# Patient Record
Sex: MALE | Race: WHITE
[De-identification: names, ages, dates, MRNs, and addresses within clinical notes are randomized per-mention and may not be internally consistent; named-entity substitution may affect disease eponyms.]

---

## 2017-02-01 RX ORDER — SPIRONOLACTONE 25 MG/1
TABLET ORAL
Qty: 90 TABLET | Refills: 0 | Status: SHIPPED | OUTPATIENT
Start: 2017-02-01 | End: 2017-04-28

## 2017-02-01 RX ORDER — ATORVASTATIN CALCIUM 80 MG/1
TABLET, FILM COATED ORAL
Qty: 90 TABLET | Refills: 0 | Status: SHIPPED | OUTPATIENT
Start: 2017-02-01 | End: 2017-10-01

## 2017-02-28 ENCOUNTER — PRIOR ORIGINAL RECORDS (OUTPATIENT)
Dept: OTHER | Age: 57
End: 2017-02-28

## 2017-03-07 ENCOUNTER — OFFICE VISIT (OUTPATIENT)
Dept: FAMILY MEDICINE CLINIC | Facility: CLINIC | Age: 57
End: 2017-03-07

## 2017-03-07 ENCOUNTER — APPOINTMENT (OUTPATIENT)
Dept: LAB | Age: 57
End: 2017-03-07
Attending: FAMILY MEDICINE

## 2017-03-07 ENCOUNTER — PRIOR ORIGINAL RECORDS (OUTPATIENT)
Dept: OTHER | Age: 57
End: 2017-03-07

## 2017-03-07 VITALS
SYSTOLIC BLOOD PRESSURE: 126 MMHG | TEMPERATURE: 99 F | RESPIRATION RATE: 20 BRPM | HEART RATE: 80 BPM | WEIGHT: 267 LBS | HEIGHT: 75 IN | DIASTOLIC BLOOD PRESSURE: 80 MMHG | BODY MASS INDEX: 33.2 KG/M2

## 2017-03-07 DIAGNOSIS — E56.9 VITAMIN DEFICIENCY: ICD-10-CM

## 2017-03-07 DIAGNOSIS — I77.810 ASCENDING AORTA DILATATION (HCC): ICD-10-CM

## 2017-03-07 DIAGNOSIS — I35.1 AORTIC VALVE INSUFFICIENCY, UNSPECIFIED ETIOLOGY: ICD-10-CM

## 2017-03-07 DIAGNOSIS — Z86.73 HISTORY OF CVA (CEREBROVASCULAR ACCIDENT): Primary | ICD-10-CM

## 2017-03-07 DIAGNOSIS — I10 ESSENTIAL HYPERTENSION: ICD-10-CM

## 2017-03-07 DIAGNOSIS — I42.0 DILATED CARDIOMYOPATHY (HCC): ICD-10-CM

## 2017-03-07 DIAGNOSIS — Z86.73 HISTORY OF CVA (CEREBROVASCULAR ACCIDENT): ICD-10-CM

## 2017-03-07 LAB
25-HYDROXYVITAMIN D (TOTAL): 41.5 NG/ML (ref 30–100)
ALBUMIN SERPL-MCNC: 3.6 G/DL (ref 3.5–4.8)
ALP LIVER SERPL-CCNC: 56 U/L (ref 45–117)
ALT SERPL-CCNC: 40 U/L (ref 17–63)
AST SERPL-CCNC: 22 U/L (ref 15–41)
BILIRUB SERPL-MCNC: 0.9 MG/DL (ref 0.1–2)
BUN BLD-MCNC: 24 MG/DL (ref 8–20)
CALCIUM BLD-MCNC: 9.5 MG/DL (ref 8.3–10.3)
CHLORIDE: 104 MMOL/L (ref 101–111)
CHOLEST SMN-MCNC: 100 MG/DL (ref ?–200)
CO2: 29 MMOL/L (ref 22–32)
CREAT BLD-MCNC: 1.22 MG/DL (ref 0.7–1.3)
EST. AVERAGE GLUCOSE BLD GHB EST-MCNC: 108 MG/DL (ref 68–126)
FREE T4: 0.8 NG/DL (ref 0.9–1.8)
GLUCOSE BLD-MCNC: 104 MG/DL (ref 70–99)
HAV AB SERPL IA-ACNC: 451 PG/ML (ref 193–986)
HAV IGM SER QL: 2.2 MG/DL (ref 1.7–3)
HBA1C MFR BLD HPLC: 5.4 % (ref ?–5.7)
HDLC SERPL-MCNC: 59 MG/DL (ref 45–?)
HDLC SERPL: 1.69 {RATIO} (ref ?–4.97)
LDLC SERPL CALC-MCNC: 27 MG/DL (ref ?–130)
M PROTEIN MFR SERPL ELPH: 7.2 G/DL (ref 6.1–8.3)
NONHDLC SERPL-MCNC: 41 MG/DL (ref ?–130)
POTASSIUM SERPL-SCNC: 4.6 MMOL/L (ref 3.6–5.1)
SODIUM SERPL-SCNC: 140 MMOL/L (ref 136–144)
TRIGLYCERIDES: 69 MG/DL (ref ?–150)
TSI SER-ACNC: 1.28 MIU/ML (ref 0.35–5.5)
VLDL: 14 MG/DL (ref 5–40)

## 2017-03-07 PROCEDURE — 84439 ASSAY OF FREE THYROXINE: CPT

## 2017-03-07 PROCEDURE — 36415 COLL VENOUS BLD VENIPUNCTURE: CPT

## 2017-03-07 PROCEDURE — 83735 ASSAY OF MAGNESIUM: CPT

## 2017-03-07 PROCEDURE — 84443 ASSAY THYROID STIM HORMONE: CPT

## 2017-03-07 PROCEDURE — 99214 OFFICE O/P EST MOD 30 MIN: CPT | Performed by: FAMILY MEDICINE

## 2017-03-07 PROCEDURE — 80061 LIPID PANEL: CPT

## 2017-03-07 PROCEDURE — 82306 VITAMIN D 25 HYDROXY: CPT

## 2017-03-07 PROCEDURE — 80053 COMPREHEN METABOLIC PANEL: CPT

## 2017-03-07 PROCEDURE — 82607 VITAMIN B-12: CPT

## 2017-03-07 PROCEDURE — 83036 HEMOGLOBIN GLYCOSYLATED A1C: CPT

## 2017-04-07 ENCOUNTER — OFFICE VISIT (OUTPATIENT)
Dept: FAMILY MEDICINE CLINIC | Facility: CLINIC | Age: 57
End: 2017-04-07

## 2017-04-07 VITALS
WEIGHT: 268 LBS | HEIGHT: 75 IN | DIASTOLIC BLOOD PRESSURE: 80 MMHG | TEMPERATURE: 99 F | RESPIRATION RATE: 18 BRPM | HEART RATE: 82 BPM | BODY MASS INDEX: 33.32 KG/M2 | SYSTOLIC BLOOD PRESSURE: 120 MMHG

## 2017-04-07 DIAGNOSIS — I10 ESSENTIAL HYPERTENSION: Primary | ICD-10-CM

## 2017-04-07 PROCEDURE — 99213 OFFICE O/P EST LOW 20 MIN: CPT | Performed by: FAMILY MEDICINE

## 2017-04-07 NOTE — PROGRESS NOTES
Scooter Davis is a 65year old male here for CVA / HTN follow up   HPI:     Pt had a CVA 3/3/15. Pt had a cerebellar CVA     Pt is working at home and working out regularly and eating healthy.     Pt has bicupid aortic valve, cardiomyopathy and dilated ao GENERAL: feels well otherwise  SKIN: denies any unusual skin lesions  EYES:denies blurred vision or double vision  HEENT: denies nasal congestion, sinus pain or ST  LUNGS: denies shortness of breath with exertion  CARDIOVASCULAR: denies chest pain on exe

## 2017-04-28 RX ORDER — SPIRONOLACTONE 25 MG/1
TABLET ORAL
Qty: 90 TABLET | Refills: 0 | Status: SHIPPED | OUTPATIENT
Start: 2017-04-28 | End: 2017-09-24

## 2017-08-14 ENCOUNTER — OFFICE VISIT (OUTPATIENT)
Dept: FAMILY MEDICINE CLINIC | Facility: CLINIC | Age: 57
End: 2017-08-14

## 2017-08-14 VITALS
HEIGHT: 75 IN | BODY MASS INDEX: 32.33 KG/M2 | OXYGEN SATURATION: 99 % | RESPIRATION RATE: 20 BRPM | WEIGHT: 260 LBS | DIASTOLIC BLOOD PRESSURE: 80 MMHG | TEMPERATURE: 98 F | HEART RATE: 60 BPM | SYSTOLIC BLOOD PRESSURE: 130 MMHG

## 2017-08-14 DIAGNOSIS — I10 ESSENTIAL HYPERTENSION: ICD-10-CM

## 2017-08-14 DIAGNOSIS — Z86.73 HISTORY OF CVA (CEREBROVASCULAR ACCIDENT): Primary | ICD-10-CM

## 2017-08-14 DIAGNOSIS — I42.0 DILATED CARDIOMYOPATHY (HCC): ICD-10-CM

## 2017-08-14 PROCEDURE — 99213 OFFICE O/P EST LOW 20 MIN: CPT | Performed by: FAMILY MEDICINE

## 2017-08-14 RX ORDER — HYDROCHLOROTHIAZIDE 12.5 MG/1
12.5 TABLET ORAL DAILY
COMMUNITY
End: 2018-02-08

## 2017-08-14 NOTE — PROGRESS NOTES
Molly De La Cruz is a 65year old male here for CVA / HTN follow up   HPI:     Pt had a CVA 3/3/15. Pt had a cerebellar CVA     Pt is working at home and working out regularly and eating healthy. Pt is happy to have lost weight.      Pt has bicupid aortic 1.0 oz/week     Cans of beer: 2 per week     Occ: . : . Children: .    Exercise: minimal.  Diet: watches fats closely     REVIEW OF SYSTEMS:   GENERAL: feels well otherwise  SKIN: denies any unusual skin lesions  EYES:denies blurred vision or double

## 2017-08-17 ENCOUNTER — PRIOR ORIGINAL RECORDS (OUTPATIENT)
Dept: OTHER | Age: 57
End: 2017-08-17

## 2017-08-17 ENCOUNTER — APPOINTMENT (OUTPATIENT)
Dept: LAB | Age: 57
End: 2017-08-17
Attending: FAMILY MEDICINE
Payer: COMMERCIAL

## 2017-08-17 DIAGNOSIS — Z86.73 HISTORY OF CVA (CEREBROVASCULAR ACCIDENT): ICD-10-CM

## 2017-08-17 DIAGNOSIS — I10 ESSENTIAL HYPERTENSION: ICD-10-CM

## 2017-08-17 DIAGNOSIS — I42.0 DILATED CARDIOMYOPATHY (HCC): ICD-10-CM

## 2017-08-17 LAB
ALBUMIN SERPL-MCNC: 3.7 G/DL (ref 3.5–4.8)
ALP LIVER SERPL-CCNC: 51 U/L (ref 45–117)
ALT SERPL-CCNC: 27 U/L (ref 17–63)
AST SERPL-CCNC: 22 U/L (ref 15–41)
BILIRUB SERPL-MCNC: 0.8 MG/DL (ref 0.1–2)
BUN BLD-MCNC: 24 MG/DL (ref 8–20)
CALCIUM BLD-MCNC: 9.1 MG/DL (ref 8.3–10.3)
CHLORIDE: 106 MMOL/L (ref 101–111)
CHOLEST SMN-MCNC: 97 MG/DL (ref ?–200)
CO2: 29 MMOL/L (ref 22–32)
CREAT BLD-MCNC: 1.14 MG/DL (ref 0.7–1.3)
GLUCOSE BLD-MCNC: 93 MG/DL (ref 70–99)
HDLC SERPL-MCNC: 45 MG/DL (ref 45–?)
HDLC SERPL: 2.16 {RATIO} (ref ?–4.97)
LDLC SERPL CALC-MCNC: 38 MG/DL (ref ?–130)
LDLC SERPL-MCNC: 14 MG/DL (ref 5–40)
M PROTEIN MFR SERPL ELPH: 6.7 G/DL (ref 6.1–8.3)
NONHDLC SERPL-MCNC: 52 MG/DL (ref ?–130)
POTASSIUM SERPL-SCNC: 4.3 MMOL/L (ref 3.6–5.1)
SODIUM SERPL-SCNC: 140 MMOL/L (ref 136–144)
TRIGLYCERIDES: 72 MG/DL (ref ?–150)

## 2017-08-17 PROCEDURE — 36415 COLL VENOUS BLD VENIPUNCTURE: CPT

## 2017-08-17 PROCEDURE — 80061 LIPID PANEL: CPT

## 2017-08-17 PROCEDURE — 80053 COMPREHEN METABOLIC PANEL: CPT

## 2017-08-31 ENCOUNTER — HOSPITAL ENCOUNTER (OUTPATIENT)
Dept: CT IMAGING | Facility: HOSPITAL | Age: 57
Discharge: HOME | End: 2017-08-31
Attending: INTERNAL MEDICINE
Payer: COMMERCIAL

## 2017-08-31 DIAGNOSIS — I71.2: ICD-10-CM

## 2017-08-31 DIAGNOSIS — Q23.1 CONGENITAL AORTIC INSUFFICIENCY: ICD-10-CM

## 2017-08-31 PROCEDURE — 71275 CT ANGIOGRAPHY CHEST: CPT | Performed by: INTERNAL MEDICINE

## 2017-09-07 ENCOUNTER — PRIOR ORIGINAL RECORDS (OUTPATIENT)
Dept: OTHER | Age: 57
End: 2017-09-07

## 2017-09-07 RX ORDER — HYDROCHLOROTHIAZIDE 12.5 MG/1
CAPSULE, GELATIN COATED ORAL
Qty: 180 CAPSULE | Refills: 0 | Status: SHIPPED | OUTPATIENT
Start: 2017-09-07 | End: 2018-02-08

## 2017-09-25 RX ORDER — SPIRONOLACTONE 25 MG/1
TABLET ORAL
Qty: 90 TABLET | Refills: 1 | Status: SHIPPED | OUTPATIENT
Start: 2017-09-25 | End: 2017-10-20

## 2017-09-26 ENCOUNTER — MYAURORA ACCOUNT LINK (OUTPATIENT)
Dept: OTHER | Age: 57
End: 2017-09-26

## 2017-09-26 ENCOUNTER — HOSPITAL ENCOUNTER (OUTPATIENT)
Dept: CV DIAGNOSTICS | Facility: HOSPITAL | Age: 57
Discharge: HOME | End: 2017-09-26
Attending: INTERNAL MEDICINE

## 2017-09-26 DIAGNOSIS — I71.2 THORACIC ASCENDING AORTIC ANEURYSM (HCC): ICD-10-CM

## 2017-09-26 DIAGNOSIS — Q23.1 BICUSPID AORTIC VALVE: ICD-10-CM

## 2017-09-28 ENCOUNTER — APPOINTMENT (OUTPATIENT)
Dept: OTHER | Facility: HOSPITAL | Age: 57
End: 2017-09-28
Attending: PREVENTIVE MEDICINE

## 2017-09-29 LAB
ALBUMIN: 3.7 G/DL
ALKALINE PHOSPHATATE(ALK PHOS): 51 IU/L
BILIRUBIN TOTAL: 0.8 MG/DL
BUN: 24 MG/DL
CALCIUM: 9.1 MG/DL
CHLORIDE: 106 MEQ/L
CHOLESTEROL, TOTAL: 97 MG/DL
CREATININE, SERUM: 1.14 MG/DL
GLUCOSE: 93 MG/DL
HDL CHOLESTEROL: 45 MG/DL
LDL CHOLESTEROL: 38 MG/DL
MAGNESIUM: 2.2 MG/DL
POTASSIUM, SERUM: 4.3 MEQ/L
PROTEIN, TOTAL: 6.7 G/DL
SGOT (AST): 22 IU/L
SGPT (ALT): 27 IU/L
SODIUM: 140 MEQ/L
TRIGLYCERIDES: 72 MG/DL

## 2017-10-03 ENCOUNTER — PRIOR ORIGINAL RECORDS (OUTPATIENT)
Dept: OTHER | Age: 57
End: 2017-10-03

## 2017-10-03 RX ORDER — ATORVASTATIN CALCIUM 80 MG/1
TABLET, FILM COATED ORAL
Qty: 90 TABLET | Refills: 0 | OUTPATIENT
Start: 2017-10-03

## 2017-10-03 RX ORDER — ATORVASTATIN CALCIUM 80 MG/1
TABLET, FILM COATED ORAL
Qty: 30 TABLET | Refills: 0 | Status: SHIPPED | OUTPATIENT
Start: 2017-10-03 | End: 2017-10-20

## 2017-10-20 RX ORDER — ATORVASTATIN CALCIUM 80 MG/1
TABLET, FILM COATED ORAL
Qty: 90 TABLET | Refills: 0 | Status: SHIPPED | OUTPATIENT
Start: 2017-10-20 | End: 2017-12-13

## 2017-10-20 RX ORDER — CARVEDILOL 25 MG/1
TABLET ORAL
Qty: 135 TABLET | Refills: 0 | Status: SHIPPED | OUTPATIENT
Start: 2017-10-20 | End: 2017-12-13

## 2017-10-20 RX ORDER — LISINOPRIL 40 MG/1
TABLET ORAL
Qty: 90 TABLET | Refills: 0 | Status: SHIPPED | OUTPATIENT
Start: 2017-10-20 | End: 2017-12-13

## 2017-10-20 RX ORDER — SPIRONOLACTONE 25 MG/1
25 TABLET ORAL
Qty: 90 TABLET | Refills: 0 | Status: SHIPPED | OUTPATIENT
Start: 2017-10-20 | End: 2017-12-13

## 2017-12-14 RX ORDER — LISINOPRIL 40 MG/1
TABLET ORAL
Qty: 90 TABLET | Refills: 0 | Status: SHIPPED | OUTPATIENT
Start: 2017-12-14 | End: 2018-02-15

## 2017-12-14 RX ORDER — ATORVASTATIN CALCIUM 80 MG/1
TABLET, FILM COATED ORAL
Qty: 90 TABLET | Refills: 0 | Status: SHIPPED | OUTPATIENT
Start: 2017-12-14 | End: 2018-02-15

## 2017-12-14 RX ORDER — SPIRONOLACTONE 25 MG/1
TABLET ORAL
Qty: 90 TABLET | Refills: 0 | Status: SHIPPED | OUTPATIENT
Start: 2017-12-14 | End: 2018-02-15

## 2017-12-14 RX ORDER — CARVEDILOL 25 MG/1
TABLET ORAL
Qty: 135 TABLET | Refills: 0 | Status: SHIPPED | OUTPATIENT
Start: 2017-12-14 | End: 2018-02-15

## 2018-02-08 ENCOUNTER — TELEPHONE (OUTPATIENT)
Dept: FAMILY MEDICINE CLINIC | Facility: CLINIC | Age: 58
End: 2018-02-08

## 2018-02-08 RX ORDER — HYDROCHLOROTHIAZIDE 12.5 MG/1
12.5 TABLET ORAL DAILY
Qty: 90 TABLET | Refills: 0 | Status: SHIPPED | OUTPATIENT
Start: 2018-02-08 | End: 2018-04-03

## 2018-02-08 RX ORDER — HYDROCHLOROTHIAZIDE 12.5 MG/1
CAPSULE, GELATIN COATED ORAL
Qty: 30 CAPSULE | Refills: 0 | Status: SHIPPED | OUTPATIENT
Start: 2018-02-08 | End: 2018-02-15

## 2018-02-08 NOTE — TELEPHONE ENCOUNTER
Patient stopped at , needs refill on HCTZ 12.5mgs  The directions on label of his med bottle states 2 pills daily, he states he only takes 1 pill daily.        He is out of medication would like 30 days at [place] and then he would like us to se

## 2018-02-08 NOTE — TELEPHONE ENCOUNTER
Patient stopped at , needs refill on HCTZ 12.5mgs  The directions on label of his med bottle states 2 pills daily, he states he only takes 1 pill daily.        He is out of medication would like 30 days at [place] and then he would like us to s

## 2018-02-15 ENCOUNTER — APPOINTMENT (OUTPATIENT)
Dept: LAB | Age: 58
End: 2018-02-15
Attending: FAMILY MEDICINE
Payer: COMMERCIAL

## 2018-02-15 ENCOUNTER — PRIOR ORIGINAL RECORDS (OUTPATIENT)
Dept: OTHER | Age: 58
End: 2018-02-15

## 2018-02-15 ENCOUNTER — OFFICE VISIT (OUTPATIENT)
Dept: FAMILY MEDICINE CLINIC | Facility: CLINIC | Age: 58
End: 2018-02-15

## 2018-02-15 VITALS
HEART RATE: 72 BPM | WEIGHT: 249 LBS | DIASTOLIC BLOOD PRESSURE: 70 MMHG | SYSTOLIC BLOOD PRESSURE: 120 MMHG | TEMPERATURE: 99 F | BODY MASS INDEX: 30.96 KG/M2 | RESPIRATION RATE: 20 BRPM | HEIGHT: 75 IN | OXYGEN SATURATION: 98 %

## 2018-02-15 DIAGNOSIS — E55.9 VITAMIN D DEFICIENCY: ICD-10-CM

## 2018-02-15 DIAGNOSIS — I10 ESSENTIAL HYPERTENSION: ICD-10-CM

## 2018-02-15 DIAGNOSIS — Z86.73 HISTORY OF CVA (CEREBROVASCULAR ACCIDENT): ICD-10-CM

## 2018-02-15 DIAGNOSIS — E56.9 VITAMIN DEFICIENCY: ICD-10-CM

## 2018-02-15 DIAGNOSIS — Z12.5 SCREENING PSA (PROSTATE SPECIFIC ANTIGEN): ICD-10-CM

## 2018-02-15 DIAGNOSIS — I35.1 AORTIC VALVE INSUFFICIENCY, ETIOLOGY OF CARDIAC VALVE DISEASE UNSPECIFIED: ICD-10-CM

## 2018-02-15 DIAGNOSIS — E53.8 VITAMIN B 12 DEFICIENCY: ICD-10-CM

## 2018-02-15 DIAGNOSIS — Z12.11 COLON CANCER SCREENING: ICD-10-CM

## 2018-02-15 DIAGNOSIS — I77.810 ASCENDING AORTA DILATATION (HCC): Primary | ICD-10-CM

## 2018-02-15 DIAGNOSIS — I42.0 DILATED CARDIOMYOPATHY (HCC): ICD-10-CM

## 2018-02-15 LAB
25-HYDROXYVITAMIN D (TOTAL): 36.8 NG/ML (ref 30–100)
ALBUMIN SERPL-MCNC: 4 G/DL (ref 3.5–4.8)
ALP LIVER SERPL-CCNC: 49 U/L (ref 45–117)
ALT SERPL-CCNC: 29 U/L (ref 17–63)
AST SERPL-CCNC: 22 U/L (ref 15–41)
BILIRUB SERPL-MCNC: 0.9 MG/DL (ref 0.1–2)
BUN BLD-MCNC: 23 MG/DL (ref 8–20)
CALCIUM BLD-MCNC: 9.4 MG/DL (ref 8.3–10.3)
CHLORIDE: 100 MMOL/L (ref 101–111)
CHOLEST SMN-MCNC: 139 MG/DL (ref ?–200)
CO2: 30 MMOL/L (ref 22–32)
CREAT BLD-MCNC: 1.32 MG/DL (ref 0.7–1.3)
EST. AVERAGE GLUCOSE BLD GHB EST-MCNC: 120 MG/DL (ref 68–126)
FREE T4: 0.7 NG/DL (ref 0.9–1.8)
GLUCOSE BLD-MCNC: 102 MG/DL (ref 70–99)
HAV AB SERPL IA-ACNC: 518 PG/ML (ref 193–986)
HBA1C MFR BLD HPLC: 5.8 % (ref ?–5.7)
HDLC SERPL-MCNC: 52 MG/DL (ref 45–?)
HDLC SERPL: 2.67 {RATIO} (ref ?–4.97)
LDLC SERPL CALC-MCNC: 69 MG/DL (ref ?–130)
M PROTEIN MFR SERPL ELPH: 7.7 G/DL (ref 6.1–8.3)
NONHDLC SERPL-MCNC: 87 MG/DL (ref ?–130)
POTASSIUM SERPL-SCNC: 4.7 MMOL/L (ref 3.6–5.1)
PSA SERPL-MCNC: 0.77 NG/ML (ref 0.01–4)
SODIUM SERPL-SCNC: 136 MMOL/L (ref 136–144)
TRIGL SERPL-MCNC: 89 MG/DL (ref ?–150)
TSI SER-ACNC: 1.4 MIU/ML (ref 0.35–5.5)
VLDLC SERPL CALC-MCNC: 18 MG/DL (ref 5–40)

## 2018-02-15 PROCEDURE — 84443 ASSAY THYROID STIM HORMONE: CPT

## 2018-02-15 PROCEDURE — 36415 COLL VENOUS BLD VENIPUNCTURE: CPT

## 2018-02-15 PROCEDURE — 83036 HEMOGLOBIN GLYCOSYLATED A1C: CPT

## 2018-02-15 PROCEDURE — 80053 COMPREHEN METABOLIC PANEL: CPT

## 2018-02-15 PROCEDURE — 84439 ASSAY OF FREE THYROXINE: CPT

## 2018-02-15 PROCEDURE — 99214 OFFICE O/P EST MOD 30 MIN: CPT | Performed by: FAMILY MEDICINE

## 2018-02-15 PROCEDURE — 82306 VITAMIN D 25 HYDROXY: CPT

## 2018-02-15 PROCEDURE — 82607 VITAMIN B-12: CPT

## 2018-02-15 PROCEDURE — 80061 LIPID PANEL: CPT

## 2018-02-15 PROCEDURE — 84153 ASSAY OF PSA TOTAL: CPT

## 2018-02-15 RX ORDER — ATORVASTATIN CALCIUM 80 MG/1
TABLET, FILM COATED ORAL
Qty: 90 TABLET | Refills: 1 | Status: SHIPPED | OUTPATIENT
Start: 2018-02-15 | End: 2019-05-01

## 2018-02-15 RX ORDER — CARVEDILOL 25 MG/1
TABLET ORAL
Qty: 135 TABLET | Refills: 1 | Status: SHIPPED | OUTPATIENT
Start: 2018-02-15 | End: 2018-11-02

## 2018-02-15 RX ORDER — SPIRONOLACTONE 25 MG/1
25 TABLET ORAL
Qty: 90 TABLET | Refills: 1 | Status: SHIPPED | OUTPATIENT
Start: 2018-02-15 | End: 2018-10-05

## 2018-02-15 RX ORDER — LISINOPRIL 40 MG/1
40 TABLET ORAL
Qty: 90 TABLET | Refills: 1 | Status: SHIPPED | OUTPATIENT
Start: 2018-02-15 | End: 2018-08-26

## 2018-02-15 NOTE — PROGRESS NOTES
Rohan Carl is a 65year old male here for CVA / HTN follow up   HPI:     Pt had a CVA 3/3/15. Pt had a cerebellar CVA     Pt is working at home and working out regularly and eating healthy. Pt is happy to have lost more weight.      Pt has bicupid aor 2 per week     Occ: . : . Children: .    Exercise: minimal.  Diet: watches fats closely     REVIEW OF SYSTEMS:   GENERAL: feels well otherwise  SKIN: denies any unusual skin lesions  EYES:denies blurred vision or double vision  HEENT: denies nasal co unspecified    - spironolactone 25 MG Oral Tab; Take 1 tablet (25 mg total) by mouth once daily. Dispense: 90 tablet;  Refill: 1  - carvedilol 25 MG Oral Tab; TAKE ONE-HALF TABLET BY  MOUTH IN THE MORNING AND 1  TABLET IN THE EVENING  Dispense: 135 tablet;

## 2018-02-19 DIAGNOSIS — R73.09 ELEVATED GLUCOSE: Primary | ICD-10-CM

## 2018-03-26 ENCOUNTER — TELEPHONE (OUTPATIENT)
Dept: FAMILY MEDICINE CLINIC | Facility: CLINIC | Age: 58
End: 2018-03-26

## 2018-03-26 DIAGNOSIS — Z86.73 HX OF COMPLETED STROKE: Primary | ICD-10-CM

## 2018-03-26 NOTE — TELEPHONE ENCOUNTER
PLs enter a f/u referral for Dr Lauren Wei  (Cardiology)      He sees him 2X a year for f/u on his stroke.

## 2018-04-02 ENCOUNTER — TELEPHONE (OUTPATIENT)
Dept: FAMILY MEDICINE CLINIC | Facility: CLINIC | Age: 58
End: 2018-04-02

## 2018-04-02 DIAGNOSIS — H25.019 CORTICAL AGE-RELATED CATARACT, UNSPECIFIED LATERALITY: Primary | ICD-10-CM

## 2018-04-02 NOTE — TELEPHONE ENCOUNTER
PT HAS CATARACT IN R EYE AND  NEEDS REFERRAL TO DR Kanwal Gutierrez. HAS [facility] AND WILL NEED A REFERRAL. PLS ENTER A REFERRAL IN SYSTEM.

## 2018-04-03 ENCOUNTER — OFFICE VISIT (OUTPATIENT)
Dept: SURGERY | Facility: CLINIC | Age: 58
End: 2018-04-03

## 2018-04-03 VITALS
HEART RATE: 61 BPM | TEMPERATURE: 98 F | SYSTOLIC BLOOD PRESSURE: 153 MMHG | WEIGHT: 247 LBS | BODY MASS INDEX: 30.71 KG/M2 | DIASTOLIC BLOOD PRESSURE: 97 MMHG | HEIGHT: 75 IN

## 2018-04-03 DIAGNOSIS — Z86.73 HISTORY OF CVA (CEREBROVASCULAR ACCIDENT): ICD-10-CM

## 2018-04-03 DIAGNOSIS — Z12.11 ENCOUNTER FOR SCREENING COLONOSCOPY: Primary | ICD-10-CM

## 2018-04-03 DIAGNOSIS — I63.49 CEREBRAL INFARCTION DUE TO EMBOLISM OF OTHER CEREBRAL ARTERY (HCC): ICD-10-CM

## 2018-04-03 DIAGNOSIS — I10 ESSENTIAL HYPERTENSION: ICD-10-CM

## 2018-04-03 PROCEDURE — 99243 OFF/OP CNSLTJ NEW/EST LOW 30: CPT | Performed by: COLON & RECTAL SURGERY

## 2018-04-03 RX ORDER — HYDROCHLOROTHIAZIDE 12.5 MG/1
TABLET ORAL
Qty: 90 TABLET | Refills: 0 | Status: SHIPPED | OUTPATIENT
Start: 2018-04-03 | End: 2018-06-10

## 2018-04-03 NOTE — PATIENT INSTRUCTIONS
This patient presents at the referral of his primary care physician, Dr. Bg Candelaria. The patient has never had a colonoscopy. He denies any symptoms such as black or tarry stools, bright red blood per rectum, or blood with wiping.   He has no issues with kirk

## 2018-04-03 NOTE — H&P
New Patient Visit Note       Active Problems      1. Encounter for screening colonoscopy    2. History of CVA (cerebrovascular accident)    3. Essential hypertension    4.  Cerebral infarction due to embolism of other cerebral artery [facility])        Chief Comp The patient now follows with a cardiologist and is diligent with his medications. He takes antihypertensives and medications for his cholesterol. He also takes a daily baby aspirin. His cardiologist is Dr. Romel Reddy.   The patient has never had any surgery 1.0 oz/week       Cans of beer: 2 per week    Drug use: No            Other Topics            Concern  Caffeine Concern        No    Comment:occasionally  Exercise                No         Current Outpatient Prescriptions:  HYDROCHLOROTHIAZIDE 12.5 MG and bowel sounds are normal. He exhibits no distension, no fluid wave, no ascites, no pulsatile midline mass and no mass. There is no splenomegaly or hepatomegaly. There is no tenderness.  There is no rigidity, no rebound, no guarding, no CVA tenderness, no consists of a CVA which occurred in March 2015. The patient states that this was secondary to undertreated hypertension. The patient now follows with a cardiologist and is diligent with his medications.   He takes antihypertensives and medications for his

## 2018-04-04 RX ORDER — POLYETHYLENE GLYCOL 3350, SODIUM CHLORIDE, SODIUM BICARBONATE, POTASSIUM CHLORIDE 420; 11.2; 5.72; 1.48 G/4L; G/4L; G/4L; G/4L
POWDER, FOR SOLUTION ORAL
Qty: 1 BOTTLE | Refills: 0 | Status: SHIPPED | OUTPATIENT
Start: 2018-04-04 | End: 2018-04-06

## 2018-04-06 ENCOUNTER — OFFICE VISIT (OUTPATIENT)
Dept: FAMILY MEDICINE CLINIC | Facility: CLINIC | Age: 58
End: 2018-04-06

## 2018-04-06 VITALS
SYSTOLIC BLOOD PRESSURE: 126 MMHG | RESPIRATION RATE: 16 BRPM | WEIGHT: 247 LBS | DIASTOLIC BLOOD PRESSURE: 88 MMHG | BODY MASS INDEX: 30.71 KG/M2 | TEMPERATURE: 99 F | HEIGHT: 75 IN | HEART RATE: 66 BPM

## 2018-04-06 DIAGNOSIS — H26.9 CATARACT OF RIGHT EYE, UNSPECIFIED CATARACT TYPE: ICD-10-CM

## 2018-04-06 DIAGNOSIS — Q23.1 BICUSPID AORTIC VALVE: ICD-10-CM

## 2018-04-06 DIAGNOSIS — I10 ESSENTIAL HYPERTENSION: ICD-10-CM

## 2018-04-06 DIAGNOSIS — Z01.818 PREOP EXAMINATION: Primary | ICD-10-CM

## 2018-04-06 DIAGNOSIS — Z86.73 HISTORY OF CARDIOEMBOLIC CEREBROVASCULAR ACCIDENT (CVA): ICD-10-CM

## 2018-04-06 DIAGNOSIS — R93.1 DECREASED CARDIAC EJECTION FRACTION: ICD-10-CM

## 2018-04-06 PROCEDURE — 99244 OFF/OP CNSLTJ NEW/EST MOD 40: CPT | Performed by: FAMILY MEDICINE

## 2018-04-06 NOTE — PROGRESS NOTES
HPI:    Patient ID: Nigel Klinefelter is a 63year old male. Pt came in for a Pre-Op exam for cataract eye surgery of the right eye. Pre-Op was requested by Dr. Skylar Golden (who will also be performing the procedure). This procedure will take place on 4/17/18.     H reactive to light. Neck: Normal range of motion. Neck supple. No thyromegaly present. Cardiovascular: Normal rate, regular rhythm and intact distal pulses. Murmur heard.   Systolic heart murmur +2   Pulmonary/Chest: Effort normal and breath sounds no

## 2018-04-16 ENCOUNTER — APPOINTMENT (OUTPATIENT)
Dept: OTHER | Facility: HOSPITAL | Age: 58
End: 2018-04-16
Attending: PREVENTIVE MEDICINE

## 2018-05-24 ENCOUNTER — PATIENT MESSAGE (OUTPATIENT)
Dept: FAMILY MEDICINE CLINIC | Facility: CLINIC | Age: 58
End: 2018-05-24

## 2018-05-24 NOTE — TELEPHONE ENCOUNTER
From: Mariel Paul  To: Aaron Salcido DO  Sent: 5/24/2018 4:15 AM CDT  Subject: Non-Urgent Medical Question    Hi Dr. Josette Zuniga here . .. when I last visited on Feb. 15th you wanted me to do a follow up blood work in 90 days.  I'd like to take care

## 2018-05-25 ENCOUNTER — APPOINTMENT (OUTPATIENT)
Dept: LAB | Age: 58
End: 2018-05-25
Attending: FAMILY MEDICINE
Payer: COMMERCIAL

## 2018-05-25 ENCOUNTER — PRIOR ORIGINAL RECORDS (OUTPATIENT)
Dept: OTHER | Age: 58
End: 2018-05-25

## 2018-05-25 DIAGNOSIS — R73.09 ELEVATED GLUCOSE: ICD-10-CM

## 2018-05-25 PROCEDURE — 36415 COLL VENOUS BLD VENIPUNCTURE: CPT

## 2018-05-25 PROCEDURE — 80053 COMPREHEN METABOLIC PANEL: CPT

## 2018-05-25 PROCEDURE — 83036 HEMOGLOBIN GLYCOSYLATED A1C: CPT

## 2018-06-11 RX ORDER — HYDROCHLOROTHIAZIDE 12.5 MG/1
TABLET ORAL
Qty: 90 TABLET | Refills: 0 | Status: SHIPPED | OUTPATIENT
Start: 2018-06-11 | End: 2018-10-05

## 2018-06-19 ENCOUNTER — PRIOR ORIGINAL RECORDS (OUTPATIENT)
Dept: OTHER | Age: 58
End: 2018-06-19

## 2018-07-05 LAB
ALBUMIN: 3.9 G/DL
ALKALINE PHOSPHATATE(ALK PHOS): 42 IU/L
BILIRUBIN TOTAL: 0.9 MG/DL
BUN: 22 MG/DL
CALCIUM: 8.9 MG/DL
CHLORIDE: 106 MEQ/L
CHOLESTEROL, TOTAL: 139 MG/DL
CREATININE, SERUM: 1.18 MG/DL
GLUCOSE: 78 MG/DL
HDL CHOLESTEROL: 52 MG/DL
HEMOGLOBIN A1C: 5.4 %
LDL CHOLESTEROL: 69 MG/DL
POTASSIUM, SERUM: 4 MEQ/L
PROTEIN, TOTAL: 7.1 G/DL
SGOT (AST): 34 IU/L
SGPT (ALT): 36 IU/L
SODIUM: 140 MEQ/L
TRIGLYCERIDES: 89 MG/DL
VITAMIN D 25-OH: 36.8 NG/ML

## 2018-08-26 DIAGNOSIS — I77.810 ASCENDING AORTA DILATATION (HCC): ICD-10-CM

## 2018-08-26 DIAGNOSIS — I35.1 AORTIC VALVE INSUFFICIENCY, ETIOLOGY OF CARDIAC VALVE DISEASE UNSPECIFIED: ICD-10-CM

## 2018-08-26 DIAGNOSIS — I10 ESSENTIAL HYPERTENSION: ICD-10-CM

## 2018-08-26 DIAGNOSIS — I42.0 DILATED CARDIOMYOPATHY (HCC): ICD-10-CM

## 2018-08-27 RX ORDER — LISINOPRIL 40 MG/1
TABLET ORAL
Qty: 90 TABLET | Refills: 0 | Status: SHIPPED | OUTPATIENT
Start: 2018-08-27 | End: 2018-11-15

## 2018-09-06 ENCOUNTER — OFFICE VISIT (OUTPATIENT)
Dept: FAMILY MEDICINE CLINIC | Facility: CLINIC | Age: 58
End: 2018-09-06
Payer: COMMERCIAL

## 2018-09-06 ENCOUNTER — LAB ENCOUNTER (OUTPATIENT)
Dept: LAB | Age: 58
End: 2018-09-06
Attending: FAMILY MEDICINE
Payer: COMMERCIAL

## 2018-09-06 VITALS
DIASTOLIC BLOOD PRESSURE: 82 MMHG | SYSTOLIC BLOOD PRESSURE: 134 MMHG | WEIGHT: 251 LBS | HEART RATE: 84 BPM | HEIGHT: 75 IN | TEMPERATURE: 99 F | RESPIRATION RATE: 16 BRPM | BODY MASS INDEX: 31.21 KG/M2

## 2018-09-06 DIAGNOSIS — L82.1 SEBORRHEIC KERATOSES: ICD-10-CM

## 2018-09-06 DIAGNOSIS — I42.0 DILATED CARDIOMYOPATHY (HCC): ICD-10-CM

## 2018-09-06 DIAGNOSIS — I35.1 AORTIC VALVE INSUFFICIENCY, ETIOLOGY OF CARDIAC VALVE DISEASE UNSPECIFIED: ICD-10-CM

## 2018-09-06 DIAGNOSIS — Z86.73 HISTORY OF CVA (CEREBROVASCULAR ACCIDENT): ICD-10-CM

## 2018-09-06 DIAGNOSIS — Z00.00 ANNUAL PHYSICAL EXAM: Primary | ICD-10-CM

## 2018-09-06 DIAGNOSIS — Z00.00 ANNUAL PHYSICAL EXAM: ICD-10-CM

## 2018-09-06 DIAGNOSIS — I77.810 ASCENDING AORTA DILATATION (HCC): ICD-10-CM

## 2018-09-06 LAB
ALBUMIN SERPL-MCNC: 3.9 G/DL (ref 3.5–4.8)
ALBUMIN/GLOB SERPL: 1.1 {RATIO} (ref 1–2)
ALP LIVER SERPL-CCNC: 52 U/L (ref 45–117)
ALT SERPL-CCNC: 49 U/L (ref 17–63)
ANION GAP SERPL CALC-SCNC: 6 MMOL/L (ref 0–18)
AST SERPL-CCNC: 33 U/L (ref 15–41)
BASOPHILS # BLD AUTO: 0.07 X10(3) UL (ref 0–0.1)
BASOPHILS NFR BLD AUTO: 0.8 %
BILIRUB SERPL-MCNC: 1.4 MG/DL (ref 0.1–2)
BUN BLD-MCNC: 26 MG/DL (ref 8–20)
BUN/CREAT SERPL: 19.5 (ref 10–20)
CALCIUM BLD-MCNC: 9.4 MG/DL (ref 8.3–10.3)
CHLORIDE SERPL-SCNC: 106 MMOL/L (ref 101–111)
CHOLEST SMN-MCNC: 110 MG/DL (ref ?–200)
CO2 SERPL-SCNC: 29 MMOL/L (ref 22–32)
COMPLEXED PSA SERPL-MCNC: 0.72 NG/ML (ref 0.01–4)
CREAT BLD-MCNC: 1.33 MG/DL (ref 0.7–1.3)
EOSINOPHIL # BLD AUTO: 0.48 X10(3) UL (ref 0–0.3)
EOSINOPHIL NFR BLD AUTO: 5.6 %
ERYTHROCYTE [DISTWIDTH] IN BLOOD BY AUTOMATED COUNT: 12.8 % (ref 11.5–16)
EST. AVERAGE GLUCOSE BLD GHB EST-MCNC: 114 MG/DL (ref 68–126)
GLOBULIN PLAS-MCNC: 3.4 G/DL (ref 2.5–4)
GLUCOSE BLD-MCNC: 102 MG/DL (ref 70–99)
HAV AB SERPL IA-ACNC: 487 PG/ML (ref 193–986)
HBA1C MFR BLD HPLC: 5.6 % (ref ?–5.7)
HCT VFR BLD AUTO: 47 % (ref 37–53)
HDLC SERPL-MCNC: 48 MG/DL (ref 40–59)
HGB BLD-MCNC: 15.5 G/DL (ref 13–17)
IMMATURE GRANULOCYTE COUNT: 0.03 X10(3) UL (ref 0–1)
IMMATURE GRANULOCYTE RATIO %: 0.4 %
LDLC SERPL CALC-MCNC: 48 MG/DL (ref ?–100)
LYMPHOCYTES # BLD AUTO: 3.32 X10(3) UL (ref 0.9–4)
LYMPHOCYTES NFR BLD AUTO: 39 %
M PROTEIN MFR SERPL ELPH: 7.3 G/DL (ref 6.1–8.3)
MCH RBC QN AUTO: 32.2 PG (ref 27–33.2)
MCHC RBC AUTO-ENTMCNC: 33 G/DL (ref 31–37)
MCV RBC AUTO: 97.7 FL (ref 80–99)
MONOCYTES # BLD AUTO: 0.76 X10(3) UL (ref 0.1–1)
MONOCYTES NFR BLD AUTO: 8.9 %
NEUTROPHIL ABS PRELIM: 3.85 X10 (3) UL (ref 1.3–6.7)
NEUTROPHILS # BLD AUTO: 3.85 X10(3) UL (ref 1.3–6.7)
NEUTROPHILS NFR BLD AUTO: 45.3 %
NONHDLC SERPL-MCNC: 62 MG/DL (ref ?–130)
OSMOLALITY SERPL CALC.SUM OF ELEC: 297 MOSM/KG (ref 275–295)
PLATELET # BLD AUTO: 203 10(3)UL (ref 150–450)
POTASSIUM SERPL-SCNC: 5 MMOL/L (ref 3.6–5.1)
RBC # BLD AUTO: 4.81 X10(6)UL (ref 4.3–5.7)
RED CELL DISTRIBUTION WIDTH-SD: 45.9 FL (ref 35.1–46.3)
SODIUM SERPL-SCNC: 141 MMOL/L (ref 136–144)
T4 FREE SERPL-MCNC: 0.7 NG/DL (ref 0.9–1.8)
TRIGL SERPL-MCNC: 70 MG/DL (ref 30–149)
TSI SER-ACNC: 1.4 MIU/ML (ref 0.35–5.5)
VIT D+METAB SERPL-MCNC: 31.5 NG/ML (ref 30–100)
VLDLC SERPL CALC-MCNC: 14 MG/DL (ref 0–30)
WBC # BLD AUTO: 8.5 X10(3) UL (ref 4–13)

## 2018-09-06 PROCEDURE — 80061 LIPID PANEL: CPT

## 2018-09-06 PROCEDURE — 90471 IMMUNIZATION ADMIN: CPT | Performed by: FAMILY MEDICINE

## 2018-09-06 PROCEDURE — 36415 COLL VENOUS BLD VENIPUNCTURE: CPT

## 2018-09-06 PROCEDURE — 17110 DESTRUCTION B9 LES UP TO 14: CPT | Performed by: FAMILY MEDICINE

## 2018-09-06 PROCEDURE — 84443 ASSAY THYROID STIM HORMONE: CPT

## 2018-09-06 PROCEDURE — 82607 VITAMIN B-12: CPT

## 2018-09-06 PROCEDURE — 82306 VITAMIN D 25 HYDROXY: CPT

## 2018-09-06 PROCEDURE — 85025 COMPLETE CBC W/AUTO DIFF WBC: CPT

## 2018-09-06 PROCEDURE — 84439 ASSAY OF FREE THYROXINE: CPT

## 2018-09-06 PROCEDURE — 80053 COMPREHEN METABOLIC PANEL: CPT

## 2018-09-06 PROCEDURE — 83036 HEMOGLOBIN GLYCOSYLATED A1C: CPT

## 2018-09-06 PROCEDURE — 99396 PREV VISIT EST AGE 40-64: CPT | Performed by: FAMILY MEDICINE

## 2018-09-06 PROCEDURE — 90715 TDAP VACCINE 7 YRS/> IM: CPT | Performed by: FAMILY MEDICINE

## 2018-09-06 NOTE — PROGRESS NOTES
Nasim Gomez is a 63year old male who presents for a complete physical exam.   HPI:     Pt complains of nothing.  + calcium and vit D   No urinary symptoms  No erectile dysfunction  No penile discharge  c-scope scheduled for November   Some snoring - pt h 81 MG Oral Tab Take 81 mg by mouth daily. Disp:  Rfl:    Multiple Vitamins-Minerals (MULTI FOR HER 50+) Oral Tab Take  by mouth.  Disp:  Rfl:       Past Medical History:   Diagnosis Date   • Obstructive sleep apnea (adult) (pediatric) Denaa Clonts 6-20-16    a no chest tenderness  BREAST: no dominant or suspicious mass  LUNGS: clear to auscultation  CARDIO: RRR without murmur  GI: good BS's,no masses, HSM or tenderness  : two descended testes,no masses,no hernia,no penile lesions  RECTAL: good rectal tone, pro

## 2018-10-05 DIAGNOSIS — I10 ESSENTIAL HYPERTENSION: ICD-10-CM

## 2018-10-05 DIAGNOSIS — I77.810 ASCENDING AORTA DILATATION (HCC): ICD-10-CM

## 2018-10-05 DIAGNOSIS — I35.1 AORTIC VALVE INSUFFICIENCY, ETIOLOGY OF CARDIAC VALVE DISEASE UNSPECIFIED: ICD-10-CM

## 2018-10-05 DIAGNOSIS — I42.0 DILATED CARDIOMYOPATHY (HCC): ICD-10-CM

## 2018-10-05 RX ORDER — SPIRONOLACTONE 25 MG/1
TABLET ORAL
Qty: 90 TABLET | Refills: 1 | Status: SHIPPED | OUTPATIENT
Start: 2018-10-05 | End: 2018-11-16

## 2018-10-05 RX ORDER — HYDROCHLOROTHIAZIDE 12.5 MG/1
TABLET ORAL
Qty: 90 TABLET | Refills: 0 | Status: SHIPPED | OUTPATIENT
Start: 2018-10-05 | End: 2019-02-23

## 2018-11-02 DIAGNOSIS — I35.1 AORTIC VALVE INSUFFICIENCY, ETIOLOGY OF CARDIAC VALVE DISEASE UNSPECIFIED: ICD-10-CM

## 2018-11-02 DIAGNOSIS — I10 ESSENTIAL HYPERTENSION: ICD-10-CM

## 2018-11-02 DIAGNOSIS — I77.810 ASCENDING AORTA DILATATION (HCC): ICD-10-CM

## 2018-11-02 DIAGNOSIS — I42.0 DILATED CARDIOMYOPATHY (HCC): ICD-10-CM

## 2018-11-05 RX ORDER — CARVEDILOL 25 MG/1
TABLET ORAL
Qty: 135 TABLET | Refills: 1 | Status: SHIPPED | OUTPATIENT
Start: 2018-11-05 | End: 2019-04-12

## 2018-11-15 DIAGNOSIS — I42.0 DILATED CARDIOMYOPATHY (HCC): ICD-10-CM

## 2018-11-15 DIAGNOSIS — I35.1 AORTIC VALVE INSUFFICIENCY, ETIOLOGY OF CARDIAC VALVE DISEASE UNSPECIFIED: ICD-10-CM

## 2018-11-15 DIAGNOSIS — I77.810 ASCENDING AORTA DILATATION (HCC): ICD-10-CM

## 2018-11-15 DIAGNOSIS — I10 ESSENTIAL HYPERTENSION: ICD-10-CM

## 2018-11-16 RX ORDER — LISINOPRIL 40 MG/1
TABLET ORAL
Qty: 90 TABLET | Refills: 0 | Status: SHIPPED | OUTPATIENT
Start: 2018-11-16 | End: 2019-01-31

## 2018-11-28 ENCOUNTER — TELEPHONE (OUTPATIENT)
Dept: FAMILY MEDICINE CLINIC | Facility: CLINIC | Age: 58
End: 2018-11-28

## 2018-11-28 ENCOUNTER — TELEPHONE (OUTPATIENT)
Dept: ADMINISTRATIVE | Age: 58
End: 2018-11-28

## 2018-11-28 NOTE — TELEPHONE ENCOUNTER
Caller requesting to create a referral/order for a colonoscopy for the patient. Accdg to her she has faxed over the CN to Dr. Yaniv Whitley office last Monday.   Explained to caller that a colonoscopy is not something we handle at the [facility] Referral Dept but off

## 2018-11-28 NOTE — TELEPHONE ENCOUNTER
Per Harry Castanon - pt has [employer] and only the PCP can place orders. Pt is having colonoscopy tomorrow, needs order entered. Per [facility] - we are completely unable to order a CPT code for a procedure for a patient.  She believes it was because pt's ref

## 2018-11-29 ENCOUNTER — PRIOR ORIGINAL RECORDS (OUTPATIENT)
Dept: OTHER | Age: 58
End: 2018-11-29

## 2018-11-30 ENCOUNTER — MYAURORA ACCOUNT LINK (OUTPATIENT)
Dept: OTHER | Age: 58
End: 2018-11-30

## 2018-11-30 ENCOUNTER — PRIOR ORIGINAL RECORDS (OUTPATIENT)
Dept: OTHER | Age: 58
End: 2018-11-30

## 2018-12-02 ENCOUNTER — TELEPHONE (OUTPATIENT)
Dept: FAMILY MEDICINE CLINIC | Facility: CLINIC | Age: 58
End: 2018-12-02

## 2018-12-04 DIAGNOSIS — Z12.11 SPECIAL SCREENING FOR MALIGNANT NEOPLASMS, COLON: Primary | ICD-10-CM

## 2019-01-01 ENCOUNTER — EXTERNAL RECORD (OUTPATIENT)
Dept: HEALTH INFORMATION MANAGEMENT | Facility: OTHER | Age: 59
End: 2019-01-01

## 2019-01-02 ENCOUNTER — PATIENT OUTREACH (OUTPATIENT)
Dept: SURGERY | Facility: CLINIC | Age: 59
End: 2019-01-02

## 2019-01-07 ENCOUNTER — TELEPHONE (OUTPATIENT)
Dept: FAMILY MEDICINE CLINIC | Facility: CLINIC | Age: 59
End: 2019-01-07

## 2019-01-07 DIAGNOSIS — Q23.0 AORTIC STENOSIS DUE TO BICUSPID AORTIC VALVE: Primary | ICD-10-CM

## 2019-01-07 DIAGNOSIS — Q23.1 AORTIC STENOSIS DUE TO BICUSPID AORTIC VALVE: Primary | ICD-10-CM

## 2019-01-07 NOTE — TELEPHONE ENCOUNTER
Pt is requesting a referral to go see the specialist Dr. Isidoro Pike, pt has an appt on March 5th and last time he saw him was back in [facility], pt stated his ins requires a referral. Please call and advise.

## 2019-01-31 DIAGNOSIS — I77.810 ASCENDING AORTA DILATATION (HCC): ICD-10-CM

## 2019-01-31 DIAGNOSIS — I42.0 DILATED CARDIOMYOPATHY (HCC): ICD-10-CM

## 2019-01-31 DIAGNOSIS — I10 ESSENTIAL HYPERTENSION: ICD-10-CM

## 2019-01-31 DIAGNOSIS — I35.1 AORTIC VALVE INSUFFICIENCY, ETIOLOGY OF CARDIAC VALVE DISEASE UNSPECIFIED: ICD-10-CM

## 2019-02-01 RX ORDER — LISINOPRIL 40 MG/1
TABLET ORAL
Qty: 90 TABLET | Refills: 0 | Status: SHIPPED | OUTPATIENT
Start: 2019-02-01 | End: 2019-04-15

## 2019-02-21 ENCOUNTER — HOSPITAL ENCOUNTER (OUTPATIENT)
Dept: CT IMAGING | Facility: HOSPITAL | Age: 59
Discharge: HOME | End: 2019-02-21
Attending: INTERNAL MEDICINE
Payer: COMMERCIAL

## 2019-02-21 ENCOUNTER — PRIOR ORIGINAL RECORDS (OUTPATIENT)
Dept: OTHER | Age: 59
End: 2019-02-21

## 2019-02-21 ENCOUNTER — HOSPITAL ENCOUNTER (OUTPATIENT)
Dept: CV DIAGNOSTICS | Facility: HOSPITAL | Age: 59
Discharge: HOME | End: 2019-02-21
Attending: INTERNAL MEDICINE
Payer: COMMERCIAL

## 2019-02-21 DIAGNOSIS — Q23.1 AORTIC STENOSIS DUE TO BICUSPID AORTIC VALVE: ICD-10-CM

## 2019-02-21 DIAGNOSIS — Q23.0 AORTIC STENOSIS DUE TO BICUSPID AORTIC VALVE: ICD-10-CM

## 2019-02-21 DIAGNOSIS — I71.2 ANEURYSM OF AORTIC ARCH (HCC): ICD-10-CM

## 2019-02-21 LAB — CREAT SERPL-MCNC: 1.2 MG/DL (ref 0.7–1.3)

## 2019-02-21 PROCEDURE — 93306 TTE W/DOPPLER COMPLETE: CPT | Performed by: INTERNAL MEDICINE

## 2019-02-21 PROCEDURE — 71275 CT ANGIOGRAPHY CHEST: CPT | Performed by: INTERNAL MEDICINE

## 2019-02-21 PROCEDURE — 82565 ASSAY OF CREATININE: CPT

## 2019-02-23 RX ORDER — HYDROCHLOROTHIAZIDE 12.5 MG/1
TABLET ORAL
Qty: 90 TABLET | Refills: 0 | Status: SHIPPED | OUTPATIENT
Start: 2019-02-23 | End: 2019-05-01

## 2019-02-27 LAB — CREATININE, SERUM: 1.2 MG/DL

## 2019-02-27 RX ORDER — HYDROCHLOROTHIAZIDE 12.5 MG/1
CAPSULE, GELATIN COATED ORAL
COMMUNITY
Start: 2016-02-23 | End: 2019-07-25 | Stop reason: ALTCHOICE

## 2019-02-27 RX ORDER — ATORVASTATIN CALCIUM 40 MG/1
TABLET, FILM COATED ORAL
COMMUNITY
Start: 2017-10-03 | End: 2020-06-08 | Stop reason: DRUGHIGH

## 2019-02-27 RX ORDER — LISINOPRIL 40 MG/1
TABLET ORAL
COMMUNITY
Start: 2017-04-06 | End: 2020-03-16 | Stop reason: SDUPTHER

## 2019-02-27 RX ORDER — CARVEDILOL 25 MG/1
TABLET ORAL
COMMUNITY
Start: 2017-09-07 | End: 2020-06-08 | Stop reason: DRUGHIGH

## 2019-02-27 RX ORDER — SPIRONOLACTONE 25 MG/1
TABLET ORAL
COMMUNITY
Start: 2015-03-25 | End: 2020-10-28

## 2019-02-27 RX ORDER — MULTIVITAMIN
CAPSULE ORAL
COMMUNITY
Start: 2015-03-25

## 2019-02-27 RX ORDER — B-COMPLEX WITH VITAMIN C
TABLET ORAL
COMMUNITY
Start: 2016-02-23 | End: 2020-07-09

## 2019-02-28 VITALS
DIASTOLIC BLOOD PRESSURE: 82 MMHG | HEIGHT: 75 IN | BODY MASS INDEX: 31.58 KG/M2 | HEART RATE: 56 BPM | WEIGHT: 254 LBS | SYSTOLIC BLOOD PRESSURE: 130 MMHG

## 2019-02-28 VITALS
HEIGHT: 75 IN | HEART RATE: 64 BPM | DIASTOLIC BLOOD PRESSURE: 76 MMHG | WEIGHT: 255 LBS | SYSTOLIC BLOOD PRESSURE: 122 MMHG | BODY MASS INDEX: 31.71 KG/M2

## 2019-03-01 VITALS — WEIGHT: 270 LBS | DIASTOLIC BLOOD PRESSURE: 76 MMHG | HEART RATE: 53 BPM | SYSTOLIC BLOOD PRESSURE: 112 MMHG

## 2019-03-05 ENCOUNTER — OFFICE VISIT (OUTPATIENT)
Dept: CARDIOLOGY | Age: 59
End: 2019-03-05

## 2019-03-05 VITALS
HEART RATE: 78 BPM | BODY MASS INDEX: 33.62 KG/M2 | WEIGHT: 262 LBS | SYSTOLIC BLOOD PRESSURE: 104 MMHG | HEIGHT: 74 IN | DIASTOLIC BLOOD PRESSURE: 76 MMHG

## 2019-03-05 DIAGNOSIS — I25.10 CAD IN NATIVE ARTERY: Primary | ICD-10-CM

## 2019-03-05 DIAGNOSIS — I10 ESSENTIAL HYPERTENSION: ICD-10-CM

## 2019-03-05 DIAGNOSIS — I50.22 CHRONIC SYSTOLIC CHF (CONGESTIVE HEART FAILURE) (CMD): ICD-10-CM

## 2019-03-05 DIAGNOSIS — I71.21 THORACIC ASCENDING AORTIC ANEURYSM (CMD): ICD-10-CM

## 2019-03-05 PROCEDURE — 3074F SYST BP LT 130 MM HG: CPT | Performed by: INTERNAL MEDICINE

## 2019-03-05 PROCEDURE — 3078F DIAST BP <80 MM HG: CPT | Performed by: INTERNAL MEDICINE

## 2019-03-05 PROCEDURE — 99215 OFFICE O/P EST HI 40 MIN: CPT | Performed by: INTERNAL MEDICINE

## 2019-03-28 ENCOUNTER — LABORATORY ENCOUNTER (OUTPATIENT)
Dept: LAB | Age: 59
End: 2019-03-28
Attending: INTERNAL MEDICINE
Payer: COMMERCIAL

## 2019-03-28 DIAGNOSIS — I25.10 CORONARY ATHEROSCLEROSIS OF NATIVE CORONARY ARTERY: Primary | ICD-10-CM

## 2019-03-28 DIAGNOSIS — I50.22 CHRONIC SYSTOLIC HEART FAILURE (HCC): ICD-10-CM

## 2019-03-28 DIAGNOSIS — I10 ESSENTIAL HYPERTENSION, MALIGNANT: ICD-10-CM

## 2019-03-28 LAB
ALBUMIN SERPL-MCNC: 3.9 G/DL (ref 3.4–5)
ALBUMIN/GLOB SERPL: 1.1 {RATIO} (ref 1–2)
ALP LIVER SERPL-CCNC: 55 U/L (ref 45–117)
ALT SERPL-CCNC: 39 U/L (ref 16–61)
ANION GAP SERPL CALC-SCNC: 4 MMOL/L (ref 0–18)
AST SERPL-CCNC: 26 U/L (ref 15–37)
BILIRUB SERPL-MCNC: 1 MG/DL (ref 0.1–2)
BUN BLD-MCNC: 19 MG/DL (ref 7–18)
BUN/CREAT SERPL: 14.8 (ref 10–20)
CALCIUM BLD-MCNC: 9.5 MG/DL (ref 8.5–10.1)
CHLORIDE SERPL-SCNC: 105 MMOL/L (ref 98–107)
CHOLEST SMN-MCNC: 113 MG/DL (ref ?–200)
CO2 SERPL-SCNC: 30 MMOL/L (ref 21–32)
CREAT BLD-MCNC: 1.28 MG/DL (ref 0.7–1.3)
GLOBULIN PLAS-MCNC: 3.4 G/DL (ref 2.8–4.4)
GLUCOSE BLD-MCNC: 98 MG/DL (ref 70–99)
HDLC SERPL-MCNC: 43 MG/DL (ref 40–59)
LDLC SERPL CALC-MCNC: 39 MG/DL (ref ?–100)
M PROTEIN MFR SERPL ELPH: 7.3 G/DL (ref 6.4–8.2)
NONHDLC SERPL-MCNC: 70 MG/DL (ref ?–130)
OSMOLALITY SERPL CALC.SUM OF ELEC: 290 MOSM/KG (ref 275–295)
POTASSIUM SERPL-SCNC: 4.4 MMOL/L (ref 3.5–5.1)
SODIUM SERPL-SCNC: 139 MMOL/L (ref 136–145)
TRIGL SERPL-MCNC: 154 MG/DL (ref 30–149)
TSI SER-ACNC: 1.67 MIU/ML (ref 0.36–3.74)
VLDLC SERPL CALC-MCNC: 31 MG/DL (ref 0–30)

## 2019-03-28 PROCEDURE — 84443 ASSAY THYROID STIM HORMONE: CPT

## 2019-03-28 PROCEDURE — 80053 COMPREHEN METABOLIC PANEL: CPT

## 2019-03-28 PROCEDURE — 80061 LIPID PANEL: CPT

## 2019-03-28 PROCEDURE — 36415 COLL VENOUS BLD VENIPUNCTURE: CPT

## 2019-04-01 ENCOUNTER — TELEPHONE (OUTPATIENT)
Dept: CARDIOLOGY | Age: 59
End: 2019-04-01

## 2019-04-12 DIAGNOSIS — I42.0 DILATED CARDIOMYOPATHY (HCC): ICD-10-CM

## 2019-04-12 DIAGNOSIS — I10 ESSENTIAL HYPERTENSION: ICD-10-CM

## 2019-04-12 DIAGNOSIS — I35.1 AORTIC VALVE INSUFFICIENCY, ETIOLOGY OF CARDIAC VALVE DISEASE UNSPECIFIED: ICD-10-CM

## 2019-04-12 DIAGNOSIS — I77.810 ASCENDING AORTA DILATATION (HCC): ICD-10-CM

## 2019-04-13 RX ORDER — CARVEDILOL 25 MG/1
TABLET ORAL
Qty: 135 TABLET | Refills: 0 | Status: SHIPPED | OUTPATIENT
Start: 2019-04-13 | End: 2019-05-01

## 2019-04-13 NOTE — TELEPHONE ENCOUNTER
Last ov 9/6/18  Last refill carvedilol 11/5/19        . Pt is due for a f/u on blood pressure medication.  Please call to schedule one

## 2019-04-15 DIAGNOSIS — I10 ESSENTIAL HYPERTENSION: ICD-10-CM

## 2019-04-15 DIAGNOSIS — I35.1 AORTIC VALVE INSUFFICIENCY, ETIOLOGY OF CARDIAC VALVE DISEASE UNSPECIFIED: ICD-10-CM

## 2019-04-15 DIAGNOSIS — I42.0 DILATED CARDIOMYOPATHY (HCC): ICD-10-CM

## 2019-04-15 DIAGNOSIS — I77.810 ASCENDING AORTA DILATATION (HCC): ICD-10-CM

## 2019-04-16 RX ORDER — LISINOPRIL 40 MG/1
TABLET ORAL
Qty: 30 TABLET | Refills: 0 | Status: SHIPPED | OUTPATIENT
Start: 2019-04-16 | End: 2019-05-01

## 2019-04-16 NOTE — TELEPHONE ENCOUNTER
Rx Request  LISINOPRIL 40 MG Oral Tab    Disp:      90              R: 0    Associated Dx:  Aortic valve insufficiency, etiology of cardiac valve disease unspecified, Ascending aorta dilatation (HCC), Dilated cardiomyopathy ([address] Essential hypertension

## 2019-04-16 NOTE — TELEPHONE ENCOUNTER
Called pt. Notified that he is due for a follow up. Pt states he will call back to schedule follow up on Friday when he checks his schedule.

## 2019-05-01 ENCOUNTER — OFFICE VISIT (OUTPATIENT)
Dept: FAMILY MEDICINE CLINIC | Facility: CLINIC | Age: 59
End: 2019-05-01
Payer: COMMERCIAL

## 2019-05-01 ENCOUNTER — ANCILLARY PROCEDURE (OUTPATIENT)
Dept: CARDIOLOGY | Age: 59
End: 2019-05-01
Attending: INTERNAL MEDICINE

## 2019-05-01 ENCOUNTER — APPOINTMENT (OUTPATIENT)
Dept: CARDIOLOGY | Age: 59
End: 2019-05-01

## 2019-05-01 ENCOUNTER — OFFICE VISIT (OUTPATIENT)
Dept: CARDIOLOGY | Age: 59
End: 2019-05-01

## 2019-05-01 VITALS
RESPIRATION RATE: 18 BRPM | HEIGHT: 75 IN | OXYGEN SATURATION: 98 % | DIASTOLIC BLOOD PRESSURE: 88 MMHG | BODY MASS INDEX: 32.33 KG/M2 | TEMPERATURE: 98 F | SYSTOLIC BLOOD PRESSURE: 122 MMHG | HEART RATE: 69 BPM | WEIGHT: 260 LBS

## 2019-05-01 VITALS
HEIGHT: 75 IN | HEART RATE: 94 BPM | DIASTOLIC BLOOD PRESSURE: 62 MMHG | SYSTOLIC BLOOD PRESSURE: 110 MMHG | BODY MASS INDEX: 32.33 KG/M2 | WEIGHT: 260 LBS

## 2019-05-01 DIAGNOSIS — I50.22 CHRONIC SYSTOLIC CHF (CONGESTIVE HEART FAILURE) (CMD): ICD-10-CM

## 2019-05-01 DIAGNOSIS — I42.0 DILATED CARDIOMYOPATHY (HCC): ICD-10-CM

## 2019-05-01 DIAGNOSIS — I48.20 CHRONIC ATRIAL FIBRILLATION (HCC): Primary | ICD-10-CM

## 2019-05-01 DIAGNOSIS — Z00.00 GENERAL MEDICAL EXAM: ICD-10-CM

## 2019-05-01 DIAGNOSIS — I35.1 AORTIC VALVE INSUFFICIENCY, ETIOLOGY OF CARDIAC VALVE DISEASE UNSPECIFIED: ICD-10-CM

## 2019-05-01 DIAGNOSIS — I10 ESSENTIAL HYPERTENSION: ICD-10-CM

## 2019-05-01 DIAGNOSIS — Q23.1 BICUSPID AORTIC VALVE: ICD-10-CM

## 2019-05-01 DIAGNOSIS — I42.0 DILATED CARDIOMYOPATHY (CMD): ICD-10-CM

## 2019-05-01 DIAGNOSIS — I48.91 ATRIAL FIBRILLATION, UNSPECIFIED TYPE (CMD): Primary | ICD-10-CM

## 2019-05-01 DIAGNOSIS — I77.810 ASCENDING AORTA DILATATION (HCC): ICD-10-CM

## 2019-05-01 DIAGNOSIS — Z86.73 HISTORY OF CVA (CEREBROVASCULAR ACCIDENT): ICD-10-CM

## 2019-05-01 DIAGNOSIS — I48.91 ATRIAL FIBRILLATION, UNSPECIFIED TYPE (CMD): ICD-10-CM

## 2019-05-01 PROCEDURE — 93000 ELECTROCARDIOGRAM COMPLETE: CPT | Performed by: INTERNAL MEDICINE

## 2019-05-01 PROCEDURE — 99215 OFFICE O/P EST HI 40 MIN: CPT | Performed by: INTERNAL MEDICINE

## 2019-05-01 PROCEDURE — 3074F SYST BP LT 130 MM HG: CPT | Performed by: INTERNAL MEDICINE

## 2019-05-01 PROCEDURE — 3078F DIAST BP <80 MM HG: CPT | Performed by: INTERNAL MEDICINE

## 2019-05-01 PROCEDURE — 93224 XTRNL ECG REC UP TO 48 HRS: CPT | Performed by: INTERNAL MEDICINE

## 2019-05-01 PROCEDURE — 99214 OFFICE O/P EST MOD 30 MIN: CPT | Performed by: FAMILY MEDICINE

## 2019-05-01 RX ORDER — ATORVASTATIN CALCIUM 80 MG/1
TABLET, FILM COATED ORAL
Qty: 90 TABLET | Refills: 1 | Status: SHIPPED | OUTPATIENT
Start: 2019-05-01 | End: 2020-07-10

## 2019-05-01 RX ORDER — HYDROCHLOROTHIAZIDE 12.5 MG/1
12.5 TABLET ORAL
Qty: 90 TABLET | Refills: 1 | Status: SHIPPED | OUTPATIENT
Start: 2019-05-01 | End: 2019-11-12 | Stop reason: ALTCHOICE

## 2019-05-01 RX ORDER — SPIRONOLACTONE 25 MG/1
TABLET ORAL
Qty: 90 TABLET | Refills: 1 | Status: SHIPPED | OUTPATIENT
Start: 2019-05-01 | End: 2020-01-17

## 2019-05-01 RX ORDER — SPIRONOLACTONE 25 MG/1
TABLET ORAL
COMMUNITY
Start: 2015-03-25 | End: 2019-05-01

## 2019-05-01 RX ORDER — CARVEDILOL 25 MG/1
37.5 TABLET ORAL DAILY
Qty: 135 TABLET | Refills: 1 | Status: SHIPPED | OUTPATIENT
Start: 2019-05-01 | End: 2019-07-11

## 2019-05-01 RX ORDER — LISINOPRIL 40 MG/1
40 TABLET ORAL
Qty: 90 TABLET | Refills: 1 | Status: SHIPPED | OUTPATIENT
Start: 2019-05-01 | End: 2020-01-17

## 2019-05-01 RX ORDER — UBIDECARENONE 30 MG
CAPSULE ORAL
COMMUNITY
End: 2019-05-01 | Stop reason: CLARIF

## 2019-05-01 NOTE — PROGRESS NOTES
Lisa Esquivel is a 63year old male here for CVA / HTN follow up   HPI:     Pt had a CVA 3/3/15. Pt had a cerebellar CVA   Pt s/p recent c-scope during which he was found to have atrial fibrillation.    Pt will see Dr. Mikel Ha     Pt is working for [employer] status: Never Smoker      Smokeless tobacco: Never Used    Alcohol use: Yes      Alcohol/week: 1.0 oz      Types: 2 Cans of beer per week    Drug use: No     Occ: . : . Children: .    Exercise: minimal.  Diet: watches fats closely     REVIEW OF SYSTE Refill: 1  - carvedilol 25 MG Oral Tab; Take 1.5 tablets (37.5 mg total) by mouth daily. Dispense: 135 tablet; Refill: 1    3. Aortic valve insufficiency, etiology of cardiac valve disease unspecified    - lisinopril 40 MG Oral Tab;  Take 1 tablet (40 mg t

## 2019-05-04 ENCOUNTER — TELEPHONE (OUTPATIENT)
Dept: FAMILY MEDICINE CLINIC | Facility: CLINIC | Age: 59
End: 2019-05-04

## 2019-05-04 NOTE — TELEPHONE ENCOUNTER
Please advise - should pt be getting higher quantity? apixaban (ELIQUIS) 5 MG Oral Tab 2 tablet 0 5/1/2019    Sig:   Take 1 tablet (5 mg total) by mouth 2 (two) times daily.

## 2019-05-22 ENCOUNTER — TELEPHONE (OUTPATIENT)
Dept: CARDIOLOGY | Age: 59
End: 2019-05-22

## 2019-05-29 ENCOUNTER — OFFICE VISIT (OUTPATIENT)
Dept: CARDIOLOGY | Age: 59
End: 2019-05-29

## 2019-05-29 DIAGNOSIS — I44.7 LBBB (LEFT BUNDLE BRANCH BLOCK): ICD-10-CM

## 2019-05-29 DIAGNOSIS — Z86.79 HISTORY OF ATRIAL FIBRILLATION: ICD-10-CM

## 2019-05-29 DIAGNOSIS — I42.0 DILATED CARDIOMYOPATHY (CMD): ICD-10-CM

## 2019-05-29 DIAGNOSIS — I71.21 THORACIC ASCENDING AORTIC ANEURYSM (CMD): Primary | ICD-10-CM

## 2019-05-29 PROCEDURE — 3074F SYST BP LT 130 MM HG: CPT | Performed by: INTERNAL MEDICINE

## 2019-05-29 PROCEDURE — 99215 OFFICE O/P EST HI 40 MIN: CPT | Performed by: INTERNAL MEDICINE

## 2019-05-29 PROCEDURE — 3078F DIAST BP <80 MM HG: CPT | Performed by: INTERNAL MEDICINE

## 2019-05-29 ASSESSMENT — ENCOUNTER SYMPTOMS
BRUISES/BLEEDS EASILY: 0
HEMOPTYSIS: 0
ALLERGIC/IMMUNOLOGIC COMMENTS: NO NEW FOOD ALLERGIES
CHILLS: 0
WEIGHT LOSS: 0
COUGH: 0
SUSPICIOUS LESIONS: 0
HEMATOCHEZIA: 0
WEIGHT GAIN: 0
FEVER: 0

## 2019-06-05 ENCOUNTER — TELEPHONE (OUTPATIENT)
Dept: CARDIOLOGY | Age: 59
End: 2019-06-05

## 2019-07-05 ENCOUNTER — TELEPHONE (OUTPATIENT)
Dept: CARDIOLOGY | Age: 59
End: 2019-07-05

## 2019-07-10 ENCOUNTER — TELEPHONE (OUTPATIENT)
Dept: CARDIOLOGY | Age: 59
End: 2019-07-10

## 2019-07-11 ENCOUNTER — TELEPHONE (OUTPATIENT)
Dept: FAMILY MEDICINE CLINIC | Facility: CLINIC | Age: 59
End: 2019-07-11

## 2019-07-11 DIAGNOSIS — I42.0 DILATED CARDIOMYOPATHY (HCC): ICD-10-CM

## 2019-07-11 DIAGNOSIS — I10 ESSENTIAL HYPERTENSION: ICD-10-CM

## 2019-07-11 DIAGNOSIS — I77.810 ASCENDING AORTA DILATATION (HCC): ICD-10-CM

## 2019-07-11 DIAGNOSIS — I35.1 AORTIC VALVE INSUFFICIENCY, ETIOLOGY OF CARDIAC VALVE DISEASE UNSPECIFIED: ICD-10-CM

## 2019-07-11 RX ORDER — CARVEDILOL 25 MG/1
37.5 TABLET ORAL DAILY
Qty: 135 TABLET | Refills: 1 | Status: ON HOLD | OUTPATIENT
Start: 2019-07-11 | End: 2020-06-01

## 2019-07-16 NOTE — HISTORICAL OFFICE NOTE
Progress Notes  - documented in this encounter    Avtar Correia MD - 2019 11:30 AM CST  Formatting of this note might be different from the original.  [facility]  Clinic Note    Doni Puckett  : 3/6/1960  PCP: Lata Montano In February 2019 CT a of thoracic aorta was stable with ascending thoracic aorta aneurysm of 5.0 x 4.7 cm prior dimensions were 4.9 x 4.8 cm. The average diameter was 4.9 cm at the level of right pulmonary artery.  Aortic arch and descending aorta had jimbo Right and left heart catheterization was performed in April 2015. It showed severe reduction in LV systolic function with LVEF of 20% with significant hypokinetic segments in the LAD territory with no intracardiac thrombus. The left ventricle was dilated. he reports that has never smoked. he has never used smokeless tobacco.    Allergies:  ALLERGIES:  No Known Allergies    Medications:  Current Outpatient Medications   Medication Sig Dispense Refill   • aspirin 81 MG tablet TAKE 1 TABLET DAILY.    • atorvast • Creatinine, Serum 05/25/2018 1.18 mg/dL Final   • Glucose 05/25/2018 78 mg/dL Final   • Potassium, Serum 05/25/2018 4.0 mEq/L Final   • Sodium 05/25/2018 140 mEq/L Final   • Chloride 05/25/2018 106 mEq/L Final   • Calcium 05/25/2018 8.9 mg/dL Final   • A

## 2019-07-18 ENCOUNTER — HOSPITAL ENCOUNTER (OUTPATIENT)
Dept: INTERVENTIONAL RADIOLOGY/VASCULAR | Facility: HOSPITAL | Age: 59
Discharge: HOME | End: 2019-07-18
Attending: INTERNAL MEDICINE | Admitting: INTERNAL MEDICINE
Payer: COMMERCIAL

## 2019-07-18 VITALS
HEART RATE: 52 BPM | TEMPERATURE: 97 F | SYSTOLIC BLOOD PRESSURE: 92 MMHG | OXYGEN SATURATION: 97 % | HEIGHT: 75 IN | DIASTOLIC BLOOD PRESSURE: 61 MMHG | BODY MASS INDEX: 31.08 KG/M2 | WEIGHT: 250 LBS | RESPIRATION RATE: 16 BRPM

## 2019-07-18 DIAGNOSIS — I44.7 LEFT BUNDLE BRANCH BLOCK (LBBB): ICD-10-CM

## 2019-07-18 DIAGNOSIS — I48.91 ATRIAL FIBRILLATION (HCC): ICD-10-CM

## 2019-07-18 LAB
ATRIAL RATE: 55 BPM
P AXIS: 16 DEGREES
P-R INTERVAL: 222 MS
Q-T INTERVAL: 458 MS
QRS DURATION: 102 MS
QTC CALCULATION (BEZET): 438 MS
R AXIS: -42 DEGREES
T AXIS: 81 DEGREES
VENTRICULAR RATE: 55 BPM

## 2019-07-18 PROCEDURE — 92960 CARDIOVERSION ELECTRIC EXT: CPT

## 2019-07-18 PROCEDURE — 5A2204Z RESTORATION OF CARDIAC RHYTHM, SINGLE: ICD-10-PCS | Performed by: INTERNAL MEDICINE

## 2019-07-18 PROCEDURE — 93010 ELECTROCARDIOGRAM REPORT: CPT | Performed by: INTERNAL MEDICINE

## 2019-07-18 PROCEDURE — 93005 ELECTROCARDIOGRAM TRACING: CPT

## 2019-07-18 PROCEDURE — 92960 CARDIOVERSION ELECTRIC EXT: CPT | Performed by: INTERNAL MEDICINE

## 2019-07-18 RX ORDER — SODIUM CHLORIDE 9 MG/ML
INJECTION, SOLUTION INTRAVENOUS CONTINUOUS
Status: DISCONTINUED | OUTPATIENT
Start: 2019-07-18 | End: 2019-07-18

## 2019-07-18 RX ADMIN — SODIUM CHLORIDE: 9 INJECTION, SOLUTION INTRAVENOUS at 07:15:00

## 2019-07-18 NOTE — PROGRESS NOTES
S/p successful cardioversion with Dr. Arnaldo Avendano, pt converted back to sinus rhythm, EKG done to confirm. Pt in stable condition, A&Ox4--neurologically intact, VS WNL, denies any pain/discomfort, tolerated PO well.  Discharged instructions given and reviewed, p

## 2019-07-18 NOTE — H&P
[facility]  H&P    Veronica Mckenzie Patient Status:  Outpatient in a Bed    3/6/1960 MRN [id number]   [facility] Marianna Dey [facility]   Hosp Day # 0 PCP DO Consuelo Novak infusion, , Intravenous, Continuous    Review of Systems:  All systems were reviewed and are negative except as described above in HPI. Physical Exam:  Blood pressure 96/75, pulse 57, temperature 96.9 °F (36.1 °C), temperature source Temporal, resp.  rat

## 2019-07-25 ENCOUNTER — OFFICE VISIT (OUTPATIENT)
Dept: CARDIOLOGY | Age: 59
End: 2019-07-25

## 2019-07-25 VITALS
WEIGHT: 252 LBS | DIASTOLIC BLOOD PRESSURE: 60 MMHG | SYSTOLIC BLOOD PRESSURE: 100 MMHG | BODY MASS INDEX: 31.33 KG/M2 | HEART RATE: 59 BPM | HEIGHT: 75 IN

## 2019-07-25 DIAGNOSIS — I25.10 CAD IN NATIVE ARTERY: ICD-10-CM

## 2019-07-25 DIAGNOSIS — I42.0 DILATED CARDIOMYOPATHY (CMD): ICD-10-CM

## 2019-07-25 DIAGNOSIS — Q23.1 BICUSPID AORTIC VALVE: ICD-10-CM

## 2019-07-25 DIAGNOSIS — I48.91 ATRIAL FIBRILLATION STATUS POST CARDIOVERSION (CMD): ICD-10-CM

## 2019-07-25 DIAGNOSIS — Z86.79 HISTORY OF ATRIAL FIBRILLATION: Primary | ICD-10-CM

## 2019-07-25 DIAGNOSIS — I50.22 CHRONIC SYSTOLIC CHF (CONGESTIVE HEART FAILURE) (CMD): ICD-10-CM

## 2019-07-25 DIAGNOSIS — I71.21 THORACIC ASCENDING AORTIC ANEURYSM (CMD): ICD-10-CM

## 2019-07-25 DIAGNOSIS — I65.29 STENOSIS OF CAROTID ARTERY, UNSPECIFIED LATERALITY: ICD-10-CM

## 2019-07-25 DIAGNOSIS — I10 ESSENTIAL HYPERTENSION: ICD-10-CM

## 2019-07-25 PROCEDURE — 3074F SYST BP LT 130 MM HG: CPT | Performed by: NURSE PRACTITIONER

## 2019-07-25 PROCEDURE — 99213 OFFICE O/P EST LOW 20 MIN: CPT | Performed by: NURSE PRACTITIONER

## 2019-07-25 PROCEDURE — 3078F DIAST BP <80 MM HG: CPT | Performed by: NURSE PRACTITIONER

## 2019-07-25 PROCEDURE — 93000 ELECTROCARDIOGRAM COMPLETE: CPT | Performed by: NURSE PRACTITIONER

## 2019-07-25 ASSESSMENT — ENCOUNTER SYMPTOMS
WEIGHT GAIN: 0
ALLERGIC/IMMUNOLOGIC COMMENTS: NO NEW FOOD ALLERGIES
BRUISES/BLEEDS EASILY: 0
HEMOPTYSIS: 0
COUGH: 0
FEVER: 0
WEIGHT LOSS: 0
HEMATOCHEZIA: 0
CHILLS: 0
SUSPICIOUS LESIONS: 0

## 2019-09-03 NOTE — PROCEDURES
PROCEDURE(S) PERFORMED:    1. Cardioversion. 2.     Sedation     :  Estrella Chavis MD     ANESTHESIA:  IV sedation. INDICATION:  Persistent atrial fibrillation. COMPLICATIONS:  None.      METHODS:  The patient was brought to the outpatien

## 2019-09-17 ENCOUNTER — OFFICE VISIT (OUTPATIENT)
Dept: CARDIOLOGY | Age: 59
End: 2019-09-17

## 2019-09-17 VITALS
DIASTOLIC BLOOD PRESSURE: 64 MMHG | BODY MASS INDEX: 32.08 KG/M2 | HEIGHT: 75 IN | SYSTOLIC BLOOD PRESSURE: 102 MMHG | WEIGHT: 258 LBS | HEART RATE: 56 BPM

## 2019-09-17 DIAGNOSIS — I42.0 DILATED CARDIOMYOPATHY (CMD): ICD-10-CM

## 2019-09-17 DIAGNOSIS — Q23.1 BICUSPID AORTIC VALVE: ICD-10-CM

## 2019-09-17 DIAGNOSIS — I50.22 CHRONIC SYSTOLIC CHF (CONGESTIVE HEART FAILURE) (CMD): ICD-10-CM

## 2019-09-17 DIAGNOSIS — I25.10 CAD IN NATIVE ARTERY: ICD-10-CM

## 2019-09-17 DIAGNOSIS — I44.7 LBBB (LEFT BUNDLE BRANCH BLOCK): ICD-10-CM

## 2019-09-17 DIAGNOSIS — I71.21 THORACIC ASCENDING AORTIC ANEURYSM (CMD): Primary | ICD-10-CM

## 2019-09-17 DIAGNOSIS — Z86.79 HISTORY OF ATRIAL FIBRILLATION: ICD-10-CM

## 2019-09-17 DIAGNOSIS — I10 ESSENTIAL HYPERTENSION: ICD-10-CM

## 2019-09-17 PROCEDURE — 99215 OFFICE O/P EST HI 40 MIN: CPT | Performed by: INTERNAL MEDICINE

## 2019-09-17 PROCEDURE — 3078F DIAST BP <80 MM HG: CPT | Performed by: INTERNAL MEDICINE

## 2019-09-17 PROCEDURE — 3074F SYST BP LT 130 MM HG: CPT | Performed by: INTERNAL MEDICINE

## 2019-09-17 PROCEDURE — 93000 ELECTROCARDIOGRAM COMPLETE: CPT | Performed by: INTERNAL MEDICINE

## 2019-10-14 RX ORDER — APIXABAN 5 MG/1
TABLET, FILM COATED ORAL
Qty: 60 TABLET | Refills: 11 | Status: SHIPPED | OUTPATIENT
Start: 2019-10-14 | End: 2020-06-08

## 2019-10-23 ENCOUNTER — OFFICE VISIT (OUTPATIENT)
Dept: CARDIOLOGY | Age: 59
End: 2019-10-23

## 2019-10-23 VITALS
DIASTOLIC BLOOD PRESSURE: 70 MMHG | HEIGHT: 75 IN | SYSTOLIC BLOOD PRESSURE: 128 MMHG | HEART RATE: 60 BPM | BODY MASS INDEX: 31.33 KG/M2 | WEIGHT: 252 LBS

## 2019-10-23 DIAGNOSIS — I42.0 DILATED CARDIOMYOPATHY (CMD): ICD-10-CM

## 2019-10-23 DIAGNOSIS — Z86.79 HISTORY OF ATRIAL FIBRILLATION: Primary | ICD-10-CM

## 2019-10-23 PROCEDURE — 3078F DIAST BP <80 MM HG: CPT | Performed by: INTERNAL MEDICINE

## 2019-10-23 PROCEDURE — 3074F SYST BP LT 130 MM HG: CPT | Performed by: INTERNAL MEDICINE

## 2019-10-23 PROCEDURE — 99214 OFFICE O/P EST MOD 30 MIN: CPT | Performed by: INTERNAL MEDICINE

## 2019-10-23 ASSESSMENT — PATIENT HEALTH QUESTIONNAIRE - PHQ9
SUM OF ALL RESPONSES TO PHQ9 QUESTIONS 1 AND 2: 0
2. FEELING DOWN, DEPRESSED OR HOPELESS: NOT AT ALL
SUM OF ALL RESPONSES TO PHQ9 QUESTIONS 1 AND 2: 0
1. LITTLE INTEREST OR PLEASURE IN DOING THINGS: NOT AT ALL

## 2019-11-11 RX ORDER — DRONEDARONE 400 MG/1
TABLET, FILM COATED ORAL
Qty: 60 TABLET | Refills: 4 | Status: SHIPPED | OUTPATIENT
Start: 2019-11-11 | End: 2020-04-16

## 2019-11-11 NOTE — PROGRESS NOTES
Rufus Da Silva is a 63year old male who presents for a complete physical exam.   HPI:     Pt complains of nothing.  + calcium and vit D   No urinary symptoms  No erectile dysfunction  No penile discharge  c-scope 2018    No snoring   No family h/o colon or Take 81 mg by mouth daily. • Multiple Vitamins-Minerals (MULTI FOR HER 50+) Oral Tab Take  by mouth.         Past Medical History:   Diagnosis Date   • Arrhythmia    • Obstructive sleep apnea (adult) (pediatric) Doug Oneal 6-20-16    autoPAP 5-12    • St clear  NECK: supple,no adenopathy,no bruits, no JVD, + thyromegaly  CHEST: no chest tenderness  BREAST: no dominant or suspicious mass  LUNGS: clear to auscultation  CARDIO: RRR without murmur  GI: good BS's,no masses, HSM or tenderness  : two descended

## 2019-11-12 ENCOUNTER — OFFICE VISIT (OUTPATIENT)
Dept: FAMILY MEDICINE CLINIC | Facility: CLINIC | Age: 59
End: 2019-11-12
Payer: COMMERCIAL

## 2019-11-12 VITALS
WEIGHT: 260.5 LBS | OXYGEN SATURATION: 98 % | HEART RATE: 61 BPM | DIASTOLIC BLOOD PRESSURE: 80 MMHG | SYSTOLIC BLOOD PRESSURE: 112 MMHG | BODY MASS INDEX: 32.39 KG/M2 | RESPIRATION RATE: 16 BRPM | HEIGHT: 75 IN | TEMPERATURE: 98 F

## 2019-11-12 DIAGNOSIS — Z00.00 ANNUAL PHYSICAL EXAM: Primary | ICD-10-CM

## 2019-11-12 DIAGNOSIS — E04.9 GOITER: ICD-10-CM

## 2019-11-12 DIAGNOSIS — Z23 NEED FOR VACCINATION: ICD-10-CM

## 2019-11-12 PROCEDURE — 90686 IIV4 VACC NO PRSV 0.5 ML IM: CPT | Performed by: FAMILY MEDICINE

## 2019-11-12 PROCEDURE — 99396 PREV VISIT EST AGE 40-64: CPT | Performed by: FAMILY MEDICINE

## 2019-11-12 PROCEDURE — 90471 IMMUNIZATION ADMIN: CPT | Performed by: FAMILY MEDICINE

## 2019-12-04 ENCOUNTER — ORDER TRANSCRIPTION (OUTPATIENT)
Dept: ADMINISTRATIVE | Facility: HOSPITAL | Age: 59
End: 2019-12-04

## 2019-12-04 DIAGNOSIS — Q23.1 BICUSPID AORTIC VALVE: ICD-10-CM

## 2019-12-04 DIAGNOSIS — I71.2 THORACIC ASCENDING AORTIC ANEURYSM (HCC): ICD-10-CM

## 2019-12-04 DIAGNOSIS — I10 HYPERTENSION: Primary | ICD-10-CM

## 2020-01-01 ENCOUNTER — EXTERNAL RECORD (OUTPATIENT)
Dept: OTHER | Age: 60
End: 2020-01-01

## 2020-01-17 DIAGNOSIS — I10 ESSENTIAL HYPERTENSION: ICD-10-CM

## 2020-01-17 DIAGNOSIS — I77.810 ASCENDING AORTA DILATATION (HCC): ICD-10-CM

## 2020-01-17 DIAGNOSIS — I35.1 AORTIC VALVE INSUFFICIENCY, ETIOLOGY OF CARDIAC VALVE DISEASE UNSPECIFIED: ICD-10-CM

## 2020-01-17 DIAGNOSIS — I42.0 DILATED CARDIOMYOPATHY (HCC): ICD-10-CM

## 2020-01-17 RX ORDER — LISINOPRIL 40 MG/1
40 TABLET ORAL
Qty: 90 TABLET | Refills: 1 | Status: ON HOLD | OUTPATIENT
Start: 2020-01-17 | End: 2020-06-01

## 2020-01-17 RX ORDER — SPIRONOLACTONE 25 MG/1
TABLET ORAL
Qty: 90 TABLET | Refills: 1 | Status: SHIPPED | OUTPATIENT
Start: 2020-01-17 | End: 2021-06-01 | Stop reason: ALTCHOICE

## 2020-01-17 NOTE — TELEPHONE ENCOUNTER
Rx Request  lisinopril 40 MG Oral Tab    spironolactone 25 MG Oral Tab    Disp:  90                  R: 1      Associated Dx: Dilated cardiomyopathy ([address] Aortic valve insufficiency, etiology of cardiac valve disease unspecified, Ascending aorta dilatatio

## 2020-02-04 NOTE — LETTER
2/4/2020         RE: Yadira Hoang  [address] [facility] [address]        Dear Colleague,    Thank you for referring your patient, Yadira Hoang, to the [facility]. Please see a copy of my visit note below.    CONSULT NOTE      REFERRING PHYSICIAN:  Gabriela Dorsey MD      PRESENTING COMPLAINT:  Consultation for a right heel malignant melanoma.      HISTORY OF PRESENTING COMPLAINT:  Ms. Hoang is 76 years old.  She has been diagnosed with a malignant melanoma of the right heel 3.4 mm in Breslow depth.  Planning a 2 cm margin excision.  Reconstruction of the heel will be required and she is here to discuss that with me.      PAST MEDICAL HISTORY:  Nil.      PAST SURGICAL HISTORY:  Nil.      MEDICATIONS:  Nil.      ALLERGIES:  Statin drugs.      SOCIAL HISTORY:  Does not smoke, does not drink.  She is retired, lives in [place].      REVIEW OF SYSTEMS:  Denies chest pain, shortness of breath, MI, CVA, DVT and PE.      PHYSICAL EXAMINATION:  Vital signs are stable.  She is afebrile, in no obvious distress.  She is 5 feet 7 inches, 144 pounds, BMI 22 kg/m2.  On examination of her right heel, she has about a 3 x 3 cm verrucous lesion on the right heel just on the instep on the medial aspect.  Pulses are palpable.  Sensation is intact.      ASSESSMENT AND PLAN:  Based on above findings, a diagnosis of a right heel malignant melanoma was made.  Reconstruction of the defect after excision will be required.  I think the best course of action would be to either use an instep flap if the blood flow is intact with a skin graft from the donor site or a free groin flap.  This was discussed with the patient and her  in detail.  I will discuss with Dr. Dorsey whether we do this simultaneously or in a staged fashion, depending on how confident she is about the margins given the fact that the lesion is quite atypical looking.  All risks, benefits and alternatives of the procedure including  [address] [facility] FOR BLOOD OR BLOOD COMPONENTS      In the course of your treatment, it may become necessary to administer a transfusion of blood or blood components.  This form provides basic information concerning this proc explain the alternatives to you if it has not already been done. I,Kris Colvin, have read/had read to me the above. I understand the matters bearing on the decision whether or not to authorize a transfusion of blood or blood components.  I have no questio pain, infection, bleeding, scarring, asymmetry, seromas, hematomas, wound breakdown, wound dehiscence, loss of the flaps, requirement of further surgeries, long convalescence sensation loss, requirement of staged revisional surgeries, DVT, PE, MI, CVA, pneumonia, renal failure and death were all explained.  She understood everything and wants to proceed.  I look forward to helping her out in the near future.      Total time spent with patient 30 minutes, more than half was counseling.      cc:   Gabriela Dorsey MD   [address]  [address]         Again, thank you for allowing me to participate in the care of your patient.        Sincerely,        NIKA Crabtree MD

## 2020-02-24 ENCOUNTER — HOSPITAL ENCOUNTER (OUTPATIENT)
Dept: CT IMAGING | Facility: HOSPITAL | Age: 60
Discharge: HOME | End: 2020-02-24
Attending: INTERNAL MEDICINE
Payer: COMMERCIAL

## 2020-02-24 VITALS — SYSTOLIC BLOOD PRESSURE: 111 MMHG | HEART RATE: 60 BPM | DIASTOLIC BLOOD PRESSURE: 68 MMHG

## 2020-02-24 DIAGNOSIS — I71.2 THORACIC ASCENDING AORTIC ANEURYSM (HCC): ICD-10-CM

## 2020-02-24 DIAGNOSIS — I10 HYPERTENSION: ICD-10-CM

## 2020-02-24 DIAGNOSIS — Q23.1 BICUSPID AORTIC VALVE: ICD-10-CM

## 2020-02-24 LAB — CREAT BLD-MCNC: 1.3 MG/DL (ref 0.7–1.3)

## 2020-02-24 PROCEDURE — 82565 ASSAY OF CREATININE: CPT

## 2020-02-24 PROCEDURE — 71275 CT ANGIOGRAPHY CHEST: CPT | Performed by: INTERNAL MEDICINE

## 2020-02-24 NOTE — IMAGING NOTE
Received pt to CT 4, GE. Pt verified name, , and allergies. Contrast= 92 ml  Saline= 158 ml  Average HR= 62  Pt tolerated exam well.

## 2020-03-02 ENCOUNTER — HOSPITAL ENCOUNTER (OUTPATIENT)
Dept: CV DIAGNOSTICS | Facility: HOSPITAL | Age: 60
Discharge: HOME | End: 2020-03-02
Attending: INTERNAL MEDICINE
Payer: COMMERCIAL

## 2020-03-02 DIAGNOSIS — I25.10 CAD IN NATIVE ARTERY: ICD-10-CM

## 2020-03-02 DIAGNOSIS — I44.7 LBBB (LEFT BUNDLE BRANCH BLOCK): ICD-10-CM

## 2020-03-02 DIAGNOSIS — Q23.1 BICUSPID AORTIC VALVE: ICD-10-CM

## 2020-03-02 DIAGNOSIS — I71.2 THORACIC ASCENDING AORTIC ANEURYSM (HCC): ICD-10-CM

## 2020-03-02 PROCEDURE — 93306 TTE W/DOPPLER COMPLETE: CPT | Performed by: INTERNAL MEDICINE

## 2020-03-04 ENCOUNTER — TELEPHONE (OUTPATIENT)
Dept: CARDIOLOGY | Age: 60
End: 2020-03-04

## 2020-03-12 ENCOUNTER — TELEPHONE (OUTPATIENT)
Dept: CARDIOLOGY | Age: 60
End: 2020-03-12

## 2020-03-16 ENCOUNTER — TELEPHONE (OUTPATIENT)
Dept: CARDIOLOGY | Age: 60
End: 2020-03-16

## 2020-03-17 ENCOUNTER — OFFICE VISIT (OUTPATIENT)
Dept: CARDIOLOGY | Age: 60
End: 2020-03-17

## 2020-03-17 VITALS
BODY MASS INDEX: 31.83 KG/M2 | WEIGHT: 256 LBS | HEIGHT: 75 IN | SYSTOLIC BLOOD PRESSURE: 118 MMHG | DIASTOLIC BLOOD PRESSURE: 76 MMHG

## 2020-03-17 DIAGNOSIS — I10 ESSENTIAL HYPERTENSION: ICD-10-CM

## 2020-03-17 DIAGNOSIS — I71.21 THORACIC ASCENDING AORTIC ANEURYSM (CMD): Primary | ICD-10-CM

## 2020-03-17 DIAGNOSIS — I25.10 CAD IN NATIVE ARTERY: ICD-10-CM

## 2020-03-17 DIAGNOSIS — I50.22 CHRONIC SYSTOLIC CHF (CONGESTIVE HEART FAILURE) (CMD): ICD-10-CM

## 2020-03-17 DIAGNOSIS — Z86.79 HISTORY OF ATRIAL FIBRILLATION: ICD-10-CM

## 2020-03-17 DIAGNOSIS — Q23.1 BICUSPID AORTIC VALVE: ICD-10-CM

## 2020-03-17 DIAGNOSIS — I42.0 DILATED CARDIOMYOPATHY (CMD): ICD-10-CM

## 2020-03-17 DIAGNOSIS — I65.23 BILATERAL CAROTID ARTERY STENOSIS: ICD-10-CM

## 2020-03-17 DIAGNOSIS — I44.7 LBBB (LEFT BUNDLE BRANCH BLOCK): ICD-10-CM

## 2020-03-17 PROCEDURE — 99214 OFFICE O/P EST MOD 30 MIN: CPT | Performed by: INTERNAL MEDICINE

## 2020-03-17 RX ORDER — LISINOPRIL 40 MG/1
10 TABLET ORAL 2 TIMES DAILY
COMMUNITY
Start: 2020-01-17 | End: 2020-07-09 | Stop reason: DRUGHIGH

## 2020-03-17 SDOH — HEALTH STABILITY: PHYSICAL HEALTH: ON AVERAGE, HOW MANY MINUTES DO YOU ENGAGE IN EXERCISE AT THIS LEVEL?: 60 MIN

## 2020-03-17 SDOH — HEALTH STABILITY: PHYSICAL HEALTH: ON AVERAGE, HOW MANY DAYS PER WEEK DO YOU ENGAGE IN MODERATE TO STRENUOUS EXERCISE (LIKE A BRISK WALK)?: 6 DAYS

## 2020-03-17 ASSESSMENT — PATIENT HEALTH QUESTIONNAIRE - PHQ9
SUM OF ALL RESPONSES TO PHQ9 QUESTIONS 1 AND 2: 0
2. FEELING DOWN, DEPRESSED OR HOPELESS: NOT AT ALL
1. LITTLE INTEREST OR PLEASURE IN DOING THINGS: NOT AT ALL
SUM OF ALL RESPONSES TO PHQ9 QUESTIONS 1 AND 2: 0

## 2020-04-16 RX ORDER — DRONEDARONE 400 MG/1
TABLET, FILM COATED ORAL
Qty: 60 TABLET | Refills: 11 | Status: SHIPPED | OUTPATIENT
Start: 2020-04-16 | End: 2021-06-16

## 2020-04-27 ENCOUNTER — OFFICE VISIT (OUTPATIENT)
Dept: CARDIOLOGY | Age: 60
End: 2020-04-27

## 2020-04-27 ENCOUNTER — TELEPHONE (OUTPATIENT)
Dept: CARDIOLOGY | Age: 60
End: 2020-04-27

## 2020-04-27 VITALS
BODY MASS INDEX: 32.08 KG/M2 | DIASTOLIC BLOOD PRESSURE: 77 MMHG | HEIGHT: 75 IN | SYSTOLIC BLOOD PRESSURE: 119 MMHG | WEIGHT: 258 LBS

## 2020-04-27 DIAGNOSIS — I71.21 THORACIC ASCENDING AORTIC ANEURYSM (CMD): Primary | ICD-10-CM

## 2020-04-27 DIAGNOSIS — Z86.79 HISTORY OF ATRIAL FIBRILLATION: ICD-10-CM

## 2020-04-27 DIAGNOSIS — Q23.1 BICUSPID AORTIC VALVE: ICD-10-CM

## 2020-04-27 PROCEDURE — 99214 OFFICE O/P EST MOD 30 MIN: CPT | Performed by: INTERNAL MEDICINE

## 2020-05-07 ENCOUNTER — TELEPHONE (OUTPATIENT)
Dept: CARDIOLOGY | Age: 60
End: 2020-05-07

## 2020-05-07 ENCOUNTER — TELEPHONE (OUTPATIENT)
Dept: CARDIOLOGY UNIT | Facility: HOSPITAL | Age: 60
End: 2020-05-07

## 2020-05-07 DIAGNOSIS — I71.21 THORACIC ASCENDING AORTIC ANEURYSM (CMD): Primary | ICD-10-CM

## 2020-05-07 DIAGNOSIS — I25.10 CAD IN NATIVE ARTERY: ICD-10-CM

## 2020-05-07 DIAGNOSIS — Q23.1 BICUSPID AORTIC VALVE: ICD-10-CM

## 2020-05-07 NOTE — PROGRESS NOTES
Called patient to discuss pre op teaching, post op expectations, discharge planning. Discussed roles of CM/SW, PT/OT, Cardiac rehab in education and recovery. All questions answered, binder given.   PAT scheduled, he is awaiting call back from Dr. Chas Posada

## 2020-05-18 ENCOUNTER — LAB ENCOUNTER (OUTPATIENT)
Dept: LAB | Facility: HOSPITAL | Age: 60
End: 2020-05-18
Attending: THORACIC SURGERY (CARDIOTHORACIC VASCULAR SURGERY)
Payer: COMMERCIAL

## 2020-05-18 DIAGNOSIS — I25.10 CAD (CORONARY ARTERY DISEASE): ICD-10-CM

## 2020-05-18 DIAGNOSIS — I35.0 AORTIC STENOSIS: ICD-10-CM

## 2020-05-19 ENCOUNTER — HOSPITAL ENCOUNTER (OUTPATIENT)
Dept: GENERAL RADIOLOGY | Facility: HOSPITAL | Age: 60
Discharge: HOME | End: 2020-05-19
Attending: THORACIC SURGERY (CARDIOTHORACIC VASCULAR SURGERY)
Payer: COMMERCIAL

## 2020-05-19 ENCOUNTER — LAB ENCOUNTER (OUTPATIENT)
Dept: LAB | Facility: HOSPITAL | Age: 60
End: 2020-05-19
Attending: THORACIC SURGERY (CARDIOTHORACIC VASCULAR SURGERY)
Payer: COMMERCIAL

## 2020-05-19 ENCOUNTER — HOSPITAL ENCOUNTER (OUTPATIENT)
Dept: ULTRASOUND IMAGING | Facility: HOSPITAL | Age: 60
Discharge: HOME | End: 2020-05-19
Attending: THORACIC SURGERY (CARDIOTHORACIC VASCULAR SURGERY)
Payer: COMMERCIAL

## 2020-05-19 DIAGNOSIS — Z91.89 AT RISK FOR BLEEDING: ICD-10-CM

## 2020-05-19 DIAGNOSIS — Z01.818 PREOP TESTING: ICD-10-CM

## 2020-05-19 DIAGNOSIS — I77.810 ASCENDING AORTA DILATATION (HCC): ICD-10-CM

## 2020-05-19 PROCEDURE — 86900 BLOOD TYPING SEROLOGIC ABO: CPT

## 2020-05-19 PROCEDURE — 93010 ELECTROCARDIOGRAM REPORT: CPT | Performed by: INTERNAL MEDICINE

## 2020-05-19 PROCEDURE — 71045 X-RAY EXAM CHEST 1 VIEW: CPT | Performed by: THORACIC SURGERY (CARDIOTHORACIC VASCULAR SURGERY)

## 2020-05-19 PROCEDURE — 36415 COLL VENOUS BLD VENIPUNCTURE: CPT

## 2020-05-19 PROCEDURE — 80053 COMPREHEN METABOLIC PANEL: CPT

## 2020-05-19 PROCEDURE — 93005 ELECTROCARDIOGRAM TRACING: CPT

## 2020-05-19 PROCEDURE — 85730 THROMBOPLASTIN TIME PARTIAL: CPT

## 2020-05-19 PROCEDURE — 85025 COMPLETE CBC W/AUTO DIFF WBC: CPT

## 2020-05-19 PROCEDURE — 83036 HEMOGLOBIN GLYCOSYLATED A1C: CPT

## 2020-05-19 PROCEDURE — 86901 BLOOD TYPING SEROLOGIC RH(D): CPT

## 2020-05-19 PROCEDURE — 81003 URINALYSIS AUTO W/O SCOPE: CPT

## 2020-05-19 PROCEDURE — 93880 EXTRACRANIAL BILAT STUDY: CPT | Performed by: THORACIC SURGERY (CARDIOTHORACIC VASCULAR SURGERY)

## 2020-05-19 PROCEDURE — 86850 RBC ANTIBODY SCREEN: CPT

## 2020-05-19 PROCEDURE — 85610 PROTHROMBIN TIME: CPT

## 2020-05-19 NOTE — HISTORICAL OFFICE NOTE
Progress Notes  - documented in this encounter  Vickie Lee MD - 03/17/2020 12:30 PM CDT  Formatting of this note might be different from the original.  [address] Note    Telemedicine Visit  Phone Call Clinic Visit  Du Prior mean gradient was 29 mmHg at that time. Aortic valve area was 1.4 cm² in February 2018. Patient has follow-up gated CT of thoracic aorta in March 2020. There is chronic total occlusion in mid LAD segment.  No other significant coronary artery disea Allergies    Medications:  Current Outpatient Medications   Medication Sig Dispense Refill   • lisinopril (ZESTRIL) 40 MG tablet Take 40 mg by mouth. • MULTAQ 400 MG Tab TAKE ONE TABLET BY MOUTH TWICE DAILY.  60 tablet 4   • ELIQUIS 5 MG Tab TAKE 1 TABLET 02/21/2019   Component Date Value Ref Range Status   • Creatinine, Serum 02/21/2019 1.20 mg/dL Final   ]    Diagnosis / Assessment:  Patient Active Problem List   Diagnosis   • CAD in native artery   • Thoracic ascending aortic aneurysm (CMS/HCC)   • Bicus

## 2020-05-21 ENCOUNTER — HOSPITAL ENCOUNTER (OUTPATIENT)
Dept: INTERVENTIONAL RADIOLOGY/VASCULAR | Facility: HOSPITAL | Age: 60
Discharge: HOME | End: 2020-05-21
Attending: THORACIC SURGERY (CARDIOTHORACIC VASCULAR SURGERY) | Admitting: THORACIC SURGERY (CARDIOTHORACIC VASCULAR SURGERY)
Payer: COMMERCIAL

## 2020-05-21 VITALS
TEMPERATURE: 99 F | HEIGHT: 75 IN | HEART RATE: 59 BPM | DIASTOLIC BLOOD PRESSURE: 72 MMHG | BODY MASS INDEX: 32.08 KG/M2 | OXYGEN SATURATION: 99 % | RESPIRATION RATE: 15 BRPM | SYSTOLIC BLOOD PRESSURE: 117 MMHG | WEIGHT: 258 LBS

## 2020-05-21 DIAGNOSIS — I71.2 ASCENDING AORTIC ANEURYSM (HCC): ICD-10-CM

## 2020-05-21 DIAGNOSIS — I35.0 AORTIC VALVE STENOSIS, ETIOLOGY OF CARDIAC VALVE DISEASE UNSPECIFIED: ICD-10-CM

## 2020-05-21 DIAGNOSIS — I25.10 CAD (CORONARY ARTERY DISEASE): Primary | ICD-10-CM

## 2020-05-21 DIAGNOSIS — I35.0 AORTIC STENOSIS: ICD-10-CM

## 2020-05-21 PROCEDURE — 99152 MOD SED SAME PHYS/QHP 5/>YRS: CPT | Performed by: INTERNAL MEDICINE

## 2020-05-21 PROCEDURE — 93456 R HRT CORONARY ARTERY ANGIO: CPT

## 2020-05-21 PROCEDURE — B3101ZZ FLUOROSCOPY OF THORACIC AORTA USING LOW OSMOLAR CONTRAST: ICD-10-PCS | Performed by: INTERNAL MEDICINE

## 2020-05-21 PROCEDURE — 99152 MOD SED SAME PHYS/QHP 5/>YRS: CPT

## 2020-05-21 PROCEDURE — 93456 R HRT CORONARY ARTERY ANGIO: CPT | Performed by: INTERNAL MEDICINE

## 2020-05-21 PROCEDURE — 4A023N6 MEASUREMENT OF CARDIAC SAMPLING AND PRESSURE, RIGHT HEART, PERCUTANEOUS APPROACH: ICD-10-PCS | Performed by: INTERNAL MEDICINE

## 2020-05-21 PROCEDURE — 99153 MOD SED SAME PHYS/QHP EA: CPT

## 2020-05-21 PROCEDURE — 93567 NJX CAR CTH SPRVLV AORTGRPHY: CPT | Performed by: INTERNAL MEDICINE

## 2020-05-21 PROCEDURE — 93567 NJX CAR CTH SPRVLV AORTGRPHY: CPT

## 2020-05-21 PROCEDURE — B2111ZZ FLUOROSCOPY OF MULTIPLE CORONARY ARTERIES USING LOW OSMOLAR CONTRAST: ICD-10-PCS | Performed by: INTERNAL MEDICINE

## 2020-05-21 RX ORDER — SODIUM CHLORIDE 9 MG/ML
INJECTION, SOLUTION INTRAVENOUS
Status: DISCONTINUED | OUTPATIENT
Start: 2020-05-22 | End: 2020-05-21 | Stop reason: HOSPADM

## 2020-05-21 RX ORDER — LIDOCAINE HYDROCHLORIDE 10 MG/ML
INJECTION, SOLUTION EPIDURAL; INFILTRATION; INTRACAUDAL; PERINEURAL
Status: COMPLETED
Start: 2020-05-21 | End: 2020-05-21

## 2020-05-21 RX ORDER — HEPARIN SODIUM 5000 [USP'U]/ML
INJECTION, SOLUTION INTRAVENOUS; SUBCUTANEOUS
Status: COMPLETED
Start: 2020-05-21 | End: 2020-05-21

## 2020-05-21 RX ORDER — MIDAZOLAM HYDROCHLORIDE 1 MG/ML
INJECTION INTRAMUSCULAR; INTRAVENOUS
Status: COMPLETED
Start: 2020-05-21 | End: 2020-05-21

## 2020-05-21 NOTE — H&P
Veronica Pastrana  : 3/6/1960  PCP: Brayan Jean, DO    History reviewed and up to date. No changes to H&P. The patient is scheduled for right and left heart catheterization as a preop evaluation prior to open heart surgery.   Patient has chronic total occlus Hemoglobin A1c was 5.2%. Creatinine was normal at 1.2 with BUN of 18. Glucose was 89. Vitamin D level was 31 and TSH panel was normal. CBC was normal.    Follow-up 2D echo with Doppler was stable in March 2020.  LV ejection fraction was 35 to 40% with diffu 4.9cm 3/15   • Bicuspid aortic valve   • Carotid artery stenosis   • CVA (cerebrovascular accident) (CMS/HCC)   3/15   • HTN (hypertension), malignant   • Left bundle branch block   • LV dysfunction   • Severe aortic insufficiency   • SVT (supraventricul congestive-like symptoms. Patient stays active. Respiratory: No cough, wheezing or hemoptysis, no dyspnea. Hematologic/Lymphatic: No easy bruising or bleeding. Integumentary: No rash or suspicious lesions on the skin.    Musculoskeletal: No arthritis or

## 2020-05-21 NOTE — PROGRESS NOTES
Pt s/p [facility] with Dr. Oneil Delgado. Pt recovered in holding. Right groin venous and arterial dressing CDI, no bleeding or hematoma. +pedals. Pt flat for one hour then HOB elevated one hour. Pt wife updated after surgery. Dr. Oneil Delgado spoke to pt about results.  Len Samson

## 2020-05-22 NOTE — PROCEDURES
[facility]    PATIENT'S NAME: Steve Palomino   ATTENDING PHYSICIAN: Grupo Pérez MD   OPERATING PHYSICIAN: Marquis Carlton M.D.    PATIENT ACCOUNT#:   [de-identified]    LOCATION:  [address]  MEDICAL RECORD #:   [id number]       DATE OF MARCIA used to infiltrate his right groin for local anesthesia. A 6-Serbian introducer sheath was inserted into the right femoral artery using modified Seldinger technique. A 7-Serbian introducer sheath was inserted into the right femoral vein.     Right heart cat was mildly enlarged and descending aorta caliber was at the upper limit of normal, but it may be mildly enlarged as well. The contrast is washing out quickly.     RIGHT HEART CATHETERIZATION HEMODYNAMICS:  Right atrial pressure was 8 mmHg and right ventric for open heart surgery which will include aortic valve replacement with aortic conduit and bypass of LAD artery with Dr. Nathaly Aly next week. 2.   All discussed with the patient and nurse in detail. See post cardiac cath orders.      Dictated By Doris Pulliam

## 2020-05-22 NOTE — CM/SW NOTE
05/22/20 1400   CM/SW Screening   Patient lives with Spouse   Discharge Needs   Anticipated D/C needs To be determined;Home health care     Per Katie Pham RN, [facility] discussed with patient. Patient was screened, possible need for HHC at [place].  CM/SW will tae

## 2020-05-26 ENCOUNTER — LAB ENCOUNTER (OUTPATIENT)
Dept: LAB | Facility: HOSPITAL | Age: 60
End: 2020-05-26
Attending: THORACIC SURGERY (CARDIOTHORACIC VASCULAR SURGERY)
Payer: COMMERCIAL

## 2020-05-26 DIAGNOSIS — Z01.812 PRE-PROCEDURE LAB EXAM: Primary | ICD-10-CM

## 2020-05-27 ENCOUNTER — APPOINTMENT (OUTPATIENT)
Dept: GENERAL RADIOLOGY | Facility: HOSPITAL | Age: 60
DRG: 220 | End: 2020-05-27
Attending: THORACIC SURGERY (CARDIOTHORACIC VASCULAR SURGERY)
Payer: COMMERCIAL

## 2020-05-27 ENCOUNTER — ANESTHESIA (OUTPATIENT)
Dept: CARDIAC SURGERY | Facility: HOSPITAL | Age: 60
DRG: 220 | End: 2020-05-27
Payer: COMMERCIAL

## 2020-05-27 ENCOUNTER — ANESTHESIA EVENT (OUTPATIENT)
Dept: CARDIAC SURGERY | Facility: HOSPITAL | Age: 60
DRG: 220 | End: 2020-05-27
Payer: COMMERCIAL

## 2020-05-27 ENCOUNTER — HOSPITAL ENCOUNTER (INPATIENT)
Facility: HOSPITAL | Age: 60
LOS: 5 days | Discharge: HOME HEALTH | DRG: 220 | End: 2020-06-01
Attending: THORACIC SURGERY (CARDIOTHORACIC VASCULAR SURGERY) | Admitting: THORACIC SURGERY (CARDIOTHORACIC VASCULAR SURGERY)
Payer: COMMERCIAL

## 2020-05-27 DIAGNOSIS — Z91.89 AT RISK FOR BLEEDING: ICD-10-CM

## 2020-05-27 DIAGNOSIS — I48.0 PAROXYSMAL ATRIAL FIBRILLATION (HCC): ICD-10-CM

## 2020-05-27 DIAGNOSIS — Z01.818 PREOP TESTING: ICD-10-CM

## 2020-05-27 DIAGNOSIS — I77.810 ASCENDING AORTA DILATATION (HCC): Primary | ICD-10-CM

## 2020-05-27 PROBLEM — I35.2 AORTIC INSUFFICIENCY WITH AORTIC STENOSIS: Status: ACTIVE | Noted: 2020-05-27

## 2020-05-27 PROCEDURE — 93312 ECHO TRANSESOPHAGEAL: CPT | Performed by: INTERNAL MEDICINE

## 2020-05-27 PROCEDURE — 02RF08Z REPLACEMENT OF AORTIC VALVE WITH ZOOPLASTIC TISSUE, OPEN APPROACH: ICD-10-PCS | Performed by: THORACIC SURGERY (CARDIOTHORACIC VASCULAR SURGERY)

## 2020-05-27 PROCEDURE — B24BZZ4 ULTRASONOGRAPHY OF HEART WITH AORTA, TRANSESOPHAGEAL: ICD-10-PCS | Performed by: INTERNAL MEDICINE

## 2020-05-27 PROCEDURE — 30233M1 TRANSFUSION OF NONAUTOLOGOUS PLASMA CRYOPRECIPITATE INTO PERIPHERAL VEIN, PERCUTANEOUS APPROACH: ICD-10-PCS | Performed by: THORACIC SURGERY (CARDIOTHORACIC VASCULAR SURGERY)

## 2020-05-27 PROCEDURE — 02B70ZK EXCISION OF LEFT ATRIAL APPENDAGE, OPEN APPROACH: ICD-10-PCS | Performed by: THORACIC SURGERY (CARDIOTHORACIC VASCULAR SURGERY)

## 2020-05-27 PROCEDURE — 30233K1 TRANSFUSION OF NONAUTOLOGOUS FROZEN PLASMA INTO PERIPHERAL VEIN, PERCUTANEOUS APPROACH: ICD-10-PCS | Performed by: THORACIC SURGERY (CARDIOTHORACIC VASCULAR SURGERY)

## 2020-05-27 PROCEDURE — 02100Z9 BYPASS CORONARY ARTERY, ONE ARTERY FROM LEFT INTERNAL MAMMARY, OPEN APPROACH: ICD-10-PCS | Performed by: THORACIC SURGERY (CARDIOTHORACIC VASCULAR SURGERY)

## 2020-05-27 PROCEDURE — 71045 X-RAY EXAM CHEST 1 VIEW: CPT | Performed by: THORACIC SURGERY (CARDIOTHORACIC VASCULAR SURGERY)

## 2020-05-27 PROCEDURE — 99233 SBSQ HOSP IP/OBS HIGH 50: CPT | Performed by: HOSPITALIST

## 2020-05-27 PROCEDURE — 36430 TRANSFUSION BLD/BLD COMPNT: CPT | Performed by: INTERNAL MEDICINE

## 2020-05-27 PROCEDURE — 30233R1 TRANSFUSION OF NONAUTOLOGOUS PLATELETS INTO PERIPHERAL VEIN, PERCUTANEOUS APPROACH: ICD-10-PCS | Performed by: THORACIC SURGERY (CARDIOTHORACIC VASCULAR SURGERY)

## 2020-05-27 PROCEDURE — 02RX0JZ REPLACEMENT OF THORACIC AORTA, ASCENDING/ARCH WITH SYNTHETIC SUBSTITUTE, OPEN APPROACH: ICD-10-PCS | Performed by: THORACIC SURGERY (CARDIOTHORACIC VASCULAR SURGERY)

## 2020-05-27 PROCEDURE — 5A09357 ASSISTANCE WITH RESPIRATORY VENTILATION, LESS THAN 24 CONSECUTIVE HOURS, CONTINUOUS POSITIVE AIRWAY PRESSURE: ICD-10-PCS | Performed by: THORACIC SURGERY (CARDIOTHORACIC VASCULAR SURGERY)

## 2020-05-27 PROCEDURE — 5A1221Z PERFORMANCE OF CARDIAC OUTPUT, CONTINUOUS: ICD-10-PCS | Performed by: THORACIC SURGERY (CARDIOTHORACIC VASCULAR SURGERY)

## 2020-05-27 PROCEDURE — 99291 CRITICAL CARE FIRST HOUR: CPT | Performed by: INTERNAL MEDICINE

## 2020-05-27 PROCEDURE — S0028 INJECTION, FAMOTIDINE, 20 MG: HCPCS | Performed by: INTERNAL MEDICINE

## 2020-05-27 PROCEDURE — 02580ZZ DESTRUCTION OF CONDUCTION MECHANISM, OPEN APPROACH: ICD-10-PCS | Performed by: THORACIC SURGERY (CARDIOTHORACIC VASCULAR SURGERY)

## 2020-05-27 DEVICE — IMPLANTABLE DEVICE
Type: IMPLANTABLE DEVICE | Site: CHEST | Status: FUNCTIONAL
Brand: STERNALOCK® BLU SYSTEM

## 2020-05-27 DEVICE — VALVE AORTIC 25MM INSPIRIS: Type: IMPLANTABLE DEVICE | Site: HEART | Status: FUNCTIONAL

## 2020-05-27 DEVICE — GRAFT HEMASHIELD 32X30: Type: IMPLANTABLE DEVICE | Site: HEART | Status: FUNCTIONAL

## 2020-05-27 DEVICE — BARD® PTFE FELT, 15.2 CM X 15.2 CM
Type: IMPLANTABLE DEVICE | Site: HEART | Status: FUNCTIONAL
Brand: BARD® PTFE FELT

## 2020-05-27 RX ORDER — MAGNESIUM OXIDE 400 MG (241.3 MG MAGNESIUM) TABLET
3 TABLET NIGHTLY PRN
Status: DISCONTINUED | OUTPATIENT
Start: 2020-05-27 | End: 2020-06-01

## 2020-05-27 RX ORDER — DIPHENHYDRAMINE HYDROCHLORIDE 50 MG/ML
INJECTION INTRAMUSCULAR; INTRAVENOUS AS NEEDED
Status: DISCONTINUED | OUTPATIENT
Start: 2020-05-27 | End: 2020-05-27 | Stop reason: SURG

## 2020-05-27 RX ORDER — DEXTROSE MONOHYDRATE 25 G/50ML
50 INJECTION, SOLUTION INTRAVENOUS
Status: DISCONTINUED | OUTPATIENT
Start: 2020-05-27 | End: 2020-05-28 | Stop reason: SDUPTHER

## 2020-05-27 RX ORDER — MIDAZOLAM HYDROCHLORIDE 1 MG/ML
1 INJECTION INTRAMUSCULAR; INTRAVENOUS EVERY 30 MIN PRN
Status: DISCONTINUED | OUTPATIENT
Start: 2020-05-27 | End: 2020-06-01

## 2020-05-27 RX ORDER — ALBUMIN, HUMAN INJ 5% 5 %
250 SOLUTION INTRAVENOUS ONCE
Status: COMPLETED | OUTPATIENT
Start: 2020-05-27 | End: 2020-05-27

## 2020-05-27 RX ORDER — DOBUTAMINE HYDROCHLORIDE 200 MG/100ML
INJECTION INTRAVENOUS CONTINUOUS PRN
Status: DISCONTINUED | OUTPATIENT
Start: 2020-05-27 | End: 2020-05-27 | Stop reason: SURG

## 2020-05-27 RX ORDER — ROCURONIUM BROMIDE 10 MG/ML
INJECTION, SOLUTION INTRAVENOUS AS NEEDED
Status: DISCONTINUED | OUTPATIENT
Start: 2020-05-27 | End: 2020-05-27 | Stop reason: SURG

## 2020-05-27 RX ORDER — DEXTROSE AND SODIUM CHLORIDE 5; .45 G/100ML; G/100ML
INJECTION, SOLUTION INTRAVENOUS CONTINUOUS
Status: DISCONTINUED | OUTPATIENT
Start: 2020-05-27 | End: 2020-05-31

## 2020-05-27 RX ORDER — CEFAZOLIN SODIUM/WATER 2 G/20 ML
SYRINGE (ML) INTRAVENOUS AS NEEDED
Status: DISCONTINUED | OUTPATIENT
Start: 2020-05-27 | End: 2020-05-27 | Stop reason: SURG

## 2020-05-27 RX ORDER — DEXMEDETOMIDINE HYDROCHLORIDE 4 UG/ML
INJECTION, SOLUTION INTRAVENOUS CONTINUOUS
Status: DISCONTINUED | OUTPATIENT
Start: 2020-05-27 | End: 2020-05-29

## 2020-05-27 RX ORDER — SODIUM CHLORIDE 9 MG/ML
INJECTION, SOLUTION INTRAVENOUS CONTINUOUS
Status: DISCONTINUED | OUTPATIENT
Start: 2020-05-27 | End: 2020-05-31

## 2020-05-27 RX ORDER — ONDANSETRON 2 MG/ML
4 INJECTION INTRAMUSCULAR; INTRAVENOUS EVERY 6 HOURS PRN
Status: DISCONTINUED | OUTPATIENT
Start: 2020-05-27 | End: 2020-06-01

## 2020-05-27 RX ORDER — BISACODYL 10 MG
10 SUPPOSITORY, RECTAL RECTAL
Status: DISCONTINUED | OUTPATIENT
Start: 2020-05-27 | End: 2020-06-01

## 2020-05-27 RX ORDER — DOCUSATE SODIUM 100 MG/1
100 CAPSULE, LIQUID FILLED ORAL 2 TIMES DAILY
Status: DISCONTINUED | OUTPATIENT
Start: 2020-05-27 | End: 2020-06-01

## 2020-05-27 RX ORDER — POLYETHYLENE GLYCOL 3350 17 G/17G
17 POWDER, FOR SOLUTION ORAL DAILY PRN
Status: DISCONTINUED | OUTPATIENT
Start: 2020-05-27 | End: 2020-05-30

## 2020-05-27 RX ORDER — MIDAZOLAM HYDROCHLORIDE 1 MG/ML
INJECTION INTRAMUSCULAR; INTRAVENOUS AS NEEDED
Status: DISCONTINUED | OUTPATIENT
Start: 2020-05-27 | End: 2020-05-27 | Stop reason: SURG

## 2020-05-27 RX ORDER — VANCOMYCIN HYDROCHLORIDE 1 G/20ML
INJECTION, POWDER, LYOPHILIZED, FOR SOLUTION INTRAVENOUS AS NEEDED
Status: DISCONTINUED | OUTPATIENT
Start: 2020-05-27 | End: 2020-05-27 | Stop reason: SURG

## 2020-05-27 RX ORDER — SODIUM PHOSPHATE, DIBASIC AND SODIUM PHOSPHATE, MONOBASIC 7; 19 G/133ML; G/133ML
1 ENEMA RECTAL ONCE AS NEEDED
Status: DISCONTINUED | OUTPATIENT
Start: 2020-05-27 | End: 2020-06-01

## 2020-05-27 RX ORDER — ASPIRIN 81 MG/1
81 TABLET, CHEWABLE ORAL DAILY
Status: DISCONTINUED | OUTPATIENT
Start: 2020-05-27 | End: 2020-06-01

## 2020-05-27 RX ORDER — IPRATROPIUM BROMIDE AND ALBUTEROL SULFATE 2.5; .5 MG/3ML; MG/3ML
3 SOLUTION RESPIRATORY (INHALATION) EVERY 4 HOURS PRN
Status: DISCONTINUED | OUTPATIENT
Start: 2020-05-27 | End: 2020-06-01

## 2020-05-27 RX ORDER — CEFAZOLIN SODIUM/WATER 2 G/20 ML
2 SYRINGE (ML) INTRAVENOUS EVERY 8 HOURS
Status: COMPLETED | OUTPATIENT
Start: 2020-05-27 | End: 2020-05-29

## 2020-05-27 RX ORDER — MORPHINE SULFATE 4 MG/ML
6 INJECTION, SOLUTION INTRAMUSCULAR; INTRAVENOUS EVERY 2 HOUR PRN
Status: DISCONTINUED | OUTPATIENT
Start: 2020-05-27 | End: 2020-06-01

## 2020-05-27 RX ORDER — PANTOPRAZOLE SODIUM 40 MG/1
40 TABLET, DELAYED RELEASE ORAL
Status: DISCONTINUED | OUTPATIENT
Start: 2020-05-28 | End: 2020-06-01

## 2020-05-27 RX ORDER — MORPHINE SULFATE 4 MG/ML
2 INJECTION, SOLUTION INTRAMUSCULAR; INTRAVENOUS EVERY 2 HOUR PRN
Status: DISCONTINUED | OUTPATIENT
Start: 2020-05-27 | End: 2020-06-01

## 2020-05-27 RX ORDER — SODIUM CHLORIDE 9 MG/ML
INJECTION, SOLUTION INTRAVENOUS CONTINUOUS PRN
Status: DISCONTINUED | OUTPATIENT
Start: 2020-05-27 | End: 2020-05-27 | Stop reason: SURG

## 2020-05-27 RX ORDER — HYDROCODONE BITARTRATE AND ACETAMINOPHEN 10; 325 MG/1; MG/1
1 TABLET ORAL EVERY 4 HOURS PRN
Status: DISCONTINUED | OUTPATIENT
Start: 2020-05-27 | End: 2020-06-01

## 2020-05-27 RX ORDER — LIDOCAINE HYDROCHLORIDE 10 MG/ML
INJECTION, SOLUTION EPIDURAL; INFILTRATION; INTRACAUDAL; PERINEURAL AS NEEDED
Status: DISCONTINUED | OUTPATIENT
Start: 2020-05-27 | End: 2020-05-27 | Stop reason: SURG

## 2020-05-27 RX ORDER — ATORVASTATIN CALCIUM 80 MG/1
80 TABLET, FILM COATED ORAL NIGHTLY
Status: DISCONTINUED | OUTPATIENT
Start: 2020-05-27 | End: 2020-06-01

## 2020-05-27 RX ORDER — CARVEDILOL 6.25 MG/1
6.25 TABLET ORAL 2 TIMES DAILY WITH MEALS
Status: DISCONTINUED | OUTPATIENT
Start: 2020-05-27 | End: 2020-05-28

## 2020-05-27 RX ORDER — POTASSIUM CHLORIDE 29.8 MG/ML
40 INJECTION INTRAVENOUS AS NEEDED
Status: DISCONTINUED | OUTPATIENT
Start: 2020-05-27 | End: 2020-06-01

## 2020-05-27 RX ORDER — HYDROCODONE BITARTRATE AND ACETAMINOPHEN 10; 325 MG/1; MG/1
2 TABLET ORAL EVERY 4 HOURS PRN
Status: DISCONTINUED | OUTPATIENT
Start: 2020-05-27 | End: 2020-06-01

## 2020-05-27 RX ORDER — ALBUMIN, HUMAN INJ 5% 5 %
250 SOLUTION INTRAVENOUS ONCE AS NEEDED
Status: DISCONTINUED | OUTPATIENT
Start: 2020-05-27 | End: 2020-06-01

## 2020-05-27 RX ORDER — PHENYLEPHRINE HCL 10 MG/ML
VIAL (ML) INJECTION AS NEEDED
Status: DISCONTINUED | OUTPATIENT
Start: 2020-05-27 | End: 2020-05-27 | Stop reason: SURG

## 2020-05-27 RX ORDER — CEFAZOLIN SODIUM 1 G/3ML
INJECTION, POWDER, FOR SOLUTION INTRAMUSCULAR; INTRAVENOUS AS NEEDED
Status: DISCONTINUED | OUTPATIENT
Start: 2020-05-27 | End: 2020-05-27 | Stop reason: SURG

## 2020-05-27 RX ORDER — MAGNESIUM SULFATE HEPTAHYDRATE 40 MG/ML
2 INJECTION, SOLUTION INTRAVENOUS AS NEEDED
Status: DISCONTINUED | OUTPATIENT
Start: 2020-05-27 | End: 2020-06-01

## 2020-05-27 RX ORDER — CHLORHEXIDINE GLUCONATE 0.12 MG/ML
15 RINSE ORAL
Status: DISCONTINUED | OUTPATIENT
Start: 2020-05-27 | End: 2020-05-29

## 2020-05-27 RX ORDER — BACITRACIN 50000 [USP'U]/1
INJECTION, POWDER, LYOPHILIZED, FOR SOLUTION INTRAMUSCULAR AS NEEDED
Status: DISCONTINUED | OUTPATIENT
Start: 2020-05-27 | End: 2020-05-27 | Stop reason: HOSPADM

## 2020-05-27 RX ORDER — VANCOMYCIN HYDROCHLORIDE
15 EVERY 12 HOURS
Status: COMPLETED | OUTPATIENT
Start: 2020-05-27 | End: 2020-05-28

## 2020-05-27 RX ORDER — NITROGLYCERIN 20 MG/100ML
INJECTION INTRAVENOUS CONTINUOUS PRN
Status: DISCONTINUED | OUTPATIENT
Start: 2020-05-27 | End: 2020-06-01

## 2020-05-27 RX ORDER — DEXMEDETOMIDINE HYDROCHLORIDE 4 UG/ML
INJECTION, SOLUTION INTRAVENOUS CONTINUOUS PRN
Status: DISCONTINUED | OUTPATIENT
Start: 2020-05-27 | End: 2020-05-27 | Stop reason: SURG

## 2020-05-27 RX ORDER — HEPARIN SODIUM 1000 [USP'U]/ML
INJECTION, SOLUTION INTRAVENOUS; SUBCUTANEOUS AS NEEDED
Status: DISCONTINUED | OUTPATIENT
Start: 2020-05-27 | End: 2020-05-27 | Stop reason: SURG

## 2020-05-27 RX ORDER — POTASSIUM CHLORIDE 14.9 MG/ML
20 INJECTION INTRAVENOUS AS NEEDED
Status: DISCONTINUED | OUTPATIENT
Start: 2020-05-27 | End: 2020-06-01

## 2020-05-27 RX ORDER — PROTAMINE SULFATE 10 MG/ML
INJECTION, SOLUTION INTRAVENOUS AS NEEDED
Status: DISCONTINUED | OUTPATIENT
Start: 2020-05-27 | End: 2020-05-27 | Stop reason: SURG

## 2020-05-27 RX ORDER — FAMOTIDINE 10 MG/ML
INJECTION, SOLUTION INTRAVENOUS AS NEEDED
Status: DISCONTINUED | OUTPATIENT
Start: 2020-05-27 | End: 2020-05-27 | Stop reason: SURG

## 2020-05-27 RX ORDER — DOBUTAMINE HYDROCHLORIDE 200 MG/100ML
INJECTION INTRAVENOUS CONTINUOUS PRN
Status: DISCONTINUED | OUTPATIENT
Start: 2020-05-27 | End: 2020-06-01

## 2020-05-27 RX ORDER — MAGNESIUM SULFATE 1 G/100ML
1 INJECTION INTRAVENOUS AS NEEDED
Status: DISCONTINUED | OUTPATIENT
Start: 2020-05-27 | End: 2020-06-01

## 2020-05-27 RX ORDER — MORPHINE SULFATE 4 MG/ML
4 INJECTION, SOLUTION INTRAMUSCULAR; INTRAVENOUS EVERY 2 HOUR PRN
Status: DISCONTINUED | OUTPATIENT
Start: 2020-05-27 | End: 2020-06-01

## 2020-05-27 RX ADMIN — DEXMEDETOMIDINE HYDROCHLORIDE 0.4 MCG/KG/HR: 4 INJECTION, SOLUTION INTRAVENOUS at 07:10:00

## 2020-05-27 RX ADMIN — HEPARIN SODIUM 5000 UNITS: 1000 INJECTION, SOLUTION INTRAVENOUS; SUBCUTANEOUS at 08:44:00

## 2020-05-27 RX ADMIN — VANCOMYCIN HYDROCHLORIDE 1000 MG: 1 INJECTION, POWDER, LYOPHILIZED, FOR SOLUTION INTRAVENOUS at 07:30:00

## 2020-05-27 RX ADMIN — DOBUTAMINE HYDROCHLORIDE 5 MCG/KG/MIN: 200 INJECTION INTRAVENOUS at 11:20:00

## 2020-05-27 RX ADMIN — SODIUM CHLORIDE: 9 INJECTION, SOLUTION INTRAVENOUS at 09:16:00

## 2020-05-27 RX ADMIN — ROCURONIUM BROMIDE 100 MG: 10 INJECTION, SOLUTION INTRAVENOUS at 06:35:00

## 2020-05-27 RX ADMIN — HEPARIN SODIUM 31000 UNITS: 1000 INJECTION, SOLUTION INTRAVENOUS; SUBCUTANEOUS at 08:58:00

## 2020-05-27 RX ADMIN — MIDAZOLAM HYDROCHLORIDE 3 MG: 1 INJECTION INTRAMUSCULAR; INTRAVENOUS at 06:35:00

## 2020-05-27 RX ADMIN — PHENYLEPHRINE HCL 100 MCG: 10 MG/ML VIAL (ML) INJECTION at 09:05:00

## 2020-05-27 RX ADMIN — ROCURONIUM BROMIDE 20 MG: 10 INJECTION, SOLUTION INTRAVENOUS at 12:52:00

## 2020-05-27 RX ADMIN — SODIUM CHLORIDE: 9 INJECTION, SOLUTION INTRAVENOUS at 06:29:00

## 2020-05-27 RX ADMIN — MIDAZOLAM HYDROCHLORIDE 2 MG: 1 INJECTION INTRAMUSCULAR; INTRAVENOUS at 09:17:00

## 2020-05-27 RX ADMIN — LIDOCAINE HYDROCHLORIDE 50 MG: 10 INJECTION, SOLUTION EPIDURAL; INFILTRATION; INTRACAUDAL; PERINEURAL at 06:35:00

## 2020-05-27 RX ADMIN — MIDAZOLAM HYDROCHLORIDE 2 MG: 1 INJECTION INTRAMUSCULAR; INTRAVENOUS at 06:30:00

## 2020-05-27 RX ADMIN — FAMOTIDINE 20 MG: 10 INJECTION, SOLUTION INTRAVENOUS at 06:35:00

## 2020-05-27 RX ADMIN — CEFAZOLIN SODIUM 2 G: 1 INJECTION, POWDER, FOR SOLUTION INTRAMUSCULAR; INTRAVENOUS at 11:30:00

## 2020-05-27 RX ADMIN — MIDAZOLAM HYDROCHLORIDE 3 MG: 1 INJECTION INTRAMUSCULAR; INTRAVENOUS at 11:15:00

## 2020-05-27 RX ADMIN — ROCURONIUM BROMIDE 50 MG: 10 INJECTION, SOLUTION INTRAVENOUS at 07:26:00

## 2020-05-27 RX ADMIN — CEFAZOLIN SODIUM/WATER 2 G: 2 G/20 ML SYRINGE (ML) INTRAVENOUS at 07:30:00

## 2020-05-27 RX ADMIN — DIPHENHYDRAMINE HYDROCHLORIDE 25 MG: 50 INJECTION INTRAMUSCULAR; INTRAVENOUS at 06:35:00

## 2020-05-27 RX ADMIN — PROTAMINE SULFATE 410 MG: 10 INJECTION, SOLUTION INTRAVENOUS at 11:45:00

## 2020-05-27 NOTE — ANESTHESIA PROCEDURE NOTES
Central Line  Performed by: Ernestine Varela MD  Authorized by: Ernestine Varela MD     General Information and Staff    Procedure Start:  5/27/2020 7:00 AM  Procedure End:  5/27/2020 7:56 AM  Anesthesiologist:  Ernestine Varela MD  Performed by:   Anesthesiol

## 2020-05-27 NOTE — PLAN OF CARE
Received patient from [place] [address] at [phone]. Patient intubated and sedated on propofol and precedex. Patient recovered per protocol. VSS, NSR on the monitor. Vent settings adjusted per Dr. Gerry Su. Plan to keep patient intubated over night.  At 1850 patient woke up fluid balance, assess for edema, trend weights  Outcome: Progressing  Goal: Absence of cardiac arrhythmias or at baseline  Description  INTERVENTIONS:  - Continuous cardiac monitoring, monitor vital signs, obtain 12 lead EKG if indicated  - Evaluate effect restrictions as appropriate  Outcome: Progressing

## 2020-05-27 NOTE — ANESTHESIA PROCEDURE NOTES
Airway  Date/Time: 5/27/2020 6:45 AM  Urgency: elective      General Information and Staff    Patient location during procedure: OR  Anesthesiologist: Alejandrina Menard MD  Performed: anesthesiologist     Indications and Patient Condition  Indications for ai

## 2020-05-27 NOTE — ANESTHESIA PROCEDURE NOTES
Arterial Line  Performed by: Holly Evans MD  Authorized by: Holly Evans MD     General Information and Staff    Procedure Start:  5/27/2020 6:30 AM  Procedure End:  5/27/2020 6:33 AM  Anesthesiologist:  Holly Evans MD  Performed By:  Madhavi Dietrich

## 2020-05-27 NOTE — PROGRESS NOTES
[address] [facility]    Cardiology critical care time  3:55 PM to 4:39 PM      Loy Mota Patient Status:  Inpatient    3/6/1960 MRN [id number]   [facility] 6NE-A Attending Neo Calvert MD   Hosp Day # 0 PCP Nitroglycerin in D5W     • norepinephrine     • nitroprusside (NIPRIDE) 50 mg in D5W infusion     • Dextrose-NaCl         Assessment:    · CAD, bicuspid AV with severe AS, moderate AI,  5.1cm ascending aortic aneurysm now POD 1 s/p  cabg x 1 (lima to lad), for history of A. fib but stayed in sinus. If Eliquis was discontinued prior to surgery.   Patient had compensated chronic heart failure with reduced ejection fraction prior to open heart surgery and did not require loop diuretic but was on lisinopril, car [id number] [facility]   May 27, 2020  Cardiology critical care time  3:55 PM to 4:39 PM

## 2020-05-27 NOTE — PROGRESS NOTES
Patient on Vent settings noted below. Weaned oxygen to 40%. Breath sounds are bilaterally clear. Plan to keep patient on full support overnight per Dr. Taya Toribio and wean in the morning. Will continue to monitor.       05/27/20 8201   Vent Information   $ RT

## 2020-05-27 NOTE — ANESTHESIA POSTPROCEDURE EVALUATION
[facility] Patient Status:  Inpatient   Age/Gender 62year old male MRN [id number]   [facility] 6NE-A Attending Ottoniel Loza MD   Hosp Day # 0 PCP Jing Herndon DO       Anesthesia Post-op Note    Procedure(s):   Aortic va

## 2020-05-27 NOTE — ANESTHESIA PROCEDURE NOTES
Procedure Performed: JAZMÍN      Start Time:  5/27/2020 7:30 AM       End Time:   5/27/2020 1:07 PM    Preanesthesia Checklist:  Patient identified, IV assessed, risks and benefits discussed, monitors and equipment assessed, procedure being performed at surge Dissection not present. Plaque thickness less than 3 mm. Mobile plaque not present. Descending Aorta:  Size normal.  Dissection not present. Plaque thickness less than 3 mm. Mobile plaque not present.           Atria    Right Atrium:  Size normal.  S

## 2020-05-27 NOTE — CONSULTS
GOOD HOSPITALIST  CONSULT     Nasim Gomez Patient Status:  Inpatient    3/6/1960 MRN [id number]   [facility] 6NE-A Attending Matthew Quiroga MD   Hosp Day # 0 PCP Aarti Khalil DO     Reason for consult: CHAZ  Requested by: Dr. Tramaine Do daily., Disp: 135 tablet, Rfl: 1  apixaban (ELIQUIS) 5 MG Oral Tab, Take 1 tablet (5 mg total) by mouth 2 (two) times daily. , Disp: 2 tablet, Rfl: 0  atorvastatin 80 MG Oral Tab, TAKE 1 TABLET BY MOUTH  NIGHTLY, Disp: 90 tablet, Rfl: 1  B Complex Vitamins 4. Thrombocytopenia suspect consumptive and expected-monitor  5. VT- given amio. Telemetry   6. Dilated cardiomyopathy- EF 35-40%- stable. Cardio following   7. DL- statin once PO   8. Old CVA 2015  9. HTN- will monitor once off of drips  10.  CHAZ- CHAZ pr

## 2020-05-27 NOTE — CONSULTS
Clinical Nutrition  Dietitian consult received per cardiac rehab standing order. Pt to be educated by cardiac rehab staff and encouraged to attend outpatient classes taught by RD. RD available PRN.      Tosin Rosado, MS, RD, LDN  Clinical Dietitian  Pager

## 2020-05-27 NOTE — HISTORICAL OFFICE NOTE
Progress Notes  - documented in this encounter  Ricky Sanchez MD - 03/17/2020 12:30 PM CDT  Formatting of this note might be different from the original.  [address] Note    Telemedicine Visit  Phone Call Clinic Visit  Du Follow-up 2D echo with Doppler was stable in March 2020. LV ejection fraction was 35 to 40% with diffuse hypokinesis. There was slow relaxation by Doppler study. Patient has bicuspid valve with moderate calcifications of the leaflets.  Separation was reduce • Left bundle branch block   • LV dysfunction   • Severe aortic insufficiency   • SVT (supraventricular tachycardia) (CMS/HCC)     PSHx:  Past Surgical History:   Procedure Laterality Date   • Cardioversion 07/18/2019   • Cataract extraction w/ intraocular Integumentary: No rash or suspicious lesions on the skin. Musculoskeletal: No arthritis or myalgias. Gastrointestinal: Negative for any bleeding. No abdominal pain. No diarrhea. Genitourinary: No hematuria.    Neurological: Alert and oriented, no headac 4. Good diet and staying active was emphasized. 5. Follow-up with Dr. Bob Coyle from EP services as scheduled. The patient has Holter scheduled by EP later this spring. Patient was cardioverted from A. fib to sinus rhythm last year.   6. Continue anticoagulat

## 2020-05-27 NOTE — ANESTHESIA PREPROCEDURE EVALUATION
PRE-OP EVALUATION    Patient Name: Doni Parikh    Pre-op Diagnosis: aortic stenosis, ascending aortic aneurysm, cad, a-fib    Procedure(s):   Aoritc valve replacement, ascending aortic aneurysm repair, coronary artery bypass graft, pulomnary vein isolation consistent with abnormal left ventricular relaxation -     grade 1 diastolic dysfunction. Cardiac index (Qs/bsa) (LVOT, Doppler):     3.1L/(min-m^2). 3. Aortic valve: Bicuspid; moderately thickened, moderately calcified     leaflets.  Cusp separation was r 05/19/2020    CREATSERUM 1.13 05/19/2020    GLU 90 05/19/2020    CA 9.3 05/19/2020     Lab Results   Component Value Date    INR 1.06 05/19/2020         Airway      Mallampati: III  Mouth opening: 3 FB  TM distance: 4 - 6 cm   Cardiovascular    Cardiovascu

## 2020-05-28 ENCOUNTER — APPOINTMENT (OUTPATIENT)
Dept: GENERAL RADIOLOGY | Facility: HOSPITAL | Age: 60
DRG: 220 | End: 2020-05-28
Attending: THORACIC SURGERY (CARDIOTHORACIC VASCULAR SURGERY)
Payer: COMMERCIAL

## 2020-05-28 PROCEDURE — 99232 SBSQ HOSP IP/OBS MODERATE 35: CPT | Performed by: HOSPITALIST

## 2020-05-28 PROCEDURE — 71045 X-RAY EXAM CHEST 1 VIEW: CPT | Performed by: THORACIC SURGERY (CARDIOTHORACIC VASCULAR SURGERY)

## 2020-05-28 PROCEDURE — 99233 SBSQ HOSP IP/OBS HIGH 50: CPT | Performed by: INTERNAL MEDICINE

## 2020-05-28 RX ORDER — ACETAMINOPHEN 10 MG/ML
1000 INJECTION, SOLUTION INTRAVENOUS EVERY 6 HOURS PRN
Status: DISCONTINUED | OUTPATIENT
Start: 2020-05-28 | End: 2020-06-01

## 2020-05-28 RX ORDER — CARVEDILOL 3.12 MG/1
3.12 TABLET ORAL 2 TIMES DAILY WITH MEALS
Status: DISCONTINUED | OUTPATIENT
Start: 2020-05-28 | End: 2020-06-01

## 2020-05-28 RX ORDER — ALBUMIN, HUMAN INJ 5% 5 %
500 SOLUTION INTRAVENOUS ONCE
Status: COMPLETED | OUTPATIENT
Start: 2020-05-28 | End: 2020-05-28

## 2020-05-28 RX ORDER — ALBUMIN, HUMAN INJ 5% 5 %
250 SOLUTION INTRAVENOUS ONCE
Status: COMPLETED | OUTPATIENT
Start: 2020-05-28 | End: 2020-05-28

## 2020-05-28 RX ORDER — DEXTROSE MONOHYDRATE 25 G/50ML
50 INJECTION, SOLUTION INTRAVENOUS
Status: DISCONTINUED | OUTPATIENT
Start: 2020-05-28 | End: 2020-06-01

## 2020-05-28 NOTE — PROGRESS NOTES
2000- received pt intubated, sedated. Vss. Insulin, precedex, propofol and dobs all infusing as ordered. Chest tubes intact and draining. Pt tolerating vent. No apparent distress noted.

## 2020-05-28 NOTE — PROGRESS NOTES
GOOD HOSPITALIST  Progress Note     Simona Ivory Patient Status:  Inpatient    3/6/1960 MRN [id number]   [facility] 6NE-A Attending Arabella Stoddard MD   Hosp Day # 1 PCP Mu Roque DO     Chief Complaint: CABG   S:  Intubated, alert an sodium  100 mg Oral BID   • vancomycin  15 mg/kg (Adjusted) Intravenous Q12H   • mupirocin  1 Application Nasal BID   • pantoprazole (PROTONIX) IV push  40 mg Intravenous QAM AC    Or   • Pantoprazole Sodium  40 mg Oral QAM AC   • ceFAZolin  2 g Intravenou

## 2020-05-28 NOTE — OPERATIVE REPORT
[facility]    PATIENT'S NAME: [facility]   ATTENDING PHYSICIAN: Krysten Okeefe MD   OPERATING PHYSICIAN: Krysten Okeefe MD   PATIENT ACCOUNT#:   [id number]    LOCATION:  [address] [place] [address]  MEDICAL RECORD #:   [id number]       YOB: 1960  ADMI was given antegrade, then retrograde, to achieve electromechanical arrest of the heart. The right-sided pulmonary veins were then ablated with AtriCure x2. Left-sided pulmonary veins were then ablated with AtriCure x2.   Left atrial appendage was amputate products were given. Ultimately, a half dose of Factor VII, 5 mg was given. This seemed to reverse the coagulopathy. Chest was closed using double-stranded wires as well as sternal plates for reinforcement in this larger gentleman.   The patient was take

## 2020-05-28 NOTE — H&P
As you know, I happened to see your patient, Mr. Scooter Davis, today, May 5, 2020, at [facility].      Mr. Luisa Rosas is a very nice 43-year-old gentleman who has multiple cardiac conditions includin.  Coronary artery disease with known occlud We have, today, discussed the nature of the patient’s cardiac condition that being those conditions listed above.   The patient has entered the window of time wherein surgical correction should be considered based on both the condition of his valve and cond

## 2020-05-28 NOTE — PHYSICAL THERAPY NOTE
Physical Therapy    Attempted eval this a.m., but currently pt remains on Dobutamine which limits ability to de-line pt. Will re-attempt as schedule and pt's medical stability allow. Re-attempted this p.m., but pt's bp remains low.  Holding assessment t

## 2020-05-28 NOTE — CONSULTS
[facility]  Endocrinology Consultation    Lilian Simmons Patient Status:  Inpatient    3/6/1960 MRN [id number]   [facility] 6NE-A Attending Cam Staples MD   [facility] Day # 1 PCP Renata Rene DO     Reason for Consultation:  Post-operative Q15 Min PRN **OR** dextrose 50 % injection 50 mL, 50 mL, Intravenous, Q15 Min PRN **OR** glucose (DEX4) oral liquid 30 g, 30 g, Oral, Q15 Min PRN **OR** Glucose-Vitamin C (DEX-4) chewable tab 8 tablet, 8 tablet, Oral, Q15 Min PRN  •  Insulin Aspart Pen (NO PRN  •  DOBUTamine in D5W (DOBUTREX) 500 mg/250 ml infusion, 2-10 mcg/kg/min, Intravenous, Continuous PRN  •  nitroGLYCERIN infusion 50mg in D5W 250ml, 5-300 mcg/min, Intravenous, Continuous PRN  •  norepinephrine (LEVOPHED) 4 mg/250 ml premix infusion, 0. pain  : no new dysuria or hematuria  Endo: no new polyuria or polydipsia  MSK: no new muscle weakness  Neuro: no new tremors    PE    BP 95/60   Pulse 87   Temp (!) 100.7 °F (38.2 °C) (Pulmonary Artery)   Resp 12   Ht 75\"   Wt 275 lb 2.2 oz (124.8 kg) year old man with aortic stenosis s/p AVR, ascending aortic aneurysm s/p aortic replacement, CAD s/p CABG, atrial fibrillation s/p bilateral pulmonary vein radiofrequency ablation, amputation of left atrial appendage  1.  Stress hyperglycemia  - A1c 5.4 ind

## 2020-05-28 NOTE — PROGRESS NOTES
[facility] Cardiology  Progress Note    Maritza Méndez Patient Status:  Inpatient    3/6/1960 MRN [id number]   [facility] 6NE-A Attending Dontae Carson MD   Hosp Day # 1 PCP Phil Kumari DO     Subjective:  Inflammatory chest wa clubbing, cyanosis or edema. Peripheral pulses are 2+. Neurologic: Alert and oriented x3. Cranial nerves intact. Normal sensation and motor function. No focal deficits. Musculoskeletal: normal range of motion, normal muscle strength, no joint effusion. Marylou Cain MD on 5/19/2020 at 12:42 PM          Us Carotid Doppler Bilat - Diag Img (cpt=93880)    Result Date: 5/19/2020  PROCEDURE:  US CAROTID DOPPLER BILAT - DIAG IMG (CPT=93880)  COMPARISON:  None.   INDICATIONS:  Z91.89 Other specified personal r Yony Andrade MD on 5/19/2020 at 1:43 PM     Finalized by: Osmel Richey MD on 5/19/2020 at 1:45 PM          Xr Chest Ap Portable  (cpt=71045)    Result Date: 5/28/2020  PROCEDURE:  XR CHEST AP PORTABLE  (CPT=71045)  TECHNIQUE:  AP chest radiograph was obtained.   COM HCl in NaCl (PRECEDEX) 400 MCG/100ML premix infusion, 0.2-1.5 mcg/kg/hr, Intravenous, Continuous  ipratropium-albuterol (DUONEB) nebulizer solution 3 mL, 3 mL, Nebulization, Q4H PRN  Insulin Regular Human (NOVOLIN R) 100 Units in sodium chloride 0.9% 100 m PRN  magnesium hydroxide (MILK OF MAGNESIA) 400 MG/5ML suspension 30 mL, 30 mL, Oral, Daily PRN  bisacodyl (DULCOLAX) rectal suppository 10 mg, 10 mg, Rectal, Daily PRN  Fleet Enema (FLEET) 7-19 GM/118ML enema 133 mL, 1 enema, Rectal, Once PRN  ondansetron heart surgery yesterday for VT coming out of pump but no recurrence and IV amiodarone was discontinued. Stable sinus rhythm in telemetry. No acute ischemia on EKG postop. 2.  Coronary artery disease with LAD  -status post one-vessel CABG.     3.  Channing Friends probably will go with Coumadin in postop course but we may decide not to do any anticoagulation if stays in sinus and keep him on full dose of aspirin 325 instead-we will discuss with CV surgery Dr. Karthikeyan Spencer -we will started by EP outpatient f

## 2020-05-28 NOTE — RESPIRATORY THERAPY NOTE
Received patient on full vent support [facility] 14/600/40%/+5, tolerating well. ABG done per Dr. Sony Negro, paged with results. Called back and said pt to remain intubated throughout the night, RN notified. Will continue to monitor.

## 2020-05-28 NOTE — PLAN OF CARE
Assumed care at 3 [facility]. Pt alert and oriented x4. NC. Encouraging IS. Chest tubes removed per order. Left pneumo reported to Dr. Mary Nguyen. No new orders. No difficulty breathing. T max 99.9. NSR. Albumin per CVS for hypovolemia; dobutamine weaned off for SBP >90.

## 2020-05-28 NOTE — PROGRESS NOTES
[facility]  CV Surgery Progress Note    Kelsey Houston Patient Status:  Inpatient    3/6/1960 MRN [id number]   [facility] 6NE-A Attending Trista Barriga MD   [address] Day # 1 PCP Haritha Vinson DO     Subjective:  Seen and examined by Dr. Elisabeth Acuna dobutamine as able   -H/H stable  -Afib now NSR s/p MAZE/LAAA: monitor   -Renal function intact, good UO   -Neuro function intact  -Vol OL:  Gentle diuresis once off dobs  -Pain management: norco   -CT management:Await OOB output; 300 cc since OR   -Encour

## 2020-05-28 NOTE — OCCUPATIONAL THERAPY NOTE
Received order for OT evaluation. Spoke with PT who communicated with RN. In the morning, pt remains on Dobutamine. Hypotension. Will continue to follow.

## 2020-05-29 PROCEDURE — 99232 SBSQ HOSP IP/OBS MODERATE 35: CPT | Performed by: INTERNAL MEDICINE

## 2020-05-29 PROCEDURE — 99232 SBSQ HOSP IP/OBS MODERATE 35: CPT | Performed by: HOSPITALIST

## 2020-05-29 RX ORDER — FUROSEMIDE 10 MG/ML
20 INJECTION INTRAMUSCULAR; INTRAVENOUS
Status: DISCONTINUED | OUTPATIENT
Start: 2020-05-29 | End: 2020-05-30

## 2020-05-29 RX ORDER — HYDROCODONE BITARTRATE AND ACETAMINOPHEN 5; 325 MG/1; MG/1
1-2 TABLET ORAL
Qty: 60 TABLET | Refills: 0 | Status: SHIPPED | OUTPATIENT
Start: 2020-05-29 | End: 2020-06-11 | Stop reason: ALTCHOICE

## 2020-05-29 NOTE — PROGRESS NOTES
[facility]  CV Surgery Progress Note    Kelsey Houston Patient Status:  Inpatient    3/6/1960 MRN [id number]   [facility] 6NE-A Attending Trista Barriga MD   [address] Day # 2 PCP Haritha Vinson DO     Subjective:  Patient seen this AM sitting neuro deficits   Abd: distended, non tender, BS+    CXR 5/29/20: CONCLUSION:  There is a 3 mm left apical pneumothorax. Right IJ catheter tip in the right internal jugular.   West-Ronnie catheter, endotracheal tube chest tubes and enteric tubes have been rem

## 2020-05-29 NOTE — OCCUPATIONAL THERAPY NOTE
OCCUPATIONAL THERAPY EVALUATION - INPATIENT     Room Number: 9741/8672-E  Evaluation Date: 5/29/2020  Type of Evaluation: Initial  Presenting Problem: AVR, CABGx1, ascending thoracic aorta repair    Physician Order: IP Consult to Occupational Therapy  Reas ASSESSMENT  Rating: Unable to rate  Location: incision, \"not bad\"  Management Techniques:  Body mechanics    COGNITION  Overall Cognitive Status:  WFL - within functional limits    VISION  Current Vision: no change reported    PERCEPTION  Overall Percepti safety with ADL. Verbalized understanding of safety with grooming at sink, toileting, bathing. Unable to fully achieve cross leg technique for LB ADL. Will require additional OT session to cover UB/LB dressing, as well as review of CV binder.   Patient En simplification techniques;ADL training;Functional transfer training;UE strengthening/ROM; Endurance training;Patient/Family education;Patient/Family training; Compensatory technique education  Rehab Potential : Good  Frequency (Obs): 5x/week  Number of Visit

## 2020-05-29 NOTE — PROGRESS NOTES
GOOD HOSPITALIST  Progress Note     [facility] Patient Status:  Inpatient    3/6/1960 MRN [id number]   [facility] 6NE-A Attending Leroy Pinto MD   Hosp Day # 2 PCP Rei Alvarado DO     Chief Complaint: CABG   S:  Extubated.   No feve mupirocin  1 Application Nasal BID   • pantoprazole (PROTONIX) IV push  40 mg Intravenous QAM AC    Or   • Pantoprazole Sodium  40 mg Oral QAM AC     ASSESSMENT / PLAN:   1. S/p CABG, AVR, Aortic aneurysm repair  1. Per Dr. Kd Cleaning   2.  PAF s/p WILLARD amputation,

## 2020-05-29 NOTE — PROGRESS NOTES
[facility] at [address] [facility]  Progress Note    Shelbi Portillo Patient Status:  Inpatient    3/6/1960 MRN [id number]   [facility] 6NE-A Attending Marie Emmanuel MD   [address] Day # 2 PCP DO Berenice Cassidy Q15 Min PRN    Or  glucose (DEX4) oral liquid 30 g, 30 g, Oral, Q15 Min PRN    Or  Glucose-Vitamin C (DEX-4) chewable tab 8 tablet, 8 tablet, Oral, Q15 Min PRN  Insulin Aspart Pen (NOVOLOG) 100 UNIT/ML flexpen 1-68 Units, 1-68 Units, Subcutaneous, TID CC mg/250 ml premix infusion, 0.5-30 mcg/min, Intravenous, Continuous PRN  Nitroprusside Sodium (NIPRIDE) 50 mg in dextrose 5 % 250 mL infusion, 0.1-4 mcg/kg/min, Intravenous, Continuous PRN  docusate sodium (COLACE) cap 100 mg, 100 mg, Oral, BID  PEG 3350 (M Stable post AVR and single vessel CABG                       History of atrial fib, now in sinus. Was on Multaq and Eliquis prior to surgery                       CHAZ                       Dyslipidemia      Plan:Resume preop cardiac meds.  Increase activity

## 2020-05-29 NOTE — PROGRESS NOTES
GOOD HOSPITALIST  Progress Note     Veronica Mckenzie Patient Status:  Inpatient    3/6/1960 MRN [id number]   [facility] 6NE-A Attending Bro Alegria MD   [address] Day # 3 PCP Beto Lawrence DO     Chief Complaint: Post-op    S: Patient seen Amos Hilliard 11.0*   INR 1.40* 0.77*       No results for input(s): TROP, CK in the last 168 hours. Imaging: Imaging data reviewed in Epic.     Medications:   • furosemide  20 mg Intravenous BID (Diuretic)   • Warfarin Sodium  5 mg Oral Nightly   • lisinopril  5

## 2020-05-29 NOTE — CM/SW NOTE
Pt had CABG w/AVR on 5/27. Pt lives with his wife. Still on 02. PT/OT to see. SW following for d/c needs.

## 2020-05-29 NOTE — PLAN OF CARE
Assumed care of this patient at 0730. AOx4, neurologically intact. Room air; however, patient is requiring oxygen via canula when ambulating and sleeping, Achieving 500 on IS; Lungs diminished. Afebrile.  NSR on the monitor, all other vitals remain stable a volume excess or deficit  - Monitor intake, output and patient weight  - Monitor urine specific gravity, serum osmolarity and serum sodium as indicated or ordered  - Monitor response to interventions for patient's volume status, including labs, urine outpu independence in self care  Description  Interventions:  - Assess ability and encourage patient to participate in ADLs to maximize function  - Promote sitting position while performing ADLs such as feeding, grooming, and bathing  - Educate and encourage pat

## 2020-05-29 NOTE — PROGRESS NOTES
[facility]  Cardiology Critical Care Progress Note    Mariel Paul Patient Status:  Inpatient    3/6/1960 MRN [id number]   [facility] 6NE-A Attending Moises Mao MD   [facility] Day # 2 PCP Maureen Reasons, DO       Subjective:  Slept very we 10 mL/hr at 05/27/20 1437   • DOBUTamine Stopped (05/28/20 1400)   • Nitroglycerin in D5W Stopped (05/27/20 1815)   • norepinephrine Stopped (05/27/20 1626)   • nitroprusside (NIPRIDE) 50 mg in D5W infusion     • Dextrose-NaCl 100 mL/hr (05/27/20 1438)

## 2020-05-29 NOTE — PROGRESS NOTES
[facility]  Progress Note    Syliva Police Patient Status:  Inpatient    3/6/1960 MRN [id number]   [facility] 6NE-A Attending Cam Staples MD   [facility] Day # 2 PCP Renata Rene DO       SUBJECTIVE:  No acute events overnight.   No sig file        Forced sexual activity: Not on file    Other Topics      Concerns:         Service: Not Asked        Blood Transfusions: Not Asked        Caffeine Concern: No          occasionally        Occupational Exposure: Not Asked        Bre Gutierrez 120* 120* 118* 121* 122* 119* 117* 127* 121* 98 126* 132* 141*       Meds:   glucose (DEX4) oral liquid 15 g, 15 g, Oral, Q15 Min PRN    Or  Glucose-Vitamin C (DEX-4) chewable tab 4 tablet, 4 tablet, Oral, Q15 Min PRN    Or  dextrose 50 % injection 50 mL, mg, Intravenous, Q2H PRN  melatonin tab TABS 3 mg, 3 mg, Oral, Nightly PRN  Albumin Human (ALBUMINAR) 5 % solution 250 mL, 250 mL, Intravenous, Once PRN  DOBUTamine in D5W (DOBUTREX) 500 mg/250 ml infusion, 2-10 mcg/kg/min, Intravenous, Continuous PRN  nit 5.4 indicating no diabetes  - transitioned to subcutaneous insulin yesterday 5/28  - novolog 1 unit for every 10g carbs plus correction 1:10>140 QID  - will continue to monitor and adjust as needed     2.  Aortic stenosis s/p AVR, ascending aortic aneurysm

## 2020-05-29 NOTE — PHYSICAL THERAPY NOTE
PHYSICAL THERAPY EVALUATION - INPATIENT     Room Number: 6001/2371-Z  Evaluation Date: 5/29/2020  Type of Evaluation: Initial  Physician Order: PT Eval and Treat    Presenting Problem: S/p AVR  Reason for Therapy: Mobility Dysfunction and Discharge P extremity ROM is within functional limits     Lower extremity strength is within functional limits     BALANCE  Static Sitting: Fair  Dynamic Sitting: Fair -  Static Standing: Poor +  Dynamic Standing: Poor +      ACTIVITY TOLERANCE  Pulse: 87  Heart Rate bedside recliner at end of session. Exercise/Education Provided:  Bed mobility  Functional activity tolerated  Gait training  Transfer training    Patient End of Session: Up in chair;Needs met;Call light within reach;RN aware of session/findings; All Comments: Goals established on 5/29/2020

## 2020-05-30 PROCEDURE — 99231 SBSQ HOSP IP/OBS SF/LOW 25: CPT | Performed by: HOSPITALIST

## 2020-05-30 PROCEDURE — 99232 SBSQ HOSP IP/OBS MODERATE 35: CPT | Performed by: INTERNAL MEDICINE

## 2020-05-30 RX ORDER — POLYETHYLENE GLYCOL 3350 17 G/17G
17 POWDER, FOR SOLUTION ORAL DAILY
Status: DISCONTINUED | OUTPATIENT
Start: 2020-05-30 | End: 2020-05-31

## 2020-05-30 RX ORDER — WARFARIN SODIUM 5 MG/1
5 TABLET ORAL NIGHTLY
Status: DISCONTINUED | OUTPATIENT
Start: 2020-05-30 | End: 2020-06-01

## 2020-05-30 RX ORDER — FUROSEMIDE 10 MG/ML
20 INJECTION INTRAMUSCULAR; INTRAVENOUS
Status: DISCONTINUED | OUTPATIENT
Start: 2020-05-30 | End: 2020-06-01

## 2020-05-30 RX ORDER — LISINOPRIL 5 MG/1
5 TABLET ORAL DAILY
Status: DISCONTINUED | OUTPATIENT
Start: 2020-05-30 | End: 2020-06-01

## 2020-05-30 NOTE — PROGRESS NOTES
[facility]   CVS Progress Note    Angelica Hayes Patient Status:  Inpatient    3/6/1960 MRN [id number]   [facility] 6NE-A Attending Gwendolyn Gutierrez MD   [facility] Day # 3 PCP Juana Kelly DO     Subjective:  Pt seen and examined.  Up to chair mL, 3 mL, Nebulization, Q4H PRN  Insulin Regular Human (NOVOLIN R) 100 Units in sodium chloride 0.9% 100 mL infusion, 1-57 Units/hr, Intravenous, Continuous  aspirin chewable tab 81 mg, 81 mg, Oral, Daily  atorvastatin (LIPITOR) tab 80 mg, 80 mg, Oral, Nig Intravenous, PRN  Magnesium Sulfate in D5W IVPB premix 1 g, 1 g, Intravenous, PRN  Magnesium Sulfate IVPB premix SOLN 2 g, 2 g, Intravenous, PRN  mupirocin (BACTROBAN) 2% nasal ointment OINT 1 Application, 1 Application, Nasal, BID  dextrose 5 %-0.45 % NaC Freya BARBOSA  5/30/2020  9:50 AM

## 2020-05-30 NOTE — CARDIAC REHAB
Cardiac rehab education completed with patient. Patient referred to outpatient cardiac rehab.   We will call patient once the department reopens to schedule  Patient viewed discharge video

## 2020-05-30 NOTE — PROGRESS NOTES
[facility]  Cardiology Critical Care Progress Note    Doni Puckett Patient Status:  Inpatient    3/6/1960 MRN [id number]   [facility] 6NE-A Attending Jose Alejandro Lopez MD   [facility] Day # 3 PCP Billie North DO       Subjective:  Denies any ayers Daily   • atorvastatin  80 mg Oral Nightly   • dronedarone HCl  400 mg Oral BID with meals   • docusate sodium  100 mg Oral BID   • mupirocin  1 Application Nasal BID   • pantoprazole (PROTONIX) IV push  40 mg Intravenous QAM AC    Or   • Pantoprazole Sodi

## 2020-05-30 NOTE — PLAN OF CARE
Received pt at 1930. A/O x4. VSS. Sternum and LLE incision with steristrips, CDI, painted with betadine. Norco administered for incisional pain. Tolerating PO. Voiding. Ambulating with minimum assist. Plan of care explained. Continue to monitor.

## 2020-05-30 NOTE — PROGRESS NOTES
[facility]  Progress Note    Verda Boxer Patient Status:  Inpatient    3/6/1960 MRN [id number]   [facility] 6NE-A Attending Fiorella Santos MD   [facility] Day # 3 PCP Staci Ayers DO       SUBJECTIVE:  No acute events overnight.   No sig file        Forced sexual activity: Not on file    Other Topics      Concerns:         Service: Not Asked        Blood Transfusions: Not Asked        Caffeine Concern: No          occasionally        Occupational Exposure: Not Asked        Emerald Almendarez 05/28/20  0105 05/28/20  0210 05/28/20  0303 05/28/20  0407 05/28/20  3918 05/28/20  0557 05/28/20  0659 05/28/20  0809 05/28/20  0903 05/28/20  1015 05/28/20  1137 05/28/20  1659 05/28/20  2107 05/29/20  0715   PGLU 128* 134* 132* 130* 131* 132* 129* 125* 4 MG/ML injection 4 mg, 4 mg, Intravenous, Q2H PRN    Or  morphINE sulfate (PF) 4 MG/ML injection 6 mg, 6 mg, Intravenous, Q2H PRN  melatonin tab TABS 3 mg, 3 mg, Oral, Nightly PRN  Albumin Human (ALBUMINAR) 5 % solution 250 mL, 250 mL, Intravenous, Once P pulmonary vein radiofrequency ablation, amputation of left atrial appendage  1.  Stress hyperglycemia  - A1c 5.4 indicating no diabetes  - transitioned to subcutaneous insulin 5/28  - d/c Nl; carb ratio, continue NL SSI 1:10>140 QID  - will continue to [facility]

## 2020-05-31 ENCOUNTER — APPOINTMENT (OUTPATIENT)
Dept: GENERAL RADIOLOGY | Facility: HOSPITAL | Age: 60
DRG: 220 | End: 2020-05-31
Attending: HOSPITALIST
Payer: COMMERCIAL

## 2020-05-31 PROCEDURE — 99233 SBSQ HOSP IP/OBS HIGH 50: CPT | Performed by: HOSPITALIST

## 2020-05-31 PROCEDURE — 99232 SBSQ HOSP IP/OBS MODERATE 35: CPT | Performed by: INTERNAL MEDICINE

## 2020-05-31 PROCEDURE — 71045 X-RAY EXAM CHEST 1 VIEW: CPT | Performed by: HOSPITALIST

## 2020-05-31 RX ORDER — SPIRONOLACTONE 25 MG/1
25 TABLET ORAL DAILY
Status: DISCONTINUED | OUTPATIENT
Start: 2020-06-01 | End: 2020-06-01

## 2020-05-31 NOTE — PROGRESS NOTES
[facility]   CVS Progress Note    Homar Martins Patient Status:  Inpatient    3/6/1960 MRN [id number]   [facility] 8NE-A Attending Wendy Toro MD   [facility] Day # 4 PCP Malon Crigler, DO     Subjective:  Pt seen and examined.  In bed, rec Continuous  ipratropium-albuterol (DUONEB) nebulizer solution 3 mL, 3 mL, Nebulization, Q4H PRN  Insulin Regular Human (NOVOLIN R) 100 Units in sodium chloride 0.9% 100 mL infusion, 1-57 Units/hr, Intravenous, Continuous  aspirin chewable tab 81 mg, 81 mg, Intravenous, PRN  Magnesium Sulfate in D5W IVPB premix 1 g, 1 g, Intravenous, PRN  Magnesium Sulfate IVPB premix SOLN 2 g, 2 g, Intravenous, PRN  mupirocin (BACTROBAN) 2% nasal ointment OINT 1 Application, 1 Application, Nasal, BID  dextrose 5 %-0.45 % NaC AVR, Asc Ao replacement, CABGx1, LAAA  - Pt is HD Stable; afebrile this morning.  - Hx Afib; remains NSR; Coumadin started yesterday  - Encourage IS/Ambulation  - Pain management  - Anticipate D/C home tomorrow      D/W Dr.Foy Khan P  5/31/2020  9:35 A

## 2020-05-31 NOTE — PHYSICAL THERAPY NOTE
PHYSICAL THERAPY TREATMENT NOTE - INPATIENT    Room Number: 8087/4429-B     Session: 1   Number of Visits to Meet Established Goals: 3    Presenting Problem: S/p AVR     History related to current admission: Pt is 62year old male admitted on 5/27/2020 fo bedclothes, sheets and blankets)?: None   -   Sitting down on and standing up from a chair with arms (e.g., wheelchair, bedside commode, etc.): None   -   Moving from lying on back to sitting on the side of the bed?: None   How much help from another perso hinge)        CHEST EXERCISES    Trunk Rotation 10   Elbow Circles 10   Chest Fly's  10   Scapular Retraction 10     RN and PCT aware of Pt participation and education provided in the form of handouts and discussion regarding activity recommendations tae

## 2020-05-31 NOTE — PLAN OF CARE
Pt. Received from CCU. He is alert and oriented times four. Lungs clear on auscultation. Pt. Has no c/o pain. Generalized edema noted. Chest and left leg incisions dry and intact with no redness or drainage noted. Pt. Is sinus rhythm on monitor.

## 2020-05-31 NOTE — PLAN OF CARE
5/31/2020    A&Ox4  VSS  RA  SBA    Denies any pain or sob  Mid sternal incision is C/D/I  Steri strips with betadine  Pt walked this am with tech and PT  Resting in bed   Possible D/C tm  Will cont to monitor        Problem: CARDIOVASCULAR - ADULT  Goal: Incision(s), wounds(s) or drain site(s) healing without S/S of infection  Description  INTERVENTIONS:  - Assess and document risk factors for pressure ulcer development  - Assess and document skin integrity  - Assess and document dressing/incision, wound b Goal  Description  Patient's Long Term Goal: resume home activities    Interventions:  --monitor tele, vs, I/o, daily wt  -pharmacologic and non-pharmacologic interventions  -follow up with provider(s)    - See additional Care Plan goals for specific inter

## 2020-05-31 NOTE — PLAN OF CARE
Pt c/o midsternal chest pain, tx with 1 tab of prn Norco, pt able to sleep with no complaints of pain. A/Ox4. Low grade temperatures. VSS. Lung sounds clear bilaterally, on room air. IS 1500. NSR on telemetry.  Bilateral lower extremity edema non-pitting patient weight  - Monitor urine specific gravity, serum osmolarity and serum sodium as indicated or ordered  - Monitor response to interventions for patient's volume status, including labs, urine output, blood pressure (other measures as available)  Aminah Snider independence in self care  Description  Interventions:  - Assess ability and encourage patient to participate in ADLs to maximize function  - Promote sitting position while performing ADLs such as feeding, grooming, and bathing  - Educate and encourage pat

## 2020-05-31 NOTE — PROGRESS NOTES
[facility]  Progress Note    Shelda Patricia Patient Status:  Inpatient    3/6/1960 MRN [id number]   [facility] 6NE-A Attending Matthew Quiroga MD   [address] Day # 4 PCP Aarti Khalil DO       SUBJECTIVE:  Low grade fevers overnight.   No si file        Forced sexual activity: Not on file    Other Topics      Concerns:         Service: Not Asked        Blood Transfusions: Not Asked        Caffeine Concern: No          occasionally        Occupational Exposure: Not Asked        Fly Lorenzo 3350 (MIRALAX) powder packet 17 g, 17 g, Oral, Daily  glucose (DEX4) oral liquid 15 g, 15 g, Oral, Q15 Min PRN    Or  Glucose-Vitamin C (DEX-4) chewable tab 4 tablet, 4 tablet, Oral, Q15 Min PRN    Or  dextrose 50 % injection 50 mL, 50 mL, Intravenous, Q15 Continuous PRN  norepinephrine (LEVOPHED) 4 mg/250 ml premix infusion, 0.5-30 mcg/min, Intravenous, Continuous PRN  Nitroprusside Sodium (NIPRIDE) 50 mg in dextrose 5 % 250 mL infusion, 0.1-4 mcg/kg/min, Intravenous, Continuous PRN  docusate sodium (COLACE as fully as possible.         Amy Kim MD  Endocrinology, Diabetes, and Metabolism  [facility]

## 2020-05-31 NOTE — PROGRESS NOTES
· [facility]      Subjective:  Doing well post op hoping for DC tomorrow    Objective:  Afebrile today I see 101.3 last night   95/58   SR  I/O incomplete weight down 3#  BUN/cr 26/1.28  Hgb j7.8  plt 97 INR 1.16    Neuro: awake/alert  HEENT: per discussion by patient with Dr. Sukumar Mullins earlier today. Will go home on coumadin instead of Eliquis and will enroll in [facility] Coumadin clinic at time of discharge. Follows with Dr. Lauren Wei as an outpatient.     Fabiano Haro MD

## 2020-05-31 NOTE — PROGRESS NOTES
[facility]  Progress Note     Syliva Police Patient Status:  Inpatient    3/6/1960 MRN [id number]   [facility] 6NE-A Attending Cam Staples MD   [address] Day # 4 PCP Renata Rene DO     Chief Complaint: Post-op    S: Patient febrile  1.40* 0.77*       No results for input(s): TROP, CK in the last 168 hours. Imaging: Imaging data reviewed in Epic.     Medications:   • furosemide  20 mg Intravenous BID (Diuretic)   • Warfarin Sodium  5 mg Oral Nightly   • lisinopril  5 mg Oral Simmie Lynn

## 2020-05-31 NOTE — OCCUPATIONAL THERAPY NOTE
OCCUPATIONAL THERAPY TREATMENT NOTE - INPATIENT     Room Number: 3544/0346-Z  Session: 1   Number of Visits to Meet Established Goals: 2    Presenting Problem: AVR, CABGx1, ascending thoracic aorta repair    History related to current admission: AVR, CABG 42.8%  Standardized Score (AM-PAC Scale): 40.22  CMS Modifier (G-Code): CK    FUNCTIONAL TRANSFER ASSESSMENT  Supine to Sit : Supervision  Sit to Stand: Supervision    Skilled Therapy Provided: Pt was received supine in bed for session, therapist educated strengthening/ROM; Endurance training;Patient/Family education;Patient/Family training; Compensatory technique education  Rehab Potential : Good  Frequency (Obs): 5x/week      OT Goals:  ALL MET AS OF 5/31

## 2020-06-01 ENCOUNTER — ANTI-COAG (OUTPATIENT)
Dept: CARDIOLOGY | Age: 60
End: 2020-06-01

## 2020-06-01 VITALS
BODY MASS INDEX: 33.49 KG/M2 | HEART RATE: 70 BPM | OXYGEN SATURATION: 93 % | SYSTOLIC BLOOD PRESSURE: 121 MMHG | WEIGHT: 269.38 LBS | HEIGHT: 75 IN | DIASTOLIC BLOOD PRESSURE: 68 MMHG | RESPIRATION RATE: 20 BRPM | TEMPERATURE: 99 F

## 2020-06-01 DIAGNOSIS — Z95.2 PRESENCE OF PROSTHETIC HEART VALVE: ICD-10-CM

## 2020-06-01 PROCEDURE — 99231 SBSQ HOSP IP/OBS SF/LOW 25: CPT | Performed by: HOSPITALIST

## 2020-06-01 PROCEDURE — 99232 SBSQ HOSP IP/OBS MODERATE 35: CPT | Performed by: CLINICAL NURSE SPECIALIST

## 2020-06-01 RX ORDER — MELATONIN
325
Status: DISCONTINUED | OUTPATIENT
Start: 2020-06-02 | End: 2020-06-01

## 2020-06-01 RX ORDER — MELATONIN
325
Status: DISCONTINUED | OUTPATIENT
Start: 2020-06-01 | End: 2020-06-01

## 2020-06-01 RX ORDER — WARFARIN SODIUM 5 MG/1
5 TABLET ORAL NIGHTLY
Qty: 30 TABLET | Refills: 3 | Status: SHIPPED | OUTPATIENT
Start: 2020-06-01 | End: 2021-06-01 | Stop reason: ALTCHOICE

## 2020-06-01 RX ORDER — CARVEDILOL 3.12 MG/1
3.12 TABLET ORAL 2 TIMES DAILY WITH MEALS
Qty: 60 TABLET | Refills: 3 | Status: SHIPPED | OUTPATIENT
Start: 2020-06-01

## 2020-06-01 RX ORDER — LISINOPRIL 10 MG/1
10 TABLET ORAL DAILY
Qty: 30 TABLET | Refills: 3 | Status: SHIPPED | OUTPATIENT
Start: 2020-06-02 | End: 2020-12-11

## 2020-06-01 RX ORDER — MELATONIN
325
Qty: 30 TABLET | Refills: 0 | Status: SHIPPED | OUTPATIENT
Start: 2020-06-02 | End: 2021-06-01 | Stop reason: ALTCHOICE

## 2020-06-01 RX ORDER — FUROSEMIDE 20 MG/1
20 TABLET ORAL DAILY
Qty: 30 TABLET | Refills: 0 | Status: SHIPPED | OUTPATIENT
Start: 2020-06-01 | End: 2021-06-01 | Stop reason: ALTCHOICE

## 2020-06-01 NOTE — PLAN OF CARE
Patient was given discharge instructions in regards to medications, s/s to monitor for, follow up appointments, and activity level. All questions were answered. IV was removed. Patient was removed from tele. Patient was escorted off the unit by transport. pressure (other measures as available)  - Encourage oral intake as appropriate  - Instruct patient on fluid and nutrition restrictions as appropriate  6/1/2020 1358 by Rylie Jones RN  Outcome: Completed  6/1/2020 1137 by Rylie Jones RN  Outcome: Pr RN  Outcome: Completed  6/1/2020 1137 by Kira Thurston RN  Outcome: Progressing     Problem: Impaired Activities of Daily Living  Goal: Achieve highest/safest level of independence in self care  Description  Interventions:  - Assess ability and encourage

## 2020-06-01 NOTE — PROGRESS NOTES
Patient adamantly refusing any IV meds. He is also refusing to get a new IV inserted. Lab came by after 4:15 and was not able to draw his blood. Patient became verbally upset and is now refusing blood draws. He walked in the biswas with RN.  Denies pain o

## 2020-06-01 NOTE — PLAN OF CARE
Patient alert & oriented, x4. Lung sounds are clear, on room air and maintaining normal sats. Continent, voiding in urinal. Last BM reported Wednesday 5/27, bowel sounds active. Mid-sternal and left groin incisions C/D/I, painted with betadine.  Incentive s on fluid and nutrition restrictions as appropriate  Outcome: Progressing     Problem: SKIN/TISSUE INTEGRITY - ADULT  Goal: Incision(s), wounds(s) or drain site(s) healing without S/S of infection  Description  INTERVENTIONS:  - Assess and document risk fac precautions during self-care     Outcome: Progressing     Problem: Patient/Family Goals  Goal: Patient/Family Long Term Goal  Description  Patient's Long Term Goal: resume home activities    Interventions:  --monitor tele, vs, I/o, daily wt  -pharmacologic

## 2020-06-01 NOTE — PAYOR COMM NOTE
--------------  CONTINUED STAY REVIEW   6-1-20       Payor: Cassandra Cooper #:  [id number]  Authorization Number: [id number]    Admit date: 5/27/20  Admit time: [address]    Admitting Physician: Dilia Gallegos MD  Attending Physician:  MD FAMILIA Perez dronedarone HCl (MULTAQ) tab 400 mg     Date Action Dose Route User    6/1/2020 0922 Given 400 mg Oral Charlynn Canavan, RN    5/31/2020 1735 Given 400 mg Oral Michele Rubio RN      ferrous sulfate EC tab 325 mg     Date Action Dose Route User    6/1/2020

## 2020-06-01 NOTE — PROGRESS NOTES
Met with patient to discuss discharge instructions, activity restrictions and recommendations, follow up visits, all questions answered, encouraged to call with any questions or concerns.   Pts wife will be home with him for a few days but she will return t

## 2020-06-01 NOTE — PROGRESS NOTES
[facility]  Progress Note     Lashell Miller Patient Status:  Inpatient    3/6/1960 MRN [id number]   [facility] 6NE-A Attending Denver Vaughan MD   [facility] Day # 5 PCP Rene Philippe DO     Chief Complaint: Post-op    S: Patient denies com 1.40* 0.77* 1.16*       No results for input(s): TROP, CK in the last 168 hours. Imaging: Imaging data reviewed in Epic.     Medications:   • magnesium hydroxide  30 mL Oral Once   • ferrous sulfate  325 mg Oral Daily with breakfast   • psyllium  1

## 2020-06-01 NOTE — PROGRESS NOTES
[facility] Progress Note    Subjective:  Had a terrible experience with lab and declining any venous draws.  Overall physically feeling well and gaining strength    Objective:  /68 (BP Location: Right arm)   Pulse 70   Temp 99 °F (37.2 °C) (Or coumadin clinic at [phone]    Addendum: INR today 1.3. Reviewed with Dr. Tess Rader. Ok for home on lower doses on ACE/BB and low dose lasix. Will need to follow bp for re-up titration as an outpatient.       NAMITA Yancey  6/1/2020  10:02 AM

## 2020-06-01 NOTE — CM/SW NOTE
DENISSE spoke with pt this morning after clinical rounds to see if pt would be agreeable to [facility] at discharge. DENISSE explained the RN role at pt's house.  Pt stated, \"My wife is very organized in everything that I'll need when I return home, and I don't want anothe

## 2020-06-01 NOTE — PROGRESS NOTES
[facility]  CV Surgery Progress Note    Veronica Pastrana Patient Status:  Inpatient    3/6/1960 MRN [id number]   [facility] 8NE-A Attending Kenisha Read MD   [facility] Day # 5 PCP Brayan Payment, DO     Subjective:  Patient seen this AM; He is v

## 2020-06-01 NOTE — HOME CARE LIAISON
Received referral from Carie Vasquez. Met with patient at the bedside to discuss home health services and offer choice. Patient is agreeable to [facility] services at discharge. Brochure and contact information provided. Any questions addressed. Will follow.

## 2020-06-01 NOTE — CM/SW NOTE
Spoke with boris with cardiac surgery--patient agreeable to RN only with residential [facility]--spoke with deedee allen--aware inr needed tomorrow before 12 noon to [facility]

## 2020-06-01 NOTE — PLAN OF CARE
Patient is alert and oriented x4. NSR on tele. Oxygenation is 100% on RA. L sounds clear. VSS. Patient denies chest pain, shortness of breath, palpitations. Denies pain. M/S incision is approximated. L leg incision approximated, painted with betadine.  BUE/ pressure (other measures as available)  - Encourage oral intake as appropriate  - Instruct patient on fluid and nutrition restrictions as appropriate  Outcome: Progressing     Problem: SKIN/TISSUE INTEGRITY - ADULT  Goal: Incision(s), wounds(s) or drain si grooming, and bathing  - Educate and encourage patient/family in tolerated functional activity level and precautions during self-care     Outcome: Progressing     Problem: Patient/Family Goals  Goal: Patient/Family Long Term Goal  Description  Patient's Lo

## 2020-06-02 ENCOUNTER — PATIENT OUTREACH (OUTPATIENT)
Dept: CASE MANAGEMENT | Age: 60
End: 2020-06-02

## 2020-06-02 ENCOUNTER — ANTI-COAG (OUTPATIENT)
Dept: CARDIOLOGY | Age: 60
End: 2020-06-02

## 2020-06-02 DIAGNOSIS — Z95.2 PRESENCE OF PROSTHETIC HEART VALVE: ICD-10-CM

## 2020-06-02 DIAGNOSIS — Z02.9 ENCOUNTERS FOR UNSPECIFIED ADMINISTRATIVE PURPOSE: ICD-10-CM

## 2020-06-02 LAB — INR PPP: 1.5

## 2020-06-02 NOTE — DISCHARGE SUMMARY
[facility]  Discharge Summary    [facility]:  [facility]    3/6/1960 MRN [id number]   [facility] 8NE-A Attending No att. providers found   Hosp Day # 5 PCP Renata Rene DO     Admit date: 2020    Discharge date an

## 2020-06-02 NOTE — PROGRESS NOTES
NCM placed call to patient for TCM, LM requesting a call to [phone] back with a condition update.

## 2020-06-03 ENCOUNTER — TELEPHONE (OUTPATIENT)
Dept: CARDIOLOGY UNIT | Facility: HOSPITAL | Age: 60
End: 2020-06-03

## 2020-06-03 ENCOUNTER — TELEPHONE (OUTPATIENT)
Dept: FAMILY MEDICINE CLINIC | Facility: CLINIC | Age: 60
End: 2020-06-03

## 2020-06-03 RX ORDER — FUROSEMIDE 20 MG/1
TABLET ORAL
Qty: 90 TABLET | Refills: 0 | Status: SHIPPED | OUTPATIENT
Start: 2020-06-03 | End: 2020-07-09 | Stop reason: ALTCHOICE

## 2020-06-03 NOTE — PROGRESS NOTES
Follow Up Phone Call    1. How are you doing now that you are home? 'Doing well. \" \"I have a routine which seems to help. \"    2. Have there been any changes in your wound/incision since going home?  Ijeoma Cooper

## 2020-06-03 NOTE — TELEPHONE ENCOUNTER
Patient discharged home from [facility] on 6/1/2020 and is recommended to have a TCM HFU by 6/8/2020 preferred or no later than 6/15/2020 or sooner. NCM has called and left messages, no return call.     Triage: Please notify Dr. Guero Carrington of the above an

## 2020-06-03 NOTE — PROGRESS NOTES
NCM placed call to patient for TCM, LM requesting a call to [phone] back with a condition update.

## 2020-06-04 ENCOUNTER — ANTI-COAG (OUTPATIENT)
Dept: CARDIOLOGY | Age: 60
End: 2020-06-04

## 2020-06-04 DIAGNOSIS — Z95.2 PRESENCE OF PROSTHETIC HEART VALVE: ICD-10-CM

## 2020-06-04 LAB — INR PPP: 2.1

## 2020-06-08 ENCOUNTER — HOSPITAL ENCOUNTER (OUTPATIENT)
Dept: GENERAL RADIOLOGY | Facility: HOSPITAL | Age: 60
Discharge: HOME | End: 2020-06-08
Attending: THORACIC SURGERY (CARDIOTHORACIC VASCULAR SURGERY)
Payer: COMMERCIAL

## 2020-06-08 ENCOUNTER — ANTI-COAG (OUTPATIENT)
Dept: CARDIOLOGY | Age: 60
End: 2020-06-08

## 2020-06-08 ENCOUNTER — OFFICE VISIT (OUTPATIENT)
Dept: CARDIOLOGY | Age: 60
End: 2020-06-08

## 2020-06-08 VITALS
HEIGHT: 75 IN | WEIGHT: 263 LBS | BODY MASS INDEX: 32.7 KG/M2 | HEART RATE: 60 BPM | DIASTOLIC BLOOD PRESSURE: 78 MMHG | SYSTOLIC BLOOD PRESSURE: 119 MMHG

## 2020-06-08 DIAGNOSIS — I10 ESSENTIAL HYPERTENSION: ICD-10-CM

## 2020-06-08 DIAGNOSIS — J90 PLEURAL EFFUSION: ICD-10-CM

## 2020-06-08 DIAGNOSIS — Z95.1 S/P CABG (CORONARY ARTERY BYPASS GRAFT): ICD-10-CM

## 2020-06-08 DIAGNOSIS — I42.0 DILATED CARDIOMYOPATHY (CMD): ICD-10-CM

## 2020-06-08 DIAGNOSIS — Q23.1 BICUSPID AORTIC VALVE: ICD-10-CM

## 2020-06-08 DIAGNOSIS — Z09 HOSPITAL DISCHARGE FOLLOW-UP: Primary | ICD-10-CM

## 2020-06-08 DIAGNOSIS — Z86.79 HISTORY OF ATRIAL FIBRILLATION: ICD-10-CM

## 2020-06-08 DIAGNOSIS — Z95.2 PRESENCE OF PROSTHETIC HEART VALVE: ICD-10-CM

## 2020-06-08 PROBLEM — I63.9 CVA (CEREBROVASCULAR ACCIDENT) (CMD): Status: ACTIVE | Noted: 2020-06-08

## 2020-06-08 LAB — INR PPP: 2.8

## 2020-06-08 PROCEDURE — 99443 TELEPHONE E&M BY PHYSICIAN EST PT NOT ORIG PREV 7 DAYS 21-30 MIN: CPT | Performed by: NURSE PRACTITIONER

## 2020-06-08 PROCEDURE — 71048 X-RAY EXAM CHEST 4+ VIEWS: CPT | Performed by: THORACIC SURGERY (CARDIOTHORACIC VASCULAR SURGERY)

## 2020-06-08 RX ORDER — HYDROCODONE BITARTRATE AND ACETAMINOPHEN 5; 325 MG/1; MG/1
1 TABLET ORAL EVERY 6 HOURS PRN
COMMUNITY
End: 2020-07-09 | Stop reason: ALTCHOICE

## 2020-06-08 RX ORDER — FERROUS SULFATE 325(65) MG
325 TABLET ORAL
COMMUNITY
End: 2020-07-09 | Stop reason: ALTCHOICE

## 2020-06-08 RX ORDER — ATORVASTATIN CALCIUM 80 MG/1
80 TABLET, FILM COATED ORAL AT BEDTIME
COMMUNITY
End: 2020-07-09 | Stop reason: DRUGHIGH

## 2020-06-08 RX ORDER — CARVEDILOL 3.12 MG/1
6.25 TABLET ORAL 2 TIMES DAILY WITH MEALS
COMMUNITY
End: 2020-09-09 | Stop reason: SDUPTHER

## 2020-06-08 RX ORDER — WARFARIN SODIUM 5 MG/1
5 TABLET ORAL DAILY
COMMUNITY
End: 2020-10-15

## 2020-06-08 ASSESSMENT — ENCOUNTER SYMPTOMS
NEUROLOGICAL NEGATIVE: 1
WEIGHT LOSS: 0
DIAPHORESIS: 0
GASTROINTESTINAL NEGATIVE: 1
SYNCOPE: 0
SHORTNESS OF BREATH: 0
DECREASED APPETITE: 0
WEIGHT GAIN: 0

## 2020-06-08 ASSESSMENT — PATIENT HEALTH QUESTIONNAIRE - PHQ9
CLINICAL INTERPRETATION OF PHQ9 SCORE: NO FURTHER SCREENING NEEDED
2. FEELING DOWN, DEPRESSED OR HOPELESS: NOT AT ALL
CLINICAL INTERPRETATION OF PHQ2 SCORE: NO FURTHER SCREENING NEEDED
SUM OF ALL RESPONSES TO PHQ9 QUESTIONS 1 AND 2: 0
SUM OF ALL RESPONSES TO PHQ9 QUESTIONS 1 AND 2: 0
1. LITTLE INTEREST OR PLEASURE IN DOING THINGS: NOT AT ALL

## 2020-06-11 ENCOUNTER — OFFICE VISIT (OUTPATIENT)
Dept: FAMILY MEDICINE CLINIC | Facility: CLINIC | Age: 60
End: 2020-06-11
Payer: COMMERCIAL

## 2020-06-11 VITALS
DIASTOLIC BLOOD PRESSURE: 82 MMHG | OXYGEN SATURATION: 97 % | WEIGHT: 263 LBS | BODY MASS INDEX: 32.7 KG/M2 | RESPIRATION RATE: 18 BRPM | HEIGHT: 75 IN | TEMPERATURE: 98 F | HEART RATE: 74 BPM | SYSTOLIC BLOOD PRESSURE: 120 MMHG

## 2020-06-11 DIAGNOSIS — I10 ESSENTIAL HYPERTENSION: ICD-10-CM

## 2020-06-11 DIAGNOSIS — I77.810 ASCENDING AORTA DILATATION (HCC): Primary | ICD-10-CM

## 2020-06-11 DIAGNOSIS — J90 PLEURAL EFFUSION: ICD-10-CM

## 2020-06-11 DIAGNOSIS — I35.1 AORTIC VALVE INSUFFICIENCY, ETIOLOGY OF CARDIAC VALVE DISEASE UNSPECIFIED: ICD-10-CM

## 2020-06-11 DIAGNOSIS — D50.0 IRON DEFICIENCY ANEMIA SECONDARY TO BLOOD LOSS (CHRONIC): ICD-10-CM

## 2020-06-11 DIAGNOSIS — I48.20 CHRONIC ATRIAL FIBRILLATION (HCC): ICD-10-CM

## 2020-06-11 PROCEDURE — 99495 TRANSJ CARE MGMT MOD F2F 14D: CPT | Performed by: FAMILY MEDICINE

## 2020-06-11 PROCEDURE — 1111F DSCHRG MED/CURRENT MED MERGE: CPT | Performed by: FAMILY MEDICINE

## 2020-06-11 NOTE — PROGRESS NOTES
HPI:    Doni Parikh is a 62year old male here today for hospital follow up.    He was discharged from Inpatient hospital, [facility] to Home   Admission Date: 5/27/20   Discharge Date: 6/1/20  Hospital Discharge Diagnoses (since 5/12/2020)       Ao daily.  furosemide 20 MG Oral Tab, Take 1 tablet (20 mg total) by mouth daily.  (Patient taking differently: Take 20 mg by mouth 2 (two) times daily.  )  ferrous sulfate 325 (65 FE) MG Oral Tab EC, Take 1 tablet (325 mg total) by mouth daily with breakfast. dizziness, denies weakness  PSYCHE: denies depression or anxiety  HEMATOLOGIC: denies hx of anemia, or bruising, denies bleeding  ENDOCRINE: denies thyroid history  ALL/ASTHMA: denies hx of allergy or asthma    PHYSICAL EXAM:   No LMP for male patient.   Es true:  Communication with the patient was made within 2 business days of discharge on date above   Medical Decision Making- Based on service period of discharge to 30 days:   · Number of Possible Diagnoses and/or Management Options: moderate  · Amount and/

## 2020-06-12 ENCOUNTER — LAB REQUISITION (OUTPATIENT)
Dept: LAB | Facility: HOSPITAL | Age: 60
End: 2020-06-12
Payer: COMMERCIAL

## 2020-06-12 ENCOUNTER — LAB ENCOUNTER (OUTPATIENT)
Dept: LAB | Facility: HOSPITAL | Age: 60
End: 2020-06-12
Attending: THORACIC SURGERY (CARDIOTHORACIC VASCULAR SURGERY)
Payer: COMMERCIAL

## 2020-06-12 DIAGNOSIS — D64.9 ANEMIA, UNSPECIFIED: ICD-10-CM

## 2020-06-12 DIAGNOSIS — Z48.812 ENCOUNTER FOR SURGICAL AFTERCARE FOLLOWING SURGERY ON THE CIRCULATORY SYSTEM: ICD-10-CM

## 2020-06-12 DIAGNOSIS — I10 ESSENTIAL (PRIMARY) HYPERTENSION: ICD-10-CM

## 2020-06-12 DIAGNOSIS — J91.8 PLEURAL EFFUSION IN OTHER CONDITIONS CLASSIFIED ELSEWHERE: ICD-10-CM

## 2020-06-12 PROCEDURE — 85610 PROTHROMBIN TIME: CPT | Performed by: THORACIC SURGERY (CARDIOTHORACIC VASCULAR SURGERY)

## 2020-06-12 PROCEDURE — 85025 COMPLETE CBC W/AUTO DIFF WBC: CPT | Performed by: THORACIC SURGERY (CARDIOTHORACIC VASCULAR SURGERY)

## 2020-06-15 ENCOUNTER — APPOINTMENT (OUTPATIENT)
Dept: LAB | Facility: HOSPITAL | Age: 60
End: 2020-06-15
Attending: THORACIC SURGERY (CARDIOTHORACIC VASCULAR SURGERY)
Payer: COMMERCIAL

## 2020-06-15 ENCOUNTER — APPOINTMENT (OUTPATIENT)
Dept: GENERAL RADIOLOGY | Facility: HOSPITAL | Age: 60
End: 2020-06-15
Attending: PHYSICIAN ASSISTANT
Payer: COMMERCIAL

## 2020-06-15 ENCOUNTER — HOSPITAL ENCOUNTER (OUTPATIENT)
Dept: GENERAL RADIOLOGY | Facility: HOSPITAL | Age: 60
Discharge: HOME | End: 2020-06-15
Attending: RADIOLOGY
Payer: COMMERCIAL

## 2020-06-15 ENCOUNTER — ANTI-COAG (OUTPATIENT)
Dept: CARDIOLOGY | Age: 60
End: 2020-06-15

## 2020-06-15 ENCOUNTER — HOSPITAL ENCOUNTER (OUTPATIENT)
Dept: ULTRASOUND IMAGING | Facility: HOSPITAL | Age: 60
Discharge: HOME | End: 2020-06-15
Attending: THORACIC SURGERY (CARDIOTHORACIC VASCULAR SURGERY)
Payer: COMMERCIAL

## 2020-06-15 VITALS
WEIGHT: 260 LBS | OXYGEN SATURATION: 97 % | BODY MASS INDEX: 32.33 KG/M2 | DIASTOLIC BLOOD PRESSURE: 66 MMHG | HEIGHT: 75 IN | TEMPERATURE: 97 F | SYSTOLIC BLOOD PRESSURE: 122 MMHG | RESPIRATION RATE: 16 BRPM | HEART RATE: 86 BPM

## 2020-06-15 DIAGNOSIS — J90 PLEURAL EFFUSION: ICD-10-CM

## 2020-06-15 DIAGNOSIS — Z95.2 PRESENCE OF PROSTHETIC HEART VALVE: ICD-10-CM

## 2020-06-15 DIAGNOSIS — J91.8 PLEURAL EFFUSION IN OTHER CONDITIONS CLASSIFIED ELSEWHERE: ICD-10-CM

## 2020-06-15 DIAGNOSIS — J90 PLEURAL EFFUSION: Primary | ICD-10-CM

## 2020-06-15 PROCEDURE — 85610 PROTHROMBIN TIME: CPT

## 2020-06-15 PROCEDURE — 36415 COLL VENOUS BLD VENIPUNCTURE: CPT

## 2020-06-15 PROCEDURE — 71045 X-RAY EXAM CHEST 1 VIEW: CPT | Performed by: RADIOLOGY

## 2020-06-15 PROCEDURE — 32555 ASPIRATE PLEURA W/ IMAGING: CPT | Performed by: THORACIC SURGERY (CARDIOTHORACIC VASCULAR SURGERY)

## 2020-06-15 NOTE — IMAGING NOTE
Pt tolerated procedure well. VSS. Dressing CDI to left upper back. Presently denies pain. Updated pt on plan of care. Report given to [facility] and awaiting transport to  # [phone].  Dr Figueroa Velázquez verbalized pt can resume Coumadin and ASA in AM. Post procedure CXR d

## 2020-06-18 NOTE — PROGRESS NOTES
Multiple attempts to reach the pt and messages left with no returned phone call. Pt went to [place] with PCP's office on 6/11/2020, Closing encounter.

## 2020-06-22 ENCOUNTER — TELEPHONE (OUTPATIENT)
Dept: CARDIOLOGY | Age: 60
End: 2020-06-22

## 2020-06-22 ENCOUNTER — HOSPITAL ENCOUNTER (OUTPATIENT)
Dept: GENERAL RADIOLOGY | Facility: HOSPITAL | Age: 60
Discharge: HOME | End: 2020-06-22
Attending: PHYSICIAN ASSISTANT
Payer: COMMERCIAL

## 2020-06-22 DIAGNOSIS — Z95.2 PRESENCE OF PROSTHETIC HEART VALVE: Primary | ICD-10-CM

## 2020-06-22 DIAGNOSIS — J91.8 PLEURAL EFFUSION IN OTHER CONDITIONS CLASSIFIED ELSEWHERE: ICD-10-CM

## 2020-06-22 DIAGNOSIS — Z95.1 S/P CABG (CORONARY ARTERY BYPASS GRAFT): ICD-10-CM

## 2020-06-22 DIAGNOSIS — I42.0 DILATED CARDIOMYOPATHY (CMD): ICD-10-CM

## 2020-06-22 PROCEDURE — 71048 X-RAY EXAM CHEST 4+ VIEWS: CPT | Performed by: PHYSICIAN ASSISTANT

## 2020-06-29 ENCOUNTER — ANTI-COAG (OUTPATIENT)
Dept: CARDIOLOGY | Age: 60
End: 2020-06-29

## 2020-06-29 DIAGNOSIS — Z95.2 PRESENCE OF PROSTHETIC HEART VALVE: ICD-10-CM

## 2020-07-01 ENCOUNTER — HOSPITAL ENCOUNTER (OUTPATIENT)
Dept: LAB | Facility: HOSPITAL | Age: 60
Discharge: HOME | End: 2020-07-01
Attending: INTERNAL MEDICINE
Payer: COMMERCIAL

## 2020-07-01 ENCOUNTER — CARDPULM VISIT (OUTPATIENT)
Dept: CARDIAC REHAB | Facility: HOSPITAL | Age: 60
End: 2020-07-01
Attending: INTERNAL MEDICINE
Payer: COMMERCIAL

## 2020-07-01 ENCOUNTER — ANTI-COAG (OUTPATIENT)
Dept: CARDIOLOGY | Age: 60
End: 2020-07-01

## 2020-07-01 DIAGNOSIS — Z95.2 PRESENCE OF PROSTHETIC HEART VALVE: ICD-10-CM

## 2020-07-01 DIAGNOSIS — Z95.2 HEART VALVE REPLACED BY TRANSPLANT: ICD-10-CM

## 2020-07-01 LAB
INR PPP: 1.4
POC INR: 1.4 (ref 0.8–1.3)

## 2020-07-01 PROCEDURE — 85610 PROTHROMBIN TIME: CPT

## 2020-07-06 ENCOUNTER — LAB ENCOUNTER (OUTPATIENT)
Dept: LAB | Facility: HOSPITAL | Age: 60
End: 2020-07-06
Attending: INTERNAL MEDICINE
Payer: COMMERCIAL

## 2020-07-06 DIAGNOSIS — Z01.818 PREOP EXAMINATION: ICD-10-CM

## 2020-07-06 DIAGNOSIS — Z11.59 ENCOUNTER FOR SCREENING FOR OTHER VIRAL DISEASES: ICD-10-CM

## 2020-07-08 LAB
SARS-COV-2 RNA RESP QL NAA+PROBE: NOT DETECTED
SARS-COV-2 RNA SPEC QL NAA+PROBE: NOT DETECTED
SPECIMEN SOURCE: NORMAL

## 2020-07-09 ENCOUNTER — HOSPITAL ENCOUNTER (OUTPATIENT)
Dept: CV DIAGNOSTICS | Facility: HOSPITAL | Age: 60
Discharge: HOME | End: 2020-07-09
Attending: INTERNAL MEDICINE
Payer: COMMERCIAL

## 2020-07-09 ENCOUNTER — HOSPITAL ENCOUNTER (OUTPATIENT)
Dept: LAB | Facility: HOSPITAL | Age: 60
Discharge: HOME | End: 2020-07-09
Attending: INTERNAL MEDICINE
Payer: COMMERCIAL

## 2020-07-09 ENCOUNTER — ANTI-COAG (OUTPATIENT)
Dept: CARDIOLOGY | Age: 60
End: 2020-07-09

## 2020-07-09 ENCOUNTER — OFFICE VISIT (OUTPATIENT)
Dept: CARDIOLOGY | Age: 60
End: 2020-07-09

## 2020-07-09 VITALS
SYSTOLIC BLOOD PRESSURE: 124 MMHG | BODY MASS INDEX: 31.71 KG/M2 | HEART RATE: 71 BPM | WEIGHT: 255 LBS | DIASTOLIC BLOOD PRESSURE: 78 MMHG | HEIGHT: 75 IN

## 2020-07-09 DIAGNOSIS — I42.0 DILATED CARDIOMYOPATHY (HCC): ICD-10-CM

## 2020-07-09 DIAGNOSIS — Z95.1 S/P CABG (CORONARY ARTERY BYPASS GRAFT): ICD-10-CM

## 2020-07-09 DIAGNOSIS — Z95.2 PRESENCE OF PROSTHETIC HEART VALVE: ICD-10-CM

## 2020-07-09 DIAGNOSIS — I10 ESSENTIAL HYPERTENSION: Primary | ICD-10-CM

## 2020-07-09 DIAGNOSIS — Z95.2 HEART VALVE REPLACED BY TRANSPLANT: ICD-10-CM

## 2020-07-09 DIAGNOSIS — I42.0 DILATED CARDIOMYOPATHY (CMD): ICD-10-CM

## 2020-07-09 LAB
INR PPP: 1.7
POC INR: 1.7 (ref 0.8–1.3)

## 2020-07-09 PROCEDURE — 93017 CV STRESS TEST TRACING ONLY: CPT | Performed by: INTERNAL MEDICINE

## 2020-07-09 PROCEDURE — 85610 PROTHROMBIN TIME: CPT

## 2020-07-09 PROCEDURE — 93018 CV STRESS TEST I&R ONLY: CPT | Performed by: INTERNAL MEDICINE

## 2020-07-09 PROCEDURE — 99214 OFFICE O/P EST MOD 30 MIN: CPT | Performed by: NURSE PRACTITIONER

## 2020-07-09 RX ORDER — UBIDECARENONE 30 MG
CAPSULE ORAL DAILY
COMMUNITY
End: 2020-07-09 | Stop reason: SDUPTHER

## 2020-07-09 RX ORDER — ATORVASTATIN CALCIUM 40 MG/1
40 TABLET, FILM COATED ORAL AT BEDTIME
COMMUNITY

## 2020-07-09 RX ORDER — LISINOPRIL 40 MG/1
TABLET ORAL DAILY
COMMUNITY
Start: 2020-06-01 | End: 2021-02-02 | Stop reason: CLARIF

## 2020-07-09 ASSESSMENT — PATIENT HEALTH QUESTIONNAIRE - PHQ9
CLINICAL INTERPRETATION OF PHQ2 SCORE: NO FURTHER SCREENING NEEDED
SUM OF ALL RESPONSES TO PHQ9 QUESTIONS 1 AND 2: 0
1. LITTLE INTEREST OR PLEASURE IN DOING THINGS: NOT AT ALL
SUM OF ALL RESPONSES TO PHQ9 QUESTIONS 1 AND 2: 0
CLINICAL INTERPRETATION OF PHQ9 SCORE: NO FURTHER SCREENING NEEDED
2. FEELING DOWN, DEPRESSED OR HOPELESS: NOT AT ALL

## 2020-07-09 ASSESSMENT — ENCOUNTER SYMPTOMS
WEIGHT GAIN: 0
DECREASED APPETITE: 0
GASTROINTESTINAL NEGATIVE: 1
SHORTNESS OF BREATH: 0
SYNCOPE: 0
WEIGHT LOSS: 0
NEUROLOGICAL NEGATIVE: 1
DIAPHORESIS: 0

## 2020-07-10 DIAGNOSIS — Z86.73 HISTORY OF CVA (CEREBROVASCULAR ACCIDENT): ICD-10-CM

## 2020-07-10 RX ORDER — ATORVASTATIN CALCIUM 80 MG/1
TABLET, FILM COATED ORAL
Qty: 90 TABLET | Refills: 1 | Status: SHIPPED | OUTPATIENT
Start: 2020-07-10 | End: 2021-04-23

## 2020-07-14 ENCOUNTER — CARDPULM VISIT (OUTPATIENT)
Dept: CARDIAC REHAB | Facility: HOSPITAL | Age: 60
End: 2020-07-14
Attending: INTERNAL MEDICINE
Payer: COMMERCIAL

## 2020-07-14 ENCOUNTER — TELEPHONE (OUTPATIENT)
Dept: CARDIOLOGY | Age: 60
End: 2020-07-14

## 2020-07-14 PROCEDURE — 93798 PHYS/QHP OP CAR RHAB W/ECG: CPT

## 2020-07-16 ENCOUNTER — CARDPULM VISIT (OUTPATIENT)
Dept: CARDIAC REHAB | Facility: HOSPITAL | Age: 60
End: 2020-07-16
Attending: INTERNAL MEDICINE
Payer: COMMERCIAL

## 2020-07-16 PROCEDURE — 93798 PHYS/QHP OP CAR RHAB W/ECG: CPT

## 2020-07-21 ENCOUNTER — CARDPULM VISIT (OUTPATIENT)
Dept: CARDIAC REHAB | Facility: HOSPITAL | Age: 60
End: 2020-07-21
Attending: INTERNAL MEDICINE
Payer: COMMERCIAL

## 2020-07-21 PROCEDURE — 93798 PHYS/QHP OP CAR RHAB W/ECG: CPT

## 2020-07-23 ENCOUNTER — CARDPULM VISIT (OUTPATIENT)
Dept: CARDIAC REHAB | Facility: HOSPITAL | Age: 60
End: 2020-07-23
Attending: INTERNAL MEDICINE
Payer: COMMERCIAL

## 2020-07-23 ENCOUNTER — ANTI-COAG (OUTPATIENT)
Dept: CARDIOLOGY | Age: 60
End: 2020-07-23

## 2020-07-23 DIAGNOSIS — Z95.2 PRESENCE OF PROSTHETIC HEART VALVE: ICD-10-CM

## 2020-07-23 PROCEDURE — 93798 PHYS/QHP OP CAR RHAB W/ECG: CPT

## 2020-07-28 ENCOUNTER — CARDPULM VISIT (OUTPATIENT)
Dept: CARDIAC REHAB | Facility: HOSPITAL | Age: 60
End: 2020-07-28
Attending: INTERNAL MEDICINE
Payer: COMMERCIAL

## 2020-07-28 PROCEDURE — 93798 PHYS/QHP OP CAR RHAB W/ECG: CPT

## 2020-07-30 ENCOUNTER — CARDPULM VISIT (OUTPATIENT)
Dept: CARDIAC REHAB | Facility: HOSPITAL | Age: 60
End: 2020-07-30
Attending: INTERNAL MEDICINE
Payer: COMMERCIAL

## 2020-07-30 PROCEDURE — 93798 PHYS/QHP OP CAR RHAB W/ECG: CPT

## 2020-08-04 ENCOUNTER — APPOINTMENT (OUTPATIENT)
Dept: CARDIAC REHAB | Facility: HOSPITAL | Age: 60
End: 2020-08-04
Attending: INTERNAL MEDICINE
Payer: COMMERCIAL

## 2020-08-06 ENCOUNTER — APPOINTMENT (OUTPATIENT)
Dept: CARDIAC REHAB | Facility: HOSPITAL | Age: 60
End: 2020-08-06
Attending: INTERNAL MEDICINE
Payer: COMMERCIAL

## 2020-08-11 ENCOUNTER — APPOINTMENT (OUTPATIENT)
Dept: CARDIAC REHAB | Facility: HOSPITAL | Age: 60
End: 2020-08-11
Attending: INTERNAL MEDICINE
Payer: COMMERCIAL

## 2020-08-13 ENCOUNTER — APPOINTMENT (OUTPATIENT)
Dept: CARDIAC REHAB | Facility: HOSPITAL | Age: 60
End: 2020-08-13
Attending: INTERNAL MEDICINE
Payer: COMMERCIAL

## 2020-08-18 ENCOUNTER — APPOINTMENT (OUTPATIENT)
Dept: CARDIAC REHAB | Facility: HOSPITAL | Age: 60
End: 2020-08-18
Attending: INTERNAL MEDICINE
Payer: COMMERCIAL

## 2020-08-20 ENCOUNTER — APPOINTMENT (OUTPATIENT)
Dept: CARDIAC REHAB | Facility: HOSPITAL | Age: 60
End: 2020-08-20
Attending: INTERNAL MEDICINE
Payer: COMMERCIAL

## 2020-08-24 ENCOUNTER — ANTI-COAG (OUTPATIENT)
Dept: CARDIOLOGY | Age: 60
End: 2020-08-24

## 2020-08-24 DIAGNOSIS — Z95.2 PRESENCE OF PROSTHETIC HEART VALVE: ICD-10-CM

## 2020-08-25 ENCOUNTER — APPOINTMENT (OUTPATIENT)
Dept: CARDIAC REHAB | Facility: HOSPITAL | Age: 60
End: 2020-08-25
Attending: INTERNAL MEDICINE
Payer: COMMERCIAL

## 2020-08-27 ENCOUNTER — APPOINTMENT (OUTPATIENT)
Dept: CARDIAC REHAB | Facility: HOSPITAL | Age: 60
End: 2020-08-27
Attending: INTERNAL MEDICINE
Payer: COMMERCIAL

## 2020-09-01 ENCOUNTER — APPOINTMENT (OUTPATIENT)
Dept: CARDIAC REHAB | Facility: HOSPITAL | Age: 60
End: 2020-09-01
Attending: INTERNAL MEDICINE
Payer: COMMERCIAL

## 2020-09-03 ENCOUNTER — APPOINTMENT (OUTPATIENT)
Dept: CARDIAC REHAB | Facility: HOSPITAL | Age: 60
End: 2020-09-03
Attending: INTERNAL MEDICINE
Payer: COMMERCIAL

## 2020-09-08 ENCOUNTER — APPOINTMENT (OUTPATIENT)
Dept: CARDIAC REHAB | Facility: HOSPITAL | Age: 60
End: 2020-09-08
Attending: INTERNAL MEDICINE
Payer: COMMERCIAL

## 2020-09-09 RX ORDER — CARVEDILOL 3.12 MG/1
TABLET ORAL
Qty: 60 TABLET | OUTPATIENT
Start: 2020-09-09

## 2020-09-09 RX ORDER — CARVEDILOL 3.12 MG/1
6.25 TABLET ORAL 2 TIMES DAILY WITH MEALS
Qty: 60 TABLET | Refills: 0 | Status: SHIPPED | OUTPATIENT
Start: 2020-09-09 | End: 2020-10-09

## 2020-09-10 ENCOUNTER — APPOINTMENT (OUTPATIENT)
Dept: CARDIAC REHAB | Facility: HOSPITAL | Age: 60
End: 2020-09-10
Attending: INTERNAL MEDICINE
Payer: COMMERCIAL

## 2020-09-15 ENCOUNTER — APPOINTMENT (OUTPATIENT)
Dept: CARDIAC REHAB | Facility: HOSPITAL | Age: 60
End: 2020-09-15
Attending: INTERNAL MEDICINE
Payer: COMMERCIAL

## 2020-09-17 ENCOUNTER — APPOINTMENT (OUTPATIENT)
Dept: CARDIAC REHAB | Facility: HOSPITAL | Age: 60
End: 2020-09-17
Attending: INTERNAL MEDICINE
Payer: COMMERCIAL

## 2020-09-22 ENCOUNTER — ANTI-COAG (OUTPATIENT)
Dept: CARDIOLOGY | Age: 60
End: 2020-09-22

## 2020-09-22 ENCOUNTER — APPOINTMENT (OUTPATIENT)
Dept: CARDIAC REHAB | Facility: HOSPITAL | Age: 60
End: 2020-09-22
Attending: INTERNAL MEDICINE
Payer: COMMERCIAL

## 2020-09-22 DIAGNOSIS — Z95.2 PRESENCE OF PROSTHETIC HEART VALVE: ICD-10-CM

## 2020-09-24 ENCOUNTER — APPOINTMENT (OUTPATIENT)
Dept: CARDIAC REHAB | Facility: HOSPITAL | Age: 60
End: 2020-09-24
Attending: INTERNAL MEDICINE
Payer: COMMERCIAL

## 2020-09-29 ENCOUNTER — APPOINTMENT (OUTPATIENT)
Dept: CARDIAC REHAB | Facility: HOSPITAL | Age: 60
End: 2020-09-29
Attending: INTERNAL MEDICINE
Payer: COMMERCIAL

## 2020-10-01 ENCOUNTER — APPOINTMENT (OUTPATIENT)
Dept: CARDIAC REHAB | Facility: HOSPITAL | Age: 60
End: 2020-10-01
Attending: INTERNAL MEDICINE
Payer: COMMERCIAL

## 2020-10-09 RX ORDER — CARVEDILOL 3.12 MG/1
TABLET ORAL
Qty: 360 TABLET | Refills: 3 | Status: SHIPPED | OUTPATIENT
Start: 2020-10-09

## 2020-10-13 ENCOUNTER — TELEPHONE (OUTPATIENT)
Dept: CARDIOLOGY | Age: 60
End: 2020-10-13

## 2020-10-15 RX ORDER — WARFARIN SODIUM 5 MG/1
TABLET ORAL
Qty: 14 TABLET | Refills: 0 | Status: SHIPPED | OUTPATIENT
Start: 2020-10-15 | End: 2020-10-28 | Stop reason: SDUPTHER

## 2020-10-16 ENCOUNTER — TELEPHONE (OUTPATIENT)
Dept: FAMILY MEDICINE CLINIC | Facility: CLINIC | Age: 60
End: 2020-10-16

## 2020-10-16 NOTE — TELEPHONE ENCOUNTER
Fax recd requesting copy of referral.  Patient was seen at Dr. Shakira Atkins on 9/17/19 and 10/23/19 and insurance has declined services due to no referral.    Fax/paperwork placed in referral folder. Please advise.

## 2020-10-20 ENCOUNTER — ANTI-COAG (OUTPATIENT)
Dept: CARDIOLOGY | Age: 60
End: 2020-10-20

## 2020-10-20 DIAGNOSIS — Z95.2 PRESENCE OF PROSTHETIC HEART VALVE: ICD-10-CM

## 2020-10-20 LAB — INR PPP: 1.8

## 2020-10-22 ENCOUNTER — HOSPITAL ENCOUNTER (OUTPATIENT)
Dept: LAB | Facility: HOSPITAL | Age: 60
Discharge: HOME | End: 2020-10-22
Attending: INTERNAL MEDICINE
Payer: COMMERCIAL

## 2020-10-22 DIAGNOSIS — Z95.2 HEART VALVE REPLACED BY TRANSPLANT: ICD-10-CM

## 2020-10-22 LAB — INR PPP: 1.8

## 2020-10-22 PROCEDURE — 85610 PROTHROMBIN TIME: CPT

## 2020-10-28 ENCOUNTER — TELEPHONE (OUTPATIENT)
Dept: FAMILY MEDICINE CLINIC | Facility: CLINIC | Age: 60
End: 2020-10-28

## 2020-10-28 ENCOUNTER — OFFICE VISIT (OUTPATIENT)
Dept: CARDIOLOGY | Age: 60
End: 2020-10-28

## 2020-10-28 VITALS
HEIGHT: 75 IN | HEART RATE: 61 BPM | WEIGHT: 272 LBS | DIASTOLIC BLOOD PRESSURE: 80 MMHG | SYSTOLIC BLOOD PRESSURE: 120 MMHG | BODY MASS INDEX: 33.82 KG/M2

## 2020-10-28 DIAGNOSIS — Z95.1 S/P CABG (CORONARY ARTERY BYPASS GRAFT): ICD-10-CM

## 2020-10-28 DIAGNOSIS — Z86.79 HISTORY OF ATRIAL FIBRILLATION: Primary | ICD-10-CM

## 2020-10-28 PROCEDURE — 3074F SYST BP LT 130 MM HG: CPT | Performed by: INTERNAL MEDICINE

## 2020-10-28 PROCEDURE — 93000 ELECTROCARDIOGRAM COMPLETE: CPT | Performed by: INTERNAL MEDICINE

## 2020-10-28 PROCEDURE — 99215 OFFICE O/P EST HI 40 MIN: CPT | Performed by: INTERNAL MEDICINE

## 2020-10-28 PROCEDURE — 3079F DIAST BP 80-89 MM HG: CPT | Performed by: INTERNAL MEDICINE

## 2020-10-28 RX ORDER — WARFARIN SODIUM 5 MG/1
TABLET ORAL
Qty: 90 TABLET | Refills: 1 | Status: SHIPPED | OUTPATIENT
Start: 2020-10-28 | End: 2021-02-17 | Stop reason: ALTCHOICE

## 2020-10-30 ENCOUNTER — TELEPHONE (OUTPATIENT)
Dept: FAMILY MEDICINE CLINIC | Facility: CLINIC | Age: 60
End: 2020-10-30

## 2020-10-30 DIAGNOSIS — I25.10 ATHEROSCLEROSIS OF NATIVE CORONARY ARTERY OF NATIVE HEART, ANGINA PRESENCE UNSPECIFIED: Primary | ICD-10-CM

## 2020-10-30 DIAGNOSIS — I71.2 THORACIC AORTIC ANEURYSM WITHOUT RUPTURE (HCC): ICD-10-CM

## 2020-10-30 NOTE — TELEPHONE ENCOUNTER
CHARGES FROM 2019   DR DUVAL  DOS  9-17-19     AND DR SALAZAR   DOS  10 - 21 -23    STATES THE OFFICE IS LOOKING FOR REFERRALS FOR THESE DATES OF SERVICE. STATES HE IS GETTING BILLED.     WHIT JACK[facility] REACHED OUT TO THIS OFFICE ON 10-16-2

## 2020-11-17 ENCOUNTER — ANTI-COAG (OUTPATIENT)
Dept: CARDIOLOGY | Age: 60
End: 2020-11-17

## 2020-11-17 DIAGNOSIS — Z95.2 PRESENCE OF PROSTHETIC HEART VALVE: ICD-10-CM

## 2020-11-23 DIAGNOSIS — I35.1 AORTIC VALVE INSUFFICIENCY, ETIOLOGY OF CARDIAC VALVE DISEASE UNSPECIFIED: ICD-10-CM

## 2020-11-23 DIAGNOSIS — I42.0 DILATED CARDIOMYOPATHY (HCC): ICD-10-CM

## 2020-11-23 DIAGNOSIS — I77.810 ASCENDING AORTA DILATATION (HCC): ICD-10-CM

## 2020-11-23 DIAGNOSIS — I10 ESSENTIAL HYPERTENSION: ICD-10-CM

## 2020-11-23 RX ORDER — LISINOPRIL 40 MG/1
40 TABLET ORAL DAILY
Qty: 30 TABLET | Refills: 0 | Status: SHIPPED | OUTPATIENT
Start: 2020-11-23 | End: 2020-12-08

## 2020-12-01 ENCOUNTER — ANTI-COAG (OUTPATIENT)
Dept: CARDIOLOGY | Age: 60
End: 2020-12-01

## 2020-12-01 DIAGNOSIS — Z95.2 PRESENCE OF PROSTHETIC HEART VALVE: ICD-10-CM

## 2020-12-08 ENCOUNTER — TELEPHONE (OUTPATIENT)
Dept: FAMILY MEDICINE CLINIC | Facility: CLINIC | Age: 60
End: 2020-12-08

## 2020-12-08 ENCOUNTER — HOSPITAL ENCOUNTER (OUTPATIENT)
Dept: CT IMAGING | Facility: HOSPITAL | Age: 60
Discharge: HOME | End: 2020-12-08
Attending: INTERNAL MEDICINE
Payer: COMMERCIAL

## 2020-12-08 ENCOUNTER — ANCILLARY PROCEDURE (OUTPATIENT)
Dept: CARDIOLOGY | Age: 60
End: 2020-12-08
Attending: INTERNAL MEDICINE

## 2020-12-08 VITALS — HEART RATE: 58 BPM | DIASTOLIC BLOOD PRESSURE: 74 MMHG | SYSTOLIC BLOOD PRESSURE: 128 MMHG

## 2020-12-08 DIAGNOSIS — I35.1 AORTIC VALVE INSUFFICIENCY, ETIOLOGY OF CARDIAC VALVE DISEASE UNSPECIFIED: ICD-10-CM

## 2020-12-08 DIAGNOSIS — Z95.1 STATUS POST CORONARY ARTERY BYPASS GRAFT: ICD-10-CM

## 2020-12-08 DIAGNOSIS — I77.810 ASCENDING AORTA DILATATION (HCC): ICD-10-CM

## 2020-12-08 DIAGNOSIS — I42.0 DILATED CARDIOMYOPATHY (HCC): ICD-10-CM

## 2020-12-08 DIAGNOSIS — I10 ESSENTIAL HYPERTENSION: ICD-10-CM

## 2020-12-08 DIAGNOSIS — Z86.79 HISTORY OF ATRIAL FIBRILLATION: ICD-10-CM

## 2020-12-08 DIAGNOSIS — Z95.1 S/P CABG (CORONARY ARTERY BYPASS GRAFT): ICD-10-CM

## 2020-12-08 PROCEDURE — 75574 CT ANGIO HRT W/3D IMAGE: CPT | Performed by: INTERNAL MEDICINE

## 2020-12-08 PROCEDURE — 82565 ASSAY OF CREATININE: CPT

## 2020-12-08 RX ORDER — LISINOPRIL 40 MG/1
40 TABLET ORAL DAILY
Qty: 30 TABLET | Refills: 0 | Status: SHIPPED | OUTPATIENT
Start: 2020-12-08 | End: 2020-12-11

## 2020-12-08 NOTE — TELEPHONE ENCOUNTER
Patient stopped at  and explained he needed  30 day supply for local pharmacy  And 90 day submitted to mail order of his Lisinopril 40mg. [place] contacted us and we never changed script to 90 days.    I noted that the  patient needs a follow up

## 2020-12-08 NOTE — IMAGING NOTE
Received Citlaly Greene to CT Rm 4 GE working with [address] [facility] and [employer]. Verified name, , allergies. with patient. IV access with 20G angiocath to right antecubital area. Positioned pt on table. Procedure explained and questions answered.  Vital sig

## 2020-12-11 ENCOUNTER — TELEMEDICINE (OUTPATIENT)
Dept: FAMILY MEDICINE CLINIC | Facility: CLINIC | Age: 60
End: 2020-12-11
Payer: COMMERCIAL

## 2020-12-11 VITALS
BODY MASS INDEX: 33 KG/M2 | DIASTOLIC BLOOD PRESSURE: 79 MMHG | HEART RATE: 58 BPM | SYSTOLIC BLOOD PRESSURE: 121 MMHG | WEIGHT: 261 LBS

## 2020-12-11 DIAGNOSIS — Z00.00 GENERAL MEDICAL EXAM: ICD-10-CM

## 2020-12-11 DIAGNOSIS — Z86.73 HISTORY OF CVA (CEREBROVASCULAR ACCIDENT): Primary | ICD-10-CM

## 2020-12-11 DIAGNOSIS — Z98.890 H/O CARDIAC RADIOFREQUENCY ABLATION: ICD-10-CM

## 2020-12-11 DIAGNOSIS — Z86.79 HISTORY OF ATRIAL FIBRILLATION: ICD-10-CM

## 2020-12-11 DIAGNOSIS — I42.0 DILATED CARDIOMYOPATHY (HCC): ICD-10-CM

## 2020-12-11 DIAGNOSIS — I10 ESSENTIAL HYPERTENSION: ICD-10-CM

## 2020-12-11 PROBLEM — I35.2 AORTIC INSUFFICIENCY WITH AORTIC STENOSIS: Status: RESOLVED | Noted: 2020-05-27 | Resolved: 2020-12-11

## 2020-12-11 PROCEDURE — 3078F DIAST BP <80 MM HG: CPT | Performed by: FAMILY MEDICINE

## 2020-12-11 PROCEDURE — 3074F SYST BP LT 130 MM HG: CPT | Performed by: FAMILY MEDICINE

## 2020-12-11 PROCEDURE — 99214 OFFICE O/P EST MOD 30 MIN: CPT | Performed by: FAMILY MEDICINE

## 2020-12-11 RX ORDER — LISINOPRIL 40 MG/1
40 TABLET ORAL DAILY
Qty: 90 TABLET | Refills: 1 | Status: SHIPPED | OUTPATIENT
Start: 2020-12-11 | End: 2021-07-06

## 2020-12-11 NOTE — PROGRESS NOTES
This visit is conducted using Telemedicine with live, interactive video and audio.     Telehealth outside of [address] Verbal Consent   I conducted a telehealth visit with Jagjit Aberu today, 12/11/20, which was completed using two-way, real-time Aortic stenosis, ascending aortic aneurysm, coronary artery disease, history of atrial fibrillation.     Procedures Performed:    Aortic valve replacement, 25 mm Inspiris bovine pericardial valve, resection and replacement of ascending aorta with 32 mm H mg by mouth 2 (two) times daily with meals. • aspirin 81 MG Oral Tab Take 81 mg by mouth daily. • Multiple Vitamins-Minerals (MULTI FOR HER 50+) Oral Tab Take by mouth daily.           Past Medical History:   Diagnosis Date   • High blood pressure stated age and cooperative, Normocephalic, without obvious abnormality, atraumatic, lips, mucosa, and tongue normal; teeth and gums normal, Speaking in full sentences comfortably, Normal work of breathing, Skin color, texture, turgor normal. No rashes or l

## 2020-12-23 ENCOUNTER — ANTI-COAG (OUTPATIENT)
Dept: CARDIOLOGY | Age: 60
End: 2020-12-23

## 2020-12-23 ENCOUNTER — TELEPHONE (OUTPATIENT)
Dept: CARDIOLOGY | Age: 60
End: 2020-12-23

## 2020-12-23 DIAGNOSIS — Z95.2 PRESENCE OF PROSTHETIC HEART VALVE: ICD-10-CM

## 2020-12-24 ENCOUNTER — TELEPHONE (OUTPATIENT)
Dept: CARDIOLOGY | Age: 60
End: 2020-12-24

## 2020-12-24 ENCOUNTER — ANTI-COAG (OUTPATIENT)
Dept: CARDIOLOGY | Age: 60
End: 2020-12-24

## 2020-12-24 DIAGNOSIS — Z95.2 PRESENCE OF PROSTHETIC HEART VALVE: ICD-10-CM

## 2021-02-17 ENCOUNTER — OFFICE VISIT (OUTPATIENT)
Dept: CARDIOLOGY | Age: 61
End: 2021-02-17

## 2021-02-17 VITALS
HEIGHT: 75 IN | DIASTOLIC BLOOD PRESSURE: 100 MMHG | SYSTOLIC BLOOD PRESSURE: 150 MMHG | HEART RATE: 58 BPM | BODY MASS INDEX: 34.57 KG/M2 | WEIGHT: 278 LBS

## 2021-02-17 DIAGNOSIS — I10 ESSENTIAL HYPERTENSION: ICD-10-CM

## 2021-02-17 DIAGNOSIS — I71.21 THORACIC ASCENDING AORTIC ANEURYSM (CMD): Primary | ICD-10-CM

## 2021-02-17 DIAGNOSIS — Z86.79 HISTORY OF ATRIAL FIBRILLATION: ICD-10-CM

## 2021-02-17 PROCEDURE — 99214 OFFICE O/P EST MOD 30 MIN: CPT | Performed by: INTERNAL MEDICINE

## 2021-02-17 PROCEDURE — 3077F SYST BP >= 140 MM HG: CPT | Performed by: INTERNAL MEDICINE

## 2021-02-17 PROCEDURE — 93000 ELECTROCARDIOGRAM COMPLETE: CPT | Performed by: INTERNAL MEDICINE

## 2021-02-17 PROCEDURE — 3080F DIAST BP >= 90 MM HG: CPT | Performed by: INTERNAL MEDICINE

## 2021-02-17 RX ORDER — ATORVASTATIN CALCIUM 80 MG/1
80 TABLET, FILM COATED ORAL NIGHTLY
COMMUNITY
Start: 2021-01-14 | End: 2021-02-17 | Stop reason: SDUPTHER

## 2021-02-17 ASSESSMENT — PATIENT HEALTH QUESTIONNAIRE - PHQ9
CLINICAL INTERPRETATION OF PHQ9 SCORE: NO FURTHER SCREENING NEEDED
2. FEELING DOWN, DEPRESSED OR HOPELESS: NOT AT ALL
CLINICAL INTERPRETATION OF PHQ2 SCORE: NO FURTHER SCREENING NEEDED
SUM OF ALL RESPONSES TO PHQ9 QUESTIONS 1 AND 2: 0
1. LITTLE INTEREST OR PLEASURE IN DOING THINGS: NOT AT ALL
SUM OF ALL RESPONSES TO PHQ9 QUESTIONS 1 AND 2: 0

## 2021-03-12 ENCOUNTER — HOSPITAL ENCOUNTER (OUTPATIENT)
Dept: CV DIAGNOSTICS | Facility: HOSPITAL | Age: 61
Discharge: HOME | End: 2021-03-12
Attending: INTERNAL MEDICINE
Payer: COMMERCIAL

## 2021-03-12 DIAGNOSIS — I10 HYPERTENSION, ESSENTIAL: ICD-10-CM

## 2021-03-12 DIAGNOSIS — I71.2 THORACIC ASCENDING AORTIC ANEURYSM (HCC): ICD-10-CM

## 2021-03-12 DIAGNOSIS — Z86.79 HISTORY OF ATRIAL FIBRILLATION: ICD-10-CM

## 2021-03-12 PROCEDURE — 93306 TTE W/DOPPLER COMPLETE: CPT | Performed by: INTERNAL MEDICINE

## 2021-03-18 ENCOUNTER — TELEPHONE (OUTPATIENT)
Dept: CARDIOLOGY | Age: 61
End: 2021-03-18

## 2021-03-18 DIAGNOSIS — Z23 NEED FOR VACCINATION: ICD-10-CM

## 2021-04-23 DIAGNOSIS — Z86.73 HISTORY OF CVA (CEREBROVASCULAR ACCIDENT): ICD-10-CM

## 2021-04-23 RX ORDER — ATORVASTATIN CALCIUM 80 MG/1
TABLET, FILM COATED ORAL
Qty: 90 TABLET | Refills: 1 | Status: SHIPPED | OUTPATIENT
Start: 2021-04-23 | End: 2021-10-14

## 2021-05-14 ENCOUNTER — LABORATORY ENCOUNTER (OUTPATIENT)
Dept: LAB | Age: 61
End: 2021-05-14
Attending: FAMILY MEDICINE
Payer: COMMERCIAL

## 2021-05-14 DIAGNOSIS — Z00.00 GENERAL MEDICAL EXAM: ICD-10-CM

## 2021-05-14 PROCEDURE — 82607 VITAMIN B-12: CPT | Performed by: FAMILY MEDICINE

## 2021-05-14 PROCEDURE — 82306 VITAMIN D 25 HYDROXY: CPT | Performed by: FAMILY MEDICINE

## 2021-05-14 PROCEDURE — 84439 ASSAY OF FREE THYROXINE: CPT | Performed by: FAMILY MEDICINE

## 2021-05-14 PROCEDURE — 80061 LIPID PANEL: CPT | Performed by: FAMILY MEDICINE

## 2021-05-14 PROCEDURE — 80050 GENERAL HEALTH PANEL: CPT | Performed by: FAMILY MEDICINE

## 2021-05-14 PROCEDURE — 84153 ASSAY OF PSA TOTAL: CPT | Performed by: FAMILY MEDICINE

## 2021-06-01 ENCOUNTER — OFFICE VISIT (OUTPATIENT)
Dept: FAMILY MEDICINE CLINIC | Facility: CLINIC | Age: 61
End: 2021-06-01
Payer: COMMERCIAL

## 2021-06-01 VITALS
HEART RATE: 78 BPM | OXYGEN SATURATION: 99 % | HEIGHT: 75 IN | TEMPERATURE: 98 F | SYSTOLIC BLOOD PRESSURE: 128 MMHG | WEIGHT: 267 LBS | RESPIRATION RATE: 18 BRPM | DIASTOLIC BLOOD PRESSURE: 72 MMHG | BODY MASS INDEX: 33.2 KG/M2

## 2021-06-01 DIAGNOSIS — E04.9 GOITER: ICD-10-CM

## 2021-06-01 DIAGNOSIS — D22.9 ATYPICAL NEVI: ICD-10-CM

## 2021-06-01 DIAGNOSIS — Z00.00 ANNUAL PHYSICAL EXAM: Primary | ICD-10-CM

## 2021-06-01 PROCEDURE — 3074F SYST BP LT 130 MM HG: CPT | Performed by: FAMILY MEDICINE

## 2021-06-01 PROCEDURE — 3008F BODY MASS INDEX DOCD: CPT | Performed by: FAMILY MEDICINE

## 2021-06-01 PROCEDURE — 99396 PREV VISIT EST AGE 40-64: CPT | Performed by: FAMILY MEDICINE

## 2021-06-01 PROCEDURE — 90732 PPSV23 VACC 2 YRS+ SUBQ/IM: CPT | Performed by: FAMILY MEDICINE

## 2021-06-01 PROCEDURE — 3078F DIAST BP <80 MM HG: CPT | Performed by: FAMILY MEDICINE

## 2021-06-01 PROCEDURE — 90471 IMMUNIZATION ADMIN: CPT | Performed by: FAMILY MEDICINE

## 2021-06-01 NOTE — PROGRESS NOTES
Buck Puente is a 62year old male who presents for a complete physical exam.   HPI:     Pt complains of nothing.  + calcium and vit D and restarted B12   No urinary symptoms  No erectile dysfunction  No penile discharge  c-scope 2018    No snoring   No fa Vitamins-Minerals (MULTI FOR HER 50+) Oral Tab Take by mouth daily.           Past Medical History:   Diagnosis Date   • High blood pressure    • High cholesterol    • Obstructive sleep apnea (adult) (pediatric) Rondall Moritz 6-20-16    autoPAP 5-12    • CHAZ (o distress  SKIN: no rashes,no suspicious lesions  HEENT: atraumatic, normocephalic,ears and throat are clear  EYES:PERRLA, EOMI, normal optic disk,conjunctiva are clear  NECK: supple,no adenopathy,no bruits, no JVD, + thyromegaly  CHEST: no chest tenderness

## 2021-06-16 RX ORDER — DRONEDARONE 400 MG/1
TABLET, FILM COATED ORAL
Qty: 60 TABLET | Refills: 1 | Status: SHIPPED | OUTPATIENT
Start: 2021-06-16

## 2021-07-06 DIAGNOSIS — Z86.79 HISTORY OF ATRIAL FIBRILLATION: ICD-10-CM

## 2021-07-06 DIAGNOSIS — I10 ESSENTIAL HYPERTENSION: ICD-10-CM

## 2021-07-06 DIAGNOSIS — I42.0 DILATED CARDIOMYOPATHY (HCC): ICD-10-CM

## 2021-07-06 DIAGNOSIS — Z98.890 H/O CARDIAC RADIOFREQUENCY ABLATION: ICD-10-CM

## 2021-07-06 RX ORDER — LISINOPRIL 40 MG/1
40 TABLET ORAL DAILY
Qty: 90 TABLET | Refills: 1 | Status: SHIPPED | OUTPATIENT
Start: 2021-07-06 | End: 2022-01-27

## 2021-10-14 DIAGNOSIS — Z86.73 HISTORY OF CVA (CEREBROVASCULAR ACCIDENT): ICD-10-CM

## 2021-10-14 RX ORDER — ATORVASTATIN CALCIUM 80 MG/1
TABLET, FILM COATED ORAL
Qty: 90 TABLET | Refills: 0 | Status: SHIPPED | OUTPATIENT
Start: 2021-10-14 | End: 2022-01-12

## 2022-01-12 DIAGNOSIS — Z86.73 HISTORY OF CVA (CEREBROVASCULAR ACCIDENT): ICD-10-CM

## 2022-01-12 RX ORDER — ATORVASTATIN CALCIUM 80 MG/1
TABLET, FILM COATED ORAL
Qty: 90 TABLET | Refills: 0 | Status: SHIPPED | OUTPATIENT
Start: 2022-01-12

## 2022-01-22 NOTE — PROGRESS NOTES
Mariel Paul is a 65year old male here for CVA / HTN follow up   HPI:     Pt had a CVA 3/3/15. Pt had a cerebellar CVA     Pt sees [facility] yearly. Echo will be in September; discussed echo results at length.      Pt is working at home and working out re 1.0 oz/week       2 Cans of beer per week     Occ: . : . Children: .    Exercise: minimal.  Diet: watches fats closely     REVIEW OF SYSTEMS:   GENERAL: feels well otherwise  SKIN: denies any unusual skin lesions  EYES:denies blurred vision or doub unspecified etiology  Echo in sept     4. Ascending aorta dilatation (HCC)  Echo in sept     5. Dilated cardiomyopathy ([address]  Echo in sept     6. Vitamin deficiency    - Vitamin B12; Future  - Vitamin D, 25-Hydroxy;  Future    RTC in 1 month or sooner if ne None

## 2022-01-26 DIAGNOSIS — I42.0 DILATED CARDIOMYOPATHY (HCC): ICD-10-CM

## 2022-01-26 DIAGNOSIS — Z86.79 HISTORY OF ATRIAL FIBRILLATION: ICD-10-CM

## 2022-01-26 DIAGNOSIS — Z98.890 H/O CARDIAC RADIOFREQUENCY ABLATION: ICD-10-CM

## 2022-01-26 DIAGNOSIS — I10 ESSENTIAL HYPERTENSION: ICD-10-CM

## 2022-01-27 DIAGNOSIS — I10 ESSENTIAL HYPERTENSION: ICD-10-CM

## 2022-01-27 DIAGNOSIS — I42.0 DILATED CARDIOMYOPATHY (HCC): ICD-10-CM

## 2022-01-27 DIAGNOSIS — Z98.890 H/O CARDIAC RADIOFREQUENCY ABLATION: ICD-10-CM

## 2022-01-27 DIAGNOSIS — Z86.79 HISTORY OF ATRIAL FIBRILLATION: ICD-10-CM

## 2022-01-27 RX ORDER — LISINOPRIL 40 MG/1
TABLET ORAL
Qty: 30 TABLET | Refills: 0 | Status: SHIPPED | OUTPATIENT
Start: 2022-01-27 | End: 2022-02-01

## 2022-01-28 RX ORDER — LISINOPRIL 40 MG/1
40 TABLET ORAL DAILY
Qty: 90 TABLET | Refills: 1 | OUTPATIENT
Start: 2022-01-28

## 2022-02-01 ENCOUNTER — TELEPHONE (OUTPATIENT)
Dept: FAMILY MEDICINE CLINIC | Facility: CLINIC | Age: 62
End: 2022-02-01

## 2022-02-01 RX ORDER — LISINOPRIL 40 MG/1
40 TABLET ORAL DAILY
Qty: 90 TABLET | Refills: 0 | Status: SHIPPED | OUTPATIENT
Start: 2022-02-01

## 2022-03-03 ENCOUNTER — LAB ENCOUNTER (OUTPATIENT)
Dept: LAB | Age: 62
End: 2022-03-03
Attending: FAMILY MEDICINE
Payer: COMMERCIAL

## 2022-03-03 ENCOUNTER — OFFICE VISIT (OUTPATIENT)
Dept: FAMILY MEDICINE CLINIC | Facility: CLINIC | Age: 62
End: 2022-03-03
Payer: COMMERCIAL

## 2022-03-03 VITALS
WEIGHT: 279 LBS | RESPIRATION RATE: 18 BRPM | OXYGEN SATURATION: 98 % | BODY MASS INDEX: 34.69 KG/M2 | HEART RATE: 84 BPM | HEIGHT: 75 IN | DIASTOLIC BLOOD PRESSURE: 86 MMHG | SYSTOLIC BLOOD PRESSURE: 134 MMHG

## 2022-03-03 DIAGNOSIS — Z86.79 HISTORY OF ATRIAL FIBRILLATION: ICD-10-CM

## 2022-03-03 DIAGNOSIS — G47.33 OSA (OBSTRUCTIVE SLEEP APNEA): ICD-10-CM

## 2022-03-03 DIAGNOSIS — Z86.73 HISTORY OF CVA (CEREBROVASCULAR ACCIDENT): ICD-10-CM

## 2022-03-03 DIAGNOSIS — I42.0 DILATED CARDIOMYOPATHY (HCC): ICD-10-CM

## 2022-03-03 DIAGNOSIS — Z98.890 H/O CARDIAC RADIOFREQUENCY ABLATION: ICD-10-CM

## 2022-03-03 DIAGNOSIS — Z00.00 GENERAL MEDICAL EXAM: ICD-10-CM

## 2022-03-03 DIAGNOSIS — I10 ESSENTIAL HYPERTENSION: Primary | ICD-10-CM

## 2022-03-03 LAB
ALBUMIN SERPL-MCNC: 3.7 G/DL (ref 3.4–5)
ALP LIVER SERPL-CCNC: 65 U/L
ALT SERPL-CCNC: 44 U/L
ANION GAP SERPL CALC-SCNC: 3 MMOL/L (ref 0–18)
AST SERPL-CCNC: 39 U/L (ref 15–37)
BASOPHILS # BLD AUTO: 0.07 X10(3) UL (ref 0–0.2)
BASOPHILS NFR BLD AUTO: 0.9 %
BILIRUB SERPL-MCNC: 1.1 MG/DL (ref 0.1–2)
BUN BLD-MCNC: 18 MG/DL (ref 7–18)
CALCIUM BLD-MCNC: 9 MG/DL (ref 8.5–10.1)
CHLORIDE SERPL-SCNC: 109 MMOL/L (ref 98–112)
CHOLEST SERPL-MCNC: 104 MG/DL (ref ?–200)
CO2 SERPL-SCNC: 29 MMOL/L (ref 21–32)
COMPLEXED PSA SERPL-MCNC: 0.76 NG/ML (ref ?–4)
CREAT BLD-MCNC: 1.35 MG/DL
EOSINOPHIL # BLD AUTO: 0.3 X10(3) UL (ref 0–0.7)
EOSINOPHIL NFR BLD AUTO: 4 %
ERYTHROCYTE [DISTWIDTH] IN BLOOD BY AUTOMATED COUNT: 12.5 %
EST. AVERAGE GLUCOSE BLD GHB EST-MCNC: 105 MG/DL (ref 68–126)
FASTING PATIENT LIPID ANSWER: YES
FASTING STATUS PATIENT QL REPORTED: YES
GLOBULIN PLAS-MCNC: 3 G/DL (ref 2.8–4.4)
GLUCOSE BLD-MCNC: 89 MG/DL (ref 70–99)
HBA1C MFR BLD: 5.3 % (ref ?–5.7)
HCT VFR BLD AUTO: 45.4 %
HDLC SERPL-MCNC: 42 MG/DL (ref 40–59)
HGB BLD-MCNC: 15.5 G/DL
IMM GRANULOCYTES # BLD AUTO: 0.02 X10(3) UL (ref 0–1)
IMM GRANULOCYTES NFR BLD: 0.3 %
LDLC SERPL CALC-MCNC: 45 MG/DL (ref ?–100)
LYMPHOCYTES # BLD AUTO: 2.82 X10(3) UL (ref 1–4)
LYMPHOCYTES NFR BLD AUTO: 38 %
MCH RBC QN AUTO: 32.5 PG (ref 26–34)
MCHC RBC AUTO-ENTMCNC: 34.1 G/DL (ref 31–37)
MCV RBC AUTO: 95.2 FL
MONOCYTES # BLD AUTO: 0.64 X10(3) UL (ref 0.1–1)
MONOCYTES NFR BLD AUTO: 8.6 %
NEUTROPHILS # BLD AUTO: 3.57 X10 (3) UL (ref 1.5–7.7)
NEUTROPHILS # BLD AUTO: 3.57 X10(3) UL (ref 1.5–7.7)
NEUTROPHILS NFR BLD AUTO: 48.2 %
NONHDLC SERPL-MCNC: 62 MG/DL (ref ?–130)
OSMOLALITY SERPL CALC.SUM OF ELEC: 293 MOSM/KG (ref 275–295)
PLATELET # BLD AUTO: 155 10(3)UL (ref 150–450)
POTASSIUM SERPL-SCNC: 4.3 MMOL/L (ref 3.5–5.1)
PROT SERPL-MCNC: 6.7 G/DL (ref 6.4–8.2)
RBC # BLD AUTO: 4.77 X10(6)UL
SODIUM SERPL-SCNC: 141 MMOL/L (ref 136–145)
T4 FREE SERPL-MCNC: 0.8 NG/DL (ref 0.8–1.7)
TRIGL SERPL-MCNC: 86 MG/DL (ref 30–149)
TSI SER-ACNC: 2.13 MIU/ML (ref 0.36–3.74)
VIT B12 SERPL-MCNC: 673 PG/ML (ref 193–986)
VIT D+METAB SERPL-MCNC: 32.3 NG/ML (ref 30–100)
VLDLC SERPL CALC-MCNC: 12 MG/DL (ref 0–30)
WBC # BLD AUTO: 7.4 X10(3) UL (ref 4–11)

## 2022-03-03 PROCEDURE — 82306 VITAMIN D 25 HYDROXY: CPT | Performed by: FAMILY MEDICINE

## 2022-03-03 PROCEDURE — 84439 ASSAY OF FREE THYROXINE: CPT | Performed by: FAMILY MEDICINE

## 2022-03-03 PROCEDURE — 82607 VITAMIN B-12: CPT | Performed by: FAMILY MEDICINE

## 2022-03-03 PROCEDURE — 3008F BODY MASS INDEX DOCD: CPT | Performed by: FAMILY MEDICINE

## 2022-03-03 PROCEDURE — 83036 HEMOGLOBIN GLYCOSYLATED A1C: CPT | Performed by: FAMILY MEDICINE

## 2022-03-03 PROCEDURE — 80050 GENERAL HEALTH PANEL: CPT | Performed by: FAMILY MEDICINE

## 2022-03-03 PROCEDURE — 80061 LIPID PANEL: CPT | Performed by: FAMILY MEDICINE

## 2022-03-03 PROCEDURE — 3079F DIAST BP 80-89 MM HG: CPT | Performed by: FAMILY MEDICINE

## 2022-03-03 PROCEDURE — 99214 OFFICE O/P EST MOD 30 MIN: CPT | Performed by: FAMILY MEDICINE

## 2022-03-03 PROCEDURE — 84153 ASSAY OF PSA TOTAL: CPT | Performed by: FAMILY MEDICINE

## 2022-03-03 PROCEDURE — 3075F SYST BP GE 130 - 139MM HG: CPT | Performed by: FAMILY MEDICINE

## 2022-03-04 ENCOUNTER — ORDER TRANSCRIPTION (OUTPATIENT)
Dept: SLEEP CENTER | Age: 62
End: 2022-03-04

## 2022-03-10 ENCOUNTER — NURSE ONLY (OUTPATIENT)
Dept: FAMILY MEDICINE CLINIC | Facility: CLINIC | Age: 62
End: 2022-03-10
Payer: COMMERCIAL

## 2022-03-10 PROCEDURE — 90750 HZV VACC RECOMBINANT IM: CPT | Performed by: FAMILY MEDICINE

## 2022-03-10 PROCEDURE — 90471 IMMUNIZATION ADMIN: CPT | Performed by: FAMILY MEDICINE

## 2022-03-24 ENCOUNTER — HOSPITAL ENCOUNTER (OUTPATIENT)
Dept: ULTRASOUND IMAGING | Age: 62
Discharge: HOME | End: 2022-03-24
Attending: FAMILY MEDICINE
Payer: COMMERCIAL

## 2022-03-24 DIAGNOSIS — E04.9 GOITER: ICD-10-CM

## 2022-03-24 PROCEDURE — 76536 US EXAM OF HEAD AND NECK: CPT | Performed by: FAMILY MEDICINE

## 2022-03-25 ENCOUNTER — LAB ENCOUNTER (OUTPATIENT)
Dept: LAB | Age: 62
End: 2022-03-25
Attending: FAMILY MEDICINE
Payer: COMMERCIAL

## 2022-03-25 DIAGNOSIS — Z01.818 PREOP EXAMINATION: ICD-10-CM

## 2022-03-25 DIAGNOSIS — Z11.59 SCREENING FOR VIRAL DISEASE: ICD-10-CM

## 2022-03-26 LAB — SARS-COV-2 RNA RESP QL NAA+PROBE: NOT DETECTED

## 2022-03-28 ENCOUNTER — OFFICE VISIT (OUTPATIENT)
Dept: SLEEP CENTER | Age: 62
End: 2022-03-28
Attending: INTERNAL MEDICINE
Payer: COMMERCIAL

## 2022-03-28 DIAGNOSIS — G47.33 OSA (OBSTRUCTIVE SLEEP APNEA): ICD-10-CM

## 2022-03-28 PROCEDURE — 95811 POLYSOM 6/>YRS CPAP 4/> PARM: CPT

## 2022-04-18 ENCOUNTER — TELEPHONE (OUTPATIENT)
Dept: FAMILY MEDICINE CLINIC | Facility: CLINIC | Age: 62
End: 2022-04-18

## 2022-04-20 ENCOUNTER — TELEPHONE (OUTPATIENT)
Dept: FAMILY MEDICINE CLINIC | Facility: CLINIC | Age: 62
End: 2022-04-20

## 2022-04-20 NOTE — TELEPHONE ENCOUNTER
NEVER GOT A CALL BACK REGARDING HIS SLEEP STUDY RESULTS. WANTS TALK TO THE NURSE BEFORE HE GOES TO A SPECIALIST.   HE IS NOT HAPPY HE HAS TO SEE A SPECIALIST

## 2022-05-07 RX ORDER — LISINOPRIL 40 MG/1
TABLET ORAL
Qty: 90 TABLET | Refills: 3 | Status: SHIPPED | OUTPATIENT
Start: 2022-05-07

## 2022-05-09 ENCOUNTER — ORDER TRANSCRIPTION (OUTPATIENT)
Dept: SLEEP CENTER | Age: 62
End: 2022-05-09

## 2022-05-24 ENCOUNTER — OFFICE VISIT (OUTPATIENT)
Dept: SLEEP CENTER | Age: 62
End: 2022-05-24
Attending: INTERNAL MEDICINE
Payer: COMMERCIAL

## 2022-05-24 DIAGNOSIS — R06.81 APNEA: ICD-10-CM

## 2022-05-24 PROCEDURE — 95806 SLEEP STUDY UNATT&RESP EFFT: CPT

## 2022-06-07 ENCOUNTER — APPOINTMENT (OUTPATIENT)
Dept: SLEEP CENTER | Age: 62
End: 2022-06-07
Attending: INTERNAL MEDICINE
Payer: COMMERCIAL

## 2022-06-07 ENCOUNTER — OFFICE VISIT (OUTPATIENT)
Dept: SLEEP CENTER | Age: 62
End: 2022-06-07
Attending: INTERNAL MEDICINE
Payer: COMMERCIAL

## 2022-06-09 ENCOUNTER — OFFICE VISIT (OUTPATIENT)
Dept: FAMILY MEDICINE CLINIC | Facility: CLINIC | Age: 62
End: 2022-06-09
Payer: COMMERCIAL

## 2022-06-09 VITALS
DIASTOLIC BLOOD PRESSURE: 80 MMHG | SYSTOLIC BLOOD PRESSURE: 132 MMHG | OXYGEN SATURATION: 99 % | HEART RATE: 62 BPM | WEIGHT: 273 LBS | BODY MASS INDEX: 33.94 KG/M2 | RESPIRATION RATE: 18 BRPM | HEIGHT: 75 IN | TEMPERATURE: 98 F

## 2022-06-09 DIAGNOSIS — Z00.00 ANNUAL PHYSICAL EXAM: Primary | ICD-10-CM

## 2022-06-09 PROCEDURE — 3075F SYST BP GE 130 - 139MM HG: CPT | Performed by: FAMILY MEDICINE

## 2022-06-09 PROCEDURE — 3008F BODY MASS INDEX DOCD: CPT | Performed by: FAMILY MEDICINE

## 2022-06-09 PROCEDURE — 99396 PREV VISIT EST AGE 40-64: CPT | Performed by: FAMILY MEDICINE

## 2022-06-09 PROCEDURE — 3079F DIAST BP 80-89 MM HG: CPT | Performed by: FAMILY MEDICINE

## 2022-06-09 RX ORDER — LOSARTAN POTASSIUM 25 MG/1
25 TABLET ORAL DAILY
COMMUNITY
Start: 2022-05-31

## 2022-06-09 RX ORDER — SPIRONOLACTONE 25 MG/1
TABLET ORAL
COMMUNITY
Start: 2022-05-31

## 2022-06-15 NOTE — PROCEDURES
[address]       Accredited by the [employer] of Sleep Medicine (AASM)    PATIENT'S NAME:        Yayo Fountain  ATTENDING PHYSICIAN:   Meir Napoles M.D. REFERRING PHYSICIAN:   Meir Napoles M.D. PATIENT ACCOUNT #:     [de-identified]        LOCATION:       [facility] RECORD #:      [id number]        YOB: 1960  DATE OF STUDY:         06/07/2022    SLEEP STUDY REPORT    STUDY TYPE:  Unattended sleep study. HISTORY:  The patient is a 59-year-old who underwent in-laboratory sleep testing in April of 2022. It revealed an apnea-hypopnea index of 34 and he was split to successful CPAP prescription of 11. It was noted on the study that there was a very significant positional variation. The patient was seen in consultation and relayed that he never sleeps on his back. He wanted a home sleep test to evaluate the degree of sleep apnea in his normal sleeping positions. UNATTENDED SLEEP STUDY RECORDING PARAMETERS:  The patient underwent a formal technically adequate unattended diagnostic sleep study [facility] The study was performed in accordance with the AASM standard for Out of Center Sleep Testing. The four-channel Type III HST measures the following parameters:  flow, respiratory effort, pulse, and oxygen saturation. SCORING:  This study was scored in accordance with AASM scoring rules and Medicare rule 1B. SLEEP ARCHITECTURE:  Total recording time 9 hours 13 minutes. Total flow time 7 hours 18 minutes. Total oxygen saturation evaluation time 9 hours 2 minutes. Overall sleep study quality appeared to be adequate; however, with some artifact in the flow monitor. RESPIRATORY MEASURES:  The apnea-hypopnea index is 14. The oxygen romero was 85%. The patient spent less than 1% of the record with a saturation below 88%. IMPRESSION:  Sleep apnea is present.   Now with sleep in his natural lateral position, it is confirmed to be mild in nature. RECOMMENDATIONS:    1. The patient was seen in consultation prior to the sleep study with mild sleep apnea as he had in the past, he has elected to not treat this; however, with his history of stroke, he may benefit from treatment with CPAP. That being said, he relayed in the consultation that he has minimal symptoms and it is relayed from his bed partner that his sleep appears to be of good quality. In our discussion in the pre-study consultation it was discussed that if mild sleep apnea was noted, observation may be acceptable. 2.   If the patient wishes to proceed with treatment, my office can coordinate. 3.   The patient should avoid driving or operating heavy machinery while sleepy. 4.   The patient should avoid alcohol or sedatives prior to sleep. Dictated By Ilana Caicedo M.D.  d: 06/15/2022 12:08:41  t: 06/15/2022 14:07:23  Job [id number]  JA/    cc: MCKENZIE Almazan D.O.

## 2022-06-30 ENCOUNTER — NURSE ONLY (OUTPATIENT)
Dept: FAMILY MEDICINE CLINIC | Facility: CLINIC | Age: 62
End: 2022-06-30
Payer: COMMERCIAL

## 2022-06-30 PROCEDURE — 90471 IMMUNIZATION ADMIN: CPT | Performed by: FAMILY MEDICINE

## 2022-06-30 PROCEDURE — 90750 HZV VACC RECOMBINANT IM: CPT | Performed by: FAMILY MEDICINE

## 2022-07-14 ENCOUNTER — LAB ENCOUNTER (OUTPATIENT)
Dept: LAB | Age: 62
End: 2022-07-14
Attending: FAMILY MEDICINE
Payer: COMMERCIAL

## 2022-07-14 DIAGNOSIS — Z00.00 ANNUAL PHYSICAL EXAM: ICD-10-CM

## 2022-07-14 LAB
ALBUMIN SERPL-MCNC: 3.5 G/DL (ref 3.4–5)
ALBUMIN/GLOB SERPL: 1 {RATIO} (ref 1–2)
ALP LIVER SERPL-CCNC: 57 U/L
ALT SERPL-CCNC: 49 U/L
ANION GAP SERPL CALC-SCNC: 7 MMOL/L (ref 0–18)
AST SERPL-CCNC: 32 U/L (ref 15–37)
BILIRUB SERPL-MCNC: 0.8 MG/DL (ref 0.1–2)
BUN BLD-MCNC: 17 MG/DL (ref 7–18)
CALCIUM BLD-MCNC: 9.6 MG/DL (ref 8.5–10.1)
CHLORIDE SERPL-SCNC: 107 MMOL/L (ref 98–112)
CHOLEST SERPL-MCNC: 95 MG/DL (ref ?–200)
CO2 SERPL-SCNC: 25 MMOL/L (ref 21–32)
CREAT BLD-MCNC: 1.27 MG/DL
EST. AVERAGE GLUCOSE BLD GHB EST-MCNC: 114 MG/DL (ref 68–126)
FASTING PATIENT LIPID ANSWER: YES
FASTING STATUS PATIENT QL REPORTED: YES
GLOBULIN PLAS-MCNC: 3.6 G/DL (ref 2.8–4.4)
GLUCOSE BLD-MCNC: 103 MG/DL (ref 70–99)
HBA1C MFR BLD: 5.6 % (ref ?–5.7)
HDLC SERPL-MCNC: 45 MG/DL (ref 40–59)
LDLC SERPL CALC-MCNC: 34 MG/DL (ref ?–100)
NONHDLC SERPL-MCNC: 50 MG/DL (ref ?–130)
OSMOLALITY SERPL CALC.SUM OF ELEC: 290 MOSM/KG (ref 275–295)
POTASSIUM SERPL-SCNC: 4.4 MMOL/L (ref 3.5–5.1)
PROT SERPL-MCNC: 7.1 G/DL (ref 6.4–8.2)
SODIUM SERPL-SCNC: 139 MMOL/L (ref 136–145)
TRIGL SERPL-MCNC: 78 MG/DL (ref 30–149)
VLDLC SERPL CALC-MCNC: 11 MG/DL (ref 0–30)

## 2022-07-14 PROCEDURE — 80053 COMPREHEN METABOLIC PANEL: CPT

## 2022-07-14 PROCEDURE — 36415 COLL VENOUS BLD VENIPUNCTURE: CPT

## 2022-07-14 PROCEDURE — 80061 LIPID PANEL: CPT

## 2022-07-14 PROCEDURE — 83036 HEMOGLOBIN GLYCOSYLATED A1C: CPT

## 2022-09-15 ENCOUNTER — LAB ENCOUNTER (OUTPATIENT)
Dept: LAB | Age: 62
End: 2022-09-15
Attending: INTERNAL MEDICINE
Payer: COMMERCIAL

## 2022-09-15 DIAGNOSIS — I10 HTN (HYPERTENSION): ICD-10-CM

## 2022-09-15 DIAGNOSIS — I50.22 CHRONIC SYSTOLIC HEART FAILURE (HCC): Primary | ICD-10-CM

## 2022-09-15 LAB
ANION GAP SERPL CALC-SCNC: 5 MMOL/L (ref 0–18)
BUN BLD-MCNC: 23 MG/DL (ref 7–18)
CALCIUM BLD-MCNC: 9.3 MG/DL (ref 8.5–10.1)
CHLORIDE SERPL-SCNC: 108 MMOL/L (ref 98–112)
CO2 SERPL-SCNC: 27 MMOL/L (ref 21–32)
CREAT BLD-MCNC: 1.28 MG/DL
FASTING STATUS PATIENT QL REPORTED: NO
GFR SERPLBLD BASED ON 1.73 SQ M-ARVRAT: 64 ML/MIN/1.73M2 (ref 60–?)
GLUCOSE BLD-MCNC: 142 MG/DL (ref 70–99)
OSMOLALITY SERPL CALC.SUM OF ELEC: 296 MOSM/KG (ref 275–295)
POTASSIUM SERPL-SCNC: 4.1 MMOL/L (ref 3.5–5.1)
SODIUM SERPL-SCNC: 140 MMOL/L (ref 136–145)

## 2022-09-15 PROCEDURE — 80048 BASIC METABOLIC PNL TOTAL CA: CPT

## 2022-09-15 PROCEDURE — 36415 COLL VENOUS BLD VENIPUNCTURE: CPT

## 2022-10-06 DIAGNOSIS — Z86.73 HISTORY OF CVA (CEREBROVASCULAR ACCIDENT): ICD-10-CM

## 2022-10-07 RX ORDER — ATORVASTATIN CALCIUM 80 MG/1
TABLET, FILM COATED ORAL
Qty: 90 TABLET | Refills: 0 | Status: SHIPPED | OUTPATIENT
Start: 2022-10-07

## 2023-01-02 DIAGNOSIS — Z86.73 HISTORY OF CVA (CEREBROVASCULAR ACCIDENT): ICD-10-CM

## 2023-01-03 RX ORDER — ATORVASTATIN CALCIUM 80 MG/1
TABLET, FILM COATED ORAL
Qty: 90 TABLET | Refills: 0 | Status: SHIPPED | OUTPATIENT
Start: 2023-01-03

## 2023-03-16 ENCOUNTER — LAB ENCOUNTER (OUTPATIENT)
Dept: LAB | Age: 63
End: 2023-03-16
Attending: INTERNAL MEDICINE
Payer: COMMERCIAL

## 2023-03-16 DIAGNOSIS — E78.00 PURE HYPERCHOLESTEROLEMIA: ICD-10-CM

## 2023-03-16 DIAGNOSIS — I65.29 CAROTID ARTERY STENOSIS: ICD-10-CM

## 2023-03-16 DIAGNOSIS — I25.10 CORONARY ATHEROSCLEROSIS OF NATIVE CORONARY ARTERY: ICD-10-CM

## 2023-03-16 DIAGNOSIS — Z86.79 PERSONAL HISTORY OF UNSPECIFIED CIRCULATORY DISEASE: Primary | ICD-10-CM

## 2023-03-16 LAB
ANION GAP SERPL CALC-SCNC: 4 MMOL/L (ref 0–18)
BUN BLD-MCNC: 19 MG/DL (ref 7–18)
CALCIUM BLD-MCNC: 9.5 MG/DL (ref 8.5–10.1)
CHLORIDE SERPL-SCNC: 111 MMOL/L (ref 98–112)
CO2 SERPL-SCNC: 26 MMOL/L (ref 21–32)
CREAT BLD-MCNC: 1.3 MG/DL
FASTING STATUS PATIENT QL REPORTED: NO
GFR SERPLBLD BASED ON 1.73 SQ M-ARVRAT: 62 ML/MIN/1.73M2 (ref 60–?)
GLUCOSE BLD-MCNC: 95 MG/DL (ref 70–99)
OSMOLALITY SERPL CALC.SUM OF ELEC: 294 MOSM/KG (ref 275–295)
POTASSIUM SERPL-SCNC: 4.4 MMOL/L (ref 3.5–5.1)
SODIUM SERPL-SCNC: 141 MMOL/L (ref 136–145)

## 2023-03-16 PROCEDURE — 36415 COLL VENOUS BLD VENIPUNCTURE: CPT

## 2023-03-16 PROCEDURE — 80048 BASIC METABOLIC PNL TOTAL CA: CPT

## 2023-04-04 DIAGNOSIS — Z86.73 HISTORY OF CVA (CEREBROVASCULAR ACCIDENT): ICD-10-CM

## 2023-04-04 RX ORDER — ATORVASTATIN CALCIUM 80 MG/1
TABLET, FILM COATED ORAL
Qty: 90 TABLET | Refills: 0 | Status: SHIPPED | OUTPATIENT
Start: 2023-04-04

## 2023-04-08 ENCOUNTER — HOSPITAL ENCOUNTER (EMERGENCY)
Age: 63
Discharge: HOME | End: 2023-04-08
Payer: COMMERCIAL

## 2023-04-08 VITALS
RESPIRATION RATE: 18 BRPM | SYSTOLIC BLOOD PRESSURE: 189 MMHG | DIASTOLIC BLOOD PRESSURE: 109 MMHG | HEIGHT: 75 IN | WEIGHT: 260 LBS | HEART RATE: 66 BPM | OXYGEN SATURATION: 98 % | BODY MASS INDEX: 32.33 KG/M2
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
## 2023-04-08 PROCEDURE — 99283 EMERGENCY DEPT VISIT LOW MDM: CPT

## 2023-04-13 ENCOUNTER — OFFICE VISIT (OUTPATIENT)
Dept: FAMILY MEDICINE CLINIC | Facility: CLINIC | Age: 63
End: 2023-04-13
Payer: COMMERCIAL

## 2023-04-13 VITALS
BODY MASS INDEX: 34.82 KG/M2 | DIASTOLIC BLOOD PRESSURE: 92 MMHG | SYSTOLIC BLOOD PRESSURE: 138 MMHG | WEIGHT: 280 LBS | HEIGHT: 75 IN | HEART RATE: 72 BPM | OXYGEN SATURATION: 95 % | RESPIRATION RATE: 16 BRPM
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
## 2023-04-13 PROCEDURE — 3075F SYST BP GE 130 - 139MM HG: CPT | Performed by: FAMILY MEDICINE

## 2023-04-13 PROCEDURE — 3080F DIAST BP >= 90 MM HG: CPT | Performed by: FAMILY MEDICINE

## 2023-04-13 PROCEDURE — 3008F BODY MASS INDEX DOCD: CPT | Performed by: FAMILY MEDICINE

## 2023-04-13 PROCEDURE — 99213 OFFICE O/P EST LOW 20 MIN: CPT | Performed by: FAMILY MEDICINE

## 2023-04-13 RX ORDER — LOSARTAN POTASSIUM 50 MG/1
50 TABLET ORAL DAILY
COMMUNITY
Start: 2023-03-02

## 2023-04-13 RX ORDER — CARVEDILOL 6.25 MG/1
6.25 TABLET ORAL 2 TIMES DAILY
COMMUNITY
Start: 2023-03-02

## 2023-05-11 ENCOUNTER — OFFICE VISIT (OUTPATIENT)
Dept: FAMILY MEDICINE CLINIC | Facility: CLINIC | Age: 63
End: 2023-05-11
Payer: COMMERCIAL

## 2023-05-11 VITALS
OXYGEN SATURATION: 98 % | RESPIRATION RATE: 20 BRPM | SYSTOLIC BLOOD PRESSURE: 152 MMHG | BODY MASS INDEX: 35.56 KG/M2 | HEIGHT: 75 IN | HEART RATE: 65 BPM | DIASTOLIC BLOOD PRESSURE: 100 MMHG | WEIGHT: 286 LBS
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
## 2023-05-11 DIAGNOSIS — I10 PRIMARY HYPERTENSION: ICD-10-CM

## 2023-05-11 DIAGNOSIS — M25.562 ACUTE PAIN OF LEFT KNEE: ICD-10-CM

## 2023-05-11 PROCEDURE — 3077F SYST BP >= 140 MM HG: CPT | Performed by: FAMILY MEDICINE

## 2023-05-11 PROCEDURE — 99213 OFFICE O/P EST LOW 20 MIN: CPT | Performed by: FAMILY MEDICINE

## 2023-05-11 PROCEDURE — 3080F DIAST BP >= 90 MM HG: CPT | Performed by: FAMILY MEDICINE

## 2023-05-11 PROCEDURE — 3008F BODY MASS INDEX DOCD: CPT | Performed by: FAMILY MEDICINE

## 2023-05-11 RX ORDER — DRONEDARONE 400 MG/1
400 TABLET, FILM COATED ORAL 2 TIMES DAILY
COMMUNITY
Start: 2023-04-30

## 2023-06-12 ENCOUNTER — PATIENT MESSAGE (OUTPATIENT)
Dept: FAMILY MEDICINE CLINIC | Facility: CLINIC | Age: 63
End: 2023-06-12

## 2023-06-12 ENCOUNTER — HOSPITAL ENCOUNTER (OUTPATIENT)
Dept: ULTRASOUND IMAGING | Age: 63
Discharge: HOME | End: 2023-06-12
Attending: FAMILY MEDICINE
Payer: COMMERCIAL

## 2023-06-12 ENCOUNTER — LAB ENCOUNTER (OUTPATIENT)
Dept: LAB | Age: 63
End: 2023-06-12
Attending: FAMILY MEDICINE
Payer: COMMERCIAL

## 2023-06-12 ENCOUNTER — OFFICE VISIT (OUTPATIENT)
Dept: FAMILY MEDICINE CLINIC | Facility: CLINIC | Age: 63
End: 2023-06-12
Payer: COMMERCIAL

## 2023-06-12 VITALS
SYSTOLIC BLOOD PRESSURE: 140 MMHG | RESPIRATION RATE: 16 BRPM | HEIGHT: 75 IN | HEART RATE: 67 BPM | WEIGHT: 287 LBS | BODY MASS INDEX: 35.68 KG/M2 | OXYGEN SATURATION: 97 % | DIASTOLIC BLOOD PRESSURE: 86 MMHG

## 2023-06-12 DIAGNOSIS — M79.89 LEFT LEG SWELLING: Primary | ICD-10-CM

## 2023-06-12 DIAGNOSIS — E78.00 HIGH CHOLESTEROL: ICD-10-CM

## 2023-06-12 DIAGNOSIS — M79.89 LEFT LEG SWELLING: ICD-10-CM

## 2023-06-12 DIAGNOSIS — R73.01 ELEVATED FASTING GLUCOSE: ICD-10-CM

## 2023-06-12 DIAGNOSIS — I82.412 ACUTE DEEP VEIN THROMBOSIS (DVT) OF FEMORAL VEIN OF LEFT LOWER EXTREMITY (HCC): ICD-10-CM

## 2023-06-12 DIAGNOSIS — Z79.899 HIGH RISK MEDICATION USE: ICD-10-CM

## 2023-06-12 PROBLEM — D68.69 OTHER THROMBOPHILIA (HCC): Status: ACTIVE | Noted: 2023-06-12

## 2023-06-12 LAB
ALBUMIN SERPL-MCNC: 3.6 G/DL (ref 3.4–5)
ALBUMIN/GLOB SERPL: 1 {RATIO} (ref 1–2)
ALP LIVER SERPL-CCNC: 78 U/L
ALT SERPL-CCNC: 77 U/L
ANION GAP SERPL CALC-SCNC: 4 MMOL/L (ref 0–18)
AST SERPL-CCNC: 47 U/L (ref 15–37)
BILIRUB SERPL-MCNC: 0.8 MG/DL (ref 0.1–2)
BUN BLD-MCNC: 19 MG/DL (ref 7–18)
CALCIUM BLD-MCNC: 9.1 MG/DL (ref 8.5–10.1)
CHLORIDE SERPL-SCNC: 108 MMOL/L (ref 98–112)
CHOLEST SERPL-MCNC: 107 MG/DL (ref ?–200)
CO2 SERPL-SCNC: 28 MMOL/L (ref 21–32)
CREAT BLD-MCNC: 1.13 MG/DL
FASTING PATIENT LIPID ANSWER: YES
FASTING STATUS PATIENT QL REPORTED: YES
GFR SERPLBLD BASED ON 1.73 SQ M-ARVRAT: 73 ML/MIN/1.73M2 (ref 60–?)
GLOBULIN PLAS-MCNC: 3.7 G/DL (ref 2.8–4.4)
GLUCOSE BLD-MCNC: 94 MG/DL (ref 70–99)
HCYS SERPL-SCNC: 10 UMOL/L (ref 3.2–10.7)
HDLC SERPL-MCNC: 51 MG/DL (ref 40–59)
LDLC SERPL CALC-MCNC: 41 MG/DL (ref ?–100)
NONHDLC SERPL-MCNC: 56 MG/DL (ref ?–130)
OSMOLALITY SERPL CALC.SUM OF ELEC: 292 MOSM/KG (ref 275–295)
POTASSIUM SERPL-SCNC: 4.1 MMOL/L (ref 3.5–5.1)
PROT SERPL-MCNC: 7.3 G/DL (ref 6.4–8.2)
SODIUM SERPL-SCNC: 140 MMOL/L (ref 136–145)
TRIGL SERPL-MCNC: 69 MG/DL (ref 30–149)
VLDLC SERPL CALC-MCNC: 10 MG/DL (ref 0–30)

## 2023-06-12 PROCEDURE — 93971 EXTREMITY STUDY: CPT | Performed by: FAMILY MEDICINE

## 2023-06-12 PROCEDURE — 85307 ASSAY ACTIVATED PROTEIN C: CPT | Performed by: FAMILY MEDICINE

## 2023-06-12 PROCEDURE — 85306 CLOT INHIBIT PROT S FREE: CPT | Performed by: FAMILY MEDICINE

## 2023-06-12 PROCEDURE — 85300 ANTITHROMBIN III ACTIVITY: CPT | Performed by: FAMILY MEDICINE

## 2023-06-12 PROCEDURE — 99214 OFFICE O/P EST MOD 30 MIN: CPT | Performed by: FAMILY MEDICINE

## 2023-06-12 PROCEDURE — 80061 LIPID PANEL: CPT | Performed by: FAMILY MEDICINE

## 2023-06-12 PROCEDURE — 3079F DIAST BP 80-89 MM HG: CPT | Performed by: FAMILY MEDICINE

## 2023-06-12 PROCEDURE — 83090 ASSAY OF HOMOCYSTEINE: CPT | Performed by: FAMILY MEDICINE

## 2023-06-12 PROCEDURE — 85303 CLOT INHIBIT PROT C ACTIVITY: CPT | Performed by: FAMILY MEDICINE

## 2023-06-12 PROCEDURE — 83036 HEMOGLOBIN GLYCOSYLATED A1C: CPT | Performed by: FAMILY MEDICINE

## 2023-06-12 PROCEDURE — 3077F SYST BP >= 140 MM HG: CPT | Performed by: FAMILY MEDICINE

## 2023-06-12 PROCEDURE — 80053 COMPREHEN METABOLIC PANEL: CPT | Performed by: FAMILY MEDICINE

## 2023-06-12 PROCEDURE — 3008F BODY MASS INDEX DOCD: CPT | Performed by: FAMILY MEDICINE

## 2023-06-12 RX ORDER — LOSARTAN POTASSIUM 50 MG/1
50 TABLET ORAL 2 TIMES DAILY
Refills: 0 | COMMUNITY
Start: 2023-06-12

## 2023-06-12 NOTE — ADDENDUM NOTE
Addended by: Kourtney Pendleton on: 6/12/2023 12:25 PM     Modules accepted: Orders, Level of Service

## 2023-06-13 LAB
EST. AVERAGE GLUCOSE BLD GHB EST-MCNC: 108 MG/DL (ref 68–126)
F2 C.20210G>A GENO BLD/T: NORMAL
F5 P.R506Q BLD/T QL: NORMAL
HBA1C MFR BLD: 5.4 % (ref ?–5.7)

## 2023-06-14 LAB
ACT PROT C RESIST: 2.7 RATIO
ANTITHROMBIN ACTIVITY: 109 %
PROTEIN C FUNCTIONAL: 99 %
PROTEIN S FUNCT: 121 %

## 2023-07-06 DIAGNOSIS — Z86.73 HISTORY OF CVA (CEREBROVASCULAR ACCIDENT): ICD-10-CM

## 2023-07-06 RX ORDER — ATORVASTATIN CALCIUM 80 MG/1
TABLET, FILM COATED ORAL
Qty: 90 TABLET | Refills: 0 | Status: SHIPPED | OUTPATIENT
Start: 2023-07-06

## 2023-09-06 ENCOUNTER — OFFICE VISIT (OUTPATIENT)
Dept: FAMILY MEDICINE CLINIC | Facility: CLINIC | Age: 63
End: 2023-09-06
Payer: COMMERCIAL

## 2023-09-06 VITALS
DIASTOLIC BLOOD PRESSURE: 84 MMHG | HEIGHT: 75 IN | RESPIRATION RATE: 16 BRPM | WEIGHT: 290 LBS | SYSTOLIC BLOOD PRESSURE: 132 MMHG | BODY MASS INDEX: 36.06 KG/M2 | OXYGEN SATURATION: 96 % | HEART RATE: 88 BPM

## 2023-09-06 DIAGNOSIS — I82.412 DEEP VEIN THROMBOSIS (DVT) OF FEMORAL VEIN OF LEFT LOWER EXTREMITY, UNSPECIFIED CHRONICITY (HCC): ICD-10-CM

## 2023-09-06 DIAGNOSIS — M79.89 LEFT LEG SWELLING: ICD-10-CM

## 2023-09-06 DIAGNOSIS — G47.33 OSA (OBSTRUCTIVE SLEEP APNEA): ICD-10-CM

## 2023-09-06 DIAGNOSIS — Z00.00 ANNUAL PHYSICAL EXAM: Primary | ICD-10-CM

## 2023-09-06 PROCEDURE — 99396 PREV VISIT EST AGE 40-64: CPT | Performed by: FAMILY MEDICINE

## 2023-09-06 PROCEDURE — 3079F DIAST BP 80-89 MM HG: CPT | Performed by: FAMILY MEDICINE

## 2023-09-06 PROCEDURE — 3008F BODY MASS INDEX DOCD: CPT | Performed by: FAMILY MEDICINE

## 2023-09-06 PROCEDURE — 3075F SYST BP GE 130 - 139MM HG: CPT | Performed by: FAMILY MEDICINE

## 2023-09-08 ENCOUNTER — LAB ENCOUNTER (OUTPATIENT)
Dept: LAB | Age: 63
End: 2023-09-08
Attending: FAMILY MEDICINE
Payer: COMMERCIAL

## 2023-09-08 DIAGNOSIS — Z00.00 ANNUAL PHYSICAL EXAM: ICD-10-CM

## 2023-09-08 LAB
ALBUMIN SERPL-MCNC: 3.6 G/DL (ref 3.4–5)
ALBUMIN/GLOB SERPL: 1 {RATIO} (ref 1–2)
ALP LIVER SERPL-CCNC: 57 U/L
ALT SERPL-CCNC: 38 U/L
ANION GAP SERPL CALC-SCNC: 6 MMOL/L (ref 0–18)
AST SERPL-CCNC: 29 U/L (ref 15–37)
BASOPHILS # BLD AUTO: 0.06 X10(3) UL (ref 0–0.2)
BASOPHILS NFR BLD AUTO: 0.7 %
BILIRUB SERPL-MCNC: 1 MG/DL (ref 0.1–2)
BUN BLD-MCNC: 16 MG/DL (ref 7–18)
CALCIUM BLD-MCNC: 9.4 MG/DL (ref 8.5–10.1)
CHLORIDE SERPL-SCNC: 106 MMOL/L (ref 98–112)
CHOLEST SERPL-MCNC: 105 MG/DL (ref ?–200)
CO2 SERPL-SCNC: 25 MMOL/L (ref 21–32)
COMPLEXED PSA SERPL-MCNC: 0.83 NG/ML (ref ?–4)
CREAT BLD-MCNC: 1.27 MG/DL
EGFRCR SERPLBLD CKD-EPI 2021: 64 ML/MIN/1.73M2 (ref 60–?)
EOSINOPHIL # BLD AUTO: 0.26 X10(3) UL (ref 0–0.7)
EOSINOPHIL NFR BLD AUTO: 3.1 %
ERYTHROCYTE [DISTWIDTH] IN BLOOD BY AUTOMATED COUNT: 12.9 %
EST. AVERAGE GLUCOSE BLD GHB EST-MCNC: 105 MG/DL (ref 68–126)
FASTING PATIENT LIPID ANSWER: YES
FASTING STATUS PATIENT QL REPORTED: YES
GLOBULIN PLAS-MCNC: 3.7 G/DL (ref 2.8–4.4)
GLUCOSE BLD-MCNC: 96 MG/DL (ref 70–99)
HBA1C MFR BLD: 5.3 % (ref ?–5.7)
HCT VFR BLD AUTO: 46.2 %
HDLC SERPL-MCNC: 50 MG/DL (ref 40–59)
HGB BLD-MCNC: 15.8 G/DL
IMM GRANULOCYTES # BLD AUTO: 0.03 X10(3) UL (ref 0–1)
IMM GRANULOCYTES NFR BLD: 0.4 %
LDLC SERPL CALC-MCNC: 40 MG/DL (ref ?–100)
LYMPHOCYTES # BLD AUTO: 2.73 X10(3) UL (ref 1–4)
LYMPHOCYTES NFR BLD AUTO: 32.6 %
MCH RBC QN AUTO: 32.6 PG (ref 26–34)
MCHC RBC AUTO-ENTMCNC: 34.2 G/DL (ref 31–37)
MCV RBC AUTO: 95.5 FL
MONOCYTES # BLD AUTO: 0.68 X10(3) UL (ref 0.1–1)
MONOCYTES NFR BLD AUTO: 8.1 %
NEUTROPHILS # BLD AUTO: 4.61 X10 (3) UL (ref 1.5–7.7)
NEUTROPHILS # BLD AUTO: 4.61 X10(3) UL (ref 1.5–7.7)
NEUTROPHILS NFR BLD AUTO: 55.1 %
NONHDLC SERPL-MCNC: 55 MG/DL (ref ?–130)
OSMOLALITY SERPL CALC.SUM OF ELEC: 285 MOSM/KG (ref 275–295)
PLATELET # BLD AUTO: 182 10(3)UL (ref 150–450)
POTASSIUM SERPL-SCNC: 4.6 MMOL/L (ref 3.5–5.1)
PROT SERPL-MCNC: 7.3 G/DL (ref 6.4–8.2)
RBC # BLD AUTO: 4.84 X10(6)UL
SODIUM SERPL-SCNC: 137 MMOL/L (ref 136–145)
T4 FREE SERPL-MCNC: 0.8 NG/DL (ref 0.8–1.7)
TRIGL SERPL-MCNC: 70 MG/DL (ref 30–149)
TSI SER-ACNC: 1.73 MIU/ML (ref 0.36–3.74)
VIT B12 SERPL-MCNC: 415 PG/ML (ref 193–986)
VIT D+METAB SERPL-MCNC: 53.4 NG/ML (ref 30–100)
VLDLC SERPL CALC-MCNC: 10 MG/DL (ref 0–30)
WBC # BLD AUTO: 8.4 X10(3) UL (ref 4–11)

## 2023-09-08 PROCEDURE — 84439 ASSAY OF FREE THYROXINE: CPT | Performed by: FAMILY MEDICINE

## 2023-09-08 PROCEDURE — 82607 VITAMIN B-12: CPT | Performed by: FAMILY MEDICINE

## 2023-09-08 PROCEDURE — 84153 ASSAY OF PSA TOTAL: CPT | Performed by: FAMILY MEDICINE

## 2023-09-08 PROCEDURE — 82306 VITAMIN D 25 HYDROXY: CPT | Performed by: FAMILY MEDICINE

## 2023-09-08 PROCEDURE — 83036 HEMOGLOBIN GLYCOSYLATED A1C: CPT | Performed by: FAMILY MEDICINE

## 2023-09-08 PROCEDURE — 80050 GENERAL HEALTH PANEL: CPT | Performed by: FAMILY MEDICINE

## 2023-09-08 PROCEDURE — 80061 LIPID PANEL: CPT | Performed by: FAMILY MEDICINE

## 2023-10-10 ENCOUNTER — TELEPHONE (OUTPATIENT)
Dept: FAMILY MEDICINE CLINIC | Facility: CLINIC | Age: 63
End: 2023-10-10

## 2023-10-10 DIAGNOSIS — M79.89 LEFT LEG SWELLING: ICD-10-CM

## 2023-11-09 ENCOUNTER — OFFICE VISIT (OUTPATIENT)
Dept: FAMILY MEDICINE CLINIC | Facility: CLINIC | Age: 63
End: 2023-11-09
Payer: COMMERCIAL

## 2023-11-09 VITALS
WEIGHT: 299 LBS | SYSTOLIC BLOOD PRESSURE: 142 MMHG | OXYGEN SATURATION: 98 % | HEIGHT: 75 IN | RESPIRATION RATE: 18 BRPM | BODY MASS INDEX: 37.18 KG/M2 | HEART RATE: 54 BPM | DIASTOLIC BLOOD PRESSURE: 96 MMHG

## 2023-11-09 DIAGNOSIS — G47.33 OSA (OBSTRUCTIVE SLEEP APNEA): ICD-10-CM

## 2023-11-09 DIAGNOSIS — I42.0 DILATED CARDIOMYOPATHY (HCC): ICD-10-CM

## 2023-11-09 DIAGNOSIS — Z98.890 H/O CARDIAC RADIOFREQUENCY ABLATION: ICD-10-CM

## 2023-11-09 DIAGNOSIS — H26.9 CATARACT OF LEFT EYE, UNSPECIFIED CATARACT TYPE: ICD-10-CM

## 2023-11-09 DIAGNOSIS — Z01.818 PREOP EXAMINATION: Primary | ICD-10-CM

## 2023-11-09 DIAGNOSIS — Z86.73 HISTORY OF CVA (CEREBROVASCULAR ACCIDENT): ICD-10-CM

## 2023-11-09 DIAGNOSIS — Z86.79 HISTORY OF ATRIAL FIBRILLATION: ICD-10-CM

## 2023-11-09 DIAGNOSIS — I10 ESSENTIAL HYPERTENSION: ICD-10-CM

## 2023-11-09 PROCEDURE — 3080F DIAST BP >= 90 MM HG: CPT | Performed by: FAMILY MEDICINE

## 2023-11-09 PROCEDURE — 99243 OFF/OP CNSLTJ NEW/EST LOW 30: CPT | Performed by: FAMILY MEDICINE

## 2023-11-09 PROCEDURE — 3077F SYST BP >= 140 MM HG: CPT | Performed by: FAMILY MEDICINE

## 2023-11-09 PROCEDURE — 3008F BODY MASS INDEX DOCD: CPT | Performed by: FAMILY MEDICINE

## 2023-11-16 ENCOUNTER — TELEPHONE (OUTPATIENT)
Dept: FAMILY MEDICINE CLINIC | Facility: CLINIC | Age: 63
End: 2023-11-16

## 2023-11-16 ENCOUNTER — NURSE ONLY (OUTPATIENT)
Dept: FAMILY MEDICINE CLINIC | Facility: CLINIC | Age: 63
End: 2023-11-16
Payer: COMMERCIAL

## 2023-11-16 VITALS — DIASTOLIC BLOOD PRESSURE: 90 MMHG | SYSTOLIC BLOOD PRESSURE: 140 MMHG

## 2023-11-16 DIAGNOSIS — Z86.73 HISTORY OF CVA (CEREBROVASCULAR ACCIDENT): ICD-10-CM

## 2023-11-16 NOTE — TELEPHONE ENCOUNTER
[address] for Day Surgery needs   Medical Clearance for patient     faxed to:   [phone]    Patient was in on 11-9-2023

## 2023-11-17 RX ORDER — ATORVASTATIN CALCIUM 80 MG/1
TABLET, FILM COATED ORAL
Qty: 90 TABLET | Refills: 0 | Status: SHIPPED | OUTPATIENT
Start: 2023-11-17

## 2023-12-12 ENCOUNTER — TELEPHONE (OUTPATIENT)
Dept: HEMATOLOGY/ONCOLOGY | Facility: HOSPITAL | Age: 63
End: 2023-12-12

## 2023-12-27 ENCOUNTER — OFFICE VISIT (OUTPATIENT)
Dept: FAMILY MEDICINE CLINIC | Facility: CLINIC | Age: 63
End: 2023-12-27
Payer: COMMERCIAL

## 2023-12-27 VITALS
WEIGHT: 299 LBS | HEIGHT: 75 IN | BODY MASS INDEX: 37.18 KG/M2 | DIASTOLIC BLOOD PRESSURE: 90 MMHG | OXYGEN SATURATION: 97 % | SYSTOLIC BLOOD PRESSURE: 158 MMHG | RESPIRATION RATE: 20 BRPM | HEART RATE: 66 BPM

## 2023-12-27 DIAGNOSIS — M79.89 LEFT LEG SWELLING: ICD-10-CM

## 2023-12-27 DIAGNOSIS — I82.4Z2 DEEP VEIN THROMBOSIS (DVT) OF DISTAL VEIN OF LEFT LOWER EXTREMITY, UNSPECIFIED CHRONICITY (HCC): ICD-10-CM

## 2023-12-27 DIAGNOSIS — L03.116 CELLULITIS OF LEFT LOWER EXTREMITY: Primary | ICD-10-CM

## 2023-12-27 PROCEDURE — 99214 OFFICE O/P EST MOD 30 MIN: CPT | Performed by: FAMILY MEDICINE

## 2023-12-27 PROCEDURE — 3080F DIAST BP >= 90 MM HG: CPT | Performed by: FAMILY MEDICINE

## 2023-12-27 PROCEDURE — 3008F BODY MASS INDEX DOCD: CPT | Performed by: FAMILY MEDICINE

## 2023-12-27 PROCEDURE — 3077F SYST BP >= 140 MM HG: CPT | Performed by: FAMILY MEDICINE

## 2023-12-27 RX ORDER — CEPHALEXIN 500 MG/1
500 CAPSULE ORAL 4 TIMES DAILY
Qty: 10 CAPSULE | Refills: 0 | Status: SHIPPED | OUTPATIENT
Start: 2023-12-27

## 2023-12-27 RX ORDER — PREDNISOLN SP/MOXIFLOX/BROMFEN 1 %-0.5 %
DROPS OPHTHALMIC (EYE)
COMMUNITY
Start: 2023-11-02

## 2023-12-27 RX ORDER — RIVAROXABAN 155 MG/1
GRANULE, FOR SUSPENSION ORAL
COMMUNITY
End: 2023-12-27

## 2023-12-27 NOTE — PROGRESS NOTES
Subjective:   Patient ID: Yashira Finley is a 59year old male. Left leg with redness and swelling x 4 days. Had injury from boot rubbing on it a few days before that. Peeled off the skin. No F/C. Mild pain. History/Other:   Review of Systems   All other systems reviewed and are negative. Current Outpatient Medications   Medication Sig Dispense Refill    Prednisolon-Moxiflox-Bromfenac 1-0.5-0.075 % Ophthalmic Solution 1 drop Ophthalmic ONCE A DAY for 28 days  1 DROP      rivaroxaban (XARELTO) 20 MG Oral Tab Take 1 tablet (20 mg total) by mouth daily with food. 30 tablet 0    cephalexin 500 MG Oral Cap Take 1 capsule (500 mg total) by mouth 4 (four) times daily. 10 capsule 0    atorvastatin 80 MG Oral Tab TAKE 1 TABLET BY MOUTH EVERY NIGHT 90 tablet 0    losartan 50 MG Oral Tab Take 1 tablet (50 mg total) by mouth 2 (two) times daily. 0    MULTAQ 400 MG Oral Tab Take 1 tablet (400 mg total) by mouth 2 (two) times daily. carvedilol 6.25 MG Oral Tab Take 1 tablet (6.25 mg total) by mouth 2 (two) times daily. aspirin 81 MG Oral Tab Take 1 tablet (81 mg total) by mouth daily. Multiple Vitamins-Minerals (MULTI FOR HER 50+) Oral Tab Take by mouth daily. Allergies:No Known Allergies    Objective:   Physical Exam  Vitals reviewed. Constitutional:       General: He is not in acute distress. Appearance: He is well-developed. He is not diaphoretic. Eyes:      General: No scleral icterus. Right eye: No discharge. Left eye: No discharge. Conjunctiva/sclera: Conjunctivae normal.   Cardiovascular:      Rate and Rhythm: Normal rate and regular rhythm. Heart sounds: Normal heart sounds. Pulmonary:      Effort: Pulmonary effort is normal. No respiratory distress. Breath sounds: Normal breath sounds. No wheezing or rales. Musculoskeletal:      Comments: Swelling, redness on left leg. Mild tenderness. Clear discharge minimal.     Assessment & Plan:   1. Cellulitis of left lower extremity    2. Left leg swelling    3. Deep vein thrombosis (DVT) of distal vein of left lower extremity, unspecified chronicity (HCC)      1. Left leg swelling  - rivaroxaban (XARELTO) 20 MG Oral Tab; Take 1 tablet (20 mg total) by mouth daily with food. Dispense: 30 tablet; Refill: 0  - US VENOUS DOPPLER LEG LEFT - DIAG IMG (CPT=93971); Future  - Lymphedema Clinic Referral:  PT/OT  Evaluate & Treat Lymphedema    2. Cellulitis of left lower extremity  - cephalexin 500 MG Oral Cap; Take 1 capsule (500 mg total) by mouth 4 (four) times daily. Dispense: 10 capsule; Refill: 0    3. Deep vein thrombosis (DVT) of distal vein of left lower extremity, unspecified chronicity (HCC)  - Lymphedema Clinic Referral:  PT/OT  Evaluate & Treat Lymphedema    Meds This Visit:  Requested Prescriptions     Signed Prescriptions Disp Refills    rivaroxaban (XARELTO) 20 MG Oral Tab 30 tablet 0     Sig: Take 1 tablet (20 mg total) by mouth daily with food. cephalexin 500 MG Oral Cap 10 capsule 0     Sig: Take 1 capsule (500 mg total) by mouth 4 (four) times daily.        Imaging & Referrals:  OP REFERRAL TO OT/PT LYMPHEDEMA CLINIC  US VENOUS DOPPLER LEG LEFT - DIAG IMG (CPT=93971)

## 2024-01-01 ENCOUNTER — APPOINTMENT (OUTPATIENT)
Dept: INTERVENTIONAL RADIOLOGY/VASCULAR | Facility: HOSPITAL | Age: 64
End: 2024-01-01
Attending: INTERNAL MEDICINE
Payer: COMMERCIAL

## 2024-01-01 ENCOUNTER — TELEPHONE (OUTPATIENT)
Dept: NEUROLOGY | Facility: CLINIC | Age: 64
End: 2024-01-01

## 2024-01-01 ENCOUNTER — HOSPITAL ENCOUNTER (INPATIENT)
Facility: HOSPITAL | Age: 64
LOS: 2 days | Discharge: OTHER INSTITUTION | End: 2024-01-01
Attending: EMERGENCY MEDICINE | Admitting: HOSPITALIST
Payer: COMMERCIAL

## 2024-01-01 ENCOUNTER — TELEPHONE (OUTPATIENT)
Dept: FAMILY MEDICINE CLINIC | Facility: CLINIC | Age: 64
End: 2024-01-01

## 2024-01-01 ENCOUNTER — APPOINTMENT (OUTPATIENT)
Dept: CV DIAGNOSTICS | Facility: HOSPITAL | Age: 64
End: 2024-01-01
Payer: COMMERCIAL

## 2024-01-01 ENCOUNTER — APPOINTMENT (OUTPATIENT)
Dept: CT IMAGING | Facility: HOSPITAL | Age: 64
End: 2024-01-01
Attending: EMERGENCY MEDICINE
Payer: COMMERCIAL

## 2024-01-01 ENCOUNTER — APPOINTMENT (OUTPATIENT)
Dept: GENERAL RADIOLOGY | Facility: HOSPITAL | Age: 64
End: 2024-01-01
Payer: COMMERCIAL

## 2024-01-01 ENCOUNTER — TELEPHONE (OUTPATIENT)
Dept: HEMATOLOGY/ONCOLOGY | Age: 64
End: 2024-01-01

## 2024-01-01 ENCOUNTER — APPOINTMENT (OUTPATIENT)
Dept: CV DIAGNOSTICS | Facility: HOSPITAL | Age: 64
End: 2024-01-01
Attending: EMERGENCY MEDICINE
Payer: COMMERCIAL

## 2024-01-01 VITALS
OXYGEN SATURATION: 96 % | WEIGHT: 210.75 LBS | HEART RATE: 61 BPM | SYSTOLIC BLOOD PRESSURE: 93 MMHG | RESPIRATION RATE: 25 BRPM | BODY MASS INDEX: 26 KG/M2 | DIASTOLIC BLOOD PRESSURE: 63 MMHG | TEMPERATURE: 104 F

## 2024-01-01 DIAGNOSIS — R07.9 CHEST PAIN: Primary | ICD-10-CM

## 2024-01-01 DIAGNOSIS — A41.9 SEPSIS, DUE TO UNSPECIFIED ORGANISM, UNSPECIFIED WHETHER ACUTE ORGAN DYSFUNCTION PRESENT (HCC): ICD-10-CM

## 2024-01-01 DIAGNOSIS — R79.89 TROPONIN LEVEL ELEVATED: ICD-10-CM

## 2024-01-01 DIAGNOSIS — R41.82 ALTERED MENTAL STATUS, UNSPECIFIED ALTERED MENTAL STATUS TYPE: ICD-10-CM

## 2024-01-01 LAB
ALBUMIN SERPL-MCNC: 2.5 G/DL (ref 3.2–4.8)
ALBUMIN SERPL-MCNC: 2.6 G/DL (ref 3.2–4.8)
ALBUMIN SERPL-MCNC: 3.1 G/DL (ref 3.2–4.8)
ALBUMIN/GLOB SERPL: 0.8 {RATIO} (ref 1–2)
ALBUMIN/GLOB SERPL: 0.9 {RATIO} (ref 1–2)
ALBUMIN/GLOB SERPL: 0.9 {RATIO} (ref 1–2)
ALP LIVER SERPL-CCNC: 81 U/L
ALP LIVER SERPL-CCNC: 82 U/L
ALP LIVER SERPL-CCNC: 98 U/L
ALT SERPL-CCNC: 10 U/L
ALT SERPL-CCNC: 7 U/L
ALT SERPL-CCNC: 7 U/L
ANION GAP SERPL CALC-SCNC: 12 MMOL/L (ref 0–18)
ANION GAP SERPL CALC-SCNC: 6 MMOL/L (ref 0–18)
ANION GAP SERPL CALC-SCNC: 7 MMOL/L (ref 0–18)
ANTIBODY SCREEN: NEGATIVE
APTT PPP: 116 SECONDS (ref 23–36)
APTT PPP: 39 SECONDS (ref 23–36)
APTT PPP: 40.4 SECONDS (ref 23–36)
APTT PPP: 69.2 SECONDS (ref 23–36)
APTT PPP: 92.9 SECONDS (ref 23–36)
AST SERPL-CCNC: 25 U/L (ref ?–34)
AST SERPL-CCNC: 27 U/L (ref ?–34)
AST SERPL-CCNC: 35 U/L (ref ?–34)
ATRIAL RATE: 70 BPM
BASE EXCESS BLDA CALC-SCNC: 1.2 MMOL/L (ref ?–2)
BASOPHILS # BLD: 0 X10(3) UL (ref 0–0.2)
BASOPHILS NFR BLD: 0 %
BILIRUB SERPL-MCNC: 0.6 MG/DL (ref 0.2–1.1)
BILIRUB SERPL-MCNC: 0.8 MG/DL (ref 0.2–1.1)
BILIRUB SERPL-MCNC: 1 MG/DL (ref 0.2–1.1)
BILIRUB UR QL STRIP.AUTO: NEGATIVE
BODY TEMPERATURE: 98.6 F
BUN BLD-MCNC: 32 MG/DL (ref 9–23)
CA-I BLD-SCNC: 1.23 MMOL/L (ref 0.95–1.32)
CALCIUM BLD-MCNC: 8.3 MG/DL (ref 8.7–10.4)
CALCIUM BLD-MCNC: 8.5 MG/DL (ref 8.7–10.4)
CALCIUM BLD-MCNC: 9.1 MG/DL (ref 8.7–10.4)
CHLORIDE SERPL-SCNC: 101 MMOL/L (ref 98–112)
CHLORIDE SERPL-SCNC: 105 MMOL/L (ref 98–112)
CHLORIDE SERPL-SCNC: 106 MMOL/L (ref 98–112)
CHOLEST SERPL-MCNC: 97 MG/DL (ref ?–200)
CO2 SERPL-SCNC: 20 MMOL/L (ref 21–32)
CO2 SERPL-SCNC: 22 MMOL/L (ref 21–32)
CO2 SERPL-SCNC: 23 MMOL/L (ref 21–32)
COHGB MFR BLD: 1.8 % SAT (ref 0–3)
COLOR UR AUTO: YELLOW
CREAT BLD-MCNC: 1.21 MG/DL
CREAT BLD-MCNC: 1.31 MG/DL
CREAT BLD-MCNC: 1.35 MG/DL
CRP SERPL-MCNC: 28.9 MG/DL (ref ?–0.5)
EGFRCR SERPLBLD CKD-EPI 2021: 59 ML/MIN/1.73M2 (ref 60–?)
EGFRCR SERPLBLD CKD-EPI 2021: 61 ML/MIN/1.73M2 (ref 60–?)
EGFRCR SERPLBLD CKD-EPI 2021: 67 ML/MIN/1.73M2 (ref 60–?)
EOSINOPHIL # BLD: 0 X10(3) UL (ref 0–0.7)
EOSINOPHIL NFR BLD: 0 %
ERYTHROCYTE [DISTWIDTH] IN BLOOD BY AUTOMATED COUNT: 14.8 %
ERYTHROCYTE [DISTWIDTH] IN BLOOD BY AUTOMATED COUNT: 15.1 %
ERYTHROCYTE [DISTWIDTH] IN BLOOD BY AUTOMATED COUNT: 15.3 %
ERYTHROCYTE [SEDIMENTATION RATE] IN BLOOD: 83 MM/HR
FLUAV + FLUBV RNA SPEC NAA+PROBE: NEGATIVE
FLUAV + FLUBV RNA SPEC NAA+PROBE: NEGATIVE
GLOBULIN PLAS-MCNC: 3 G/DL (ref 2–3.5)
GLOBULIN PLAS-MCNC: 3.2 G/DL (ref 2–3.5)
GLOBULIN PLAS-MCNC: 3.4 G/DL (ref 2–3.5)
GLUCOSE BLD-MCNC: 118 MG/DL (ref 70–99)
GLUCOSE BLD-MCNC: 122 MG/DL (ref 70–99)
GLUCOSE BLD-MCNC: 132 MG/DL (ref 70–99)
GLUCOSE BLD-MCNC: 145 MG/DL (ref 70–99)
GLUCOSE BLD-MCNC: 97 MG/DL (ref 70–99)
GLUCOSE UR STRIP.AUTO-MCNC: NORMAL MG/DL
HCO3 BLDA-SCNC: 25.9 MEQ/L (ref 21–27)
HCT VFR BLD AUTO: 27 %
HCT VFR BLD AUTO: 27.1 %
HCT VFR BLD AUTO: 32 %
HDLC SERPL-MCNC: <5 MG/DL (ref 40–59)
HGB BLD-MCNC: 11.3 G/DL
HGB BLD-MCNC: 9.3 G/DL
HGB BLD-MCNC: 9.7 G/DL
HGB BLD-MCNC: 9.7 G/DL
INR BLD: 1.86 (ref 0.8–1.2)
INR BLD: 1.87 (ref 0.8–1.2)
L PNEUMO AG UR QL: NEGATIVE
LACTATE BLD-SCNC: 1 MMOL/L (ref 0.5–2)
LACTATE SERPL-SCNC: 1.4 MMOL/L (ref 0.5–2)
LACTATE SERPL-SCNC: 2.3 MMOL/L (ref 0.5–2)
LACTATE SERPL-SCNC: 4.2 MMOL/L (ref 0.5–2)
LEUKOCYTE ESTERASE UR QL STRIP.AUTO: NEGATIVE
LYMPHOCYTES NFR BLD: 1.85 X10(3) UL (ref 1–4)
LYMPHOCYTES NFR BLD: 10 %
LYMPHOCYTES NFR BLD: 10 %
LYMPHOCYTES NFR BLD: 2.49 X10(3) UL (ref 1–4)
LYMPHOCYTES NFR BLD: 2.65 X10(3) UL (ref 1–4)
LYMPHOCYTES NFR BLD: 7 %
MAGNESIUM SERPL-MCNC: 1.6 MG/DL (ref 1.6–2.6)
MAGNESIUM SERPL-MCNC: 2.2 MG/DL (ref 1.6–2.6)
MCH RBC QN AUTO: 29.4 PG (ref 26–34)
MCH RBC QN AUTO: 29.5 PG (ref 26–34)
MCH RBC QN AUTO: 30 PG (ref 26–34)
MCHC RBC AUTO-ENTMCNC: 34.4 G/DL (ref 31–37)
MCHC RBC AUTO-ENTMCNC: 35.3 G/DL (ref 31–37)
MCHC RBC AUTO-ENTMCNC: 35.8 G/DL (ref 31–37)
MCV RBC AUTO: 83.1 FL
MCV RBC AUTO: 83.9 FL
MCV RBC AUTO: 85.7 FL
METHGB MFR BLD: 0.8 % SAT (ref 0.4–1.5)
MONOCYTES # BLD: 0.25 X10(3) UL (ref 0.1–1)
MONOCYTES # BLD: 1.06 X10(3) UL (ref 0.1–1)
MONOCYTES # BLD: 1.59 X10(3) UL (ref 0.1–1)
MONOCYTES NFR BLD: 1 %
MONOCYTES NFR BLD: 4 %
MONOCYTES NFR BLD: 6 %
MORPHOLOGY: NORMAL
MORPHOLOGY: NORMAL
MRSA DNA SPEC QL NAA+PROBE: NEGATIVE
NEUTROPHILS # BLD AUTO: 20.47 X10 (3) UL (ref 1.5–7.7)
NEUTROPHILS # BLD AUTO: 21.06 X10 (3) UL (ref 1.5–7.7)
NEUTROPHILS # BLD AUTO: 22.29 X10 (3) UL (ref 1.5–7.7)
NEUTROPHILS NFR BLD: 83 %
NEUTROPHILS NFR BLD: 87 %
NEUTROPHILS NFR BLD: 89 %
NEUTS BAND NFR BLD: 1 %
NEUTS BAND NFR BLD: 2 %
NEUTS HYPERSEG # BLD: 22.16 X10(3) UL (ref 1.5–7.7)
NEUTS HYPERSEG # BLD: 22.26 X10(3) UL (ref 1.5–7.7)
NEUTS HYPERSEG # BLD: 23.5 X10(3) UL (ref 1.5–7.7)
NITRITE UR QL STRIP.AUTO: NEGATIVE
OSMOLALITY SERPL CALC.SUM OF ELEC: 285 MOSM/KG (ref 275–295)
OSMOLALITY SERPL CALC.SUM OF ELEC: 285 MOSM/KG (ref 275–295)
OSMOLALITY SERPL CALC.SUM OF ELEC: 290 MOSM/KG (ref 275–295)
OXYHGB MFR BLDA: 97.4 % (ref 92–100)
PCO2 BLDA: 45 MM HG (ref 35–45)
PH BLDA: 7.38 [PH] (ref 7.35–7.45)
PH UR STRIP.AUTO: 6 [PH] (ref 5–8)
PHOSPHATE SERPL-MCNC: 3.8 MG/DL (ref 2.4–5.1)
PHOSPHATE SERPL-MCNC: 4.3 MG/DL (ref 2.4–5.1)
PLATELET # BLD AUTO: 77 10(3)UL (ref 150–450)
PLATELET # BLD AUTO: 84 10(3)UL (ref 150–450)
PLATELET # BLD AUTO: 91 10(3)UL (ref 150–450)
PLATELET MORPHOLOGY: NORMAL
PLATELETS.RETICULATED NFR BLD AUTO: 7.5 % (ref 0–7)
PLATELETS.RETICULATED NFR BLD AUTO: 9.5 % (ref 0–7)
PO2 BLDA: 148 MM HG (ref 80–100)
POTASSIUM BLD-SCNC: 3.8 MMOL/L (ref 3.6–5.1)
POTASSIUM SERPL-SCNC: 3.5 MMOL/L (ref 3.5–5.1)
POTASSIUM SERPL-SCNC: 3.8 MMOL/L (ref 3.5–5.1)
POTASSIUM SERPL-SCNC: 4.2 MMOL/L (ref 3.5–5.1)
POTASSIUM SERPL-SCNC: 4.2 MMOL/L (ref 3.5–5.1)
PROCALCITONIN SERPL-MCNC: 2.94 NG/ML (ref ?–0.05)
PROT SERPL-MCNC: 5.6 G/DL (ref 5.7–8.2)
PROT SERPL-MCNC: 5.7 G/DL (ref 5.7–8.2)
PROT SERPL-MCNC: 6.5 G/DL (ref 5.7–8.2)
PROT UR STRIP.AUTO-MCNC: 100 MG/DL
PROTHROMBIN TIME: 21.6 SECONDS (ref 11.6–14.8)
PROTHROMBIN TIME: 21.7 SECONDS (ref 11.6–14.8)
Q-T INTERVAL: 602 MS
QRS DURATION: 180 MS
QTC CALCULATION (BEZET): 645 MS
R AXIS: -77 DEGREES
RBC # BLD AUTO: 3.15 X10(6)UL
RBC # BLD AUTO: 3.23 X10(6)UL
RBC # BLD AUTO: 3.85 X10(6)UL
RH BLOOD TYPE: POSITIVE
RSV RNA SPEC NAA+PROBE: NEGATIVE
SARS-COV-2 RNA RESP QL NAA+PROBE: NOT DETECTED
SODIUM BLD-SCNC: 132 MMOL/L (ref 135–145)
SODIUM SERPL-SCNC: 133 MMOL/L (ref 136–145)
SODIUM SERPL-SCNC: 133 MMOL/L (ref 136–145)
SODIUM SERPL-SCNC: 136 MMOL/L (ref 136–145)
SP GR UR STRIP.AUTO: 1.03 (ref 1–1.03)
STREP PNEUMO ANTIGEN, URINE: NEGATIVE
T AXIS: 63 DEGREES
TOTAL CELLS COUNTED BLD: 100
TRIGL SERPL-MCNC: 157 MG/DL (ref 30–149)
TROPONIN I SERPL HS-MCNC: 471 NG/L
TROPONIN I SERPL HS-MCNC: 503 NG/L
UROBILINOGEN UR STRIP.AUTO-MCNC: NORMAL MG/DL
VENTRICULAR RATE: 69 BPM
WBC # BLD AUTO: 24.9 X10(3) UL (ref 4–11)
WBC # BLD AUTO: 26.4 X10(3) UL (ref 4–11)
WBC # BLD AUTO: 26.5 X10(3) UL (ref 4–11)

## 2024-01-01 PROCEDURE — 3078F DIAST BP <80 MM HG: CPT | Performed by: EMERGENCY MEDICINE

## 2024-01-01 PROCEDURE — 03HY32Z INSERTION OF MONITORING DEVICE INTO UPPER ARTERY, PERCUTANEOUS APPROACH: ICD-10-PCS | Performed by: EMERGENCY MEDICINE

## 2024-01-01 PROCEDURE — 99291 CRITICAL CARE FIRST HOUR: CPT | Performed by: EMERGENCY MEDICINE

## 2024-01-01 PROCEDURE — 93325 DOPPLER ECHO COLOR FLOW MAPG: CPT

## 2024-01-01 PROCEDURE — 3074F SYST BP LT 130 MM HG: CPT | Performed by: EMERGENCY MEDICINE

## 2024-01-01 PROCEDURE — 99291 CRITICAL CARE FIRST HOUR: CPT | Performed by: HOSPITALIST

## 2024-01-01 PROCEDURE — 74177 CT ABD & PELVIS W/CONTRAST: CPT | Performed by: EMERGENCY MEDICINE

## 2024-01-01 PROCEDURE — 70450 CT HEAD/BRAIN W/O DYE: CPT | Performed by: EMERGENCY MEDICINE

## 2024-01-01 PROCEDURE — 71260 CT THORAX DX C+: CPT | Performed by: EMERGENCY MEDICINE

## 2024-01-01 PROCEDURE — 93306 TTE W/DOPPLER COMPLETE: CPT | Performed by: EMERGENCY MEDICINE

## 2024-01-01 PROCEDURE — B246ZZ4 ULTRASONOGRAPHY OF RIGHT AND LEFT HEART, TRANSESOPHAGEAL: ICD-10-PCS | Performed by: STUDENT IN AN ORGANIZED HEALTH CARE EDUCATION/TRAINING PROGRAM

## 2024-01-01 PROCEDURE — 99233 SBSQ HOSP IP/OBS HIGH 50: CPT | Performed by: HOSPITALIST

## 2024-01-01 PROCEDURE — 71045 X-RAY EXAM CHEST 1 VIEW: CPT | Performed by: EMERGENCY MEDICINE

## 2024-01-01 PROCEDURE — 36620 INSERTION CATHETER ARTERY: CPT | Performed by: EMERGENCY MEDICINE

## 2024-01-01 PROCEDURE — 93320 DOPPLER ECHO COMPLETE: CPT

## 2024-01-01 RX ORDER — ACETAMINOPHEN 325 MG/1
650 TABLET ORAL EVERY 6 HOURS PRN
Status: DISCONTINUED | OUTPATIENT
Start: 2024-01-01 | End: 2024-01-01

## 2024-01-01 RX ORDER — RIFAMPIN 300 MG/1
300 CAPSULE ORAL
Status: DISCONTINUED | OUTPATIENT
Start: 2024-01-01 | End: 2024-01-01

## 2024-01-01 RX ORDER — HEPARIN SODIUM 5000 [USP'U]/ML
5000 INJECTION, SOLUTION INTRAVENOUS; SUBCUTANEOUS EVERY 12 HOURS SCHEDULED
Status: DISCONTINUED | OUTPATIENT
Start: 2024-01-01 | End: 2024-01-01

## 2024-01-01 RX ORDER — SODIUM CHLORIDE 9 MG/ML
INJECTION, SOLUTION INTRAVENOUS
Status: DISCONTINUED | OUTPATIENT
Start: 2024-01-01 | End: 2024-01-01

## 2024-01-01 RX ORDER — SENNOSIDES 8.6 MG
17.2 TABLET ORAL NIGHTLY PRN
Status: DISCONTINUED | OUTPATIENT
Start: 2024-01-01 | End: 2024-01-01

## 2024-01-01 RX ORDER — VANCOMYCIN HYDROCHLORIDE
15 ONCE
Status: COMPLETED | OUTPATIENT
Start: 2024-01-01 | End: 2024-01-01

## 2024-01-01 RX ORDER — MULTIPLE VITAMINS W/ MINERALS TAB 9MG-400MCG
1 TAB ORAL DAILY
Status: DISCONTINUED | OUTPATIENT
Start: 2024-01-01 | End: 2024-01-01

## 2024-01-01 RX ORDER — HEPARIN SODIUM 5000 [USP'U]/ML
INJECTION, SOLUTION INTRAVENOUS; SUBCUTANEOUS
Status: COMPLETED
Start: 2024-01-01 | End: 2024-01-01

## 2024-01-01 RX ORDER — BISACODYL 10 MG
10 SUPPOSITORY, RECTAL RECTAL
Status: DISCONTINUED | OUTPATIENT
Start: 2024-01-01 | End: 2024-01-01

## 2024-01-01 RX ORDER — ACETAMINOPHEN 325 MG/1
650 TABLET ORAL EVERY 4 HOURS PRN
COMMUNITY

## 2024-01-01 RX ORDER — LIDOCAINE HYDROCHLORIDE 10 MG/ML
INJECTION, SOLUTION EPIDURAL; INFILTRATION; INTRACAUDAL; PERINEURAL
Status: COMPLETED
Start: 2024-01-01 | End: 2024-01-01

## 2024-01-01 RX ORDER — HEPARIN SODIUM AND DEXTROSE 10000; 5 [USP'U]/100ML; G/100ML
INJECTION INTRAVENOUS CONTINUOUS
Status: DISCONTINUED | OUTPATIENT
Start: 2024-01-01 | End: 2024-01-01

## 2024-01-01 RX ORDER — ACETAMINOPHEN 10 MG/ML
1000 INJECTION, SOLUTION INTRAVENOUS EVERY 8 HOURS
Status: DISCONTINUED | OUTPATIENT
Start: 2024-01-01 | End: 2024-01-01

## 2024-01-01 RX ORDER — SODIUM PHOSPHATE, DIBASIC AND SODIUM PHOSPHATE, MONOBASIC 7; 19 G/230ML; G/230ML
1 ENEMA RECTAL ONCE AS NEEDED
Status: DISCONTINUED | OUTPATIENT
Start: 2024-01-01 | End: 2024-01-01

## 2024-01-01 RX ORDER — SODIUM CHLORIDE 9 MG/ML
INJECTION, SOLUTION INTRAVENOUS CONTINUOUS
Status: DISCONTINUED | OUTPATIENT
Start: 2024-01-01 | End: 2024-01-01

## 2024-01-01 RX ORDER — HEPARIN SODIUM AND DEXTROSE 10000; 5 [USP'U]/100ML; G/100ML
18 INJECTION INTRAVENOUS ONCE
Status: COMPLETED | OUTPATIENT
Start: 2024-01-01 | End: 2024-01-01

## 2024-01-01 RX ORDER — MAGNESIUM SULFATE HEPTAHYDRATE 40 MG/ML
2 INJECTION, SOLUTION INTRAVENOUS ONCE
Status: COMPLETED | OUTPATIENT
Start: 2024-01-01 | End: 2024-01-01

## 2024-01-01 RX ORDER — MELATONIN
3 NIGHTLY PRN
Status: DISCONTINUED | OUTPATIENT
Start: 2024-01-01 | End: 2024-01-01

## 2024-01-01 RX ORDER — VANCOMYCIN HYDROCHLORIDE
15 EVERY 12 HOURS
Status: DISCONTINUED | OUTPATIENT
Start: 2024-01-01 | End: 2024-01-01

## 2024-01-01 RX ORDER — POLYETHYLENE GLYCOL 3350 17 G/17G
17 POWDER, FOR SOLUTION ORAL DAILY PRN
Status: DISCONTINUED | OUTPATIENT
Start: 2024-01-01 | End: 2024-01-01

## 2024-01-01 RX ORDER — ACETAMINOPHEN 325 MG/1
TABLET ORAL
Status: DISCONTINUED
Start: 2024-01-01 | End: 2024-01-01

## 2024-01-01 RX ORDER — ACETAMINOPHEN 10 MG/ML
1000 INJECTION, SOLUTION INTRAVENOUS EVERY 6 HOURS PRN
Status: DISCONTINUED | OUTPATIENT
Start: 2024-01-01 | End: 2024-01-01

## 2024-01-01 RX ORDER — CLOPIDOGREL BISULFATE 75 MG/1
75 TABLET ORAL DAILY
Status: DISCONTINUED | OUTPATIENT
Start: 2024-01-01 | End: 2024-01-01

## 2024-01-01 RX ORDER — ACETAMINOPHEN 650 MG/1
650 SUPPOSITORY RECTAL ONCE
Status: COMPLETED | OUTPATIENT
Start: 2024-01-01 | End: 2024-01-01

## 2024-01-17 ENCOUNTER — HOSPITAL ENCOUNTER (OUTPATIENT)
Dept: ULTRASOUND IMAGING | Age: 64
Discharge: HOME | End: 2024-01-17
Attending: FAMILY MEDICINE
Payer: COMMERCIAL

## 2024-01-17 DIAGNOSIS — M79.89 LEFT LEG SWELLING: ICD-10-CM

## 2024-01-17 PROCEDURE — 93971 EXTREMITY STUDY: CPT | Performed by: FAMILY MEDICINE

## 2024-01-18 ENCOUNTER — OFFICE VISIT (OUTPATIENT)
Dept: HEMATOLOGY/ONCOLOGY | Age: 64
End: 2024-01-18
Attending: INTERNAL MEDICINE
Payer: COMMERCIAL

## 2024-01-18 VITALS
WEIGHT: 297 LBS | SYSTOLIC BLOOD PRESSURE: 151 MMHG | HEART RATE: 61 BPM | DIASTOLIC BLOOD PRESSURE: 90 MMHG | RESPIRATION RATE: 20 BRPM | OXYGEN SATURATION: 98 % | HEIGHT: 75 IN | BODY MASS INDEX: 36.93 KG/M2

## 2024-01-18 DIAGNOSIS — I82.512 CHRONIC DEEP VEIN THROMBOSIS (DVT) OF FEMORAL VEIN OF LEFT LOWER EXTREMITY (HCC): Primary | ICD-10-CM

## 2024-01-18 DIAGNOSIS — Z86.73 HISTORY OF CVA (CEREBROVASCULAR ACCIDENT): ICD-10-CM

## 2024-01-18 DIAGNOSIS — I87.032 POSTTHROMBOTIC SYNDROME OF LEFT LEG WITH ULCER AND INFLAMMATION (HCC): ICD-10-CM

## 2024-01-18 PROCEDURE — 99245 OFF/OP CONSLTJ NEW/EST HI 55: CPT | Performed by: INTERNAL MEDICINE

## 2024-01-18 RX ORDER — VALSARTAN 160 MG/1
160 TABLET ORAL DAILY
COMMUNITY
Start: 2024-01-11
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
## 2024-01-19 LAB
B2 GLYCOPROT1 IGG SERPL IA-ACNC: 1.1 U/ML (ref ?–7)
B2 GLYCOPROT1 IGM SERPL IA-ACNC: <2.4 U/ML (ref ?–7)
CARDIOLIPIN IGG SERPL-ACNC: 2.1 GPL (ref ?–10)
CARDIOLIPIN IGM SERPL-ACNC: 4.2 MPL (ref ?–10)

## 2024-01-21 DIAGNOSIS — M79.89 LEFT LEG SWELLING: ICD-10-CM

## 2024-01-21 LAB — B2 GLYCOPROT I IGA AB: <9 GPI IGA UNITS

## 2024-01-22 NOTE — TELEPHONE ENCOUNTER
A refill request was received for:  Requested Prescriptions     Pending Prescriptions Disp Refills    XARELTO 20 MG Oral Tab [Pharmacy Med Name: XARELTO 20MG TABLETS] 30 tablet 0     Sig: TAKE 1 TABLET(20 MG) BY MOUTH DAILY WITH FOOD       Last refill date:   12/27/2023    Last office visit: 12/27/2023    Follow up due:  Future Appointments   Date Time Provider Department Center   1/25/2024 11:00 AM Ricky Darby MD  Wound [facility]   2/29/2024 10:00 AM Getachew Osullivan MD PF HEM [facility] [place]

## 2024-01-25 ENCOUNTER — OFFICE VISIT (OUTPATIENT)
Dept: WOUND CARE | Facility: HOSPITAL | Age: 64
End: 2024-01-25
Attending: FAMILY MEDICINE
Payer: COMMERCIAL

## 2024-01-25 VITALS
DIASTOLIC BLOOD PRESSURE: 89 MMHG | SYSTOLIC BLOOD PRESSURE: 165 MMHG | BODY MASS INDEX: 35.43 KG/M2 | RESPIRATION RATE: 18 BRPM | WEIGHT: 285 LBS | HEIGHT: 75 IN | HEART RATE: 79 BPM | TEMPERATURE: 98 F

## 2024-01-25 DIAGNOSIS — I82.412 DEEP VEIN THROMBOSIS (DVT) OF FEMORAL VEIN OF LEFT LOWER EXTREMITY, UNSPECIFIED CHRONICITY (HCC): ICD-10-CM

## 2024-01-25 DIAGNOSIS — I42.0 DILATED CARDIOMYOPATHY (HCC): ICD-10-CM

## 2024-01-25 DIAGNOSIS — I87.312 CHRONIC VENOUS HYPERTENSION (IDIOPATHIC) WITH ULCER OF LEFT LOWER EXTREMITY (CODE) (HCC): Primary | ICD-10-CM

## 2024-01-25 DIAGNOSIS — I48.20 CHRONIC ATRIAL FIBRILLATION (HCC): ICD-10-CM

## 2024-01-25 DIAGNOSIS — E66.01 CLASS 2 SEVERE OBESITY DUE TO EXCESS CALORIES WITH SERIOUS COMORBIDITY AND BODY MASS INDEX (BMI) OF 35.0 TO 35.9 IN ADULT  (HCC): ICD-10-CM

## 2024-01-25 DIAGNOSIS — I10 ESSENTIAL HYPERTENSION: ICD-10-CM

## 2024-01-25 PROCEDURE — 11045 DBRDMT SUBQ TISS EACH ADDL: CPT | Performed by: FAMILY MEDICINE

## 2024-01-25 PROCEDURE — 11042 DBRDMT SUBQ TIS 1ST 20SQCM/<: CPT | Performed by: FAMILY MEDICINE

## 2024-01-25 PROCEDURE — 99214 OFFICE O/P EST MOD 30 MIN: CPT | Performed by: FAMILY MEDICINE

## 2024-01-25 NOTE — PROGRESS NOTES
Patient ID: Ori Colvin is a 63 year old male.    Debridement Venous Ulcer Distal;Lower;Posterior;Right Leg   Wound 01/25/24 #1- Left Posterior Leg- Distal Leg Distal;Lower;Posterior;Right    Performed by: Ricky Darby MD  Authorized by: Ricky Darby MD      Consent   Consent obtained? verbal  Consent given by: patient  Risks discussed? procedural risks discussed    Debridement Details  Performed by: physician  Debridement type: conservative sharp  Pain control: lidocaine 2% and lidocaine 4%  Pain control administration type: topical    Pre-debridement measurements  Length (cm): 1.5  Width (cm): 3 (angled)  Depth (cm): 0.1  Surface Area (cm^2): 4.5    Post-debridement measurements  Length (cm): 1.6  Width (cm): 3.1  Depth (cm): 0.1  Percent debrided: 100%  Surface Area (cm^2): 4.96  Area Debrided (cm^2): 4.96  Volume (cm^3): 0.5    Devitalized tissue debrided: slough  Instrument(s) utilized: curette  Bleeding: small  Hemostasis obtained with: pressure  Response to treatment: procedure was tolerated well    Debridement Venous Ulcer Distal;Lower;Posterior;Right Leg   Wound 01/25/24 #2- Left Posterior Leg- Proximal Leg Distal;Lower;Posterior;Right    Performed by: Ricky Darby MD  Authorized by: Ricky Darby MD      Consent   Consent obtained? verbal  Risks discussed? procedural risks discussed    Debridement Details  Performed by: physician  Debridement type: conservative sharp  Pain control: lidocaine 4%  Pain control administration type: topical    Pre-debridement measurements  Length (cm): 4  Width (cm): 3.9  Depth (cm): 0.1  Surface Area (cm^2): 15.6    Post-debridement measurements  Length (cm): 4.1  Width (cm): 4  Depth (cm): 0.1  Percent debrided: 100%  Surface Area (cm^2): 16.4  Area Debrided (cm^2): 16.4  Volume (cm^3): 1.64    Devitalized tissue debrided: slough  Instrument(s) utilized: curette  Bleeding: small  Hemostasis obtained with: pressure  Response to treatment: procedure was tolerated  well
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
## 2024-01-25 NOTE — PATIENT INSTRUCTIONS
PATIENT INSTRUCTIONS      FOR Kris Colvin HEATHER 3/6/1960    DATE OF SERVICE: 2024      Apply Medihoney gel to the wounds on the left leg    Cover this with a piece of honey gauze    Cover this with ABD pad and secure with gauze roll and tape    Wear spandagrip stocking to the legs and you may remove those at bedtime when legs are elevated    Change the dressing on a daily basis    You may shower but no soaking of the wound underwater    No prolonged standing and try to elevate legs whenever possible        Follow up with Dr. Darby 1 WEEK      ADDITIONAL REMINDERS:     The treatment plan has been discussed at length with you and your provider. Follow all instructions carefully, it is very important. If you do not follow all instructions, you are at risk of your wound not healing, infection, possible loss of limb and even loss of life.  Please call the clinic at [phone] during regular business hours ( 7:30 AM - 5:30 PM) if you notice increased redness, warmth, pain or pus like drainage or start running a fever greater than 100.3.    For after hour emergencies, please call your primary physician or go to the nearest emergency room.  If your blood pressure is elevated, follow up with your primary care doctor and/or cardiologist as soon as possible.

## 2024-01-25 NOTE — PROGRESS NOTES
..Weekly Wound Education Note    Teaching Provided To: Patient  Training Topics: Discharge instructions;Cleasing and general instructions;Dressing;Edema control;Compression  Training Method: Demonstration;Explain/Verbal  Training Response: Reinforcement needed        Notes: Pt here for inital consult to left lower leg wound posterior, distal, and proximal. Patient states noticing wounds forming three months ago. He has been treating it with an antibiotic ointment and neosporin. Wound has been open to air. Provider debrided wound at visit. Treatment honey gel, honey gauze, conforming gauze, and tape to all wounds. Applied spandagrip (f) to LLE and spandagrip (E) to RLE. Extra supplies provided to patient at visit today. Educated patient on importance of increasing protein intake. Provided coupon for Jakub and [employer]/ensure. Educated patient about compression garment/wraps (how clinic can order through insurance). Pt states understanding. Pt to follow up with provider in 1 week.

## 2024-01-25 NOTE — PROGRESS NOTES
Patient ID: Ori Colvin is a 63 year old male.    Debridement Venous Ulcer Left   Wound 01/25/24 #3- Left Anterior Leg Left    Performed by: Ricky Darby MD  Authorized by: Ricky Darby MD      Consent   Consent obtained? verbal  Consent given by: patient  Risks discussed? procedural risks discussed    Debridement Details  Performed by: physician  Debridement type: surgical  Level of debridement: subcutaneous tissue  Pain control: lidocaine 4%  Pain control administration type: topical    Pre-debridement measurements  Length (cm): 8.5  Width (cm): 4.3  Depth (cm): 0.1  Surface Area (cm^2): 36.55    Post-debridement measurements  Length (cm): 8.6  Width (cm): 4.4  Depth (cm): 0.1  Percent debrided: 100%  Surface Area (cm^2): 37.84  Area Debrided (cm^2): 37.84  Volume (cm^3): 3.78    Tissue and other material debrided: subcutaneous tissue  Devitalized tissue debrided: biofilm, exudate and slough  Instrument(s) utilized: curette  Bleeding: small  Hemostasis obtained with: pressure  Response to treatment: procedure was tolerated well

## 2024-02-01 ENCOUNTER — OFFICE VISIT (OUTPATIENT)
Dept: WOUND CARE | Facility: HOSPITAL | Age: 64
End: 2024-02-01
Attending: FAMILY MEDICINE
Payer: COMMERCIAL

## 2024-02-01 VITALS
RESPIRATION RATE: 16 BRPM | HEART RATE: 62 BPM | TEMPERATURE: 99 F | DIASTOLIC BLOOD PRESSURE: 98 MMHG | SYSTOLIC BLOOD PRESSURE: 181 MMHG

## 2024-02-01 DIAGNOSIS — I87.312 CHRONIC VENOUS HYPERTENSION (IDIOPATHIC) WITH ULCER OF LEFT LOWER EXTREMITY (CODE) (HCC): Primary | ICD-10-CM

## 2024-02-01 DIAGNOSIS — I82.412 DEEP VEIN THROMBOSIS (DVT) OF FEMORAL VEIN OF LEFT LOWER EXTREMITY, UNSPECIFIED CHRONICITY (HCC): ICD-10-CM

## 2024-02-01 DIAGNOSIS — I48.20 CHRONIC ATRIAL FIBRILLATION (HCC): ICD-10-CM

## 2024-02-01 DIAGNOSIS — E66.01 CLASS 2 SEVERE OBESITY DUE TO EXCESS CALORIES WITH SERIOUS COMORBIDITY AND BODY MASS INDEX (BMI) OF 35.0 TO 35.9 IN ADULT  (HCC): ICD-10-CM

## 2024-02-01 DIAGNOSIS — I42.0 DILATED CARDIOMYOPATHY (HCC): ICD-10-CM

## 2024-02-01 DIAGNOSIS — I10 ESSENTIAL HYPERTENSION: ICD-10-CM

## 2024-02-01 PROCEDURE — 99214 OFFICE O/P EST MOD 30 MIN: CPT | Performed by: FAMILY MEDICINE

## 2024-02-01 PROCEDURE — 97597 DBRDMT OPN WND 1ST 20 CM/<: CPT | Performed by: FAMILY MEDICINE

## 2024-02-01 PROCEDURE — 97598 DBRDMT OPN WND ADDL 20CM/<: CPT | Performed by: FAMILY MEDICINE

## 2024-02-01 NOTE — PATIENT INSTRUCTIONS
PATIENT INSTRUCTIONS      FOR Kris Colvin HEATHER 3/6/1960    DATE OF SERVICE: 2024      Apply Medihoney gel to the wounds on the left leg    Cover this with a piece of honey gauze    Cover this with ABD pad and secure with gauze roll and tape    Wear spandagrip stocking to the legs and you may remove those at bedtime when legs are elevated    Change the dressing on a daily basis    You may shower but no soaking of the wound underwater    No prolonged standing and try to elevate legs whenever possible        Follow up with Dr. Darby 1 WEEK      ADDITIONAL REMINDERS:     The treatment plan has been discussed at length with you and your provider. Follow all instructions carefully, it is very important. If you do not follow all instructions, you are at risk of your wound not healing, infection, possible loss of limb and even loss of life.  Please call the clinic at [phone] during regular business hours ( 7:30 AM - 5:30 PM) if you notice increased redness, warmth, pain or pus like drainage or start running a fever greater than 100.3.    For after hour emergencies, please call your primary physician or go to the nearest emergency room.  If your blood pressure is elevated, follow up with your primary care doctor and/or cardiologist as soon as possible.

## 2024-02-01 NOTE — PROGRESS NOTES
..Weekly Wound Education Note    Teaching Provided To: Patient  Training Topics: Discharge instructions;Cleasing and general instructions;Dressing  Training Method: Demonstration;Explain/Verbal  Training Response: Reinforcement needed        Notes: Pt here for follow up wound visit for left posterior distal lower leg, left posterior proximal and left anterior lower leg wound. All wound measurements decreased. Edema measurement stable. Provider debrided wounds at visits. Pt to continue with honey gel to all wounds covered with 4x4 gauze, kerlix, tape. Applied spandagrip (F) to LLE. Pt to follow up with provider in 1 week.

## 2024-02-01 NOTE — PROGRESS NOTES
Patient ID: Ori Colvin is a 63 year old male.    Debridement Venous Ulcer Distal;Lower;Posterior;Right Leg   Wound 01/25/24 #1- Left Posterior Leg- Distal Leg Distal;Lower;Posterior;Right    Performed by: Ricky Darby MD  Authorized by: Ricky Darby MD      Consent   Consent obtained? verbal  Consent given by: patient  Risks discussed? procedural risks discussed    Debridement Details  Performed by: physician  Debridement type: hydrosurgical  Pain control: lidocaine 2%  Pain control administration type: topical    Pre-debridement measurements  Length (cm): 2  Width (cm): 3.5 (slightly angled)  Depth (cm): 0.1  Surface Area (cm^2): 7    Post-debridement measurements  Length (cm): 2.1  Width (cm): 3.6  Depth (cm): 0.1  Percent debrided: 100%  Surface Area (cm^2): 7.56  Area Debrided (cm^2): 7.56  Volume (cm^3): 0.76    Devitalized tissue debrided: biofilm and slough  Instrument(s) utilized: curette  Bleeding: none  Hemostasis obtained with: not applicable  Response to treatment: procedure was tolerated well    Debridement Venous Ulcer Distal;Lower;Posterior;Right Leg   Wound 01/25/24 #2- Left Posterior Leg- Proximal Leg Distal;Lower;Posterior;Right    Performed by: Ricky Darby MD  Authorized by: Ricky Darby MD      Consent   Consent obtained? verbal  Consent given by: patient  Risks discussed? procedural risks discussed    Debridement Details  Performed by: physician  Debridement type: hydrosurgical  Pain control: lidocaine 4%  Pain control administration type: topical    Pre-debridement measurements  Length (cm): 4.2  Width (cm): 4.5  Depth (cm): 0.1  Surface Area (cm^2): 18.9    Post-debridement measurements  Length (cm): 4.2  Width (cm): 4.6  Depth (cm): 0.1  Percent debrided: 100%  Surface Area (cm^2): 19.32  Area Debrided (cm^2): 19.32  Volume (cm^3): 1.93    Devitalized tissue debrided: biofilm and slough  Instrument(s) utilized: curette  Bleeding: none  Hemostasis obtained with: not  applicable  Response to treatment: procedure was tolerated well    Debridement Venous Ulcer Left   Wound 01/25/24 #3- Left Anterior Leg Left    Performed by: Ricky Darby MD  Authorized by: Ricky Darby MD      Consent   Consent obtained? verbal  Consent given by: patient  Risks discussed? procedural risks discussed    Debridement Details  Performed by: physician  Debridement type: conservative sharp  Pain control: lidocaine 2%  Pain control administration type: topical    Pre-debridement measurements  Length (cm): 8.8  Width (cm): 4.3  Depth (cm): 0.1  Surface Area (cm^2): 37.84    Post-debridement measurements  Length (cm): 8.9  Width (cm): 4.3  Depth (cm): 0.1  Percent debrided: 100%  Surface Area (cm^2): 38.27  Area Debrided (cm^2): 38.27  Volume (cm^3): 3.83    Tissue and other material debrided: subcutaneous tissue  Devitalized tissue debrided: biofilm and slough  Instrument(s) utilized: curette  Bleeding: small  Hemostasis obtained with: pressure  Response to treatment: procedure was tolerated well

## 2024-02-01 NOTE — PROGRESS NOTES
Chief Complaint   Patient presents with    Wound Care     Follow-up for wounds to left leg. 4/10 pain, denies others concerns.        HPI:     Patient here for follow-up of left leg wound multiple.  He is doing well and using Medihoney gel to the wound.  He denies any increased pain or drainage.  He has been using spandagrip.  He does have acute DVT and is under treatment for it with blood thinner.  No chest pain or shortness of breath.    Lab Results   Component Value Date    BUN 16 09/08/2023    CREATSERUM 1.27 09/08/2023    ALB 3.6 09/08/2023    TP 7.3 09/08/2023    A1C 5.3 09/08/2023         Current Outpatient Medications:     rivaroxaban (XARELTO) 20 MG Oral Tab, Take 1 tablet (20 mg total) by mouth daily with food., Disp: 30 tablet, Rfl: 0    valsartan 160 MG Oral Tab, Take 1 tablet (160 mg total) by mouth daily., Disp: , Rfl:     Prednisolon-Moxiflox-Bromfenac 1-0.5-0.075 % Ophthalmic Solution, 1 drop Ophthalmic ONCE A DAY for 28 days 1 DROP, Disp: , Rfl:     atorvastatin 80 MG Oral Tab, TAKE 1 TABLET BY MOUTH EVERY NIGHT, Disp: 90 tablet, Rfl: 0    MULTAQ 400 MG Oral Tab, Take 1 tablet (400 mg total) by mouth 2 (two) times daily., Disp: , Rfl:     carvedilol 6.25 MG Oral Tab, Take 1 tablet (6.25 mg total) by mouth 2 (two) times daily., Disp: , Rfl:     aspirin 81 MG Oral Tab, Take 1 tablet (81 mg total) by mouth daily., Disp: , Rfl:     Multiple Vitamins-Minerals (MULTI FOR HER 50+) Oral Tab, Take by mouth daily.  , Disp: , Rfl:     No Known Allergies         REVIEW OF SYSTEMS:     Review of Systems (ROS)  This information was obtained from the patient, chart    A comprehensive 10 point review of systems was completed.  Pertinent positives and negatives noted in the the HPI.     Past medical, surgical, family and social history updated where appropriate.    PHYSICAL EXAM:   BP (!) 181/98   Pulse 62   Temp 98.6 °F (37 °C)   Resp 16    Estimated body mass index is 35.62 kg/m² as calculated from the  following:    Height as of 1/25/24: 75\".    Weight as of 1/25/24: 285 lb (129.3 kg).   Vital signs reviewed.Appears stated age, well groomed.      Awake, alert, in no acute distress  HENT:  normocephalic, atraumatic, external ear canals clear bilaterally, tympanic membranes appear opaque, non-bulging, non-erythematous, nasal turbinates are non-inflamed and not swollen, oropharynx without erythema, swelling, or exudate, gingiva and lips without any apparent lesions.   Cardiac:  S1 S2 regular rate and rhythm, no murmur, gallop, or rub  Respiratory:  Bilaterally clear to auscultation, no chest tenderness to palpation, no wheezing, no rhonchi, no rales, air entry is adequate, no accessory respiratory muscle use, no chest asymmetry, normal excursion.  GI:  bowel sound positive in all four quadrants, abdomen is soft, non-tender, non-distended, no hepatosplenomegaly, no abnormal aortic pulsation.     Calf  Point of Measurement - Left Calf: 37     Left Calf from:: Heel  Calf Left cm:: 53.5          Ankle  Point of Measurement - Left Ankle: 12     Left Ankle from:: Heel  Left Ankle cm:: 32                Foot                            Wound 01/25/24 #1- Left Posterior Leg- Distal Leg Distal;Lower;Posterior;Right (Active)   Date First Assessed/Time First Assessed: 01/25/24 1057    Wound Number (Wound Clinic Only): #1- Left Posterior Leg- Distal  Primary Wound Type: Venous Ulcer  Location: Leg  Wound Location Orientation: Distal;Lower;Posterior;Right      Assessments 1/25/2024 11:04 AM 2/1/2024  2:48 PM   Wound Image       Drainage Amount Unable to assess --   Treatments Compression --   Wound Length (cm) 1.5 cm --   Wound Width (cm) 3 cm --   Wound Surface Area (cm^2) 4.5 cm^2 --   Wound Depth (cm) 0.1 cm --   Wound Volume (cm^3) 0.45 cm^3 --   Margins Well-defined edges --   Non-staged Wound Description Full thickness --   Karin-wound Assessment Dry;Edema --   Wound Granulation Tissue Pink;Firm;Pale [place] --   Wound Bed  Granulation (%) 70 % --   Wound Bed Slough (%) 30 % --   Wound Odor None --       Inactive Orders   Date Order Priority Status Authorizing Provider   01/25/24 1156 Debridement Venous Ulcer Distal;Lower;Posterior;Right Leg Routine Completed Ricky Darby MD       Wound 01/25/24 #2- Left Posterior Leg- Proximal Leg Distal;Lower;Posterior;Right (Active)   Date First Assessed/Time First Assessed: 01/25/24 1057    Wound Number (Wound Clinic Only): #2- Left Posterior Leg- Proximal  Primary Wound Type: Venous Ulcer  Location: Leg  Wound Location Orientation: Distal;Lower;Posterior;Right      Assessments 1/25/2024 11:03 AM 2/1/2024  2:47 PM   Wound Image       Drainage Amount Unable to assess --   Treatments Compression --   Wound Length (cm) 4 cm --   Wound Width (cm) 3.9 cm --   Wound Surface Area (cm^2) 15.6 cm^2 --   Wound Depth (cm) 0.1 cm --   Wound Volume (cm^3) 1.56 cm^3 --   Margins Well-defined edges --   Non-staged Wound Description Full thickness --   Karin-wound Assessment Edema;Dry;Hemosiderin staining --   Wound Granulation Tissue Firm;Pink;Red --   Wound Bed Granulation (%) 80 % --   Wound Bed Slough (%) 20 % --   Wound Odor None --       Inactive Orders   Date Order Priority Status Authorizing Provider   01/25/24 1246 Debridement Venous Ulcer Distal;Lower;Posterior;Right Leg Routine Completed Ricky Darby MD       Wound 01/25/24 #3- Left Anterior Leg Left (Active)   Date First Assessed/Time First Assessed: 01/25/24 1058    Wound Number (Wound Clinic Only): #3- Left Anterior Leg  Primary Wound Type: Venous Ulcer  Wound Location Orientation: Left      Assessments 1/25/2024 11:02 AM 2/1/2024  2:47 PM   Wound Image       Drainage Amount Unable to assess --   Treatments Compression --   Wound Length (cm) 8.5 cm --   Wound Width (cm) 4.3 cm --   Wound Surface Area (cm^2) 36.55 cm^2 --   Wound Depth (cm) 0.1 cm --   Wound Volume (cm^3) 3.655 cm^3 --   Margins Well-defined edges --   Non-staged Wound  Description Full thickness --   Karin-wound Assessment Dry;Edema;Hemosiderin staining --   Wound Granulation Tissue Pink;Firm --   Wound Bed Granulation (%) 20 % --   Wound Bed Slough (%) 80 % --   Wound Odor None --       Inactive Orders   Date Order Priority Status Authorizing Provider   01/25/24 1250 Debridement Venous Ulcer Left Routine Completed Ricky Darby MD          ASSESSMENT AND PLAN:      1. Chronic venous hypertension (idiopathic) with ulcer of left lower extremity (CODE) ([facility])    2. Deep vein thrombosis (DVT) of femoral vein of left lower extremity, unspecified chronicity ([facility])    3. Essential hypertension    4. Dilated cardiomyopathy ([facility])    5. Chronic atrial fibrillation ([facility])    6. Class 2 severe obesity due to excess calories with serious comorbidity and body mass index (BMI) of 35.0 to 35.9 in adult  ([facility])    Clinically the wounds are significantly better than last visit scab formation and eschar is no longer present.  He does have some slough on the wounds and this was selectively debrided with a curette.  See note.    Blood pressure is markedly elevated he does no symptoms of high blood pressure and was told to follow-up with his primary doctor regarding that.    Counseled at length regarding obesity and weight reduction.      Risks, benefits, and alternatives of current treatment plan discussed in detail.  Questions and concerns addressed. Red flags to RTC or ED reviewed.  Patient (or parent) agrees to plan.      No follow-ups on file.    Also refer to patient instructions.     I spent a total of 30 minutes with this patient's care.  This time included preparing to see the patient (eg, review notes and recent diagnostics), seeing the patient, performing a medically appropriate examination and/or evaluation, counseling and educating the patient, and documenting in the record.          Ricky Darby M.D.   [facility]   2/1/2024    Patient Instructions       PATIENT  INSTRUCTIONS      FOR HEATHER Velásquez 3/6/1960    DATE OF SERVICE: 2024      Apply Medihoney gel to the wounds on the left leg    Cover this with a piece of honey gauze    Cover this with ABD pad and secure with gauze roll and tape    Wear spandagrip stocking to the legs and you may remove those at bedtime when legs are elevated    Change the dressing on a daily basis    You may shower but no soaking of the wound underwater    No prolonged standing and try to elevate legs whenever possible        Follow up with Dr. Darby 1 WEEK      ADDITIONAL REMINDERS:     The treatment plan has been discussed at length with you and your provider. Follow all instructions carefully, it is very important. If you do not follow all instructions, you are at risk of your wound not healing, infection, possible loss of limb and even loss of life.  Please call the clinic at [phone] during regular business hours ( 7:30 AM - 5:30 PM) if you notice increased redness, warmth, pain or pus like drainage or start running a fever greater than 100.3.    For after hour emergencies, please call your primary physician or go to the nearest emergency room.  If your blood pressure is elevated, follow up with your primary care doctor and/or cardiologist as soon as possible.               MISCELLANEOUS INSTRUCTIONS  Supplement with a daily multivitamin   Low salt diet  Intense blood sugar control - Goal Blood sugar below 180 at all times recommended.  Increase protein intake / consider protein supplements - see below  Elevate extremities at all times when sitting / laying down.  No tobacco use     DIETARY MODIFICATIONS TO HELP WITH WOUND HEALING:          Please follow any restrictions to diet given by your other doctors     Protein: meats, beans, eggs, milk and yogurt particularly Greek yogurt), tofu, soy nuts, soy protein products     Vitamin C: citrus fruits and juices, strawberries, tomatoes, tomato juice, peppers, baked potatoes, spinach,  broccoli, cauliflower, [place] sprouts, cabbage     Vitamin A: dark greens, leafy vegetables, orange or yellow vegetables, cantaloupe, fortified dairy products, liver, fortified cereals     Zinc: fortified cereals, red meats, seafood     Consider [employer] (These are essential branch chain amino acids that help with tissue building and wound healing) and take 2 packets/day. You can order online at [employer] or [employer]     ADDITIONAL REMINDERS:     The treatment plan has been discussed at length with you and your provider. Follow all instructions carefully, it is very important. If you do not follow all instructions, you are at risk of your wound not healing, infection, possible loss of limb and even loss of life.  Please call the clinic at [phone] during regular business hours ( 7:30 AM - 5:30 PM) if you notice increased redness, warmth, pain or pus like drainage or start running a fever greater than 100.3.    For after hour emergencies, please call your primary physician or go to the nearest emergency room.  If your blood pressure is elevated, follow up with your primary care doctor and/or cardiologist as soon as possible.     FOR LEG SWELLING,  LOWER EXTREMITY WOUNDS AND IF USING COMPRESSION:   Managing your edema:    Avoid prolonged standing in one place. It is better to have your calf muscles moving to pump fluid out of the legs.  Elevate leg(s) above the level of the heart when sitting or as much as possible.  Take you diuretics as directed by your physician. Do not skip doses or change doses unless instructed to do so by your physician.  Decrease salt intake, follow recommended 2 grams daily.  Do not get leg(s) with compression wrap wet. If wraps are too tight as indicated by pain, numbness/tingling or discoloration of toes remove wrap completely and call the wound center @ [phone].       The treatment plan has been discussed at length between you and your provider. Follow all instructions  carefully, it is very important. If you do not follow all instructions you are at risk of your wound not healing, infection, possible loss of limb and even loss of life.    COMPRESSION WRAP PATIENT CARE INSTRUCTIONS  DO NOT get compression wrap wet. When showering, use a shower boot.   Please contact wound clinic and if not able to reach, then go to emergency room if ANY of the following occur:   Tingling or numbness in the foot or toes on leg with compression wrap.  Severe pain that cannot be relieved with elevation and any medication instructed per your doctor.  A fever of 100.4°F (38°C) or higher.  Swelling, coldness, or blue-gray discoloration of the toes.  If the compression wrap feels too tight or too loose.  If the compression wrap is damaged or has rough edges that hurt.  If the compression wrap gets wet, you must cut wrap off.   Drainage from compression wrap that smells different than usual.

## 2024-02-08 ENCOUNTER — OFFICE VISIT (OUTPATIENT)
Dept: WOUND CARE | Facility: HOSPITAL | Age: 64
End: 2024-02-08
Attending: FAMILY MEDICINE
Payer: COMMERCIAL

## 2024-02-08 VITALS
HEART RATE: 65 BPM | SYSTOLIC BLOOD PRESSURE: 152 MMHG | TEMPERATURE: 98 F | DIASTOLIC BLOOD PRESSURE: 94 MMHG | RESPIRATION RATE: 18 BRPM

## 2024-02-08 DIAGNOSIS — I48.20 CHRONIC ATRIAL FIBRILLATION (HCC): ICD-10-CM

## 2024-02-08 DIAGNOSIS — I87.312 CHRONIC VENOUS HYPERTENSION (IDIOPATHIC) WITH ULCER OF LEFT LOWER EXTREMITY (CODE) (HCC): Primary | ICD-10-CM

## 2024-02-08 DIAGNOSIS — E66.01 CLASS 2 SEVERE OBESITY DUE TO EXCESS CALORIES WITH SERIOUS COMORBIDITY AND BODY MASS INDEX (BMI) OF 35.0 TO 35.9 IN ADULT  (HCC): ICD-10-CM

## 2024-02-08 DIAGNOSIS — I82.412 DEEP VEIN THROMBOSIS (DVT) OF FEMORAL VEIN OF LEFT LOWER EXTREMITY, UNSPECIFIED CHRONICITY (HCC): ICD-10-CM

## 2024-02-08 DIAGNOSIS — I10 ESSENTIAL HYPERTENSION: ICD-10-CM

## 2024-02-08 DIAGNOSIS — I42.0 DILATED CARDIOMYOPATHY (HCC): ICD-10-CM

## 2024-02-08 PROCEDURE — 99214 OFFICE O/P EST MOD 30 MIN: CPT | Performed by: FAMILY MEDICINE

## 2024-02-08 PROCEDURE — 97598 DBRDMT OPN WND ADDL 20CM/<: CPT | Performed by: FAMILY MEDICINE

## 2024-02-08 PROCEDURE — 97597 DBRDMT OPN WND 1ST 20 CM/<: CPT | Performed by: FAMILY MEDICINE

## 2024-02-08 NOTE — PROGRESS NOTES
Patient ID: Ori Colvin is a 63 year old male.    Debridement Venous Ulcer Distal;Lower;Posterior;Right Leg   Wound 01/25/24 #2- Left Posterior Leg- Proximal Leg Distal;Lower;Posterior;Right    Performed by: Ricky Darby MD  Authorized by: Ricky Darby MD      Consent   Consent obtained? verbal  Consent given by: patient  Risks discussed? procedural risks discussed    Debridement Details  Performed by: physician  Debridement type: conservative sharp  Pain control: lidocaine 2%  Pain control administration type: topical    Pre-debridement measurements  Length (cm): 3.5  Width (cm): 3.5  Depth (cm): 0.1  Surface Area (cm^2): 12.25    Post-debridement measurements  Length (cm): 3.6  Width (cm): 3.6  Depth (cm): 0.1  Percent debrided: 100%  Surface Area (cm^2): 12.96  Area Debrided (cm^2): 12.96  Volume (cm^3): 1.3    Tissue and other material debrided: subcutaneous tissue  Devitalized tissue debrided: biofilm and slough  Instrument(s) utilized: curette  Bleeding: small  Hemostasis obtained with: pressure  Response to treatment: procedure was tolerated well    Debridement Venous Ulcer Distal;Lower;Posterior;Right Leg   Wound 01/25/24 #1- Left Posterior Leg- Distal Leg Distal;Lower;Posterior;Right    Performed by: Ricky Darby MD  Authorized by: Ricky Darby MD      Consent   Consent obtained? verbal  Consent given by: patient  Risks discussed? procedural risks discussed    Debridement Details  Performed by: physician  Debridement type: conservative sharp  Pain control: lidocaine 2%  Pain control administration type: topical    Pre-debridement measurements  Length (cm): 1.9  Width (cm): 3.6  Depth (cm): 0.1  Surface Area (cm^2): 6.84    Post-debridement measurements  Length (cm): 2  Width (cm): 3.7  Depth (cm): 0.1  Percent debrided: 100%  Surface Area (cm^2): 7.4  Area Debrided (cm^2): 7.4  Volume (cm^3): 0.74    Tissue and other material debrided: subcutaneous tissue  Devitalized tissue debrided: biofilm  and slough  Instrument(s) utilized: curette  Bleeding: small  Hemostasis obtained with: pressure  Response to treatment: procedure was tolerated well

## 2024-02-08 NOTE — PROGRESS NOTES
Chief Complaint   Patient presents with    Wound Care     Patient is here for follow up and has no new concerns.        HPI:     Patient here for follow-up of 3 wounds on the left lower extremity.  He is doing fine and has no new complaints.  He request a note for work as a current note where he was off work expires today.  He would like to go back with restrictions.  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  He does not want to walk on the job site as he has to wear loose fitting shoes due to swelling in the foot and cannot wear work construction boots.    Lab Results   Component Value Date    BUN 16 09/08/2023    CREATSERUM 1.27 09/08/2023    ALB 3.6 09/08/2023    TP 7.3 09/08/2023    A1C 5.3 09/08/2023         Current Outpatient Medications:     rivaroxaban (XARELTO) 20 MG Oral Tab, Take 1 tablet (20 mg total) by mouth daily with food., Disp: 30 tablet, Rfl: 0    valsartan 160 MG Oral Tab, Take 1 tablet (160 mg total) by mouth daily., Disp: , Rfl:     Prednisolon-Moxiflox-Bromfenac 1-0.5-0.075 % Ophthalmic Solution, 1 drop Ophthalmic ONCE A DAY for 28 days 1 DROP, Disp: , Rfl:     atorvastatin 80 MG Oral Tab, TAKE 1 TABLET BY MOUTH EVERY NIGHT, Disp: 90 tablet, Rfl: 0    MULTAQ 400 MG Oral Tab, Take 1 tablet (400 mg total) by mouth 2 (two) times daily., Disp: , Rfl:     carvedilol 6.25 MG Oral Tab, Take 1 tablet (6.25 mg total) by mouth 2 (two) times daily., Disp: , Rfl:     aspirin 81 MG Oral Tab, Take 1 tablet (81 mg total) by mouth daily., Disp: , Rfl:     Multiple Vitamins-Minerals (MULTI FOR HER 50+) Oral Tab, Take by mouth daily.  , Disp: , Rfl:     No Known Allergies         REVIEW OF SYSTEMS:     Review of Systems (ROS)  This information was obtained from the patient, chart    A comprehensive 10 point review of systems was completed.  Pertinent positives and negatives noted in the the HPI.     Past medical, surgical, family and social history updated where  appropriate.    PHYSICAL EXAM:   BP (!) 152/94   Pulse 65   Temp 98.1 °F (36.7 °C)   Resp 18    Estimated body mass index is 35.62 kg/m² as calculated from the following:    Height as of 1/25/24: 75\".    Weight as of 1/25/24: 285 lb (129.3 kg).   Vital signs reviewed.Appears stated age, well groomed.      Awake, alert, in no acute distress  HENT:  normocephalic, atraumatic, external ear canals clear bilaterally, tympanic membranes appear opaque, non-bulging, non-erythematous, nasal turbinates are non-inflamed and not swollen, oropharynx without erythema, swelling, or exudate, gingiva and lips without any apparent lesions.   Cardiac:  S1 S2 regular rate and rhythm, no murmur, gallop, or rub  Respiratory:  Bilaterally clear to auscultation, no chest tenderness to palpation, no wheezing, no rhonchi, no rales, air entry is adequate, no accessory respiratory muscle use, no chest asymmetry, normal excursion.  GI:  bowel sound positive in all four quadrants, abdomen is soft, non-tender, non-distended, no hepatosplenomegaly, no abnormal aortic pulsation.     Calf  Point of Measurement - Left Calf: 37     Left Calf from:: Heel  Calf Left cm:: 52.5          Ankle  Point of Measurement - Left Ankle: 12     Left Ankle from:: Heel  Left Ankle cm:: 32.6                Foot                            Wound 01/25/24 #1- Left Posterior Leg- Distal Leg Distal;Lower;Posterior;Right (Active)   Date First Assessed/Time First Assessed: 01/25/24 1057    Wound Number (Wound Clinic Only): #1- Left Posterior Leg- Distal  Primary Wound Type: Venous Ulcer  Location: Leg  Wound Location Orientation: Distal;Lower;Posterior;Right      Assessments 1/25/2024 11:04 AM 2/8/2024  8:55 AM   Wound Image       Drainage Amount Unable to assess --   Treatments Compression --   Wound Length (cm) 1.5 cm 2 cm   Wound Width (cm) 3 cm 3.7 cm   Wound Surface Area (cm^2) 4.5 cm^2 7.4 cm^2   Wound Depth (cm) 0.1 cm 0.1 cm   Wound Volume (cm^3) 0.45 cm^3 0.74  cm^3   Wound Healing % -- -64   Margins Well-defined edges --   Non-staged Wound Description Full thickness --   Kairn-wound Assessment Dry;Edema --   Wound Granulation Tissue Pink;Firm;Pale Wren --   Wound Bed Granulation (%) 70 % --   Wound Bed Slough (%) 30 % --   Wound Odor None --       Inactive Orders   Date Order Priority Status Authorizing Provider   02/08/24 0945 Debridement Venous Ulcer Distal;Lower;Posterior;Right Leg Routine Completed Ricky Darby MD   02/01/24 1502 Debridement Venous Ulcer Distal;Lower;Posterior;Right Leg Routine Completed Ricky Darby MD   01/25/24 1156 Debridement Venous Ulcer Distal;Lower;Posterior;Right Leg Routine Completed Ricky Darby MD       Wound 01/25/24 #2- Left Posterior Leg- Proximal Leg Distal;Lower;Posterior;Right (Active)   Date First Assessed/Time First Assessed: 01/25/24 1057    Wound Number (Wound Clinic Only): #2- Left Posterior Leg- Proximal  Primary Wound Type: Venous Ulcer  Location: Leg  Wound Location Orientation: Distal;Lower;Posterior;Right      Assessments 1/25/2024 11:03 AM 2/8/2024  8:54 AM   Wound Image       Drainage Amount Unable to assess --   Treatments Compression --   Wound Length (cm) 4 cm 3.6 cm   Wound Width (cm) 3.9 cm 3.6 cm   Wound Surface Area (cm^2) 15.6 cm^2 12.96 cm^2   Wound Depth (cm) 0.1 cm 0.1 cm   Wound Volume (cm^3) 1.56 cm^3 1.296 cm^3   Wound Healing % -- 17   Margins Well-defined edges --   Non-staged Wound Description Full thickness --   Karin-wound Assessment Edema;Dry;Hemosiderin staining --   Wound Granulation Tissue Firm;Pink;Red --   Wound Bed Granulation (%) 80 % --   Wound Bed Slough (%) 20 % --   Wound Odor None --       Inactive Orders   Date Order Priority Status Authorizing Provider   02/08/24 0943 Debridement Venous Ulcer Distal;Lower;Posterior;Right Leg Routine Completed Ricky Darby MD   02/01/24 1504 Debridement Venous Ulcer Distal;Lower;Posterior;Right Leg Routine Completed Ricky Darby MD    01/25/24 1246 Debridement Venous Ulcer Distal;Lower;Posterior;Right Leg Routine Completed Ricky Darby MD       Wound 01/25/24 #3- Left Anterior Leg Left (Active)   Date First Assessed/Time First Assessed: 01/25/24 1058    Wound Number (Wound Clinic Only): #3- Left Anterior Leg  Primary Wound Type: Venous Ulcer  Wound Location Orientation: Left      Assessments 1/25/2024 11:02 AM 2/8/2024  8:55 AM   Wound Image       Drainage Amount Unable to assess Unable to assess   Treatments Compression Compression   Wound Length (cm) 8.5 cm 8.5 cm   Wound Width (cm) 4.3 cm 4.3 cm   Wound Surface Area (cm^2) 36.55 cm^2 36.55 cm^2   Wound Depth (cm) 0.1 cm 0.1 cm   Wound Volume (cm^3) 3.655 cm^3 3.655 cm^3   Wound Healing % -- 0   Margins Well-defined edges Well-defined edges   Non-staged Wound Description Full thickness Full thickness   Karin-wound Assessment Dry;Edema;Hemosiderin staining Dry;Edema;Hemosiderin staining   Wound Granulation Tissue Pink;Firm Pink;Firm   Wound Bed Granulation (%) 20 % 10 %   Wound Bed Slough (%) 80 % 90 %   Wound Odor None None       Inactive Orders   Date Order Priority Status Authorizing Provider   02/01/24 1505 Debridement Venous Ulcer Left Routine Completed Ricky Darby MD   01/25/24 1250 Debridement Venous Ulcer Left Routine Completed Ricky Darby MD       Compression Wrap 02/08/24 Leg Distal;Lower;Posterior;Right (Active)   Placement Date/Time: 02/08/24 0947   Location: Leg  Wound Location Orientation: Distal;Lower;Posterior;Right      Assessments 2/8/2024  8:55 AM   Response to Treatment Well tolerated   Compression Layers Multilayer   Compression Product Type Coflex   Dressing Applied Yes   Compression Wrap Location Toes to Knee   Compression Wrap Status Clean;Dry;Intact       No associated orders.          ASSESSMENT AND PLAN:      1. Chronic venous hypertension (idiopathic) with ulcer of left lower extremity (CODE) ([facility])    2. Deep vein thrombosis (DVT) of femoral vein of  left lower extremity, unspecified chronicity (HCC)  Stable no chest pain or shortness of breath.  3. Essential hypertension blood pressure is somewhat elevated he has no symptoms of high blood pressure.  Continue current medications and follow-up with primary care cardiology as directed.    4. Dilated cardiomyopathy (HCC)  Stable  5. Chronic atrial fibrillation (HCC)  Stable  6. Class 2 severe obesity due to excess calories with serious comorbidity and body mass index (BMI) of 35.0 to 35.9 in adult  (HCC) counseled regarding proper diet to lose weight    The medial of the left leg wounds x 2 were debrided with selective debridement see note.  The anterior left leg wound is doing well and there are epithelial islands forming.  I will have him switch to Enluxtra to the wounds and start Coflex 2 layer compression wrap.  He has palpable pedal pulses bilateral.  No cyanosis.  Counseled at length regarding compression wrap and symptoms to watch out for with that including discoloration of the toes or increased pain and if those occur he should cut his wrap off and seek medical attention immediately.  Written instructions given as well.  He will have a nurse visit in 3 to 4 days and follow-up with me in 1 week.  I will give him a note for work restrictions until the end of this month.  He is in process of following up with hematologist as well.    It is noted that he has a current DVT but is anticoagulated with Xarelto.  It is safe to do compression wrap in the setting.    Risks, benefits, and alternatives of current treatment plan discussed in detail.  Questions and concerns addressed. Red flags to RTC or ED reviewed.  Patient (or parent) agrees to plan.      Return in about 4 days (around 2/12/2024).    Also refer to patient instructions.     I spent a total of 40 minutes with this patient's care.  This time included preparing to see the patient (eg, review notes and recent diagnostics), seeing the patient, performing a  medically appropriate examination and/or evaluation, counseling and educating the patient, and documenting in the record.          Ricky Darby M.D.   [facility]   2024    Patient Instructions       PATIENT INSTRUCTIONS      FOR Kris oClvin HEATHER 3/6/1960    DATE OF SERVICE: 2024      Enluxtra  Coflex 2 layer compression wrap    DO NOT GET WRAP WET      Nurse visit 24    Follow up with Dr. Darby 1 WEEK        Managing your edema:    Avoid prolonged standing in one place. It is better to have your calf muscles moving to pump fluid out of the legs.  Elevate leg(s) above the level of the heart when sitting or as much as possible.  Take you diuretics as directed by your physician. Do not skip doses or change doses unless instructed to do so by your physician.  Decrease salt intake, follow recommended 2 grams daily.  Do not get leg(s) with compression wrap wet. If wraps are too tight as indicated by pain, numbness/tingling or discoloration of toes remove wrap completely and call the wound center @ [phone].       The treatment plan has been discussed at length between you and your provider. Follow all instructions carefully, it is very important. If you do not follow all instructions you are at risk of your wound not healing, infection, possible loss of limb and even loss of life.    COMPRESSION WRAP PATIENT CARE INSTRUCTIONS  DO NOT get compression wrap wet. When showering, use a shower boot.   Please contact wound clinic and if not able to reach, then go to emergency room if ANY of the following occur:   Tingling or numbness in the foot or toes on leg with compression wrap.  Severe pain that cannot be relieved with elevation and any medication instructed per your doctor.  A fever of 100.4°F (38°C) or higher.  Swelling, coldness, or blue-gray discoloration of the toes.  If the compression wrap feels too tight or too loose.  If the compression wrap is damaged or has  rough edges that hurt.  If the compression wrap gets wet, you must cut wrap off.   Drainage from compression wrap that smells different than usual.                MISCELLANEOUS INSTRUCTIONS  Supplement with a daily multivitamin   Low salt diet  Intense blood sugar control - Goal Blood sugar below 180 at all times recommended.  Increase protein intake / consider protein supplements - see below  Elevate extremities at all times when sitting / laying down.  No tobacco use     DIETARY MODIFICATIONS TO HELP WITH WOUND HEALING:          Please follow any restrictions to diet given by your other doctors     Protein: meats, beans, eggs, milk and yogurt particularly Greek yogurt), tofu, soy nuts, soy protein products     Vitamin C: citrus fruits and juices, strawberries, tomatoes, tomato juice, peppers, baked potatoes, spinach, broccoli, cauliflower, [employer] sprouts, cabbage     Vitamin A: dark greens, leafy vegetables, orange or yellow vegetables, cantaloupe, fortified dairy products, liver, fortified cereals     Zinc: fortified cereals, red meats, seafood     Consider Jakub [employer] (These are essential branch chain amino acids that help with tissue building and wound healing) and take 2 packets/day. You can order online at [employer] or [employer]     ADDITIONAL REMINDERS:     The treatment plan has been discussed at length with you and your provider. Follow all instructions carefully, it is very important. If you do not follow all instructions, you are at risk of your wound not healing, infection, possible loss of limb and even loss of life.  Please call the clinic at [phone] during regular business hours ( 7:30 AM - 5:30 PM) if you notice increased redness, warmth, pain or pus like drainage or start running a fever greater than 100.3.    For after hour emergencies, please call your primary physician or go to the nearest emergency room.  If your blood pressure is elevated, follow up with your primary care doctor  and/or cardiologist as soon as possible.     FOR LEG SWELLING,  LOWER EXTREMITY WOUNDS AND IF USING COMPRESSION:   Managing your edema:    Avoid prolonged standing in one place. It is better to have your calf muscles moving to pump fluid out of the legs.  Elevate leg(s) above the level of the heart when sitting or as much as possible.  Take you diuretics as directed by your physician. Do not skip doses or change doses unless instructed to do so by your physician.  Decrease salt intake, follow recommended 2 grams daily.  Do not get leg(s) with compression wrap wet. If wraps are too tight as indicated by pain, numbness/tingling or discoloration of toes remove wrap completely and call the wound center @ [phone].       The treatment plan has been discussed at length between you and your provider. Follow all instructions carefully, it is very important. If you do not follow all instructions you are at risk of your wound not healing, infection, possible loss of limb and even loss of life.    COMPRESSION WRAP PATIENT CARE INSTRUCTIONS  DO NOT get compression wrap wet. When showering, use a shower boot.   Please contact wound clinic and if not able to reach, then go to emergency room if ANY of the following occur:   Tingling or numbness in the foot or toes on leg with compression wrap.  Severe pain that cannot be relieved with elevation and any medication instructed per your doctor.  A fever of 100.4°F (38°C) or higher.  Swelling, coldness, or blue-gray discoloration of the toes.  If the compression wrap feels too tight or too loose.  If the compression wrap is damaged or has rough edges that hurt.  If the compression wrap gets wet, you must cut wrap off.   Drainage from compression wrap that smells different than usual.

## 2024-02-08 NOTE — PATIENT INSTRUCTIONS
PATIENT INSTRUCTIONS      FOR Kris Colvin HEATHER 3/6/1960    DATE OF SERVICE: 2024      Enluxtra  Coflex 2 layer compression wrap    DO NOT GET WRAP WET      Nurse visit 24    Follow up with Dr. Darby 1 WEEK        Managing your edema:    Avoid prolonged standing in one place. It is better to have your calf muscles moving to pump fluid out of the legs.  Elevate leg(s) above the level of the heart when sitting or as much as possible.  Take you diuretics as directed by your physician. Do not skip doses or change doses unless instructed to do so by your physician.  Decrease salt intake, follow recommended 2 grams daily.  Do not get leg(s) with compression wrap wet. If wraps are too tight as indicated by pain, numbness/tingling or discoloration of toes remove wrap completely and call the wound center @ [phone].       The treatment plan has been discussed at length between you and your provider. Follow all instructions carefully, it is very important. If you do not follow all instructions you are at risk of your wound not healing, infection, possible loss of limb and even loss of life.    COMPRESSION WRAP PATIENT CARE INSTRUCTIONS  DO NOT get compression wrap wet. When showering, use a shower boot.   Please contact wound clinic and if not able to reach, then go to emergency room if ANY of the following occur:   Tingling or numbness in the foot or toes on leg with compression wrap.  Severe pain that cannot be relieved with elevation and any medication instructed per your doctor.  A fever of 100.4°F (38°C) or higher.  Swelling, coldness, or blue-gray discoloration of the toes.  If the compression wrap feels too tight or too loose.  If the compression wrap is damaged or has rough edges that hurt.  If the compression wrap gets wet, you must cut wrap off.   Drainage from compression wrap that smells different than usual.

## 2024-02-08 NOTE — PROGRESS NOTES
.Weekly Wound Education Note    Teaching Provided To: Patient  Training Topics: Discharge instructions;Cleasing and general instructions;Dressing;Edema control;Compression  Training Method: Demonstration;Explain/Verbal  Training Response: Reinforcement needed        Notes: Pt here for follow up wound care visit to left anterior lower leg, left posterior proximal and left posterior distal wounds. Left posterior proximal wound measurement decreased, left posterior distal wound measurement stable, left anterior lower leg wound measurement slightly decreased. Edema measurement stable. Provider debrided wounds at visit. Treatment changed to enluxtra with back off, kerramax (x2), kerlix, tape. Applied 1st wrap Coflex 2 layer to LLE. Pt to follow up on Monday for nurse visit and provider visit Thursday. Provider to write patient a note for work restrictions. RN provided education to patient about basic care for wrap (not getting wet, elevate legs above the level of heart), RN provided patient with print out information about basic wrap care.

## 2024-02-09 DIAGNOSIS — Z86.73 HISTORY OF CVA (CEREBROVASCULAR ACCIDENT): ICD-10-CM

## 2024-02-09 RX ORDER — ATORVASTATIN CALCIUM 80 MG/1
TABLET, FILM COATED ORAL
Qty: 90 TABLET | Refills: 0 | Status: SHIPPED | OUTPATIENT
Start: 2024-02-09

## 2024-02-09 NOTE — TELEPHONE ENCOUNTER
A refill request was received for:  Requested Prescriptions     Pending Prescriptions Disp Refills    ATORVASTATIN 80 MG Oral Tab [Pharmacy Med Name: ATORVASTATIN 80MG TABLETS] 90 tablet 0     Sig: TAKE 1 TABLET BY MOUTH EVERY NIGHT       Last refill date:   11/17/2023    Last office visit: 11/9/2023    Follow up due:  Future Appointments   Date Time Provider Department Center   2/12/2024  7:15 AM WOUND CARE NURSE  [facility]   2/15/2024  7:30 AM Ricky Darby MD  Wound [facility]   2/19/2024  7:30 AM WOUND CARE NURSE  [facility]   2/22/2024  7:30 AM Ricky Darby MD  [facility]ound [facility]   2/26/2024  7:30 AM WOUND CARE NURSE  [facility]   2/29/2024  7:30 AM Ricky Darby MD  Wound [facility]   2/29/2024 10:00 AM Getachew Osullivan MD  [facility] [place]   3/4/2024  7:30 AM WOUND CARE NURSE  Wound [facility]   3/7/2024  7:30 AM Ricky Darby MD  Wound [facility]   3/11/2024  7:30 AM WOUND CARE NURSE  Wound [facility]   3/14/2024  7:30 AM Ricky Darby MD  Wound [facility]   3/18/2024  7:30 AM WOUND CARE NURSE  Wound [facility]   3/21/2024  7:30 AM Ricky Darby MD  Wound [facility]   3/25/2024  7:30 AM WOUND CARE NURSE  Wound [facility]   3/28/2024  7:30 AM Ricky Darby MD  Wound [facility]

## 2024-02-12 ENCOUNTER — OFFICE VISIT (OUTPATIENT)
Dept: WOUND CARE | Facility: HOSPITAL | Age: 64
End: 2024-02-12
Attending: FAMILY MEDICINE
Payer: COMMERCIAL

## 2024-02-12 VITALS
SYSTOLIC BLOOD PRESSURE: 162 MMHG | TEMPERATURE: 98 F | RESPIRATION RATE: 16 BRPM | HEART RATE: 61 BPM | DIASTOLIC BLOOD PRESSURE: 91 MMHG

## 2024-02-12 DIAGNOSIS — I87.312 CHRONIC VENOUS HYPERTENSION (IDIOPATHIC) WITH ULCER OF LEFT LOWER EXTREMITY (CODE) (HCC): Primary | ICD-10-CM

## 2024-02-12 PROCEDURE — 29581 APPL MULTLAYER CMPRN SYS LEG: CPT

## 2024-02-12 NOTE — PROGRESS NOTES
Ori Colvin is an 63 year old male.    Chief Complaint   Patient presents with    Wound Care     RN visit for wounds to left leg. Wrap tolerated well. Denies pain or concerns at this time.        BP (!) 162/91   Pulse 61   Temp 97.7 °F (36.5 °C)   Resp 16     Wound 01/25/24 #1- Left Posterior Leg- Distal Leg Distal;Lower;Posterior;Right (Active)   Date First Assessed/Time First Assessed: 01/25/24 1057    Wound Number (Wound Clinic Only): #1- Left Posterior Leg- Distal  Primary Wound Type: Venous Ulcer  Location: Leg  Wound Location Orientation: Distal;Lower;Posterior;Right      Assessments 1/25/2024 11:04 AM 2/12/2024  7:25 AM   Wound Image       Drainage Amount Unable to assess Moderate   Drainage Description -- Serosanguineous   Treatments Compression Compression   Wound Length (cm) 1.5 cm 1.5 cm   Wound Width (cm) 3 cm 3.5 cm   Wound Surface Area (cm^2) 4.5 cm^2 5.25 cm^2   Wound Depth (cm) 0.1 cm 0.1 cm   Wound Volume (cm^3) 0.45 cm^3 0.525 cm^3   Wound Healing % -- -17   Margins Well-defined edges --   Non-staged Wound Description Full thickness --   Karin-wound Assessment Dry;Edema --   Wound Granulation Tissue Pink;Firm;Pale Wren Firm;Red   Wound Bed Granulation (%) 70 % 5 %   Wound Bed Slough (%) 30 % 95 %   Wound Odor None None   Tunneling? -- No   Undermining? -- No   Sinus Tracts? -- No       Inactive Orders   Date Order Priority Status Authorizing Provider   02/08/24 0945 Debridement Venous Ulcer Distal;Lower;Posterior;Right Leg Routine Completed Ricky Darby MD   02/01/24 1502 Debridement Venous Ulcer Distal;Lower;Posterior;Right Leg Routine Completed Ricky Darby MD   01/25/24 1156 Debridement Venous Ulcer Distal;Lower;Posterior;Right Leg Routine Completed Ricky Darby MD       Wound 01/25/24 #2- Left Posterior Leg- Proximal Leg Distal;Lower;Posterior;Right (Active)   Date First Assessed/Time First Assessed: 01/25/24 1057    Wound Number (Wound Clinic Only): #2- Left Posterior Leg-  Proximal  Primary Wound Type: Venous Ulcer  Location: Leg  Wound Location Orientation: Distal;Lower;Posterior;Right      Assessments 1/25/2024 11:03 AM 2/12/2024  7:26 AM   Wound Image       Drainage Amount Unable to assess Moderate   Drainage Description -- Serosanguineous   Treatments Compression Compression   Wound Length (cm) 4 cm 4 cm   Wound Width (cm) 3.9 cm 5 cm   Wound Surface Area (cm^2) 15.6 cm^2 20 cm^2   Wound Depth (cm) 0.1 cm 0.1 cm   Wound Volume (cm^3) 1.56 cm^3 2 cm^3   Wound Healing % -- -28   Margins Well-defined edges Well-defined edges   Non-staged Wound Description Full thickness Full thickness   Karin-wound Assessment Edema;Dry;Hemosiderin staining Edema;Hemosiderin staining;Moist   Wound Granulation Tissue Firm;Pink;Red Firm;Red   Wound Bed Granulation (%) 80 % 5 %   Wound Bed Slough (%) 20 % 95 %   Wound Odor None None   Tunneling? -- No   Undermining? -- No   Sinus Tracts? -- No       Inactive Orders   Date Order Priority Status Authorizing Provider   02/08/24 0943 Debridement Venous Ulcer Distal;Lower;Posterior;Right Leg Routine Completed Ricky Darby MD   02/01/24 1504 Debridement Venous Ulcer Distal;Lower;Posterior;Right Leg Routine Completed Ricky Darby MD   01/25/24 1246 Debridement Venous Ulcer Distal;Lower;Posterior;Right Leg Routine Completed Ricky Darby MD       Wound 01/25/24 #3- Left Anterior Leg Left (Active)   Date First Assessed/Time First Assessed: 01/25/24 1058    Wound Number (Wound Clinic Only): #3- Left Anterior Leg  Primary Wound Type: Venous Ulcer  Wound Location Orientation: Left      Assessments 1/25/2024 11:02 AM 2/12/2024  7:27 AM   Wound Image       Drainage Amount Unable to assess Moderate   Drainage Description -- Serosanguineous   Treatments Compression Compression   Wound Length (cm) 8.5 cm 7.9 cm   Wound Width (cm) 4.3 cm 3.9 cm   Wound Surface Area (cm^2) 36.55 cm^2 30.81 cm^2   Wound Depth (cm) 0.1 cm 0.1 cm   Wound Volume (cm^3) 3.655 cm^3  3.081 cm^3   Wound Healing % -- 16   Margins Well-defined edges Well-defined edges   Non-staged Wound Description Full thickness Full thickness   Karin-wound Assessment Dry;Edema;Hemosiderin staining Edema;Hemosiderin staining   Wound Granulation Tissue Pink;Firm Firm;Red   Wound Bed Granulation (%) 20 % 5 %   Wound Bed Epithelium (%) -- 5 %   Wound Bed Slough (%) 80 % 90 %   Wound Odor None None   Tunneling? -- No   Undermining? -- No   Sinus Tracts? -- No       Inactive Orders   Date Order Priority Status Authorizing Provider   02/01/24 1505 Debridement Venous Ulcer Left Routine Completed Ricky Darby MD   01/25/24 1250 Debridement Venous Ulcer Left Routine Completed Ricky Darby MD       Compression Wrap 02/08/24 Leg Distal;Lower;Posterior;Right (Active)   Placement Date/Time: 02/08/24 0947   Location: Leg  Wound Location Orientation: Distal;Lower;Posterior;Right      Assessments 2/8/2024  8:55 AM 2/12/2024  7:27 AM   Response to Treatment Well tolerated Well tolerated   Compression Layers Multilayer 2   Compression Product Type Coflex Coflex   Dressing Applied Yes Yes   Compression Wrap Location Toes to Knee Toes to Knee   Compression Wrap Status Clean;Dry;Intact Clean;Intact;Dry       No associated orders.       Weekly Wound Education Note    Teaching Provided To: Patient  Training Topics: Dressing;Edema control;Discharge instructions;Compression  Training Method: Explain/Verbal  Training Response: Patient responds and understands           Patient is here for a nurse visit.  Posterior superior and anterior wound measurements improved.  Edema measurements improved as well.  VASHE soak to all wounds, continue Enluxtra (back off), kerramax, conforming gauze roll to wounds.  Coflex 2 layer wrap.  Patient will return on Thursday for a provider visit.        No follow-ups on file.    Rhonda ORTIZ RN

## 2024-02-15 ENCOUNTER — OFFICE VISIT (OUTPATIENT)
Dept: WOUND CARE | Facility: HOSPITAL | Age: 64
End: 2024-02-15
Attending: FAMILY MEDICINE
Payer: COMMERCIAL

## 2024-02-15 VITALS
SYSTOLIC BLOOD PRESSURE: 159 MMHG | TEMPERATURE: 98 F | RESPIRATION RATE: 16 BRPM | DIASTOLIC BLOOD PRESSURE: 91 MMHG | HEART RATE: 64 BPM

## 2024-02-15 DIAGNOSIS — I87.312 CHRONIC VENOUS HYPERTENSION (IDIOPATHIC) WITH ULCER OF LEFT LOWER EXTREMITY (CODE) (HCC): Primary | ICD-10-CM

## 2024-02-15 DIAGNOSIS — E66.01 CLASS 2 SEVERE OBESITY DUE TO EXCESS CALORIES WITH SERIOUS COMORBIDITY AND BODY MASS INDEX (BMI) OF 35.0 TO 35.9 IN ADULT  (HCC): ICD-10-CM

## 2024-02-15 DIAGNOSIS — I48.20 CHRONIC ATRIAL FIBRILLATION (HCC): ICD-10-CM

## 2024-02-15 DIAGNOSIS — I42.0 DILATED CARDIOMYOPATHY (HCC): ICD-10-CM

## 2024-02-15 DIAGNOSIS — I82.412 DEEP VEIN THROMBOSIS (DVT) OF FEMORAL VEIN OF LEFT LOWER EXTREMITY, UNSPECIFIED CHRONICITY (HCC): ICD-10-CM

## 2024-02-15 DIAGNOSIS — I10 ESSENTIAL HYPERTENSION: ICD-10-CM

## 2024-02-15 PROCEDURE — 99215 OFFICE O/P EST HI 40 MIN: CPT | Performed by: FAMILY MEDICINE

## 2024-02-15 PROCEDURE — 17250 CHEM CAUT OF GRANLTJ TISSUE: CPT | Performed by: FAMILY MEDICINE

## 2024-02-15 NOTE — PROGRESS NOTES
Chief Complaint   Patient presents with    Wound Care     Patient is here for a wound care follow up. His pain is currently a 2, but he complains that he was in a lot of pain for hours after his last debridement.         HPI:     Patient here for follow-up of left total leg wound multiple.  He is doing well and tolerated compression wrap without difficulty.    Lab Results   Component Value Date    BUN 16 09/08/2023    CREATSERUM 1.27 09/08/2023    ALB 3.6 09/08/2023    TP 7.3 09/08/2023    A1C 5.3 09/08/2023         Current Outpatient Medications:     atorvastatin 80 MG Oral Tab, TAKE 1 TABLET BY MOUTH EVERY NIGHT, Disp: 90 tablet, Rfl: 0    rivaroxaban (XARELTO) 20 MG Oral Tab, Take 1 tablet (20 mg total) by mouth daily with food., Disp: 30 tablet, Rfl: 0    valsartan 160 MG Oral Tab, Take 1 tablet (160 mg total) by mouth daily., Disp: , Rfl:     Prednisolon-Moxiflox-Bromfenac 1-0.5-0.075 % Ophthalmic Solution, 1 drop Ophthalmic ONCE A DAY for 28 days 1 DROP, Disp: , Rfl:     MULTAQ 400 MG Oral Tab, Take 1 tablet (400 mg total) by mouth 2 (two) times daily., Disp: , Rfl:     carvedilol 6.25 MG Oral Tab, Take 1 tablet (6.25 mg total) by mouth 2 (two) times daily., Disp: , Rfl:     aspirin 81 MG Oral Tab, Take 1 tablet (81 mg total) by mouth daily., Disp: , Rfl:     Multiple Vitamins-Minerals (MULTI FOR HER 50+) Oral Tab, Take by mouth daily.  , Disp: , Rfl:     No Known Allergies         REVIEW OF SYSTEMS:     Review of Systems (ROS)  This information was obtained from the patient, chart    A comprehensive 10 point review of systems was completed.  Pertinent positives and negatives noted in the the HPI.     Past medical, surgical, family and social history updated where appropriate.    PHYSICAL EXAM:   BP (!) 159/91   Pulse 64   Temp 97.6 °F (36.4 °C)   Resp 16    Estimated body mass index is 35.62 kg/m² as calculated from the following:    Height as of 1/25/24: 75\".    Weight as of 1/25/24: 285 lb (129.3 kg).    Vital signs reviewed.Appears stated age, well groomed.      Awake, alert, in no acute distress  HENT:  normocephalic, atraumatic, external ear canals clear bilaterally, tympanic membranes appear opaque, non-bulging, non-erythematous, nasal turbinates are non-inflamed and not swollen, oropharynx without erythema, swelling, or exudate, gingiva and lips without any apparent lesions.   Cardiac:  S1 S2 regular rate and rhythm, no murmur, gallop, or rub  Respiratory:  Bilaterally clear to auscultation, no chest tenderness to palpation, no wheezing, no rhonchi, no rales, air entry is adequate, no accessory respiratory muscle use, no chest asymmetry, normal excursion.  GI:  bowel sound positive in all four quadrants, abdomen is soft, non-tender, non-distended, no hepatosplenomegaly, no abnormal aortic pulsation.     Calf  Point of Measurement - Left Calf: 37     Left Calf from:: Heel  Calf Left cm:: 43.3          Ankle  Point of Measurement - Left Ankle: 12     Left Ankle from:: Heel  Left Ankle cm:: 29.8                Foot                            Wound 01/25/24 #1- Left Posterior Leg- Distal Leg Distal;Lower;Posterior;Right (Active)   Date First Assessed/Time First Assessed: 01/25/24 1057    Wound Number (Wound Clinic Only): #1- Left Posterior Leg- Distal  Primary Wound Type: Venous Ulcer  Location: Leg  Wound Location Orientation: Distal;Lower;Posterior;Right      Assessments 1/25/2024 11:04 AM 2/15/2024  8:04 AM   Wound Image       Drainage Amount Unable to assess --   Treatments Compression --   Wound Length (cm) 1.5 cm --   Wound Width (cm) 3 cm --   Wound Surface Area (cm^2) 4.5 cm^2 --   Wound Depth (cm) 0.1 cm --   Wound Volume (cm^3) 0.45 cm^3 --   Margins Well-defined edges --   Non-staged Wound Description Full thickness --   Karin-wound Assessment Dry;Edema --   Wound Granulation Tissue Pink;Firm;Pale Wren --   Wound Bed Granulation (%) 70 % --   Wound Bed Slough (%) 30 % --   Wound Odor None --        Inactive Orders   Date Order Priority Status Authorizing Provider   02/08/24 0945 Debridement Venous Ulcer Distal;Lower;Posterior;Right Leg Routine Completed Ricky Darby MD   02/01/24 1502 Debridement Venous Ulcer Distal;Lower;Posterior;Right Leg Routine Completed Ricky Darby MD   01/25/24 1156 Debridement Venous Ulcer Distal;Lower;Posterior;Right Leg Routine Completed Ricky Darby MD       Wound 01/25/24 #2- Left Posterior Leg- Proximal Leg Lower;Posterior;Right (Active)   Date First Assessed/Time First Assessed: 01/25/24 1057    Wound Number (Wound Clinic Only): #2- Left Posterior Leg- Proximal  Primary Wound Type: Venous Ulcer  Location: Leg  Wound Location Orientation: Lower;Posterior;Right      Assessments 1/25/2024 11:03 AM 2/15/2024  8:04 AM   Wound Image       Drainage Amount Unable to assess --   Treatments Compression --   Wound Length (cm) 4 cm --   Wound Width (cm) 3.9 cm --   Wound Surface Area (cm^2) 15.6 cm^2 --   Wound Depth (cm) 0.1 cm --   Wound Volume (cm^3) 1.56 cm^3 --   Margins Well-defined edges --   Non-staged Wound Description Full thickness --   Karin-wound Assessment Edema;Dry;Hemosiderin staining --   Wound Granulation Tissue Firm;Pink;Red --   Wound Bed Granulation (%) 80 % --   Wound Bed Slough (%) 20 % --   Wound Odor None --       Inactive Orders   Date Order Priority Status Authorizing Provider   02/08/24 0943 Debridement Venous Ulcer Distal;Lower;Posterior;Right Leg Routine Completed Ricky Darby MD   02/01/24 1504 Debridement Venous Ulcer Distal;Lower;Posterior;Right Leg Routine Completed Ricky Darby MD   01/25/24 1246 Debridement Venous Ulcer Distal;Lower;Posterior;Right Leg Routine Completed Ricky Darby MD       Wound 01/25/24 #3- Left Anterior Leg Left (Active)   Date First Assessed/Time First Assessed: 01/25/24 1058    Wound Number (Wound Clinic Only): #3- Left Anterior Leg  Primary Wound Type: Venous Ulcer  Wound Location Orientation: Left       Assessments 1/25/2024 11:02 AM 2/15/2024  8:03 AM   Wound Image       Drainage Amount Unable to assess --   Treatments Compression --   Wound Length (cm) 8.5 cm --   Wound Width (cm) 4.3 cm --   Wound Surface Area (cm^2) 36.55 cm^2 --   Wound Depth (cm) 0.1 cm --   Wound Volume (cm^3) 3.655 cm^3 --   Margins Well-defined edges --   Non-staged Wound Description Full thickness --   Karin-wound Assessment Dry;Edema;Hemosiderin staining --   Wound Granulation Tissue Pink;Firm --   Wound Bed Granulation (%) 20 % --   Wound Bed Slough (%) 80 % --   Wound Odor None --       Inactive Orders   Date Order Priority Status Authorizing Provider   02/01/24 1505 Debridement Venous Ulcer Left Routine Completed Ricky Darby MD   01/25/24 1250 Debridement Venous Ulcer Left Routine Completed Ricky Darby MD       Compression Wrap 02/08/24 Leg Distal;Lower;Posterior;Right (Active)   Placement Date/Time: 02/08/24 0947   Location: Leg  Wound Location Orientation: Distal;Lower;Posterior;Right      Assessments 2/8/2024  8:55 AM 2/12/2024  7:27 AM   Response to Treatment Well tolerated Well tolerated   Compression Layers Multilayer 2   Compression Product Type Coflex Coflex   Dressing Applied Yes Yes   Compression Wrap Location Toes to Knee Toes to Knee   Compression Wrap Status Clean;Dry;Intact Clean;Intact;Dry       No associated orders.          ASSESSMENT AND PLAN:      1. Chronic venous hypertension (idiopathic) with ulcer of left lower extremity (CODE) ([facility])    2. Deep vein thrombosis (DVT) of femoral vein of left lower extremity, unspecified chronicity ([facility])    3. Dilated cardiomyopathy ([facility])    4. Chronic atrial fibrillation ([facility])    5. Essential hypertension    6. Class 2 severe obesity due to excess calories with serious comorbidity and body mass index (BMI) of 35.0 to 35.9 in adult  ([facility])    Leg swelling is down and the wounds do show improvement.  Silver nitrate applied to the wound edges.  Will continue with Enluxtra  dressing to all the wounds and Coflex 2 layer compression wrap.  Patient may now follow-up in 1 week.  Reviewed wrap instructions and he has written instructions regarding that as well.  He has had no calf pain or chest pain or shortness of breath.    Encouraged weight reduction and blood pressure is somewhat elevated he should have monitor this and follow-up with his primary care doctor or cardiologist regarding this if it remains elevated above 140/90.      Risks, benefits, and alternatives of current treatment plan discussed in detail.  Questions and concerns addressed. Red flags to RTC or ED reviewed.  Patient (or parent) agrees to plan.      No follow-ups on file.    Also refer to patient instructions.     I spent a total of 40 minutes with this patient's care.  This time included preparing to see the patient (eg, review notes and recent diagnostics), seeing the patient, performing a medically appropriate examination and/or evaluation, counseling and educating the patient, and documenting in the record.          Ricky Darby M.D.   [facility]   2/15/2024    Patient Instructions       PATIENT INSTRUCTIONS      FOR HEATHER Velásquez 3/6/1960    DATE OF SERVICE: 2/15/2024      Enluxtra  Coflex 2 layer compression wrap    DO NOT GET WRAP WET    Follow up with Dr. Darby 1 WEEK        Managing your edema:    Avoid prolonged standing in one place. It is better to have your calf muscles moving to pump fluid out of the legs.  Elevate leg(s) above the level of the heart when sitting or as much as possible.  Take you diuretics as directed by your physician. Do not skip doses or change doses unless instructed to do so by your physician.  Decrease salt intake, follow recommended 2 grams daily.  Do not get leg(s) with compression wrap wet. If wraps are too tight as indicated by pain, numbness/tingling or discoloration of toes remove wrap completely and call the wound center @ [phone].        The treatment plan has been discussed at length between you and your provider. Follow all instructions carefully, it is very important. If you do not follow all instructions you are at risk of your wound not healing, infection, possible loss of limb and even loss of life.    COMPRESSION WRAP PATIENT CARE INSTRUCTIONS  DO NOT get compression wrap wet. When showering, use a shower boot.   Please contact wound clinic and if not able to reach, then go to emergency room if ANY of the following occur:   Tingling or numbness in the foot or toes on leg with compression wrap.  Severe pain that cannot be relieved with elevation and any medication instructed per your doctor.  A fever of 100.4°F (38°C) or higher.  Swelling, coldness, or blue-gray discoloration of the toes.  If the compression wrap feels too tight or too loose.  If the compression wrap is damaged or has rough edges that hurt.  If the compression wrap gets wet, you must cut wrap off.   Drainage from compression wrap that smells different than usual.              MISCELLANEOUS INSTRUCTIONS  Supplement with a daily multivitamin   Low salt diet  Intense blood sugar control - Goal Blood sugar below 180 at all times recommended.  Increase protein intake / consider protein supplements - see below  Elevate extremities at all times when sitting / laying down.  No tobacco use     DIETARY MODIFICATIONS TO HELP WITH WOUND HEALING:          Please follow any restrictions to diet given by your other doctors     Protein: meats, beans, eggs, milk and yogurt particularly Greek yogurt), tofu, soy nuts, soy protein products     Vitamin C: citrus fruits and juices, strawberries, tomatoes, tomato juice, peppers, baked potatoes, spinach, broccoli, cauliflower, [place] sprouts, cabbage     Vitamin A: dark greens, leafy vegetables, orange or yellow vegetables, cantaloupe, fortified dairy products, liver, fortified cereals     Zinc: fortified cereals, red meats, seafood     Consider  [employer] (These are essential branch chain amino acids that help with tissue building and wound healing) and take 2 packets/day. You can order online at [employer] or [employer]     ADDITIONAL REMINDERS:     The treatment plan has been discussed at length with you and your provider. Follow all instructions carefully, it is very important. If you do not follow all instructions, you are at risk of your wound not healing, infection, possible loss of limb and even loss of life.  Please call the clinic at [phone] during regular business hours ( 7:30 AM - 5:30 PM) if you notice increased redness, warmth, pain or pus like drainage or start running a fever greater than 100.3.    For after hour emergencies, please call your primary physician or go to the nearest emergency room.  If your blood pressure is elevated, follow up with your primary care doctor and/or cardiologist as soon as possible.     FOR LEG SWELLING,  LOWER EXTREMITY WOUNDS AND IF USING COMPRESSION:   Managing your edema:    Avoid prolonged standing in one place. It is better to have your calf muscles moving to pump fluid out of the legs.  Elevate leg(s) above the level of the heart when sitting or as much as possible.  Take you diuretics as directed by your physician. Do not skip doses or change doses unless instructed to do so by your physician.  Decrease salt intake, follow recommended 2 grams daily.  Do not get leg(s) with compression wrap wet. If wraps are too tight as indicated by pain, numbness/tingling or discoloration of toes remove wrap completely and call the wound center @ [phone].       The treatment plan has been discussed at length between you and your provider. Follow all instructions carefully, it is very important. If you do not follow all instructions you are at risk of your wound not healing, infection, possible loss of limb and even loss of life.    COMPRESSION WRAP PATIENT CARE INSTRUCTIONS  DO NOT get compression wrap wet.  When showering, use a shower boot.   Please contact wound clinic and if not able to reach, then go to emergency room if ANY of the following occur:   Tingling or numbness in the foot or toes on leg with compression wrap.  Severe pain that cannot be relieved with elevation and any medication instructed per your doctor.  A fever of 100.4°F (38°C) or higher.  Swelling, coldness, or blue-gray discoloration of the toes.  If the compression wrap feels too tight or too loose.  If the compression wrap is damaged or has rough edges that hurt.  If the compression wrap gets wet, you must cut wrap off.   Drainage from compression wrap that smells different than usual.

## 2024-02-15 NOTE — PROGRESS NOTES
.Weekly Wound Education Note    Teaching Provided To: Patient  Training Topics: Cleasing and general instructions;Discharge instructions;Dressing;Compression;Edema control  Training Method: Explain/Verbal;Demonstration  Training Response: Reinforcement needed        Notes: Pt here for follow up wound care visit to left anterior leg, left posterior distal, and left posterior proximal leg wounds. Edema measurement decreased, left posterior distal wound measurement stable, left proximal leg and left anterior leg measurements decreased. Provider debrided wounds at visit, applied silver nitrate. Continue with enluxtra with the back off, kerramax, conforming gauze, tape. Applied coflex 2 layer wrap to LLE. Pt to follow up with provider in 1 week.

## 2024-02-15 NOTE — PATIENT INSTRUCTIONS
PATIENT INSTRUCTIONS      FOR Kris Colvin , HEATHER 3/6/1960    DATE OF SERVICE: 2/15/2024      Enluxtra  Coflex 2 layer compression wrap    DO NOT GET WRAP WET    Follow up with Dr. Darby 1 WEEK        Managing your edema:    Avoid prolonged standing in one place. It is better to have your calf muscles moving to pump fluid out of the legs.  Elevate leg(s) above the level of the heart when sitting or as much as possible.  Take you diuretics as directed by your physician. Do not skip doses or change doses unless instructed to do so by your physician.  Decrease salt intake, follow recommended 2 grams daily.  Do not get leg(s) with compression wrap wet. If wraps are too tight as indicated by pain, numbness/tingling or discoloration of toes remove wrap completely and call the wound center @ [phone].       The treatment plan has been discussed at length between you and your provider. Follow all instructions carefully, it is very important. If you do not follow all instructions you are at risk of your wound not healing, infection, possible loss of limb and even loss of life.    COMPRESSION WRAP PATIENT CARE INSTRUCTIONS  DO NOT get compression wrap wet. When showering, use a shower boot.   Please contact wound clinic and if not able to reach, then go to emergency room if ANY of the following occur:   Tingling or numbness in the foot or toes on leg with compression wrap.  Severe pain that cannot be relieved with elevation and any medication instructed per your doctor.  A fever of 100.4°F (38°C) or higher.  Swelling, coldness, or blue-gray discoloration of the toes.  If the compression wrap feels too tight or too loose.  If the compression wrap is damaged or has rough edges that hurt.  If the compression wrap gets wet, you must cut wrap off.   Drainage from compression wrap that smells different than usual.

## 2024-02-17 ENCOUNTER — APPOINTMENT (OUTPATIENT)
Dept: CT IMAGING | Facility: HOSPITAL | Age: 64
End: 2024-02-17
Payer: COMMERCIAL

## 2024-02-17 ENCOUNTER — APPOINTMENT (OUTPATIENT)
Dept: CT IMAGING | Facility: HOSPITAL | Age: 64
End: 2024-02-17
Attending: Other
Payer: COMMERCIAL

## 2024-02-17 ENCOUNTER — HOSPITAL ENCOUNTER (INPATIENT)
Facility: HOSPITAL | Age: 64
LOS: 8 days | Discharge: NURSING, REHAB OR LONG-TERM CARE | End: 2024-02-25
Attending: EMERGENCY MEDICINE | Admitting: HOSPITALIST
Payer: COMMERCIAL

## 2024-02-17 ENCOUNTER — APPOINTMENT (OUTPATIENT)
Dept: CT IMAGING | Facility: HOSPITAL | Age: 64
End: 2024-02-17
Attending: EMERGENCY MEDICINE
Payer: COMMERCIAL

## 2024-02-17 ENCOUNTER — APPOINTMENT (OUTPATIENT)
Dept: GENERAL RADIOLOGY | Facility: HOSPITAL | Age: 64
End: 2024-02-17
Attending: Other
Payer: COMMERCIAL

## 2024-02-17 DIAGNOSIS — S06.320A INTRAPARENCHYMAL HEMATOMA OF BRAIN, LEFT, WITHOUT LOSS OF CONSCIOUSNESS, INITIAL ENCOUNTER (HCC): Primary | ICD-10-CM

## 2024-02-17 PROBLEM — I25.810 CORONARY ARTERY DISEASE INVOLVING CORONARY BYPASS GRAFT OF NATIVE HEART: Status: ACTIVE | Noted: 2024-01-01

## 2024-02-17 PROBLEM — I25.810 CORONARY ARTERY DISEASE INVOLVING CORONARY BYPASS GRAFT OF NATIVE HEART: Status: ACTIVE | Noted: 2024-02-17

## 2024-02-17 PROBLEM — I50.20 HFREF (HEART FAILURE WITH REDUCED EJECTION FRACTION) (HCC): Status: ACTIVE | Noted: 2024-02-17

## 2024-02-17 PROBLEM — I82.4Y2 DEEP VEIN THROMBOSIS (DVT) OF PROXIMAL VEIN OF LEFT LOWER EXTREMITY (HCC): Status: ACTIVE | Noted: 2024-01-01

## 2024-02-17 PROBLEM — I82.4Y2 DEEP VEIN THROMBOSIS (DVT) OF PROXIMAL VEIN OF LEFT LOWER EXTREMITY (HCC): Status: ACTIVE | Noted: 2024-02-17

## 2024-02-17 PROBLEM — I50.20 HFREF (HEART FAILURE WITH REDUCED EJECTION FRACTION) (HCC): Status: ACTIVE | Noted: 2024-01-01

## 2024-02-17 LAB
ALBUMIN SERPL-MCNC: 3.7 G/DL (ref 3.4–5)
ALBUMIN/GLOB SERPL: 1 {RATIO} (ref 1–2)
ALP LIVER SERPL-CCNC: 78 U/L
ALT SERPL-CCNC: 36 U/L
ANION GAP SERPL CALC-SCNC: 2 MMOL/L (ref 0–18)
APTT PPP: 30.7 SECONDS (ref 23.3–35.6)
AST SERPL-CCNC: 29 U/L (ref 15–37)
BASOPHILS # BLD AUTO: 0.07 X10(3) UL (ref 0–0.2)
BASOPHILS NFR BLD AUTO: 0.6 %
BILIRUB SERPL-MCNC: 0.7 MG/DL (ref 0.1–2)
BUN BLD-MCNC: 14 MG/DL (ref 9–23)
CALCIUM BLD-MCNC: 9.6 MG/DL (ref 8.5–10.1)
CHLORIDE SERPL-SCNC: 108 MMOL/L (ref 98–112)
CO2 SERPL-SCNC: 28 MMOL/L (ref 21–32)
CREAT BLD-MCNC: 1.25 MG/DL
EGFRCR SERPLBLD CKD-EPI 2021: 65 ML/MIN/1.73M2 (ref 60–?)
EOSINOPHIL # BLD AUTO: 0.36 X10(3) UL (ref 0–0.7)
EOSINOPHIL NFR BLD AUTO: 3 %
ERYTHROCYTE [DISTWIDTH] IN BLOOD BY AUTOMATED COUNT: 12.5 %
GLOBULIN PLAS-MCNC: 3.8 G/DL (ref 2.8–4.4)
GLUCOSE BLD-MCNC: 86 MG/DL (ref 70–99)
GLUCOSE BLD-MCNC: 93 MG/DL (ref 70–99)
HCT VFR BLD AUTO: 44.9 %
HGB BLD-MCNC: 15.1 G/DL
IMM GRANULOCYTES # BLD AUTO: 0.05 X10(3) UL (ref 0–1)
IMM GRANULOCYTES NFR BLD: 0.4 %
INR BLD: 1.07 (ref 0.8–1.2)
LYMPHOCYTES # BLD AUTO: 2.87 X10(3) UL (ref 1–4)
LYMPHOCYTES NFR BLD AUTO: 24 %
MCH RBC QN AUTO: 31.5 PG (ref 26–34)
MCHC RBC AUTO-ENTMCNC: 33.6 G/DL (ref 31–37)
MCV RBC AUTO: 93.7 FL
MONOCYTES # BLD AUTO: 0.78 X10(3) UL (ref 0.1–1)
MONOCYTES NFR BLD AUTO: 6.5 %
MRSA DNA SPEC QL NAA+PROBE: NEGATIVE
NEUTROPHILS # BLD AUTO: 7.82 X10 (3) UL (ref 1.5–7.7)
NEUTROPHILS # BLD AUTO: 7.82 X10(3) UL (ref 1.5–7.7)
NEUTROPHILS NFR BLD AUTO: 65.5 %
OSMOLALITY SERPL CALC.SUM OF ELEC: 286 MOSM/KG (ref 275–295)
PLATELET # BLD AUTO: 204 10(3)UL (ref 150–450)
POTASSIUM SERPL-SCNC: 4.1 MMOL/L (ref 3.5–5.1)
PROT SERPL-MCNC: 7.5 G/DL (ref 6.4–8.2)
PROTHROMBIN TIME: 14 SECONDS (ref 11.6–14.8)
RBC # BLD AUTO: 4.79 X10(6)UL
SARS-COV-2 RNA RESP QL NAA+PROBE: NOT DETECTED
SODIUM SERPL-SCNC: 138 MMOL/L (ref 136–145)
TROPONIN I SERPL HS-MCNC: 32 NG/L
WBC # BLD AUTO: 12 X10(3) UL (ref 4–11)

## 2024-02-17 PROCEDURE — 70450 CT HEAD/BRAIN W/O DYE: CPT | Performed by: OTHER

## 2024-02-17 PROCEDURE — 71045 X-RAY EXAM CHEST 1 VIEW: CPT | Performed by: OTHER

## 2024-02-17 PROCEDURE — 99291 CRITICAL CARE FIRST HOUR: CPT | Performed by: OTHER

## 2024-02-17 PROCEDURE — XW03372 INTRODUCTION OF INACTIVATED COAGULATION FACTOR XA INTO PERIPHERAL VEIN, PERCUTANEOUS APPROACH, NEW TECHNOLOGY GROUP 2: ICD-10-PCS | Performed by: EMERGENCY MEDICINE

## 2024-02-17 PROCEDURE — 70450 CT HEAD/BRAIN W/O DYE: CPT | Performed by: EMERGENCY MEDICINE

## 2024-02-17 PROCEDURE — 99223 1ST HOSP IP/OBS HIGH 75: CPT | Performed by: HOSPITALIST

## 2024-02-17 RX ORDER — HYDRALAZINE HYDROCHLORIDE 20 MG/ML
10 INJECTION INTRAMUSCULAR; INTRAVENOUS EVERY 2 HOUR PRN
Status: DISCONTINUED | OUTPATIENT
Start: 2024-02-17 | End: 2024-02-25

## 2024-02-17 RX ORDER — COVID-19 ANTIGEN TEST
220 KIT MISCELLANEOUS DAILY PRN
COMMUNITY
End: 2024-02-25

## 2024-02-17 RX ORDER — METOCLOPRAMIDE HYDROCHLORIDE 5 MG/ML
10 INJECTION INTRAMUSCULAR; INTRAVENOUS EVERY 8 HOURS PRN
Status: DISCONTINUED | OUTPATIENT
Start: 2024-02-17 | End: 2024-02-25

## 2024-02-17 RX ORDER — MELATONIN
3 NIGHTLY PRN
Status: DISCONTINUED | OUTPATIENT
Start: 2024-02-17 | End: 2024-02-25

## 2024-02-17 RX ORDER — ONDANSETRON 2 MG/ML
4 INJECTION INTRAMUSCULAR; INTRAVENOUS EVERY 6 HOURS PRN
Status: DISCONTINUED | OUTPATIENT
Start: 2024-02-17 | End: 2024-02-17

## 2024-02-17 RX ORDER — LABETALOL HYDROCHLORIDE 5 MG/ML
10 INJECTION, SOLUTION INTRAVENOUS EVERY 10 MIN PRN
Status: COMPLETED | OUTPATIENT
Start: 2024-02-17 | End: 2024-02-17

## 2024-02-17 RX ORDER — ACETAMINOPHEN 650 MG/1
650 SUPPOSITORY RECTAL EVERY 4 HOURS PRN
Status: DISCONTINUED | OUTPATIENT
Start: 2024-02-17 | End: 2024-02-25

## 2024-02-17 RX ORDER — ONDANSETRON 2 MG/ML
4 INJECTION INTRAMUSCULAR; INTRAVENOUS EVERY 6 HOURS PRN
Status: DISCONTINUED | OUTPATIENT
Start: 2024-02-17 | End: 2024-02-25

## 2024-02-17 RX ORDER — PROCHLORPERAZINE EDISYLATE 5 MG/ML
5 INJECTION INTRAMUSCULAR; INTRAVENOUS EVERY 8 HOURS PRN
Status: DISCONTINUED | OUTPATIENT
Start: 2024-02-17 | End: 2024-02-17

## 2024-02-17 RX ORDER — ACETAMINOPHEN 500 MG
500 TABLET ORAL EVERY 4 HOURS PRN
Status: DISCONTINUED | OUTPATIENT
Start: 2024-02-17 | End: 2024-02-17

## 2024-02-17 RX ORDER — SPIRONOLACTONE 25 MG/1
12.5 TABLET ORAL DAILY
COMMUNITY
End: 2024-02-25

## 2024-02-17 RX ORDER — ONDANSETRON 2 MG/ML
4 INJECTION INTRAMUSCULAR; INTRAVENOUS EVERY 4 HOURS PRN
Status: DISCONTINUED | OUTPATIENT
Start: 2024-02-17 | End: 2024-02-17

## 2024-02-17 RX ORDER — ECHINACEA PURPUREA EXTRACT 125 MG
1 TABLET ORAL
Status: DISCONTINUED | OUTPATIENT
Start: 2024-02-17 | End: 2024-02-17

## 2024-02-17 RX ORDER — ACETAMINOPHEN 325 MG/1
650 TABLET ORAL EVERY 4 HOURS PRN
Status: DISCONTINUED | OUTPATIENT
Start: 2024-02-17 | End: 2024-02-25

## 2024-02-17 RX ORDER — SODIUM CHLORIDE 9 MG/ML
INJECTION, SOLUTION INTRAVENOUS CONTINUOUS
Status: DISCONTINUED | OUTPATIENT
Start: 2024-02-17 | End: 2024-02-18

## 2024-02-17 RX ORDER — BISACODYL 10 MG
10 SUPPOSITORY, RECTAL RECTAL
Status: DISCONTINUED | OUTPATIENT
Start: 2024-02-17 | End: 2024-02-25

## 2024-02-17 NOTE — H&P
[facility]     Kris Colvin Patient Status:  Emergency    3/6/1960 MRN [id number]   Location [facility] EMERGENCY DEPARTMENT Attending King Murray*   Hosp Day # 0 PCP Lenka uGevara DO     Chief Complaint: Right sided weakness     Subjective:    History of Present Illness:     Kris Colvin is a 63 year old male with of HTN, CHAZ, previous stroke, previous dvt who presented to the ER with right sided weakness.  Patient was on the toilet when he suddenly couldn't move his right arm.  He scooted onto the ground. He also noticed he couldn't speak very well.  He denies cp, sob, fever, chills, n/v.  No other acute complaints.     History/Other:    Past Medical History:  Past Medical History:   Diagnosis Date    High blood pressure     High cholesterol     Obstructive sleep apnea, adult [place] 16    autoPAP 5-12     Stroke (HCC)      Past Surgical History:   Past Surgical History:   Procedure Laterality Date    CABG      COLONOSCOPY N/A 2018    Procedure: COLONOSCOPY, POSSIBLE BIOPSY, POSSIBLE POLYPECTOMY 09449;  Surgeon: Zack Giordano MD;  Location: [facility]      Family History:   Family History   Problem Relation Age of Onset    Breast Cancer Mother     Diabetes Father     Diabetes Maternal Grandmother     Diabetes Paternal Grandmother      Social History:    reports that he has never smoked. He has never used smokeless tobacco. He reports that he does not currently use alcohol after a past usage of about 1.7 standard drinks of alcohol per week. He reports that he does not use drugs.     Allergies: No Known Allergies    Medications:    No current facility-administered medications on file prior to encounter.     Current Outpatient Medications on File Prior to Encounter   Medication Sig Dispense Refill    atorvastatin 80 MG Oral Tab TAKE 1 TABLET BY MOUTH EVERY NIGHT 90 tablet 0    rivaroxaban (XARELTO) 20 MG Oral Tab Take 1 tablet (20 mg total) by  mouth daily with food. 30 tablet 0    valsartan 160 MG Oral Tab Take 1 tablet (160 mg total) by mouth daily.      Prednisolon-Moxiflox-Bromfenac 1-0.5-0.075 % Ophthalmic Solution 1 drop Ophthalmic ONCE A DAY for 28 days  1 DROP      MULTAQ 400 MG Oral Tab Take 1 tablet (400 mg total) by mouth 2 (two) times daily.      carvedilol 6.25 MG Oral Tab Take 1 tablet (6.25 mg total) by mouth 2 (two) times daily.      aspirin 81 MG Oral Tab Take 1 tablet (81 mg total) by mouth daily.      Multiple Vitamins-Minerals (MULTI FOR HER 50+) Oral Tab Take by mouth daily.           Review of Systems:   A comprehensive review of systems was completed.    Pertinent positives and negatives noted in the HPI.    Objective:   Physical Exam:    BP (!) 174/102   Pulse 68   Temp 98 °F (36.7 °C)   Resp 12   SpO2 97%   General: No acute distress, Alert, pleasant   Respiratory: No rhonchi, no wheezes  Cardiovascular: S1, S2. Regular rate and rhythm  Abdomen: Soft, Non-tender, non-distended, positive bowel sounds  Neuro: Right arm power 1/5, LE 4/5, with right facial droop and slurred speech  Extremities: No edema    Results:    Labs:      Labs Last 24 Hours:    No results for input(s): \"RBC\", \"HGB\", \"HCT\", \"MCV\", \"MCH\", \"MCHC\", \"RDW\", \"NEPRELIM\", \"WBC\", \"PLT\" in the last 168 hours.    No results for input(s): \"GLU\", \"BUN\", \"CREATSERUM\", \"GFRAA\", \"GFRNAA\", \"EGFRCR\", \"CA\", \"ALB\", \"NA\", \"K\", \"CL\", \"CO2\", \"ALKPHO\", \"AST\", \"ALT\", \"BILT\", \"TP\" in the last 168 hours.    Lab Results   Component Value Date    INR 1.80 (H) 10/22/2020    INR 1.70 (H) 07/09/2020    INR 1.40 (H) 07/01/2020       No results for input(s): \"TROP\", \"TROPHS\", \"CK\" in the last 168 hours.    No results for input(s): \"TROP\", \"PBNP\" in the last 168 hours.    No results for input(s): \"PCT\" in the last 168 hours.    Imaging: Imaging data reviewed in Epic.    Assessment & Plan:      #Acute intraparenchymal hemorrhage   CT noted findings above - mild mass effect, no midline  shift  Admit to neuro critical care unit  Neuro checks q1hr  NPO, Speech, PT/OT  Nicardipine for SBP goal 130-140  Andexxa being given for xarelto reversal   Neuro, Neuro surgery consult    #CAD s/p CABG - hold aspirin given bleed  Hold lipitor, coreg, valsartan until speech eval    #Essential HTN - valsartan, coreg on hold    #Hx of DVT - hold xarelto     #HLD - satin - resume once speech eval    #Chronic LE wounds - follows with outpatient wound care       Plan of care discussed with patient, wife, er physician, nurse     Rex Tim MD    Supplementary Documentation:     The 21st Century Cures Act makes medical notes like these available to patients in the interest of transparency. Please be advised this is a medical document. Medical documents are intended to carry relevant information, facts as evident, and the clinical opinion of the practitioner. The medical note is intended as peer to peer communication and may appear blunt or direct. It is written in medical language and may contain abbreviations or verbiage that are unfamiliar.

## 2024-02-17 NOTE — ED PROVIDER NOTES
Patient Seen in: [facility] Emergency Department      History     Chief Complaint   Patient presents with    Stroke     Stated Complaint: right side deficit 0930    Subjective:   HPI    63-year-old male with past medical history of paroxysmal A-fib on Xarelto, previous CVA with no residual deficits, hypertension, hyperlipidemia presents today for concern of potential stroke.  Patient's wife left the home at 9:30 AM and he was at his baseline health.  He estimates that around 10:30 AM, he was using the washroom and he began noticing right-sided weakness.  Patient's wife returned home approximately 30 minutes prior to arrival and found him on the ground.  Patient denies any headache, vomiting.    Objective:   Past Medical History:   Diagnosis Date    Cataract     High blood pressure     High cholesterol     Obstructive sleep apnea, adult [place] 6-20-16    autoPAP 5-12     Stroke (HCC) 2015              Past Surgical History:   Procedure Laterality Date    CABG      CATARACT      COLONOSCOPY N/A 11/29/2018    Procedure: COLONOSCOPY, POSSIBLE BIOPSY, POSSIBLE POLYPECTOMY 47313;  Surgeon: Zack Giordano MD;  Location: [facility]                Social History     Socioeconomic History    Marital status:    Tobacco Use    Smoking status: Never    Smokeless tobacco: Never   Vaping Use    Vaping Use: Never used   Substance and Sexual Activity    Alcohol use: Not Currently     Alcohol/week: 1.7 standard drinks of alcohol     Types: 2 Cans of beer per week     Comment: Occastional    Drug use: No   Other Topics Concern    Caffeine Concern No     Comment: occasionally    Exercise No     Social Determinants of Health     Food Insecurity: No Food Insecurity (2/17/2024)    Food Insecurity     Food Insecurity: Never true   Transportation Needs: No Transportation Needs (2/17/2024)    Transportation Needs     Lack of Transportation: No   Housing Stability: Low Risk  (2/17/2024)    Housing Stability     Housing  Instability: No              Review of Systems    Positive for stated complaint: right side deficit 0930  Other systems are as noted in HPI.  Constitutional and vital signs reviewed.      All other systems reviewed and negative except as noted above.    Physical Exam     ED Triage Vitals   BP 02/17/24 1306 (!) 185/115   Pulse 02/17/24 1306 67   Resp 02/17/24 1306 14   Temp 02/17/24 1306 98 °F (36.7 °C)   Temp src 02/17/24 1500 Temporal   SpO2 02/17/24 1306 97 %   O2 Device 02/17/24 1306 None (Room air)       Current:/67 (BP Location: Left arm)   Pulse 81   Temp 97.9 °F (36.6 °C) (Temporal)   Resp 14   Wt (!) 137.4 kg   SpO2 94%   BMI 37.86 kg/m²         Physical Exam  Vitals and nursing note reviewed.   Constitutional:       Appearance: Normal appearance.   HENT:      Head: Normocephalic and atraumatic.      Nose: Nose normal.      Mouth/Throat:      Mouth: Mucous membranes are moist.   Eyes:      Extraocular Movements: Extraocular movements intact.      Pupils: Pupils are equal, round, and reactive to light.   Cardiovascular:      Rate and Rhythm: Normal rate and regular rhythm.   Pulmonary:      Effort: Pulmonary effort is normal.      Breath sounds: Normal breath sounds.   Abdominal:      Palpations: Abdomen is soft.      Tenderness: There is no abdominal tenderness.   Musculoskeletal:         General: Normal range of motion.      Cervical back: Neck supple.   Skin:     General: Skin is warm.   Neurological:      General: No focal deficit present.      Mental Status: He is alert.      Comments: Right-sided facial droop.  No significant dysarthria or aphasia.  0 out of 5 right upper extremity strength.  4-5 right lower extremity strength.  Gross sensation intact.   Psychiatric:         Mood and Affect: Mood normal.           ED Course     Labs Reviewed   CBC W/ DIFFERENTIAL - Abnormal; Notable for the following components:       Result Value    WBC 12.0 (*)     Neutrophil Absolute Prelim 7.82 (*)      Neutrophil Absolute 7.82 (*)     All other components within normal limits   COMP METABOLIC PANEL (14) - Normal   PROTHROMBIN TIME (PT) - Normal   PTT, ACTIVATED - Normal   TROPONIN I HIGH SENSITIVITY - Normal   POCT GLUCOSE - Normal   RAPID SARS-COV-2 BY PCR - Normal   ED/MRSA SCREEN BY PCR-CC - Normal   CBC WITH DIFFERENTIAL WITH PLATELET    Narrative:     The following orders were created for panel order CBC With Differential With Platelet.  Procedure                               Abnormality         Status                     ---------                               -----------         ------                     CBC W/ DIFFERENTIAL[[id number]]          Abnormal            Final result                 Please view results for these tests on the individual orders.   RAINBOW DRAW LAVENDER   RAINBOW DRAW LIGHT GREEN   RAINBOW DRAW BLUE   RAINBOW DRAW GOLD     EKG    Rate, intervals and axes as noted on EKG Report.  Rate: 64  Rhythm: Sinus Rhythm  Reading: No STEMI                    XR CHEST AP PORTABLE  (CPT=71045)    Result Date: 2/17/2024  PROCEDURE:  XR CHEST AP PORTABLE  (CPT=71045)  TECHNIQUE:  AP chest radiograph was obtained.  COMPARISON:  [facility] , XR, XR CHEST DECUBITUS BILAT INCL PA AND LAT(4 VIEWS)(CPT=71048), 6/22/2020, 7:22 AM.  [facility] , XR, XR CHEST AP PORTABLE  (CPT=71045), 6/15/2020, 11:43 AM.  INDICATIONS:  hx of HF, admission for stroke, holding meds  PATIENT STATED HISTORY: (As transcribed by Technologist)  Patient offered no additional history at this time.    FINDINGS:  There is cardiomegaly, which is similar to prior.  No new focal consolidation..  No pleural effusion.  No pneumothorax.  Median sternotomy wires.  No new osseous findings.            CONCLUSION:  No acute cardiopulmonary findings.   LOCATION:  [facility]      Dictated by (CST): Edmond Dent MD on 2/17/2024 at 4:21 PM     Finalized by (CST): Edmond Dent MD on 2/17/2024 at 4:22 PM       CT STROKE BRAIN (NO IV)(CPT=70450)    Result  Date: 2/17/2024  PROCEDURE:  CT STROKE BRAIN (NO IV)(CPT=70450)  COMPARISON:  [facility] , CT, CT BRAIN OR HEAD (30712), 3/03/2015, 1:49 PM.  INDICATIONS:  right side deficit 0930  TECHNIQUE:  Noncontrast CT scanning is performed through the brain.  Dose reduction techniques were used. Dose information is transmitted to the ACR (American College of Radiology) NRDR (National Radiology Data Registry) which includes the Dose Index Registry.  PATIENT STATED HISTORY: (As transcribed by Technologist)  Patient is here for stroke, with right sided deficits.    FINDINGS:  There is an acute intraparenchymal hemorrhage within the insular region of the right parietal lobe measuring 2.6 x 3.0 x 2.2 cm in maximal dimensions.  Mild associated mass effect.  No midline shift.  Mild diffuse atrophy and white matter disease noted.  The bony calvarium is intact.  Paranasal sinuses and mastoid air cells are well pneumatized.            CONCLUSION:  1. Acute intraparenchymal hemorrhage within the insular region of the right parietal lobe measuring 2.6 x 3.0 x 2.2 cm.  Mild associated mass effect.  No midline shift. 2. Mild diffuse atrophy and white matter disease consistent with chronic small vessel ischemic changes. 3. The findings of this critical value study were discussed with the ordering ER physician, Dr. Murray on 2/17/2024 at 1324 hours.  Read back was performed.    LOCATION:  [facility]   Dictated by (CST): Brian Bhatt DO on 2/17/2024 at 1:21 PM     Finalized by (CST): Brian Bhatt DO on 2/17/2024 at 1:25 PM               MDM      Differential Diagnosis  63-year-old male presents today for evaluation of a possible CVA.  His last normal was approximately 10:30 AM according the patient.  His wife last saw him at 9:30 AM.  Presently, he is 0 out of 5 strength in the right upper extremity.  He has right-sided facial droop.  He has 4/5 strength in right lower extremity.  His gross sensation is intact.  His speech seems clear without  significant dysarthria or aphasia.  Concern for CVA, intracranial hemorrhage or TIA.  Plan for CT head.  Stroke navigator assisting with care.  Patient took his Xarelto this morning, he will not be candidate for thrombolytics given his anticoagulant use.    1:26 pm  Patient noted to have a left-sided intraparenchymal hemorrhage.  I consulted with pharmacy, will give Andexxa and start patient on Cardene drip for blood pressure control.  Neurosurgery Dr. Guadarrama along with critical care Dr. Weeks notified of need for ICU admission.  I spoke with the hospitalist in regards to admission.    2:45 pm   systolic.  Patient stable.  Wife states he had a slight flicker in his arm which is improved from previous.  Will admit to the ICU for continued close monitoring and care.    A total of 30 minutes of critical care time (exclusive of billable procedures) was administered to manage the patient's critical imaging findings and neurologic instability due to his intraparenchymal hemorrhage.  This involved direct patient intervention, complex decision making, and/or extensive discussions with the patient, family, and clinical staff.    History from [facility]  Patient's wife helps supplement history    Discussions of Management  Case reviewed with neurosurgery, neuro ICU along with the hospitalist    Independent Interpretation  I independently interpreted the CT scan, patient with left-sided intraparenchymal hemorrhage  Admission disposition: 2/17/2024  1:34 PM                                        Medical Decision Making      Disposition and Plan     Clinical Impression:  1. Intraparenchymal hematoma of brain, left, without loss of consciousness, initial encounter ([facility])         Disposition:  Admit  2/17/2024  1:34 pm    Follow-up:  No follow-up provider specified.        Medications Prescribed:  Current Discharge Medication List                            Hospital Problems       Present on Admission  Date  Reviewed: 1/18/2024            ICD-10-CM Noted POA    * (Principal) Intraparenchymal hematoma of brain, left, without loss of consciousness, initial encounter ([facility]) S06.320A 2/17/2024 Unknown    Coronary artery disease involving coronary bypass graft of native heart I25.810 2/17/2024 Unknown    Deep vein thrombosis (DVT) of proximal vein of left lower extremity ([facility]) I82.4Y2 2/17/2024 Unknown    HFrEF (heart failure with reduced ejection fraction) ([facility]) I50.20 2/17/2024 Unknown    Hyperlipidemia E78.5 Unknown Yes

## 2024-02-17 NOTE — CONSULTS
[facility]   NEUROCRITICAL CARE   CONSULT NOTE    Admission date: 2/17/2024  Reason for Consult: ICH  Chief Complaint:   Chief Complaint   Patient presents with    Stroke   ________________________________________________________________    History     History of Presenting Illness  63 year old male with PMH of HTN, CHAZ, prior stroke, DVT on AC, CAD s/p CABG p/w R weakness.  Stroke code was called, initial NIH was 6.  CT head demonstrating a left insular ICH. . AC was reversed with Andexxa. Started on Cardene for BP control.  Patient states that he was in the washroom and when he tried to stand up he could not lift himself up completely so slowly slid down to the floor.  Was down for about an hour and a half before help arrived.  States blood pressure typically is in the 140s.  EF has improved in the past few years up to 40% per his discussions with his cardiologist.    Past Medical History:   Diagnosis Date    High blood pressure     High cholesterol     Obstructive sleep apnea, adult [place] 6-20-16    autoPAP 5-12     Stroke (HCC) 2015     Past Surgical History:   Procedure Laterality Date    CABG      COLONOSCOPY N/A 11/29/2018    Procedure: COLONOSCOPY, POSSIBLE BIOPSY, POSSIBLE POLYPECTOMY 91812;  Surgeon: Zack Giordano MD;  Location: [facility]     Social History     Socioeconomic History    Marital status:    Tobacco Use    Smoking status: Never    Smokeless tobacco: Never   Vaping Use    Vaping Use: Never used   Substance and Sexual Activity    Alcohol use: Not Currently     Alcohol/week: 1.7 standard drinks of alcohol     Types: 2 Cans of beer per week     Comment: Occastional    Drug use: No   Other Topics Concern    Caffeine Concern No     Comment: occasionally    Exercise No     Family History   Problem Relation Age of Onset    Breast Cancer Mother     Diabetes Father     Diabetes Maternal Grandmother     Diabetes Paternal Grandmother      Allergies No Known  Allergies    Home Meds  Current Outpatient Medications   Medication Instructions    aspirin 81 mg, Oral, Daily    atorvastatin 80 MG Oral Tab TAKE 1 TABLET BY MOUTH EVERY NIGHT    carvedilol (COREG) 6.25 mg, Oral, 2 times daily    Multaq 400 mg, Oral, 2 times daily    Multiple Vitamins-Minerals (MULTI FOR HER 50+) Oral Tab 1 tablet, Oral, Daily    Naproxen Sodium (ALEVE) 220 mg, Oral, DAILY PRN    rivaroxaban (XARELTO) 20 mg, Oral, Daily with food    spironolactone (ALDACTONE) 12.5 mg, Oral, Daily    valsartan (DIOVAN) 160 mg, Oral, Daily     Scheduled Meds:   coagulation factor Xa inactivated-zhzo (Andexxa-andexanet janusz) 960 mg in 96 mL infusion (HIGH DOSE)  960 mg Intravenous Once     Continuous Infusions:   niCARdipine 10 mg/hr (02/17/24 1429)     PRN Meds:    OBJECTIVE   VITAL SIGNS:   Temp:  [98 °F (36.7 °C)] 98 °F (36.7 °C)  Pulse:  [59-77] 72  Resp:  [12-26] 26  BP: (144-195)/() 148/95  SpO2:  [97 %-99 %] 98 %    INTAKE/OUTPUT:  Intake/Output Summary (Last 24 hours) at 2/17/2024 1436  Last data filed at 2/17/2024 1429  Gross per 24 hour   Intake 80 ml   Output --   Net 80 ml     PHYSICAL EXAM:  GENERAL APPEARANCE: Patient resting comfortably in bed, NAD  HEENT: Normocephalic, atraumatic.   LUNGS: Normal respiratory rate and effort   HEART: Regular rate and rhythm   ABDOMEN: Soft. No tenderness, guarding, or rebound.   EXTREMTIEIS: LLE wrapped, L calf wound present on admission     NEUROLOGIC:    Mental Status:  A&O x 4, Follows simple commands, no obvious aphasia, moderate dysarthria  Cranial nerves: PERRL.  Visual fields full.  EOMI.  R facial droop.  Hearing grossly intact. Tongue midline   Motor: Drift:  RLE, distally 5/5, RUE 2-/5. L strong  Sensation: Intact to light touch bilaterally  Cerebellar: Normal Finger-To-Nose test on the left, unable to complete on the right     LABORATORY DATA:  Last 24 hour labs were reviewed in detail.  Recent Labs   Lab 02/17/24  1346      K 4.1      CO2  28.0   GLU 93   BUN 14   CREATSERUM 1.25     Recent Labs   Lab 02/17/24  1346   WBC 12.0*   HGB 15.1   .0     Recent Labs   Lab 02/17/24  1346   ALT 36   AST 29     No results for input(s): \"MG\", \"PHOS\" in the last 168 hours.    Radiology:    CT STROKE BRAIN (NO IV)(CPT=70450)    Result Date: 2/17/2024  CONCLUSION:  1. Acute intraparenchymal hemorrhage within the insular region of the right parietal lobe measuring 2.6 x 3.0 x 2.2 cm.  Mild associated mass effect.  No midline shift. 2. Mild diffuse atrophy and white matter disease consistent with chronic small vessel ischemic changes. 3. The findings of this critical value study were discussed with the ordering ER physician, Dr. Murray on 2/17/2024 at 1324 hours.  Read back was performed.    LOCATION:  [facility]   Dictated by (CST): Brian Bhatt DO on 2/17/2024 at 1:21 PM     Finalized by (CST): Brian Bhatt DO on 2/17/2024 at 1:25 PM      ASSESSMENT/PLAN   63 year old male with PMH of HTN, CHAZ, prior stroke, DVT on AC, CAD s/p CABG p/w L ICH.    Neurological:  Left insular intracerebral Hemorrhage, PBD 0   - After initial brain imaging,  ICH Score 0   - CT Head as above   - Coags wnl   - S/p AC reversal with Andexxa   - Repeat CT in 6 hours for stability   - Goal Plt > 100, INR < 1.5    - SBP goal < 150   - Nsg following, appreciate recs    - PT/OT/SLP evals    Cardiovascular:  HTN Emergency          - SBP goal as noted above    - Hold home medications   - IV labetalol/hydralazine pushes and Nicardipine gtt prn      CAD s/p CABG  HFrEF   - ECHO on 3/21 with EF of 35%   - Daily Weights, Strict I/Os   - Hold home antiplatelet medication and home heart failure meds    HLD - Continue home med    Pulmonary:  CHAZ        - Admission CXR pending          - Satting well on RA, encourage IS    - CPAP prn     Renal:         - Monitor I&Os          - IVF while NPO    Gastrointestinal:  Nutrition:        - NPO pending stability scan         - Bowel regimen: ordered  prn    Infectious Disease:   Leukocytosis - Afebrile, likely reactive, continue to monitor     Heme/Onc - Hb goal > 7, continue to monitor daily     Hx of DVT   - Dx in June, AC on hold, hypercoag olivia negative as outpatient   - LE dopplers, possible IVC filter if clots remain   - Wound care for L calf wound     Endocrine: - Accuchecks q6 with SSI prn while NPO     Checklist   - Lines: PIVs   - DVT Prophylaxis: SCDs    - Diet: NPO   - IVF: NS    - Electrolytes: Replete per protocol   - Code Status: FULL    Dispo: CNICU    Greater than 50 minutes of critical care time (exclusive of billable procedures) was administered to manage and/or prevent neurologic instability. This involved direct patient intervention, complex decision making, and/or extensive discussions with the patient, family, and clinical staff.    Makenna Souza MD  Neurocritical Care  [facility]    Disclaimer: This record was dictated using Dragon software. There may be errors due to voice recognition problems that were not realized and corrected during the completion of the note.

## 2024-02-17 NOTE — SIGNIFICANT EVENT
Stroke Alert initiated ED  Last Know Normal at 0930  Pre-morbid MRS 1  Initial NIHSS 7 including facial droop, right arm flaccid, right leg weak  Patient accompanied to CT dept  Repeat NIHSS completed back in ED room    NIH Stroke Scale post CT scan  1a.  Level of consciousness: 0   1b. LOC questions:  0   1c. LOC commands: 0   2.  Best Gaze: 0   3. Visual: 0   4. Facial Palsy: 2 (right facial droop)   5a. Motor left arm: 0   5b.  Motor right arm: 4 (cannot raise arm, cannot wiggle fingers)   6a. Motor left le   6b.  Motor right le (holds for 5 seconds, drifts down slightly)   7. Limb Ataxia: 0   8.  Sensory: 1 (reports sensation mildly decreased to sharp touch on the right leg when compared with the left)   9. Best Language:  0   10. Dysarthria: 1 (slurring present, able to be understood without difficulty)   11. Extinction and Inattention: 0     Total:   9      Other symptoms include hypertensive on arrival.     1319 Dr Weeks and Dr Gray updated on patient's status, imaging results and repeat NIHSS. Per Dr Weeks, patient is not a candidate for TNK. Per Dr Gray, patient is not a candidate for neuro intervention    1323 Dr. Guadarrama notified of new consult. Orders received to keep SBP <140, give andexxa, admit to CNICU, repeat CT scan in 4 hours. Per Dr. Guadarrama no need for surgical intervention at this time. Dr. Murray updated.     Patient and spouse educated on stroke diagnosis, treatment, plan of care, and what to expect during hospitalization; all pertinent questions and concerns addressed    Of note: Patient arrived into the launch pad with significant right facial droop, right arm flaccid, right leg drift. He reports his LKW was 0930, symptom onset shortly after that while in the bathroom.    He was diagnosed with a left DVT in  and has been on xarelto since then. He follows in the wound clinic for the left leg twice a week. He reports that he last took his xarelto this morning.  Patient also reports that he has had a CVA in the past but has no residual deficits since then.     At baseline he is a/ox4, independent with all of his ADL's, ambulates without the use of an assistive device despite the current left leg problems, and he still drives. He works as a  for the [employer]. He says he has recently been under a significant amount of stress at work.    Stroke Alert timing affected by: N/A    Please refer to the Stroke Data Flowsheets for additional information and Stroke Alert response times.

## 2024-02-18 ENCOUNTER — APPOINTMENT (OUTPATIENT)
Dept: CT IMAGING | Facility: HOSPITAL | Age: 64
End: 2024-02-18
Attending: Other
Payer: COMMERCIAL

## 2024-02-18 ENCOUNTER — APPOINTMENT (OUTPATIENT)
Dept: CV DIAGNOSTICS | Facility: HOSPITAL | Age: 64
End: 2024-02-18
Attending: Other
Payer: COMMERCIAL

## 2024-02-18 ENCOUNTER — APPOINTMENT (OUTPATIENT)
Dept: ULTRASOUND IMAGING | Facility: HOSPITAL | Age: 64
End: 2024-02-18
Attending: Other
Payer: COMMERCIAL

## 2024-02-18 PROBLEM — G93.6 VASOGENIC CEREBRAL EDEMA (HCC): Status: ACTIVE | Noted: 2024-02-18

## 2024-02-18 PROBLEM — G93.6 VASOGENIC CEREBRAL EDEMA (HCC): Status: ACTIVE | Noted: 2024-01-01

## 2024-02-18 LAB
ANION GAP SERPL CALC-SCNC: 4 MMOL/L (ref 0–18)
ATRIAL RATE: 64 BPM
BASOPHILS # BLD AUTO: 0.07 X10(3) UL (ref 0–0.2)
BASOPHILS NFR BLD AUTO: 0.8 %
BUN BLD-MCNC: 13 MG/DL (ref 9–23)
CALCIUM BLD-MCNC: 8.6 MG/DL (ref 8.5–10.1)
CHLORIDE SERPL-SCNC: 111 MMOL/L (ref 98–112)
CO2 SERPL-SCNC: 28 MMOL/L (ref 21–32)
CREAT BLD-MCNC: 0.94 MG/DL
EGFRCR SERPLBLD CKD-EPI 2021: 92 ML/MIN/1.73M2 (ref 60–?)
EOSINOPHIL # BLD AUTO: 0.39 X10(3) UL (ref 0–0.7)
EOSINOPHIL NFR BLD AUTO: 4.3 %
ERYTHROCYTE [DISTWIDTH] IN BLOOD BY AUTOMATED COUNT: 12.5 %
GLUCOSE BLD-MCNC: 103 MG/DL (ref 70–99)
GLUCOSE BLD-MCNC: 154 MG/DL (ref 70–99)
GLUCOSE BLD-MCNC: 94 MG/DL (ref 70–99)
GLUCOSE BLD-MCNC: 96 MG/DL (ref 70–99)
GLUCOSE BLD-MCNC: 96 MG/DL (ref 70–99)
HCT VFR BLD AUTO: 41.6 %
HGB BLD-MCNC: 14.2 G/DL
IMM GRANULOCYTES # BLD AUTO: 0.02 X10(3) UL (ref 0–1)
IMM GRANULOCYTES NFR BLD: 0.2 %
LYMPHOCYTES # BLD AUTO: 2.46 X10(3) UL (ref 1–4)
LYMPHOCYTES NFR BLD AUTO: 27.2 %
MAGNESIUM SERPL-MCNC: 2 MG/DL (ref 1.6–2.6)
MCH RBC QN AUTO: 31.8 PG (ref 26–34)
MCHC RBC AUTO-ENTMCNC: 34.1 G/DL (ref 31–37)
MCV RBC AUTO: 93.3 FL
MONOCYTES # BLD AUTO: 0.81 X10(3) UL (ref 0.1–1)
MONOCYTES NFR BLD AUTO: 9 %
NEUTROPHILS # BLD AUTO: 5.29 X10 (3) UL (ref 1.5–7.7)
NEUTROPHILS # BLD AUTO: 5.29 X10(3) UL (ref 1.5–7.7)
NEUTROPHILS NFR BLD AUTO: 58.5 %
OSMOLALITY SERPL CALC.SUM OF ELEC: 296 MOSM/KG (ref 275–295)
P-R INTERVAL: 132 MS
PLATELET # BLD AUTO: 166 10(3)UL (ref 150–450)
POTASSIUM SERPL-SCNC: 3.9 MMOL/L (ref 3.5–5.1)
Q-T INTERVAL: 432 MS
QRS DURATION: 108 MS
QTC CALCULATION (BEZET): 445 MS
R AXIS: 232 DEGREES
RBC # BLD AUTO: 4.46 X10(6)UL
SODIUM SERPL-SCNC: 143 MMOL/L (ref 136–145)
T AXIS: 72 DEGREES
VENTRICULAR RATE: 64 BPM
WBC # BLD AUTO: 9 X10(3) UL (ref 4–11)

## 2024-02-18 PROCEDURE — 93970 EXTREMITY STUDY: CPT | Performed by: OTHER

## 2024-02-18 PROCEDURE — 99291 CRITICAL CARE FIRST HOUR: CPT | Performed by: OTHER

## 2024-02-18 PROCEDURE — 70450 CT HEAD/BRAIN W/O DYE: CPT | Performed by: OTHER

## 2024-02-18 PROCEDURE — 99291 CRITICAL CARE FIRST HOUR: CPT | Performed by: STUDENT IN AN ORGANIZED HEALTH CARE EDUCATION/TRAINING PROGRAM

## 2024-02-18 PROCEDURE — 99233 SBSQ HOSP IP/OBS HIGH 50: CPT | Performed by: HOSPITALIST

## 2024-02-18 PROCEDURE — 93306 TTE W/DOPPLER COMPLETE: CPT | Performed by: OTHER

## 2024-02-18 RX ORDER — CARVEDILOL 6.25 MG/1
6.25 TABLET ORAL 2 TIMES DAILY
Status: DISCONTINUED | OUTPATIENT
Start: 2024-02-18 | End: 2024-02-25

## 2024-02-18 RX ORDER — ATORVASTATIN CALCIUM 80 MG/1
80 TABLET, FILM COATED ORAL DAILY
Status: DISCONTINUED | OUTPATIENT
Start: 2024-02-18 | End: 2024-02-25

## 2024-02-18 NOTE — PROGRESS NOTES
[facility]   NEUROCRITICAL CARE   PROGRESS NOTE    Admission date: 2/17/2024  Reason for Consult: ICH  Chief Complaint:   Chief Complaint   Patient presents with    Stroke   ________________________________________________________________    SUBJECTIVE     24 Hour Significant Events: CT scan stable.  Speech eval passed.  Weaned off of Cardene.  Chest x-ray without signs of fluid overload.  Lower extremity Dopplers pending    OBJECTIVE   VITAL SIGNS:   Temp:  [97.7 °F (36.5 °C)-98.3 °F (36.8 °C)] 97.7 °F (36.5 °C)  Pulse:  [59-89] 72  Resp:  [10-27] 14  BP: ()/() 141/95  SpO2:  [91 %-99 %] 96 %    INTAKE/OUTPUT:  Intake/Output Summary (Last 24 hours) at 2/18/2024 0921  Last data filed at 2/18/2024 0700  Gross per 24 hour   Intake 1794.92 ml   Output 850 ml   Net 944.92 ml     PHYSICAL EXAM:  GENERAL APPEARANCE: Patient resting comfortably in bed, NAD  HEENT: Normocephalic, atraumatic.   LUNGS: Normal respiratory rate and effort   HEART: Regular rate and rhythm   ABDOMEN: Soft. No tenderness, guarding, or rebound.   EXTREMTIEIS: LLE wrapped, L calf wound present on admission      NEUROLOGIC:    Mental Status:  A&O x 4, Follows simple commands, no obvious aphasia, mild dysarthria  Cranial nerves: PERRL.  Visual fields full.  EOMI.  R facial droop.  Hearing grossly intact. Tongue midline   Motor: Drift:  RLE to bed, distally 4/5, RUE 1/5. L strong  Sensation: Intact to light touch bilaterally  Cerebellar: Normal Finger-To-Nose test on the left, unable to complete on the right      LABORATORY DATA:  Last 24 hour labs were reviewed in detail.  Recent Labs   Lab 02/17/24  1346 02/18/24  0429    143   K 4.1 3.9    111   CO2 28.0 28.0   GLU 93 96   BUN 14 13   CREATSERUM 1.25 0.94     Recent Labs   Lab 02/17/24  1346 02/18/24  0429   WBC 12.0* 9.0   HGB 15.1 14.2   .0 166.0     Recent Labs   Lab 02/17/24  1346   ALT 36   AST 29     Recent Labs   Lab 02/18/24  0429   MG 2.0        Scheduled Meds:   atorvastatin  80 mg Oral Daily    carvedilol  6.25 mg Oral BID     Continuous Infusions:  PRN Meds:melatonin, glycerin-hypromellose-, bisacodyl, acetaminophen **OR** acetaminophen, hydrALAzine, ondansetron **OR** metoclopramide    Radiology:    CT BRAIN OR HEAD (06592)    Result Date: 2/17/2024  CONCLUSION: 1. Stable acute intraparenchymal hemorrhage involving the left insular region.  Stable mild associated mass effect.  LOCATION:  [facility]   Dictated by (CST): Edmond Dent MD on 2/17/2024 at 8:46 PM     Finalized by (CST): Edmond Dent MD on 2/17/2024 at 8:49 PM       XR CHEST AP PORTABLE  (CPT=71045)    Result Date: 2/17/2024  CONCLUSION:  No acute cardiopulmonary findings.   LOCATION:  [facility]      Dictated by (CST): Edmond Dent MD on 2/17/2024 at 4:21 PM     Finalized by (CST): Edmond Dent MD on 2/17/2024 at 4:22 PM       CT STROKE BRAIN (NO IV)(CPT=70450)    Result Date: 2/17/2024  CONCLUSION:  1. Acute intraparenchymal hemorrhage within the insular region of the right parietal lobe measuring 2.6 x 3.0 x 2.2 cm.  Mild associated mass effect.  No midline shift. 2. Mild diffuse atrophy and white matter disease consistent with chronic small vessel ischemic changes. 3. The findings of this critical value study were discussed with the ordering ER physician, Dr. Murray on 2/17/2024 at 1324 hours.  Read back was performed.    LOCATION:  [facility]   Dictated by (CST): Brian Bhatt DO on 2/17/2024 at 1:21 PM     Finalized by (CST): Brian Bhatt DO on 2/17/2024 at 1:25 PM      ASSESSMENT/PLAN   63 year old male with PMH of HTN, CHAZ, prior stroke, DVT on AC, CAD s/p CABG p/w L ICH.     Neurological:  Left insular intracerebral Hemorrhage, PBD 1         - After initial brain imaging,  ICH Score 0, etiology likely hypertensive worsened in setting of anticoagulation use         - CT Head as above, 6-hour scan stable         - Coags wnl         - S/p AC reversal with Andexxa          - Repeat 24-hour stability scan pending this afternoon         - Goal Plt > 100, INR < 1.5          - SBP goal < 150         - Nsg following, appreciate recs          - PT/OT/SLP evals         - No further inpatient neurowork-up needed pending above, follow-up with neuro in 4 to 6 weeks, for call with further questions or concerns     Cardiovascular:  HTN Emergency                      - SBP goal as noted above          - Resume home meds as tolerated         - IV labetalol/hydralazine pushes  prn            CAD s/p CABG  HFrEF         - ECHO on 3/21 with EF of 35%         - Daily Weights, Strict I/Os         - Resume home medications as tolerated         - Hold home antiplatelet medication until cleared by neurosurgery    HLD - Continue home med     Pulmonary:  CHAZ        - Admission CXR without signs of congestion        - Satting well on RA, encourage IS         - CPAP prn      Renal:   - Monitor I&Os       Gastrointestinal:  Nutrition:        - Passed speech eval, ADAT          - Bowel regimen: ordered prn     Infectious Disease:   Leukocytosis - Afebrile, likely reactive, resolved, continue to monitor      Heme/Onc - Hb goal > 7, continue to monitor daily      Hx of DVT         - Dx in June, AC on hold, hypercoag olivia negative as outpatient         - LE dopplers, possible IVC filter if clots remain, no AC until cleared by nsg          - Wound care for L calf wound      Endocrine: - Accuchecks q6 with SSI prn while NPO      Checklist         - Lines: PIVs         - DVT Prophylaxis: SCDs          - Diet: ADAT         - IVF: NS - Dc         - Electrolytes: Replete per protocol         - Code Status: FULL     Dispo: CNICU pending stability scan and hemodynamic stability off of Cardene     Greater than 40 minutes of critical care time (exclusive of billable procedures) was administered to manage and/or prevent neurologic instability. This involved direct patient intervention, complex decision making, and/or extensive  discussions with the patient, family, and clinical staff.     Makenna Souza MD  Neurocritical Care  [facility]     Disclaimer: This record was dictated using Dragon software. There may be errors due to voice recognition problems that were not realized and corrected during the completion of the note.

## 2024-02-18 NOTE — PLAN OF CARE
Problem: NEUROLOGICAL - ADULT  Goal: Achieves stable or improved neurological status  Description: INTERVENTIONS  - Assess for and report changes in neurological status  - Initiate measures to prevent increased intracranial pressure  - Maintain blood pressure and fluid volume within ordered parameters to optimize cerebral perfusion and minimize risk of hemorrhage  - Monitor temperature, glucose, and sodium. Initiate appropriate interventions as ordered  Outcome: Progressing  Goal: Achieves maximal functionality and self care  Description: INTERVENTIONS  - Monitor swallowing and airway patency with patient fatigue and changes in neurological status  - Encourage and assist patient to increase activity and self care with guidance from PT/OT  - Encourage visually impaired, hearing impaired and aphasic patients to use assistive/communication devices  Outcome: Progressing

## 2024-02-18 NOTE — PHYSICAL THERAPY NOTE
PT orders received. Pt on 24 hr bedrest starting 2/17/24 @1530.  Will hold PT until activity orders are upgraded.  RN aware.  Will continue to follow as appropriate.

## 2024-02-18 NOTE — CONSULTS
[facility]  Neurosurgery Consult Note      Kris Colvin  3/6/1960  [id number]  PCP: Lenka Guevara DO  Consulting Provider: Adalberto Santiago MD    REASON FOR CONSULT:  ICH    HISTORY OF PRESENT ILLNESS:  Kris Colvin is a(n) 63 year old male with paroxysmal A-fib on Xarelto, previous CVA with no residual deficits, hypertension, hyperlipidemia who is admitted to the hospital after being found to have a left insular ICH.  Patient and wife who is present at bedside reports that he went to the washroom yesterday morning and was unable to move his right arm suddenly.  Denied any headaches or other neurologic symptoms.  He fell and was down for about an hour until his wife returned home and found him down.  He continues to deny any headaches, nausea, vomiting.  He continues to have some slurred speech and right sided weakness although the slurred speech is somewhat better today.  His anticoagulation was reversed in the ER yesterday, and his had a repeat CT scan demonstrated stability of the small hemorrhage.    PAST MEDICAL HISTORY:  Past Medical History:   Diagnosis Date    Cataract     High blood pressure     High cholesterol     Obstructive sleep apnea, adult [facility] 6-20-16    autoPAP 5-12     Stroke (HCC) 2015     PAST SURGICAL HISTORY:  Past Surgical History:   Procedure Laterality Date    CABG      CATARACT      COLONOSCOPY N/A 11/29/2018    Procedure: COLONOSCOPY, POSSIBLE BIOPSY, POSSIBLE POLYPECTOMY 49240;  Surgeon: Zack Giordano MD;  Location: [facility]     FAMILY HISTORY:  family history includes Breast Cancer in his mother; Diabetes in his father, maternal grandmother, and paternal grandmother.    SOCIAL HISTORY:   reports that he has never smoked. He has never used smokeless tobacco. He reports that he does not currently use alcohol after a past usage of about 1.7 standard drinks of alcohol per week. He reports that he does not use drugs.    ALLERGIES:  No  Known Allergies    MEDICATIONS:  No current facility-administered medications on file prior to encounter.     Current Outpatient Medications on File Prior to Encounter   Medication Sig Dispense Refill    spironolactone 25 MG Oral Tab Take 0.5 tablets (12.5 mg total) by mouth daily.      Naproxen Sodium (ALEVE) 220 MG Oral Cap Take 220 mg by mouth daily as needed.      atorvastatin 80 MG Oral Tab TAKE 1 TABLET BY MOUTH EVERY NIGHT 90 tablet 0    rivaroxaban (XARELTO) 20 MG Oral Tab Take 1 tablet (20 mg total) by mouth daily with food. 30 tablet 0    valsartan 160 MG Oral Tab Take 1 tablet (160 mg total) by mouth daily.      MULTAQ 400 MG Oral Tab Take 1 tablet (400 mg total) by mouth 2 (two) times daily.      carvedilol 6.25 MG Oral Tab Take 1 tablet (6.25 mg total) by mouth 2 (two) times daily.      aspirin 81 MG Oral Tab Take 1 tablet (81 mg total) by mouth daily.      Multiple Vitamins-Minerals (MULTI FOR HER 50+) Oral Tab Take 1 tablet by mouth daily.         REVIEW OF SYSTEMS:  All other systems were reviewed and were negative except for those previously mentioned in the HPI    PHYSICAL EXAMINATION:  General: No acute distress.  Respiratory: Non-labored respirations bilaterally. No audible wheezing  Cardiovascular: Extremities warm and well-perfused.  Abdomen: Soft, nontender, nondistended.   Musculoskeletal: Moves all extremities well, symmetrically.  Extremities: No edema    NEUROLOGIC EXAMINATION:  A&Ox3   PERRL, EOMI   Face symmetric, no numbness   5 out of 5 left hemibody, 1 out of 5 right upper extremity, 4 out of 5 right lower extremity  Sensation decreased to touch in the right hemibody    IMAGING:  CT head 218 2024 x 2: Stable appearance of small left insular ICH without significant brain compression, mass effect, midline shift.    ASSESSMENT:  Mr. Colvin is admitted with a left insular spontaneous ICH, likely hypertensive in nature.  This has remained stable on CT scan.  His anticoagulation was  reversed.  No role for surgical interventions at this time.  Would recommend holding off on restarting anticoagulation for at least 2 weeks.  Will plan to see him back in neurosurgery [facility] clinic with a repeat CT scan at that time prior to restarting any anticoagulation as deemed necessary medically.    Plan:  -No acute neurosurgical invention  -Will plan for outpatient follow-up in 2 weeks with repeat CT scan in neurosurgery [facility] clinic prior to restarting Jonny Guadarrama MD  Neurological Surgery    [address], [address] [place] [id number]  [phone]  Pager [phone]  2/18/2024 10:30 AM      This note was created using a voice-recognition transcribing system. Incorrect words or phrases may have been missed during proofreading. Please interpret accordingly.    Spent a total of 30 minutes of critical care time with greater than 50% including face-to-face counseling, coordination of care, review of imaging, discussion with patient and family regarding results of imaging.

## 2024-02-18 NOTE — PLAN OF CARE
Assumed care of Ori at 1930. Alert/oriented x 4. Mild R facial droop w/ slurring noted, R tongue deviation. R side weak w/ abnormal shrug, RUE 0-1/5 motor strength. Please refer to neuro flow sheet for full assessment & hourly neuro checks. Cardene weaned off at 2000. NSR on tele.  2200- Follow up CT Brain result is stable.  0700- No acute change in LOC & neuro status overnight. Straight cathed at 0700. NPO with Speech Tx to evaluate swallowing.

## 2024-02-18 NOTE — SLP NOTE
ADULT SWALLOWING EVALUATION    ASSESSMENT    ASSESSMENT/OVERALL IMPRESSION:  Order received as per stroke protocol, chart reviewed. Patient diagnosed with left insular ICH. Medical hx includes previous stroke with no residuals deficits, afib, HTN, HL. Patient lives at home with spouse and independent at baseline. Patient remains in CNICU and RN requested for SLP to proceed. Patient NPO.    Patient received awake, alert, and motivated. Spouse present. Patient with right facial asymmetry, decreased labial strength on right. Vocal quality clear. Speech with decreased speech intelligibility and articulatory precision however functional for immediate wants/needs. Communication evaluation to be completed at another visit. Patient with RUE flaccid therefore SLP assisted with PO trials.     Patient exhibited inconsistent minimal anterior spillage on right with puree and thin liquid trials, suspected premature spillage with decreased bolus control, and x1 immediate cough after thin liquid trial. Time spent with patient on education and training in compensatory strategies for bolus hold then swallow, slow rate, and small bolus volume at a time.  Patient demonstrated improved tolerance with no further clinical s/s aspiration observed over multiple trials.  Patient did benefit from intermittent cues for controlled bolus volume. Patient/spouse expressed understanding.  Video swallow study to be completed if CXR declines, increase in clinical signs of aspiration, and/or MD desires.    SLP to follow.      RECOMMENDATIONS   Diet Recommendations - Solids: Regular  Diet Recommendations - Liquids: Thin Liquids - No straws;  Slow rate;  Small cup sip at a time    Compensatory Strategies Recommended: No straws;Slow rate;Small bites and sips  Aspiration Precautions: Upright position;Slow rate;Small bites and sips;No straw  Medication Administration Recommendations: One pill at a time;Whole in puree  Treatment Plan/Recommendations:  Communication evaluation;Aspiration precautions    HISTORY   MEDICAL HISTORY  Reason for Referral: Stroke protocol    Problem List  Principal Problem:    Intraparenchymal hematoma of brain, left, without loss of consciousness, initial encounter ([facility])  Active Problems:    Hyperlipidemia    HFrEF (heart failure with reduced ejection fraction) ([facility])    Coronary artery disease involving coronary bypass graft of native heart    Deep vein thrombosis (DVT) of proximal vein of left lower extremity ([facility])      Past Medical History  Past Medical History:   Diagnosis Date    Cataract     High blood pressure     High cholesterol     Obstructive sleep apnea, adult [facility] [place] 6-20-16    autoPAP 5-12     Stroke ([facility]) 2015       Prior Living Situation: Home with spouse  Diet Prior to Admission: Regular;Thin liquids       Patient/Family Goals: to drink regular liquids    SWALLOWING HISTORY  Current Diet Consistency: NPO  Dysphagia History: none  Imaging Results: CT BRAIN:  Stable acute intraparenchymal hemorrhage involving the left insular region.  Stable mild associated mass effect.     OBJECTIVE   ORAL MOTOR EXAMINATION  Dentition: Functional  Symmetry: Reduced right facial  Strength: Reduced right facial     Range of Motion: Reduced right facial  Rate of Motion: Reduced    Voice Quality: Clear  Respiratory Status: Supplemental O2;Nasal cannula  Consistencies Trialed: Thin liquids;Puree;Hard solid  Method of Presentation: Self presentation  Patient Positioning: Upright;Midline    Oral Phase of Swallow: Impaired  Bolus Retrieval: Intact  Bilabial Seal: Impaired  Bolus Formation: Intact  Bolus Propulsion: Impaired  Mastication: Intact  Retention: Intact    Pharyngeal Phase of Swallow: Within Functional Limits           (Please note: Silent aspiration cannot be evaluated clinically. Videofluoroscopic Swallow Study is required to rule-out silent aspiration.)    Esophageal Phase of Swallow: No complaints consistent with possible  esophageal involvement      GOALS  Goal #1 The patient will tolerate regular consistency and thin liquids without overt signs or symptoms of aspiration with 90 % accuracy over 1-2 session(s).  In Progress   Goal #2 The patient/family/caregiver will demonstrate understanding and implementation of aspiration precautions and swallow strategies independently over 1-2 session(s).    In Progress   Goal #3 Communication evaluation and establish goals New     FOLLOW UP  Treatment Plan/Recommendations: Communication evaluation;Aspiration precautions     Follow Up Needed (Documentation Required): Yes  SLP Follow-up Date: 02/19/24    Thank you for your referral.   If you have any questions, please contact Kandis Perdomo, SLP  Kandis Perdomo, MS CCC-SLP/L, pager [phone]  Speech-Language Pathologist

## 2024-02-18 NOTE — PROGRESS NOTES
[facility]     Hospitalist Progress Note     Kris Colvin Patient Status:  Inpatient    3/6/1960 MRN [id number]   Location [facility] 6NE-A Attending Rex Tim MD   Hosp Day # 1 PCP Lenka Guevara DO     Chief Complaint: Right side weakness     Subjective:     Patient feels better today.  Feels his strength in his leg has improved.  Denies fever, chills, cp, sob.  No other acute complaints.      Objective:    Review of Systems:   A comprehensive review of systems was completed; pertinent positive and negatives stated in subjective.    Vital signs:  Temp:  [97.7 °F (36.5 °C)-98.3 °F (36.8 °C)] 97.8 °F (36.6 °C)  Pulse:  [59-89] 65  Resp:  [10-27] 13  BP: ()/() 142/78  SpO2:  [91 %-99 %] 98 %  FiO2 (%):  [28 %] 28 %    Physical Exam:    General: No acute distress, Alert, pleasant   Respiratory: No rhonchi, no wheezes  Cardiovascular: S1, S2. Regular rate and rhythm  Abdomen: Soft, Non-tender, non-distended, positive bowel sounds  Neuro: Right arm power 1/5, LE 4/5, with right facial droop and slurred speech  Extremities: No edema    Diagnostic Data:    Labs:  Recent Labs   Lab 24  1346 24  0429   WBC 12.0* 9.0   HGB 15.1 14.2   MCV 93.7 93.3   .0 166.0   INR 1.07  --        Recent Labs   Lab 24  1346 24  0429   GLU 93 96   BUN 14 13   CREATSERUM 1.25 0.94   CA 9.6 8.6   ALB 3.7  --     143   K 4.1 3.9    111   CO2 28.0 28.0   ALKPHO 78  --    AST 29  --    ALT 36  --    BILT 0.7  --    TP 7.5  --        Estimated Creatinine Clearance: 97.4 mL/min (based on SCr of 0.94 mg/dL).    Recent Labs   Lab 24  1346   TROPHS 32       Recent Labs   Lab 24  1346   PTP 14.0   INR 1.07                  Microbiology    No results found for this visit on 24.      Imaging: Reviewed in Epic.    Medications:    atorvastatin  80 mg Oral Daily    carvedilol  6.25 mg Oral BID       Assessment & Plan:      #Acute intraparenchymal hemorrhage   CT noted  findings above - mild mass effect, no midline shift  Neuro unit  Neuro checks q1hr  PT/OT/ST  Coreg, off nicardipine   S/p andexxa  Repeat CT today  Neuro, Neuro surgery consult     #CAD s/p CABG - hold aspirin given bleed  Coreg, lipitor      #Essential HTN - valsartan on hold     #Hx of DVT - hold xarelto, may need IVC filter, venous duplex ordered      #HLD - satin - resume once speech eval     #Chronic LE wounds - follows with outpatient wound care       Rex Tim MD    Supplementary Documentation:     Quality:  DVT Mechanical Prophylaxis:   SCDs, Early ambuation  DVT Pharmacologic Prophylaxis   Medication   None                Code Status: Prior  Gaines: No urinary catheter in place  Gaines Duration (in days):   Central line:    ZORA:     Discharge is dependent on: clinical recovery   At this point Mr. Colvin is expected to be discharge to: [facility]    The 21st Century Cures Act makes medical notes like these available to patients in the interest of transparency. Please be advised this is a medical document. Medical documents are intended to carry relevant information, facts as evident, and the clinical opinion of the practitioner. The medical note is intended as peer to peer communication and may appear blunt or direct. It is written in medical language and may contain abbreviations or verbiage that are unfamiliar.

## 2024-02-18 NOTE — DISCHARGE PLANNING
Patient follow-up appointment information:     Visit Type: Stroke follow-up  Date of TIA/Stroke: 2/17/2024  Type of Stroke: Hemorrhagic  Patient to follow-up: 4-6 weeks  OP Neurologist: Any available neurologist  New patient to neurology service: Yes  Anticipated discharge (if known): TBD  Discharge disposition (if known): TBD    Please call Perla (spouse) to make appointment [phone].      RN Stroke Navigator team  [phone]

## 2024-02-19 ENCOUNTER — APPOINTMENT (OUTPATIENT)
Dept: WOUND CARE | Facility: HOSPITAL | Age: 64
End: 2024-02-19
Attending: FAMILY MEDICINE
Payer: COMMERCIAL

## 2024-02-19 LAB
GLUCOSE BLD-MCNC: 101 MG/DL (ref 70–99)
GLUCOSE BLD-MCNC: 104 MG/DL (ref 70–99)
GLUCOSE BLD-MCNC: 85 MG/DL (ref 70–99)
GLUCOSE BLD-MCNC: 89 MG/DL (ref 70–99)
GLUCOSE BLD-MCNC: 95 MG/DL (ref 70–99)

## 2024-02-19 PROCEDURE — 99233 SBSQ HOSP IP/OBS HIGH 50: CPT | Performed by: HOSPITALIST

## 2024-02-19 PROCEDURE — 99255 IP/OBS CONSLTJ NEW/EST HI 80: CPT | Performed by: INTERNAL MEDICINE

## 2024-02-19 RX ORDER — LOSARTAN POTASSIUM 100 MG/1
100 TABLET ORAL DAILY
Status: DISCONTINUED | OUTPATIENT
Start: 2024-02-19 | End: 2024-02-23

## 2024-02-19 NOTE — PROGRESS NOTES
[facility]     Hospitalist Progress Note     Kris Colvin Patient Status:  Inpatient    3/6/1960 MRN [id number]   Location [facility] 7NE-A Attending Rex Tim MD   Hosp Day # 2 PCP Lenka Guevara, DO   [address] [address]  ?  24  ?  Re: Kris Colvin  ?  To Whom It May Concern:    Kris Colvin was admitted to [facility] from 2024 to [facility] .    Please excuse Kris Colvin from attending work for these days.    The patient  will need acute rehab due to his deficits. He may need to be out for at least 3 months. .    Patient will follow up with his  primary care physician upon discharge.   Further restrictions and work excuse will be based on primary care physician's evaluation.  ?  Thank you,    BLAKE Laurent  [facility]

## 2024-02-19 NOTE — PLAN OF CARE
Assumed care at approximately 2030.  A & O x 4.  Room air.   NSR on tele.   Neuros q 4, no new deficits. Slurred speech, mild R facial droop and R sided weakness noted. See flowsheets.   Patient c/o LLE pain overnight. Pain managed with PRN Tylenol.   Bed in lowest position, call light within reach.   Patient updated on plan of care.

## 2024-02-19 NOTE — PROGRESS NOTES
[facility]     Hospitalist Progress Note     Kris Colvin Patient Status:  Inpatient    3/6/1960 MRN [id number]   Location [facility] 6NE-A Attending Rex Tim MD   Hosp Day # 2 PCP Lenka Guevara DO     Chief Complaint: Right side weakness     Subjective:     Patient feeling well today.  Still unable to move his right arm.  Denies any fevers, no chills, no chest pain or shortness of breath.  He has no other acute complaints at this time.    Objective:    Review of Systems:   A comprehensive review of systems was completed; pertinent positive and negatives stated in subjective.    Vital signs:  Temp:  [97.5 °F (36.4 °C)-98.5 °F (36.9 °C)] 98.2 °F (36.8 °C)  Pulse:  [64-80] 80  Resp:  [11-18] 16  BP: (124-163)/(71-98) 152/85  SpO2:  [93 %-98 %] 93 %  FiO2 (%):  [28 %] 28 %    Physical Exam:    General: No acute distress, Alert, pleasant   Respiratory: No rhonchi, no wheezes  Cardiovascular: S1, S2. Regular rate and rhythm  Abdomen: Soft, Non-tender, non-distended, positive bowel sounds  Neuro: Right arm power 1/5, LE 4/5, with right facial droop and slurred speech  Extremities: No edema    Diagnostic Data:    Labs:  Recent Labs   Lab 24  1346 24  0429   WBC 12.0* 9.0   HGB 15.1 14.2   MCV 93.7 93.3   .0 166.0   INR 1.07  --        Recent Labs   Lab 24  1346 24  0429   GLU 93 96   BUN 14 13   CREATSERUM 1.25 0.94   CA 9.6 8.6   ALB 3.7  --     143   K 4.1 3.9    111   CO2 28.0 28.0   ALKPHO 78  --    AST 29  --    ALT 36  --    BILT 0.7  --    TP 7.5  --        Estimated Creatinine Clearance: 97.4 mL/min (based on SCr of 0.94 mg/dL).    Recent Labs   Lab 24  1346   TROPHS 32       Recent Labs   Lab 24  1346   PTP 14.0   INR 1.07                  Microbiology    No results found for this visit on 24.      Imaging: Reviewed in Epic.    Medications:    losartan  100 mg Oral Daily    atorvastatin  80 mg Oral Daily    carvedilol  6.25 mg Oral BID        Assessment & Plan:      #Acute intraparenchymal hemorrhage   CT noted findings above - mild mass effect, no midline shift  Neuro checks   PT/OT/ST  Coreg, off nicardipine   S/p andexxa  Repeat CT stable findings   PT/OT - rehab placement   Neuro, Neuro surgery consult     #CAD s/p CABG - hold aspirin given bleed  Coreg, lipitor      #Essential HTN - valsartan on hold     #Left leg dvt  #Hx of DVT  Patient will need IVC filter - off AC for now until cleared by neurosurgery  Hematology consult     #HLD - satin - resume once speech eval     #Chronic LE wounds - follows with outpatient wound care       Rex Tim MD    Supplementary Documentation:     Quality:  DVT Mechanical Prophylaxis:   SCDs, Early ambuation  DVT Pharmacologic Prophylaxis   Medication   None                Code Status: Prior  Gaines: No urinary catheter in place  Gaines Duration (in days):   Central line:    ZORA:     Discharge is dependent on: clinical recovery   At this point Mr. Colvin is expected to be discharge to: [facility]    The 21st Century Cures Act makes medical notes like these available to patients in the interest of transparency. Please be advised this is a medical document. Medical documents are intended to carry relevant information, facts as evident, and the clinical opinion of the practitioner. The medical note is intended as peer to peer communication and may appear blunt or direct. It is written in medical language and may contain abbreviations or verbiage that are unfamiliar.

## 2024-02-19 NOTE — CM/SW NOTE
02/19/24 1200   CM/SW Referral Data   Referral Source Social Work (self-referral)   Informant Clinical Staff Member     Pt admitted on 2/17/2024 from Home for Intraparenchymal hematoma of brain, recommendations for intensive therapy at DC. Per protocol, PMR entered.       Full assessment to follow.    JOSE Dutton

## 2024-02-19 NOTE — PLAN OF CARE
US legs  left DVT progessed -     CONCLUSION:  Partial DVT in the proximal mid and distal segments of the left superficial femoral  vein as well as the proximal segment of the left popliteal vein.  Extent of DVT has progressed since 1/17/2024 exam.     Poss need IVF filter as will need to be off DOAC for 2 weeks  until has f/u CT Head and sees NS as outpt.   Spont ICH ? Caused by HTN   CTH as outpt 2 weeks needed  Pt/ot evlas P  rec [place], PMR  order placed - pt was there 9 yrs ago   Consult IR for IVC filter and Dr Osullivan   Place note for pt's work  - will need FMLA paperwork- wife to address w/ [employer] - they have my contact info   Pt w/ sign r sided weakness- r UE flaccid, RLE better can now lift it.   Facial droop. Needing to eat/ chew slowly     Cozaar added. Pt had a lot of pain from his left leg wounds- pain meds helping. Wound care to see Tues  needs drsg changes - follows in wound clinic   NS/neuro has signed off.   Nena Helm NP

## 2024-02-19 NOTE — PHYSICAL THERAPY NOTE
PHYSICAL THERAPY EVALUATION - INPATIENT     Room Number: 7602/7602-A  Evaluation Date: 2/19/2024  Type of Evaluation: Initial  Physician Order: PT Eval and Treat    Presenting Problem: Intraparenchymal hemtoma of brain, HDL, DVT,  Co-Morbidities : HTN, CHAZ< DVT, CAD s/p CABG    Reason for Therapy: Mobility Dysfunction and Discharge Planning    CT Brain 2/19  CONCLUSION:  Stable 2.8 x 2.7 x 1.8 cm acute intraparenchymal hematoma arising from the left insular region with a small amount of surrounding vasogenic edema and stable mild localized mass effect.      Mild diffuse cerebral and cerebellar atrophy with chronic microvascular ischemic changes of aging.     US Venous   CONCLUSION:  Partial DVT in the proximal mid and distal segments of the left superficial femoral   vein as well as the proximal segment of the left popliteal vein.  Extent of DVT has progressed since 1/17/2024 exam.  Critical exam results phoned to nurse Thomas on 2/18/2024 at 1042 hours with read back.      No DVT in the right leg.      Posterior tibial veins of the left calf could not be visualized secondary to ulceration.     PHYSICAL THERAPY ASSESSMENT   Patient is currently functioning below baseline with bed mobility, transfers, gait, and stair negotiation.  Prior to admission, patient's baseline is IND with all ADLs and Mobility.  Patient is requiring moderate assist and maximum assist as a result of the following impairments: decreased functional strength, decreased endurance/aerobic capacity, pain, impaired Sttting and standing balance, and decrease spatial awareness .  Physical Therapy will continue to follow for duration of hospitalization.    Patient will benefit from continued skilled PT Services to facilitate return to prior level of function as patient demonstrates high motivation with excellent tolerance to an intensive therapy program .    PLAN  PT Treatment Plan: Bed mobility;Body mechanics;Gait training;Strengthening;Stair  training;Transfer training;Balance training  Rehab Potential : Good  Frequency (Obs): 3-5x/week  Number of Visits to Meet Established Goals: 3      CURRENT GOALS    Goal #1 Patient is able to demonstrate supine - sit EOB @ level: minimum assistance     Goal #2 Patient is able to demonstrate transfers EOB to/from BSC at assistance level: moderate assistance     Goal #3 Patient is able to ambulate 10 feet with assist device: walker - platform at assistance level: moderate assistance     Goal #4    Goal #5    Goal #6    Goal Comments: Goals established on 2024      PHYSICAL THERAPY MEDICAL/SOCIAL HISTORY  History related to current admission: Patient is a 63 year old male admitted on 2024 from Home for Intraparenchymal hematoma of brain .     HOME SITUATION  Type of Home: House   Home Layout: Two level  Stairs to Enter : 1     Stairs to Bedroom: 12  Railing: Yes    Lives With: Spouse  Drives: Yes  Patient Owned Equipment: None  Patient Regularly Uses: None    Prior Level of [place]: Pt reports that he is IND with all ADLs and Mobility. Pt continues to work and drive.     SUBJECTIVE  \"I can't move my R arm\"      OBJECTIVE  Precautions:  (R side flaccid, Left Leg wound)  Fall Risk: High fall risk    WEIGHT BEARING RESTRICTION                   PAIN ASSESSMENT  Ratin  Location: Pt reported no pain       COGNITION  Overall Cognitive Status:  WFL - within functional limits  Following Commands:  follows multistep commands with repetition  Motor Planning: intact  Awareness of Errors:  assistance required to correct errors made    RANGE OF MOTION AND STRENGTH ASSESSMENT  Upper extremity ROM and strength are within functional limits Please see OT note    Lower extremity ROM is within functional limits     Lower extremity strength is within functional limits except for the following:    Right Hip flexion  3-/5  Left Hip flexion  5/5  Right Knee extension  3/5  Left Knee extension  5/5  Right Knee flexion   3-/5  Left Knee flexion  5/5  Right Dorsiflexion  3/5  Left Dorsiflexion  5/5      BALANCE  Static Sitting: Poor +  Dynamic Sitting: Poor  Static Standing: Poor -  Dynamic Standing: Poor -    ADDITIONAL TESTS  Additional Tests: Modified John              Modified John: 4                  ACTIVITY TOLERANCE                         O2 WALK       NEUROLOGICAL FINDINGS                        AM-PAC '6-Clicks' INPATIENT SHORT FORM - BASIC MOBILITY  How much difficulty does the patient currently have...  Patient Difficulty: Turning over in bed (including adjusting bedclothes, sheets and blankets)?: A Lot   Patient Difficulty: Sitting down on and standing up from a chair with arms (e.g., wheelchair, bedside commode, etc.): A Lot   Patient Difficulty: Moving from lying on back to sitting on the side of the bed?: A Lot   How much help from another person does the patient currently need...   Help from Another: Moving to and from a bed to a chair (including a wheelchair)?: A Lot   Help from Another: Need to walk in hospital room?: Total   Help from Another: Climbing 3-5 steps with a railing?: Total       AM-PAC Score:  Raw Score: 10   Approx Degree of Impairment: 76.75%   Standardized Score (AM-PAC Scale): 32.29   CMS Modifier (G-Code): CL    FUNCTIONAL ABILITY STATUS  Gait Assessment   Functional Mobility/Gait Assessment  Gait Assistance: Not tested    Skilled Therapy Provided     Bed Mobility:  Rolling: NT  Supine to sit: Max A with HOB elevated. Pt was able to initiate activity with the LLE and LUE however requires assistance to bring the Trunk in upright position.   Sit to supine: NT     Transfer Mobility:  Sit to stand: Max A x 2 from bed to srikanth walker. Pt was observed to participate in the activity by utilizing LLE and LUE to produce power to perform transfer however due to the lack of strength on the RLE and decrease ability to use the RUE. PT required Max A x 2   Stand to sit: Mod A  Gait = NT    Therapist's  Comments:    While sitting pt was observed to have decrease awareness of space however when verbal and tactile cued would initiate abdominal muscles to align COG over RASHAWN. At moments pt was able to demonstrate CGA while sitting at the EOB.   Pt was able to perform sit to stand transfer twice. With cues to improve upright posture and weight shifting towards the L to utilize L UE to assist in Wbing.   Pt was able to perform lateral transfer towards the R with Mod A x 2 as pt was observed to facilitate hip clearance and was able to utilize LUE to pull himself towards the chair.   At this time, pt is a great candidate for intensive therapy as pt has high motivation, strong family support and can tolerate 3 hrs of therapy. In addition, pt has community goals.     Exercise/Education Provided:  Bed mobility  Body mechanics  Transfer training    Patient End of Session: Up in chair;Needs met;Call light within reach;All patient questions and concerns addressed;With  staff;RN aware of session/findings;Family present;Alarm set      Patient Evaluation Complexity Level:  History Moderate - 1 or 2 personal factors and/or co-morbidities   Examination of body systems Moderate - addressing a total of 3 or more elements   Clinical Presentation Moderate - Evolving   Clinical Decision Making Moderate - Evolving       PT Session Time: 23 minutes  Therapeutic Activity: 15 minutes

## 2024-02-19 NOTE — OCCUPATIONAL THERAPY NOTE
OCCUPATIONAL THERAPY EVALUATION - INPATIENT     Room Number: 7602/7602-A  Evaluation Date: 2/19/2024  Type of Evaluation: Initial  Presenting Problem: L ICH    Physician Order: IP Consult to Occupational Therapy  Reason for Therapy: ADL/IADL Dysfunction and Discharge Planning    OCCUPATIONAL THERAPY ASSESSMENT   Patient is currently functioning below baseline with toileting, bathing, upper body dressing, lower body dressing, grooming, bed mobility, transfers, stating sitting balance, dynamic sitting balance, static standing balance, dynamic standing balance, maintaining seated position, functional standing tolerance, energy conservation strategies, and RUe Alba dn strength, RUE flaccid . Prior to admission, patient's baseline is Mod I for all ADLs and IADLs.  Patient is requiring maximum assist as a result of the following impairments: decreased functional strength, decreased functional reach, decreased endurance, impaired sitting and standing balance, impaired coordination, decreased muscular endurance, medical status, limited Active RUE ROM, and decreased safety awareness. Occupational Therapy will continue to follow for duration of hospitalization.    Patient will benefit from continued skilled OT Services to facilitate return to prior level of function as patient demonstrates high motivation with excellent tolerance to an intensive therapy program       History Related to Current Admission: Patient is a 63 year old male admitted on 2/17/2024 with Presenting Problem: L ICH. Co-Morbidities : HTN, CHAZ< DVT, CAD s/p CABG    WEIGHT BEARING RESTRICTION                   Recommendations for nursing staff:   Transfers: sit to stand lift  Toileting location: commode    EVALUATION SESSION:  Patient Start of Session: supine in bed for session  FUNCTIONAL TRANSFER ASSESSMENT  Sit to Stand: Edge of Bed  Edge of Bed: Maximum Assist (Max A x2 to stand up frmo EOB to complete stand 2/2 safety, cueing, pt particiapting and  assisting with stand, pt able to stand with 1 person MAx A to partial stand from chair with 2nd person supporting RUE, would benefit from sling to support RUE)    BED MOBILITY  Rolling: Maximum Assist (x1 person with constant cues)  Supine to Sit : Maximum Assist (with 1 person A and cues for hand placement and support of flaccid RUE)  Scooting: Max A to scoot to EOB    BALANCE ASSESSMENT  Static Sitting: Contact Guard Assist (CGA for short periods of time and at other times Mod A with leans forward adn to the right)  Sitting Bilateral: Moderate Assist (to scoot laterally to chair with Min A support to prevent forward lean and right arm hanging and then Mod A to assits with scoot.)  Static Standing: Maximum Assist (Max A x1 to maintain full stand)    FUNCTIONAL ADL ASSESSMENT  Grooming Seated: Minimal Assist (to wipe face with cues to wipe whole mouth)  LB Dressing Seated: Maximum Assist (to nando socks)      ACTIVITY TOLERANCE: vitals stable                         O2 SATURATIONS       COGNITION  Overall Cognitive Status:  WFL - within functional limits    Upper Extremity   ROM: within functional limits for LUE and RUE PROM, no AROM on RUE, flaccid RUE  Strength: within functional limits on LUE, RUE flaccid  Coordination  Gross motor: impaired  Fine motor: impaired  Sensation: Light touch:  intact    EDUCATION PROVIDED  Patient: Role of Occupational Therapy; Plan of Care  Patient's Response to Education: Verbalized Understanding; Returned Demonstration    Equipment used: srikanth walker  Demonstrates functional use, Would benefit from additional trial       Patient End of Session: Up in chair;Needs met;Call light within reach;All patient questions and concerns addressed;SCDs in place;Alarm set;Family present    OCCUPATIONAL PROFILE    HOME SITUATION  Type of Home: House  Home Layout: Two level  Lives With: Spouse    Toilet and Equipment: Standard height toilet  Shower/Tub and Equipment: Walk-in shower  Other Equipment:  None    Occupation/Status: retired  Hand Dominance: Right  Drives: Yes  Patient Regularly Uses: None    Prior Level of Function: Pt typically independent with ADLs and mobility. Pt does not use AD.    SUBJECTIVE   Pt stated, \"I did not do well.\"    PAIN ASSESSMENT  Ratin  Location: no pain at this time       OBJECTIVE  Precautions:  (R side flaccid, Left Leg wound)  Fall Risk: High fall risk      ASSESSMENTS    AM-PAC ‘6-Clicks’ Inpatient Daily Activity Short Form  -   Putting on and taking off regular lower body clothing?: A Lot  -   Bathing (including washing, rinsing, drying)?: A Lot  -   Toileting, which includes using toilet, bedpan or urinal? : A Lot  -   Putting on and taking off regular upper body clothing?: A Lot  -   Taking care of personal grooming such as brushing teeth?: A Lot  -   Eating meals?: A Lot    AM-PAC Score:  Score: 12  Approx Degree of Impairment: 66.57%  Standardized Score (AM-PAC Scale): 30.6    ADDITIONAL TESTS     NEUROLOGICAL FINDINGS      COGNITION ASSESSMENTS       PLAN  OT Treatment Plan: Balance activities;Energy conservation/work simplification techniques;ADL training;IADL training;Continued evaluation;Compensatory technique education;Equipment eval/education;Patient/Family training;Patient/Family education;Endurance training;Functional transfer training;UE strengthening/ROM  Rehab Potential : Good  Frequency: 3-5x/week  Number of Visits to Meet Established Goals: 5    ADL Goals   Patient will perform eating: with set up  Patient will perform grooming: with min assist    Functional Transfer Goals  Patient will transfer from sit to supine:  with min assist  Patient will transfer from supine to sit:  with min assist  Patient will transfer from sit to stand:  with mod assist    Patient Evaluation Complexity Level:   Occupational Profile/Medical History MODERATE - Expanded review of history including review of medical or therapy record   Specific performance deficits impacting  engagement in ADL/IADL MODERATE  3 - 5 performance deficits   Client Assessment/Performance Deficits MODERATE - Comorbidities and min to mod modifications of tasks    Clinical Decision Making MODERATE - Analysis of occupational profile, detailed assessments, several treatment options    Overall Complexity MODERATE     OT Session Time: 40 minutes  Self-Care Home Management: 15 minutes  Therapeutic Activity: 0 minutes  Neuromuscular Re-education: 15 minutes  Therapeutic Exercise: 0 minutes  Cognitive Skills: 0 minutes  Sensory Integrative: 0 minutes  Orthotic Management and Trainin minutes  Can add/delete any of these

## 2024-02-19 NOTE — SLP NOTE
SPEECH DAILY NOTE - INPATIENT    ASSESSMENT & PLAN   ASSESSMENT  Pt seen for dysphagia tx to assess tolerance with recommended diet, ensure proper utilization of aspiration precautions and provide pt/family education.  Contacted patient at the bedside following PT and OT transferring patient to chair for lunch. Patient with lunch tray present. Patient self presenting po trials of regular solids and thin liquids. Reviewed aspiration precautions with patient with understanding verbalized. Patient with mild pocketing of solids on the right side of oral cavity requiring intermittent verbal cues to clear oral cavity. Patient with intermittent cough with liquids. Reviewed with patient to take small sips and slow rate which resulted in elimination of  coughing and throat clearing. Patient demonstrates tolerance of regular solids and thin liquids with use of compensatory strategies.   Will follow up to ensure safety with diet and educate pt on compensatory strategies/swallow precautions. Video swallow study to be completed if CXR declines, increase in clinical signs of aspiration, and/or MD desires.   Recommend cognitive/communicative assessment per stroke protocol.     Diet Recommendations - Solids: Regular  Diet Recommendations - Liquids: Thin Liquids    Compensatory Strategies Recommended: No straws;Slow rate;Small bites and sips  Aspiration Precautions: Upright position;Slow rate;Small bites and sips;No straw  Medication Administration Recommendations: One pill at a time;Whole in puree    Patient Experiencing Pain: No                Treatment Plan  Treatment Plan/Recommendations: Aspiration precautions;Communication evaluation    Interdisciplinary Communication: Discussed with RN  Disussed with other staff             GOALS  Goal #1 The patient will tolerate regular consistency and thin liquids without overt signs or symptoms of aspiration with 90 % accuracy over 1-2 session(s).  In Progress   Goal #2 The  patient/family/caregiver will demonstrate understanding and implementation of aspiration precautions and swallow strategies independently over 1-2 session(s).     In Progress   Goal #3 Communication evaluation and establish goals New       FOLLOW UP  Follow Up Needed (Documentation Required): Yes  SLP Follow-up Date: 02/20/24  Number of Visits to Meet Established Goals: 4    Session: 1 of 2    If you have any questions, please contact Lindsay KEENAN, SLP
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
## 2024-02-19 NOTE — CONSULTS
Please note that this consult is being done remotely as electronic health chart review only.    Physician Medicine & Rehabilitation Consult Note     Visit Information:   PM&R consultation was requested by Dr. Rex Tim to provide an opinion regarding rehabilitation needs in this patient with acute intraparenchymal hemorrhage.    Chief Complaint/Identification: 64 yo M with impaired mobility, impaired ADLs, and speech/cognitive deficits secondary to acute intraparenchymal hemorrhage    History of Present Illness:   Patient is a 63 year old, male with history of CAD s/p CABG, afib on xarelto, previous CVA (no residual deficits), HTN, HLD, and chronic leg wounds who presented to [facility] on 2/17/24 with new right sided weakness and was found on the ground by his wife.  CT head noted a left sided intraparenchymal hemorrhage. NSGY was consulted and did not recommend any acute NSGY intervention, but will need repeat CT head in 2 weeks and follow up with NSGY [facility] clinic prior to restarting Xarelto. He was found to have progression of his left leg DVT (since 1/17/24). IR consulted for placement of IVC filter while off anticoagulation. PT/OT, SLP evaluated him and recommend acute inpatient rehab      ROS: unable to perform ROS since this is a chart review.     Past Medical History:    Past Medical History:   Diagnosis Date    Cataract     High blood pressure     High cholesterol     Obstructive sleep apnea, adult [place] 6-20-16    autoPAP 5-12     Stroke (HCC) 2015         Past Surgical History:   Past Surgical History:   Procedure Laterality Date    CABG      CATARACT      COLONOSCOPY N/A 11/29/2018    Procedure: COLONOSCOPY, POSSIBLE BIOPSY, POSSIBLE POLYPECTOMY 78921;  Surgeon: Zack Giordano MD;  Location: [facility]         Family History:   Family History   Problem Relation Age of Onset    Breast Cancer Mother     Diabetes Father     Diabetes Maternal Grandmother     Diabetes Paternal Grandmother         Current Medications: No current outpatient medications on file.    Prior to Admission medications:   Medications Prior to Admission   Medication Sig Dispense Refill Last Dose    spironolactone 25 MG Oral Tab Take 0.5 tablets (12.5 mg total) by mouth daily.   2/17/2024 at 0900    Naproxen Sodium (ALEVE) 220 MG Oral Cap Take 220 mg by mouth daily as needed.   2/17/2024 at 0900    atorvastatin 80 MG Oral Tab TAKE 1 TABLET BY MOUTH EVERY NIGHT 90 tablet 0 2/16/2024 at 2100    rivaroxaban (XARELTO) 20 MG Oral Tab Take 1 tablet (20 mg total) by mouth daily with food. 30 tablet 0 2/17/2024 at 0900    valsartan 160 MG Oral Tab Take 1 tablet (160 mg total) by mouth daily.   2/17/2024 at 0900    MULTAQ 400 MG Oral Tab Take 1 tablet (400 mg total) by mouth 2 (two) times daily.   2/17/2024 at 0900    carvedilol 6.25 MG Oral Tab Take 1 tablet (6.25 mg total) by mouth 2 (two) times daily.   2/17/2024 at 0900    aspirin 81 MG Oral Tab Take 1 tablet (81 mg total) by mouth daily.   2/17/2024 at 0900    Multiple Vitamins-Minerals (MULTI FOR HER 50+) Oral Tab Take 1 tablet by mouth daily.   2/17/2024 at 0900        Allergies: No Known Allergies     Social history:  Home set up: 2 story home with 1 ZOIE. 12 stairs to second floor  Lives with: wife  Other support (family, friends): wife  Habits:   Social History     Socioeconomic History    Marital status:      Spouse name: Not on file    Number of children: Not on file    Years of education: Not on file    Highest education level: Not on file   Occupational History    Not on file   Tobacco Use    Smoking status: Never    Smokeless tobacco: Never   Vaping Use    Vaping Use: Never used   Substance and Sexual Activity    Alcohol use: Not Currently     Alcohol/week: 1.7 standard drinks of alcohol     Types: 2 Cans of beer per week     Comment: Occastional    Drug use: No    Sexual activity: Not on file   Other Topics Concern     Service Not Asked    Blood Transfusions  Not Asked    Caffeine Concern No     Comment: occasionally    Occupational Exposure Not Asked    Hobby Hazards Not Asked    Sleep Concern Not Asked    Stress Concern Not Asked    Weight Concern Not Asked    Special Diet Not Asked    Back Care Not Asked    Exercise No    Bike Helmet Not Asked    Seat Belt Not Asked    Self-Exams Not Asked   Social History Narrative    Not on file     Social Determinants of Health     Financial Resource Strain: Not on file   Food Insecurity: No Food Insecurity (2/17/2024)    Food Insecurity     Food Insecurity: Never true   Transportation Needs: No Transportation Needs (2/17/2024)    Transportation Needs     Lack of Transportation: No   Physical Activity: Not on file   Stress: Not on file   Social Connections: Not on file   Housing Stability: Low Risk  (2/17/2024)    Housing Stability     Housing Instability: No     Housing Instability Emergency: Not on file         Functional History:  Prior function:   [X ] Independent with mobility, ADLs, iADLs, cognition/communication, swallow, bowel and bladder  [ ] Used assistive device  [ ] Required assistance with basic ADLs (dressing, grooming, toileting, bathing)  [ ] Required assistance with iADLs (groceries, cleaning, cooking)  [X ] Previously driving    Current function:  Precautions:   Precautions: Aspiration;Fall risk;Seizure    Mobility:  Bed Mobility:   Rolling: NT  Supine to sit: Max A with HOB elevated. Pt was able to initiate activity with the LLE and LUE however requires assistance to bring the Trunk in upright position.              Sit to supine: NT             Transfers:   Sit to stand: Max A x 2 from bed to srikanth walker. Pt was observed to participate in the activity by utilizing LLE and LUE to produce power to perform transfer however due to the lack of strength on the RLE and decrease ability to use the RUE. PT required Max A x 2                Stand to sit: Mod A  Gait = NT    Ambulation:    Not tested           ADLs:    Grooming Seated: Minimal Assist (to wipe face with cues to wipe whole mouth)  LB Dressing Seated: Maximum Assist (to nando socks)    PHYSICAL EXAM: unable to perform physical exam since this is a chart review    Vitals:    02/19/24 1235   BP: 152/85   Pulse: 80   Resp:    Temp:        Intake/Output last 24 hours:   Intake/Output Summary (Last 24 hours) at 2/19/2024 1651  Last data filed at 2/19/2024 1200  Gross per 24 hour   Intake --   Output 400 ml   Net -400 ml       Imaging:   CT head (2/17/24):  Impression   CONCLUSION:    1. Acute intraparenchymal hemorrhage within the insular region of the right parietal lobe measuring 2.6 x 3.0 x 2.2 cm.  Mild associated mass effect.  No midline shift.  2. Mild diffuse atrophy and white matter disease consistent with chronic small vessel ischemic changes.  3. The findings of this critical value study were discussed with the ordering ER physician, Dr. Murray on 2/17/2024 at 1324 hours.  Read back was performed.       CT head (2/18/24):  FINDINGS:    Redemonstrated is an acute intraparenchymal hemorrhage within the left insular region, this measures 2.8 x 1.8 x 2.7 centimeters, grossly unchanged from prior.  There is mild associated mass effect and surrounding hypodensity suggestive of edema.  This  is also similar to prior.  There are patchy areas of low attenuation in the periventricular and deep white matter which are nonspecific but most consistent with small vessel ischemic changes.     The visualized paranasal sinuses show no significant findings. No evidence of depressed skull fracture.                   Impression   CONCLUSION:  1. Stable acute intraparenchymal hemorrhage involving the left insular region.  Stable mild associated mass effect.       Labs:     Recent Labs   Lab 02/17/24  1346 02/18/24  0429   GLU 93 96   BUN 14 13   CREATSERUM 1.25 0.94   EGFRCR 65 92   CA 9.6 8.6    143   K 4.1 3.9    111   CO2 28.0 28.0     Recent Labs   Lab  02/17/24  1346 02/18/24  0429   RBC 4.79 4.46   HGB 15.1 14.2   HCT 44.9 41.6   MCV 93.7 93.3   MCH 31.5 31.8   MCHC 33.6 34.1   RDW 12.5 12.5   NEPRELIM 7.82* 5.29   WBC 12.0* 9.0   .0 166.0           ASSESSMENT:    Mr. Ori Colvin is a 64 yo M with history of CAD s/p CABG, afib on xarelto, previous CVA (no residual deficits), HTN, HLD, and chronic leg wounds who has impaired mobility, impaired ADLs, and speech/cognitive deficits secondary to acute intraparenchymal hemorrhage.    Inpatient rehabilitation potential:  Pt is a good candidate for acute inpatient rehabilitation, and demonstrates that he can tolerate 3hrs/day therapy for at least 5 days a week in two out of 3 disciplines (PT/OT/SLP). Agree with current PT/OT/SLP consultations. He will make better gains with an acute inpatient rehabilitation stay over a SNF placement at this time.    RECOMMENDATIONS:  #Mobility: Continue PT for strength, endurance, ROM, ambulation, balance, transfers.  When able, encourage having patient out of bed to chair at least TID AC with assistance to improve endurance.  #Self-Care: Continue OT for UE function and ADLs, IADLs, ROM of bilateral upper extremities  #Swallow/cog/communication: Continue SLP for cognitive/speech deficits  #VTE prophylaxis: Continue SCDs per primary team. Recommend IVC filter placement prior to admission to acute inpatient rehabilitation    Code status: FULL  Advanced directive: his wife is his healthcare POA    Thank you for the opportunity to be involved in the care of this patient. Please feel free to Vocera with questions or concerns. We will continue to follow with you regarding rehabilitation needs.    Elizabeth Rodney MD  PM&R    -----------------------------------------  Supplemental Rehab Info:  Primary Diagnosis: intraparenchymal hemorrhage  Onset: 2/17/24  Impairments include: motor weakness, sensory deficits, cognitive deficits, balance deficits    Comorbidities: CAD s/p CABG, afib on xarelto,  previous CVA (no residual deficits), HTN, HLD, left leg DVT, and chronic leg wounds     Activity Limitations: none    Participation Restrictions: none    Rehab:   The patient can participate in 3 hours of therapy per day for at least 5 days/week and demonstrates appropriate carryover. The pt needs to be medically stable with no further medical/surgical interventions planned, but has complex rehab needs requiring intensive therapy intervention. I anticipate that the patient will meet the following criteria:     1) Able to make a meaningful amount of functional progress in a reasonable amount of time with goal of discharge to community with assistance.  2) The patient is willing and able to participate in rehabilitation therapies based on progress during acute care.   3) The patient will require 24 hours supervision from rehabilitation physicians and nursing.   4) Rehabilitation goals cannot be achieved at a lower level of care.   5) Patient requires a minimum of 3 hours of intensive therapy per day for at least 5 days a week.   Estimated length of stay is 21 days and patient will discharge to home    Current barriers to inpatient rehabilitation include: IVC filter placement first, insurance approval, bed availability    Goals/Expected level of improvement:   Mobility: mod I  ADLs: mod I  IADLs: mod I  Toileting: mod I  Community Access: mod I    Potential Clinical Complications:   DVT/PE, infection (UTI, pneumonia, wound), falls, depression, syncope, MI, spasticity, contractures, pressure ulcer

## 2024-02-19 NOTE — DISCHARGE INSTRUCTIONS
Please call [phone] to schedule your follow up imaging prior to your next appointment.  This may be scheduled up to one hour before your appointment time  Stay off blood thinners until f/u w/ NS MD/ NP    Drsg change to LLE   Need cbc, pt/inr on 2/29  also BMP   Will need IVC filter removed in few weeks/ months - f/u w/ cards/ Dr Osullivan to get set up    
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 \par Patient is a preemptive candidate for transplant.\par She has a fistula placed in her left radial in anticipation of eventual HD, but it was placed several years ago and she has not needed it.\par \par On November 16, 2020, patient presented for echocardiogram. Echo showed normal LVEF (65-70%), normal LA size, normal pulmonary pressures.\par \par On December 3, 2020, she presented for nuclear stress test. She walked on the treadmill, achieved target HR (88% MPHR), but had to stop walking due to leg pain early in stage 2. Myocardial perfusion imaging was negative for ischemia. \par \par \par PMD: Jos Zaragoza MD [phone]\par Nephrologist: Jet Horn MD [phone]

## 2024-02-19 NOTE — PLAN OF CARE
Stroke booklet given to patient; the following education was provided:     What is stroke  BEFAST - Stroke warning sings and symptoms  How to initiate EMS  Secondary stroke prevention and personalized risk factors: HTN, obesity  Lifestyle modifications (nutrition and exercise)  Post-stroke recovery and follow-up  Community resources (stroke support group and non-emergent stroke line)    Patient receptive to teachings. All pertinent questions and concerns were addressed.    Patient has chosen Perla (spouse) as his designated care partner. She can be reached at [phone].      RN Stroke Navigator team  [phone]

## 2024-02-20 ENCOUNTER — APPOINTMENT (OUTPATIENT)
Dept: INTERVENTIONAL RADIOLOGY/VASCULAR | Facility: HOSPITAL | Age: 64
End: 2024-02-20
Attending: NURSE PRACTITIONER
Payer: COMMERCIAL

## 2024-02-20 LAB
GLUCOSE BLD-MCNC: 104 MG/DL (ref 70–99)
GLUCOSE BLD-MCNC: 123 MG/DL (ref 70–99)
GLUCOSE BLD-MCNC: 94 MG/DL (ref 70–99)
GLUCOSE BLD-MCNC: 97 MG/DL (ref 70–99)

## 2024-02-20 PROCEDURE — B5191ZZ FLUOROSCOPY OF INFERIOR VENA CAVA USING LOW OSMOLAR CONTRAST: ICD-10-PCS | Performed by: RADIOLOGY

## 2024-02-20 PROCEDURE — 99232 SBSQ HOSP IP/OBS MODERATE 35: CPT | Performed by: HOSPITALIST

## 2024-02-20 PROCEDURE — 99232 SBSQ HOSP IP/OBS MODERATE 35: CPT | Performed by: INTERNAL MEDICINE

## 2024-02-20 PROCEDURE — 06H03DZ INSERTION OF INTRALUMINAL DEVICE INTO INFERIOR VENA CAVA, PERCUTANEOUS APPROACH: ICD-10-PCS | Performed by: RADIOLOGY

## 2024-02-20 RX ORDER — HEPARIN SODIUM 5000 [USP'U]/ML
INJECTION, SOLUTION INTRAVENOUS; SUBCUTANEOUS
Status: COMPLETED
Start: 2024-02-20 | End: 2024-02-20

## 2024-02-20 RX ORDER — MIDAZOLAM HYDROCHLORIDE 1 MG/ML
INJECTION INTRAMUSCULAR; INTRAVENOUS
Status: COMPLETED
Start: 2024-02-20 | End: 2024-02-20

## 2024-02-20 RX ORDER — LIDOCAINE HYDROCHLORIDE 10 MG/ML
INJECTION, SOLUTION INFILTRATION; PERINEURAL
Status: COMPLETED
Start: 2024-02-20 | End: 2024-02-20

## 2024-02-20 NOTE — CM/SW NOTE
02/20/24 1200   Choice of Post-Acute Provider   Informed patient of right to choose their preferred provider Yes   Patient/family choice Kristel SALCEDO met with pt and wife prior to procedure for DC planning. Aware of approval by PMR for AR, would like Kristel. Pt will need insurance auth prior to DC, MJ has submitted auth today.     JOSE Dutton

## 2024-02-20 NOTE — PLAN OF CARE
Assumed care at approximately 1930.  A & O x 4.  Room air.   NSR on tele.   Neuros q 4, no new deficits. Slurred speech, mild R facial droop and R sided weakness noted. See flowsheets.   Denies pain.   Plan for IVC filter today.   Wound to see for LLE wound.  Bed in lowest position, call light within reach.   Patient updated on plan of care.

## 2024-02-20 NOTE — PROGRESS NOTES
Heme/Onc Progress Note - [facility]      Chief Complaint:    Follow up for evaluation and management of left leg DVT.    Interim History:      The patient is s/p IVC filter placement today. He had no complications. He has no new complaints. He continues to have right upper extremity flaccid weakness. He is able to move his right foot and toes.    Physical Examination:    Vital Signs: BP (!) 143/92 (BP Location: Right arm)   Pulse 64   Temp 97.5 °F (36.4 °C) (Oral)   Resp 20   Wt (!) 137.4 kg (302 lb 14.6 oz)   SpO2 92%   BMI 37.86 kg/m²     General: Patient is alert and not in acute distress.  Chest: Clear to auscultation. No rales and no wheezes.  Heart: Regular rate and rhythm.   Abdomen: Soft, non tender with good bowel sounds.  Extremities: No edema. Non-tender.  Skin:  Warm, dry.      Labs reviewed at this visit:     Recent Labs   Lab 02/17/24  1346 02/18/24  0429   RBC 4.79 4.46   HGB 15.1 14.2   HCT 44.9 41.6   MCV 93.7 93.3   MCH 31.5 31.8   MCHC 33.6 34.1   RDW 12.5 12.5   NEPRELIM 7.82* 5.29   WBC 12.0* 9.0   .0 166.0       Recent Labs   Lab 02/17/24  1346 02/18/24  0429   GLU 93 96   BUN 14 13   CREATSERUM 1.25 0.94   CA 9.6 8.6   ALB 3.7  --     143   K 4.1 3.9    111   CO2 28.0 28.0   ALKPHO 78  --    AST 29  --    ALT 36  --    BILT 0.7  --    TP 7.5  --        Radiologic imaging reviewed at this visit:    CT Brain on 2/18/2024:  FINDINGS:    VENTRICLES/SULCI:  Stable peer mild diffuse cerebral and cerebellar atrophy.  INTRACRANIAL:  Stable acute intraparenchymal hemorrhage in the left insular region measuring 2.8 x 1.8 x 2.7 cm with a small amount of surrounding vasogenic edema and mild localized mass effect.  No midline shift.  Stable patchy decreased areas of low  attenuation in the periventricular and subcortical white matter consistent with chronic small vessel ischemic changes of aging.    SINUSES:           No sign of acute sinusitis.    MASTOIDS:          No sign of acute  inflammation.  SKULL:             No depressed calvarial fracture.     Impression   CONCLUSION:  Stable 2.8 x 2.7 x 1.8 cm acute intraparenchymal hematoma arising from the left insular region with a small amount of surrounding vasogenic edema and stable mild localized mass effect.     Mild diffuse cerebral and cerebellar atrophy with chronic microvascular ischemic changes of aging.       Impression/Plan:     Provoked left leg femoral and popliteal vein thrombosis:     He has recent venous doppler with probable progression. He now is off of anticoagulation and s/p IVC filter.     Prior to presentation the patient was on rivaoxaban 20 mg, ASA 81 mg and he was taking PRN Aleve.  This combo likely increased risk of bleeding. Would consider resuming rivaroxaban alone once ok with neurosurgery. He will be going to acute rehab. I will see him after discharge from rehab in about three weeks.      Acute intracranial hemorrhage:     Neurology and neurosurgery have seen with plans for follow up management. Plan for acute rehab. I will see the patient after discharge from rehab in about three weeks.     CAD s/p CABG  H/O CVA     Hold ASA and anticoagulation.     Getachew Osullivan MD  [facility]

## 2024-02-20 NOTE — CONSULTS
[facility]  Report of Inpatient Wound Care Consultation    Kris Colvin Patient Status:  Inpatient    3/6/1960 MRN [id number]   Location [facility] 7NE-A Attending Rex Tim MD   Hosp Day # 3 PCP Lenka Guevara DO     Reason for Consultation:  LLE wounds    History of Present Illness:  Kris Colvin is a a(n) 63 year old male. Patient presents with left lower extremity wounds, coban layer of the compression removed  yesterday by the nurse as instructed. Pain medication given by RN prior to wound assessment. Pt complains  of mild to moderate pain with dressing change.He follows up at the outpatient wound clinic.  Nena Helm NP in the room.      Past Medical History:   Diagnosis Date    Cataract     High blood pressure     High cholesterol     Obstructive sleep apnea, adult [place] 16    autoPAP      Stroke (HCC)      Past Surgical History:   Procedure Laterality Date    CABG      CATARACT      COLONOSCOPY N/A 2018    Procedure: COLONOSCOPY, POSSIBLE BIOPSY, POSSIBLE POLYPECTOMY 94406;  Surgeon: Zack Giordano MD;  Location: [facility]      reports that he has never smoked. He has never used smokeless tobacco. He reports that he does not currently use alcohol after a past usage of about 1.7 standard drinks of alcohol per week. He reports that he does not use drugs.    Allergies:  @ALLERGY    Laboratory Data:  Lab Results   Component Value Date    PGLU 97 2024         Impression:  Wound 24 #1- Left Posterior Leg- Distal Leg Distal;Lower;Posterior;Right (Active)   Date First Assessed/Time First Assessed: 24 1057    Wound Number (Wound Clinic Only): #1- Left Posterior Leg- Distal  Primary Wound Type: Venous Ulcer  Location: Leg  Wound Location Orientation: Distal;Lower;Posterior;Right      Assessments 2024  9:12 AM   Wound Image     Drainage Amount Moderate   Drainage Description Serosanguineous   Wound Length (cm) 1.5 cm   Wound Width (cm) 2.1 cm   Wound  Surface Area (cm^2) 3.15 cm^2   Wound Depth (cm) 0.1 cm   Wound Volume (cm^3) 0.315 cm^3   Wound Healing % 30   Margins Well-defined edges   Non-staged Wound Description Full thickness   Karin-wound Assessment Edema;Hemosiderin staining   Wound Granulation Tissue Red;Pink;Spongy   Wound Bed Granulation (%) 100 % (with some fibrin)   Wound Odor None   Tunneling? No   Undermining? No   Sinus Tracts? No       Wound 01/25/24 #2- Left Posterior Leg- Proximal Leg Lower;Posterior;Right (Active)   Date First Assessed/Time First Assessed: 01/25/24 1057    Wound Number (Wound Clinic Only): #2- Left Posterior Leg- Proximal  Primary Wound Type: Venous Ulcer  Location: Leg  Wound Location Orientation: Lower;Posterior;Right      Assessments 2/20/2024  9:10 AM   Wound Image     Drainage Amount Moderate   Drainage Description Serosanguineous   Wound Length (cm) 3.3 cm   Wound Width (cm) 2.5 cm   Wound Surface Area (cm^2) 8.25 cm^2   Wound Depth (cm) 0.1 cm   Wound Volume (cm^3) 0.825 cm^3   Wound Healing % 47   Margins Well-defined edges   Non-staged Wound Description Full thickness   Karin-wound Assessment Edema;Hemosiderin staining   Wound Granulation Tissue Red;Pink   Wound Bed Granulation (%) 95 % (with some fibrin)   Wound Bed Epithelium (%) 5 %   Wound Odor None   Tunneling? No   Undermining? No   Sinus Tracts? No       Wound 01/25/24 #3- Left Anterior Leg Left (Active)   Date First Assessed/Time First Assessed: 01/25/24 1058    Wound Number (Wound Clinic Only): #3- Left Anterior Leg  Primary Wound Type: Venous Ulcer  Wound Location Orientation: Left      Assessments 2/20/2024  9:06 AM   Wound Image     Drainage Amount Moderate   Drainage Description Serosanguineous   Wound Length (cm) 7.1 cm   Wound Width (cm) 2.9 cm   Wound Surface Area (cm^2) 20.59 cm^2   Wound Depth (cm) 91 cm   Wound Volume (cm^3) 1873.69 cm^3   Wound Healing % -[id number]   Margins Well-defined edges   Non-staged Wound Description Full thickness   Karin-wound  Assessment Edema;Hemosiderin staining   Wound Granulation Tissue Red;Pink;Firm   Wound Bed Granulation (%) 90 %   Wound Bed Epithelium (%) 10 %   Wound Odor None   Tunneling? No   Undermining? No   Sinus Tracts? No        Wound Cleaning and Dressings to all wounds  Showering directions: May shower with protection- use cast cover or shower boot  Wound cleansing:  Cleanse with saline  Wound product: non bordered silver foam.Wrap with kerlix.  Dressing change frequency:  Change dressing every other day and/or as needed    Compression Therapy:   Elevate leg(s) as much as possible- see flow sheet on edema for lower extremity assessment    Miscellaneous/Additional Orders:  Offloading: Turn Q 2 hours and as needed as tolerated    Care Summary: Discussed Plan of Care at beside with patient. Patient verbally acknowledges understanding of all instructions and all questions were answered. Possible discharge to [facility] tomorrow per RN.    Recommendations:  May apply compression to left lower leg [ even low compression like tubigrip]  if cardiology agrees.      Thank you for allowing me to participate in the care of your patient.  Time Spent 40 minutes , Thank you.    Ara Calloway RN, BSN [facility]   Wound Care pager [phone]  2/20/2024  9:32 AM

## 2024-02-20 NOTE — HISTORICAL OFFICE NOTE
[facility]  [address] [place] [address], 4th floor, [place] [address]  [phone]      Kris Colvin  Progress Note  Demographics:  Name: Kris Colvin YOB: 1960  Age: 63, Male Medical Record No: [id number]  Visited Date/Time: 01/11/2024 08:40 AM    Chief Complaints  8 months follow up    Status post aortic valve replacement for severe AS  thoracic aorta aneurysm repair with CABG  Atrial fibrillation history  Cardiomyopathy  History of Present Illness  63-year-old gentleman coming to our clinic for 6-month follow-up.      Patient is ischemic cardiomyopathy, CAD and history of bioprosthetic aortic valve and CABG, left bundle branch block, with mildly to moderately reduced LV systolic function.  He stays in sinus with history of A-fib on Multaq per EP.  He is off anticoagulation per EP.    The patient denies angina or congestive-like symptoms.  He tolerates current medications well.  Blood pressure is controlled.  No fluid overload by physical exam.  Blood pressure 120s / 70s - 80s mmHg.    Patient stays very active and walks with no significant limitations.    Patient had cardiac stent in April 2023.  It was complicated by left leg swelling and ultrasound showed acute DVT in the mid femoral vein extending from the calf veins.  He was put on Xarelto and his PCP scheduled him for follow-up ultrasound of lower extremity.  It is pending.  No bleeding issues on Xarelto.    Left lower leg swollen, with venous ulceration in pre-tibial area. No clear cut infection but inflammation. Pt most likely developed venous insuff from DVT. Distal pulses OK and no neuro sx in left foot.    Somehow he takes losartan 50 mg once a day instead of twice a day.  We increased the dose in the past because his BP was not controlled.  The present time his BP is at the upper limit of normal.    Prior to it he missed appointment with me between 2020 and 2022 but was seen by EP.  Somehow he was not started on  right medications after open heart surgery June 2020 and missed visits with me.  Some medication was not resumed.  When he came to my office in May 2022, we doubled carvedilol and added losartan and spironolactone hoping for better heart pump function.  We have not ordered echo yet but wanted to uptitrate medications first when he reestablished clinic in 2022.    LV systolic function improved by follow-up echo October 2022 after we uptitrated beta-blocker and losartan with low-dose of spironolactone.  He has mildly reduced LVEF with left bundle branch block.    Lexiscan nuclear stress test -May 2023 -no ischemia but medium to large fixed anteroseptal and anteroapical perfusion defect of moderate severity consistent with prior infarct.  LVEF 35% with dilated LV cavity.  Anteroseptal dyskinesis.  No ischemia.  Sinus.  No angina.    EF is better by echo done by nuclear stress test.    Echo Doppler  -October 2022  -improvement as compared to echo doppler 09/2021 - LV systolic function improved. LVEF increased from 35-40% to 45-50%. Otherwise no significant changes.  A 25 mm pericardial bioprosthetic aortic valve was functioning well with mean gradient of 16 to 18 mmHg and no AI or perivalvular leakage.  No other significant valvular abnormalities.  Hypokinetic mid anteroseptal segments and mid distal anterior septum with apical segment in LAD territory but some septal wall motion related to LBBB.  Slow relaxation.  Mild LVH.  Moderately enlarged left atrium.  No pulmonary hypertension by Doppler.  Status post ascending aorta aneurysm repair with Hemashield graft.    Patient takes Multaq.  Dr. Mancia manages his A. fib.  Patient is in sinus.  He is off anticoagulation per EP.  He takes aspirin.    Patient is next appointment with EP in December 2023 with MCT monitoring prior to it.  Not yet.    We completed EKG on 9/6/2022 - sinus 57 bpm with left axis deviation.  No ischemia but minor nonspecific changes.  Left atrial  enlargement Q waves in anteroseptal leads with poor R wave progression.  No acute changes.   any second.  Normal QT interval.    Follow-up laboratory data -September 2023  -normal CBC with normal BMP glucose 96 potassium 4.6.  Cholesterol profile at goal with normal TSH PSA 0.83 vitamin D 53.    Laboratory data -March 2023 potassium 4.4, glucose 95 creatinine 1.3 -unremarkable BMP.    Laboratory data -September 2022 -glucose 142 potassium 4.1 creatinine 1.28 BUN 23 GFR 64.    Laboratory data July 2022 normal liver enzymes and kidney function with cholesterol profile at goal.  Hemoglobin A1c 5.6%.    Patient has coronary artery disease with one-vessel CABG with LIMA to LAD, s/p bioprosthetic aortic valve replacement with 25 mm Inspiris bovine pericardial valve and replacement of aneurysmal ascending thoracic aorta with 32 mm Hemashield graft, bilateral PV radiofrequency ablation and amputation of left atrial appendage -by Dr. Castillo-June 2020.  Other history includes hypertension, dyslipidemia, history of stroke with no residual loss and A. fib with cardioversion in the past minus.    Patient reports occasional fatigue and occasional dyspnea on exertion but no acute cardiopulmonary symptoms.  He is class II stage C dilated cardiomyopathy with valvular heart disease and one-vessel CABG.  No fluid overload by physical exam.  Blood pressure is not controlled in the 140s over high 80s millimeters of mercury or higher.  Trace leg edema.    Patient was found to have dilated cardiomyopathy with LVEF of 20% in 2015.  Aortic stenosis and aneurysmal thoracic aorta were followed with periodical imaging to the point patient required combo open heart surgery in June 2020.  LAD  was treated medically waiting for open heart surgery.  There was no significant disease in the remaining vessels.  [facility] showed LAD  echo showed severe aortic stenosis with 2+ AI with 5x4.7 cm ascending aorta aneurysm by CT in March  2020.    Between 2015 and 2021 patient was treated only medically we initiated carvedilol with lisinopril and spironolactone maximizing the doses which help with LVEF to go up from 20% up to 35 to 40%.  Patient lost follow-up with us after surgery and has not been taking those meds but only carvedilol with very low-dose 3.125 mg p.o. twice a day.  For this reason his EF stayed at 35 to 40%.  There was discussion about possible ICD evaluation and EP notes.    EKG -May 31, 2022-sinus 60 bpm with normal intervals and left axis deviation with Q waves in V1 V2 and V3.  Narrow QRS.    Echo Doppler study -December 2021- LVEF 35 to 40% with anterior wall, mid anteroseptal and mid inferoseptal wall hypokinesis.  Preserved RV systolic function.  Mild left atrial enlargement.  Normally functioning bioprosthetic aortic valve with mean gradient of 15 mmHg.  Left ventricle was dilated.    Patient was diagnosed with atrial fibrillation when he presented for colonoscopy in early 2018.  Eliquis was initiated.  He was cardioverted to A. fib successfully in July 2019.  It helped with energy level.  Then he underwent radiofrequency ablation with left atrial amputation doing open heart surgery in June 2020.  He stays on Multaq off anticoagulation and takes aspirin sinus.    He has history of CVA 2015, no residuals.  Currently has NYHA Class 1 symptoms.  Cardiac risk factors Hypertension, Dyslipidemia, Obesity and Never smoked  Past Medical History  1.Dilated cardiomyopathy  2.Essential hypertension  3.ACC/AHA stage C chronic systolic heart failure  4.CAD native (coronary artery disease)  5.LBBB (left bundle branch block) - BBB  6.Carotid artery stenosis  7.History of atrial fibrillation  8.Atrial fibrillation, currently in sinus rhythm  9.Thoracic ascending aortic aneurysm  10.CVA (cerebrovascular accident)  11.Anticoagulant long-term use  12.Bicuspid aortic valve  13.Leg DVT (deep venous thromboembolism), chronic, left  14.Chronic  cutaneous venous stasis ulcer  15.High cholesterol  Past Surgical History  1.Status post aortic valve replacement with bioprosthetic valve  2.S/P thoracic aortic aneurysm repair  3.S/P AVR (25mm prosthetic, replacement of asc ao)  4.Left thoracentesis  5.S/P CABG (LMA-LAD, Ablation, WILLARD resection)  Family History  1. Father - Acute CVA (cerebrovascular accident)  2. Mother - Cancer  Social History  Smoking status Never smoked  Tobacco usage - No (Non-smoker (finding))  Review of systems  Constitutional Fatigue  No history of Anorexia  Gastrointestinal No history of Abdominal pain  Cardiovascular Edema  No history of Chest pain, LOU, Palpitations, Syncope, PND, Orthopnea and Claudication  Musculoskeletal Arthritis  No history of Arthralgias and Myalgias  Respiratory No history of SOB  Neurological No history of Numbness, Speech problems, Poor balance and Unsteady gait  Psychiatric Depression  No history of Anxiety  Integumentary No history of Rashes and Skin changes  Physical Examination  Vitals Right Arm Sitting  / 84 mmHg, Pulse rate 62 bpm, Regular, Height in 6' 3\", BMI: 36.6, Weight in 293 lbs (or) 132.9 kgs and BSA : 2.7 cc/m²  General Appearance No Acute Distress and Obese  Head/Eyes/Ears/Nose/Mouth/Throat Conjunctiva pink, Sclera Clear, PERRLA, Mucous membranes Moist and No pallor  Neck Normal carotid pulsations, No carotid bruits and No JVD  Respiratory Unlabored and Lungs clear with normal breath sounds  Cardiovascular Intact distal pulses and Regular rhythm. Normal rate present. Normal and normal S1 and S2  2/6 systolic murmur is noted .    Gastrointestinal Abdomen soft, Non-tender, Normoactive bowel sounds and No organomegaly  Strength and tone Normal muscle strength  Lower Extremities Edema 2+ and Venous stasis  Skin Warm and dry and No rashes or lesions  Neurologic / Psychiatric Alert and Oriented  Speech Normal speech  Allergies  No known medication allergies.  Medications (Info obtained by:  List)  1.aspirin 81 MG tablet, TAKE 1 TABLET DAILY.  2.atorvastatin 80 mg tablet, Take 1 tablet orally once a day.  3.CARVEDILOL 6.25MG TABLETS, TAKE 1 TABLET BY MOUTH TWICE DAILY  4.losartan 50 mg tablet, Take 1 tablet orally once a day.  5.MULTAQ 400MG TABLETS, TAKE 1 TABLET BY MOUTH TWICE DAILY  6.Multiple Vitamin (MULTIVITAMIN) capsule, TAKE 1 CAPSULE DAILY.  7.spironolactone 25 mg tablet, Take one-half tablet orally once a day.  8.Vitamin D3 25 mcg (1,000 unit) tablet, Take 1 tablet orally once a day.  9.Xarelto 20 mg tablet, Take 1 tablet orally once a day.  Impression  1.Dilated cardiomyopathy  2.Status post aortic valve replacement with bioprosthetic valve  3.Essential hypertension  4.S/P thoracic aortic aneurysm repair  5.ACC/AHA stage C chronic systolic heart failure  6.CAD native (coronary artery disease)  7.Carotid artery stenosis  8.History of atrial fibrillation  9.Atrial fibrillation, currently in sinus rhythm  10.Anticoagulant long-term use  11.S/P CABG (LMA-LAD, Ablation, WILLARD resection)  12.Leg DVT (deep venous thromboembolism), chronic, left  13.Chronic cutaneous venous stasis ulcer  Assessment & Plan  1.  Echo showed improvement in LV systolic function after we uptitrated beta-blocker and losartan with low-dose of spironolactone.  He has mildly to moderately reduced LVEF of 45% by echo and 35% by Lexiscan nuclear stress test with left bundle branch block.  Overall it improves after adjustment of heart failure medications.  There was no ischemia by recent nuclear stress test.  Fixed defect in LAD territory only.  No symptoms.  2.  No need for ICD.  3.  Discontinue losartan.  Do not overlap with valsartan.  4.  Start valsartan 160 mg p.o. daily.  Patient prescription to pharmacy for 90 days with 1 refill.   5.  Continue carvedilol and spironolactone at current doses.  Tendency to hyperkalemia.  4.  Check nonfasting basic metabolic panel in 2 months.  5.  No need for other blood work.  We reviewed  blood work from PCP September 2023 and all was normal.  6.  Follow-up with Dr. Mancia as scheduled next week.  Patient is on Multaq.  He would like to talk to Dr. Longoria about switching to a different medication due to price.  He has history of A-fib but stays in sinus.  7.  Patient has history of A. fib but stays in A. fib on Multaq and this is followed by EP Dr. Mancia.   8.  Continue anticoagulation with Xarelto for left leg DVT.  Patient has follow-up venous ultrasound of lower extremities in December with PCP.  She will be referred to hematologist and I discussed with the patient.  Patient has also venous ulcer from venous insufficiency and leg edema most likely DVT not resolved.  Depends on ultrasound he may need procedure on the veins with IR or PV team.  PCP ordered ultrasound which is pending and will be done soon and patient has appoint with hematologist on.  He may need wound care as well.  It was discussed with the patient detail.  9.  Good diet and staying active was emphasized.  Weight loss strongly recommended.  10.  Echo Doppler in 2 months to follow on bioprosthetic aortic valve and hoping for further EF recovery.  11.  Follow-up with me in 3 months.    All discussed with the patient detail.  Copy to PCP.    Recommend low sodium diet, daily exercise and maintain a normal BMI. Recommend monitor blood pressure at home.    Increased BMI: Provide patient with information regarding diet and lifestyle changes.  Medications Ordered  1.valsartan 160 mg tablet, Take 1 tablet orally once a day.  Labs and Diagnostics ordered  1.BMP (Basic Metabolic Panel) (2 Months)  2.Echocardiography - Complete (2 Months)  Future appointments  1.Follow up visit - Javier Marie MD (3 Months)  Miscellaneous  1.Reviewed glucose, sodium, potassium, chloride, co2, anion gap, bun, creatinine, calcium, osmolality calculated, e gfr cr and fasting patient bmp answer with the patient.  2.Reviewed myocardial perfusion imaging, trans  thoracic echocardiogram with the patient.  3.Weight monitoring (regime/therapy)  Nurses documentation  Triage - Nursing Doc:  Upcoming surgeries: no   Use of assistive devices(s): no   Refills: no  Verbal order for EKG: no  Triage & medication list reviewed by: ABRAHAM   Patient instructions  1.  Discontinue losartan.  Do not overlap with valsartan.    2.  Start valsartan 160 mg daily    3.  Check nonfasting basic metabolic panel in 2 months    4. Schedule echocardiogram in 2 months     5. Follow up in 3 months with   Lab Details  BASIC METABOLIC PANEL (8)  03/16/2023 02:54:40 PM  GLUCOSE 95 70-99 mg/dL  F  SODIUM 141 136-145 mmol/L  F  POTASSIUM 4.4 3.5-5.1 mmol/L  F  CHLORIDE 111  mmol/L  F  CO2 26.0 21.0-32.0 mmol/L  F  ANION GAP 4 0-18 mmol/L  F  BUN 19 7-18 mg/dL H F  CREATININE 1.30 0.70-1.30 mg/dL  F  CALCIUM 9.5 8.5-10.1 mg/dL  F  OSMOLALITY CALCULATED 294 275-295 mOsm/kg  F  E GFR CR 62 >=60 mL/min/1.73m2  F  FASTING PATIENT BMP ANSWER No   F  Diagnostics Details  Exercise Myocardial Perfusion Imaging 05/04/2023  1.Stress EKG is normal.    1.This is an abnormal perfusion study.    2.Medium - large, fixed anteroseptal and anteroapical perfusion defect of moderate severity c/w prior infarction.    3.The left ventricular cavity is noted to be markedly enlarged on the stress study. The left ventricular ejection fraction was calculated to be 35% and left ventricular global function is moderately reduced. There is anteroapical dyskinesia.    Trans Thoracic Echocardiogram 10/28/2022  1.The study quality is good.    2.The left ventricle is normal in size. Global left ventricular systolic function is mildly reduced. The left ventricular ejection fraction is 45-50%. Regional wall motion analysis shows hypokinesis of mid anteroseptal segment, mid and distal anterior septum, apical septal segment but some septum wall motions related to LBBB. The left ventricle diastolic function is impaired (Grade I) with  left atrial pressure at the upper limit. Mild concentric left ventricular hypertrophy.    3.The left atrial diameter is moderately increased. Left atrial diameter is 4.7 cms.    4.A 25mm pericardial bioprosthetic aortic valve is functioning well, well seated. The trans-aortic mean gradient is 16-18 mmHg. No AI or perivalvular leakage.    5.Cannot estimate pulmonary artery peressure due to lack of TR jet.    6.No other significant valvular abnormalities.    7.As compared to echo doppler 09/2021 - LV systolic function improved. LVEF increased from 35-40% to 45-50%. Otherwise no significant changes.    CPOE Orders carried out by: Javier Marie MD and Mer Tinajero  Care Providers: Javier Marie MD, Mer Tinajero and Ruchi Lemus CMA  Electronically Authenticated by  Javier Marie MD  01/11/2024 05:32:54 PM  Disclaimer: Components of this note were documented using voice recognition system and are subject to errors not corrected at proofreading. Contact the author of this note for any clarifications.

## 2024-02-20 NOTE — PLAN OF CARE
Assumed patient care at 0730  Alert & oriented x4.  No new neurological changes; RUE flaccid, responds to pain.  RLE weakness, R facial droop, and slurred speech, see flowsheets  Seizure prec maintained  SBP elevated this am; Dr. Tim notified; Losartan started with improvement in BP  LLE wounds with dressing C/D/I; compression wrap removed per wound care request; they plan to see tomorrow am  IVC filter tomorrow; NPO at MN.  Hematology consulted  Good PO intake  Up to chair   Spouse at bedside  Plan for PMR to see    Plan of care discussed with patient and physicians.
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
## 2024-02-20 NOTE — PLAN OF CARE
Assumed care at 0700  Patient alert, oriented x4  No acute neurological changes  IVC filter placed. R groin site soft, no hematoma  Tylenol given for pain  LLE dressings changed by wound care  Cardiology consulted  Video swallow pending    Need insurance auth for . Plan of care discussed with patient and spouse

## 2024-02-20 NOTE — SLP NOTE
SPEECH DAILY NOTE - INPATIENT    ASSESSMENT & PLAN   ASSESSMENT  Pt seen for dysphagia tx to assess tolerance with recommended diet, ensure proper utilization of aspiration precautions and provide pt/family education.  Patient seen with recommended diet of regular solids and thin liquids. Patient instructed on small sips and slow rate coupled with effortful swallow. Intermittent cough present with thin liquids. Patient was seen with PT and OT present to assist with positioning patient upright and midline in bed.   Recommend continue with regular solids and thin liquids with use of aspiration precautions. Videofluoroscopic swallow study (VFSS) suggested to assess oropharyngeal swallow function, r/o aspiration, assess compensatory strategies to optimize safe swallowing and recommend safest and least restrictive diet.     Diet Recommendations - Solids: Regular  Diet Recommendations - Liquids: Thin Liquids    Compensatory Strategies Recommended: No straws;Slow rate;Small bites and sips  Aspiration Precautions: Upright position;Slow rate;Small bites and sips;No straw  Medication Administration Recommendations: One pill at a time;Whole in puree    Patient Experiencing Pain: No                Treatment Plan  Treatment Plan/Recommendations: Videofluoroscopic swallow study;Aspiration precautions    Interdisciplinary Communication: Discussed with RN  Recommendations posted at bedside               GOALS  Goal #1 The patient will tolerate regular consistency and thin liquids without overt signs or symptoms of aspiration with 90 % accuracy over 1-2 session(s).  In Progress   Goal #2 The patient/family/caregiver will demonstrate understanding and implementation of aspiration precautions and swallow strategies independently over 1-2 session(s).     In Progress   Goal #3 Communication evaluation and establish goals New   Goal #4 Pt will participate in VFSS to objectively assess swallow function, r/o aspiration and determine  safest/least restrictive means of nutrition/hydration.  Goal established       FOLLOW UP  Follow Up Needed (Documentation Required): Yes  SLP Follow-up Date: 02/21/24  Number of Visits to Meet Established Goals: 4    Session: 2 of 4    If you have any questions, please contact Lindsay KEENAN SLP
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
## 2024-02-20 NOTE — PROGRESS NOTES
[facility]     Hospitalist Progress Note     Kris Colvin Patient Status:  Inpatient    3/6/1960 MRN [id number]   Formerly [facility] 7NE-A Attending Rex Tim MD   Hosp Day # 4 PCP Lenka Guevara DO     Chief Complaint: ICH    Subjective:   Patient denies chest pain or shortness of breath. Admits to coughing when eating/drinking. No nausea, vomiting, diarrhea.    Current medications:   docusate sodium  100 mg Oral BID    polyethylene glycol (PEG 3350)  17 g Oral Daily    losartan  100 mg Oral Daily    atorvastatin  80 mg Oral Daily    carvedilol  6.25 mg Oral BID       Objective:    Review of Systems:   10 point ROS completed and was negative, except for pertinent positive and negatives stated in subjective.    Vital signs:  Temp:  [97.4 °F (36.3 °C)-100.2 °F (37.9 °C)] 100.2 °F (37.9 °C)  Pulse:  [63-75] 66  Resp:  [15-20] 16  BP: (134-151)/(82-93) 135/87  SpO2:  [92 %-96 %] 96 %  Patient Weight for the past 72 hrs:   Weight   24 1348 (!) 302 lb 14.6 oz (137.4 kg)   24 1606 (!) 302 lb 14.6 oz (137.4 kg)     Physical Exam:    General: No acute distress.   Respiratory: Diminished right base  Cardiovascular: S1, S2. Regular rate and rhythm.  Abdomen: Soft, nontender, nondistended.  Positive bowel sounds.   Extremities: No edema. RUE 0/5, RLE weaker compared to LLE  Neuro: AAOx3    Diagnostic Data:    Labs:  Recent Labs   Lab 24  1346 24  0429 24  0634   WBC 12.0* 9.0 9.6   HGB 15.1 14.2 14.8   MCV 93.7 93.3 94.6   .0 166.0 127.0*   INR 1.07  --   --        Recent Labs   Lab 24  1346 24  0429 24  0634   GLU 93 96 106*   BUN 14 13 24*   CREATSERUM 1.25 0.94 1.19   CA 9.6 8.6 9.1   ALB 3.7  --  3.2*    143 144   K 4.1 3.9 4.1    111 111   CO2 28.0 28.0 30.0   ALKPHO 78  --  74   AST 29  --  30   ALT 36  --  26   BILT 0.7  --  1.0   TP 7.5  --  7.0       Estimated Creatinine Clearance: 76.9 mL/min (based on SCr of 1.19 mg/dL).    Recent  Labs   Lab 02/17/24  1346   PTP 14.0   INR 1.07            COVID-19 Lab Results    COVID-19  Lab Results   Component Value Date    COVID19 Not Detected 02/17/2024    COVID19 Not Detected 03/25/2022    COVID19 Not Detected 07/06/2020       Pro-Calcitonin  No results for input(s): \"PCT\" in the last 168 hours.    Cardiac  No results for input(s): \"TROP\", \"PBNP\" in the last 168 hours.    Creatinine Kinase  No results for input(s): \"CK\" in the last 168 hours.    Inflammatory Markers  No results for input(s): \"CRP\", \"KENDRA\", \"LDH\", \"DDIMER\" in the last 168 hours.    Recent Labs   Lab 02/17/24  1346   TROPHS 32       Imaging: Imaging data reviewed in Epic.    Medications:    docusate sodium  100 mg Oral BID    polyethylene glycol (PEG 3350)  17 g Oral Daily    losartan  100 mg Oral Daily    atorvastatin  80 mg Oral Daily    carvedilol  6.25 mg Oral BID       Assessment & Plan:    Acute intraparenchymal hemorrhage right parietal lobe with mass effect, no midline shift sp DOAC reversal in ER, repeat CT stable  Cerebrovascular disease  Repeat CT in 2 weeks as outpatient, then determine resuming DOAC at that time per NS  NS consult  History of VTE, now with progression  sp IVC filter 2/20/2024  Hematology consult   Paroxysmal atrial fibrillation on DOAC   CAD sp CABG sp AVR  Chronic diastolic heart failure  Essential hypertension  Dyslipidemia  Coreg  Losartan  Lipitor  Resume Spironolactone?   Resume Multaq?   DOAC on hold given ICH  Monitor hemodynamics   Telemetry  Cardiology consult  Aspiration?  VFSS  Speech evaluation   Atelectasis  Incentive spirometry  Flutter valve  Constipation  Bowel care  CHAZ    Supplementary Documentation:   Quality:  DVT Prophylaxis: IVC filter    At this point Mr. Colvin is expected to be discharge to: Acute Rehab    Plan of care discussed with patient and RN.    Rupert Valles MD

## 2024-02-20 NOTE — PHYSICAL THERAPY NOTE
PHYSICAL THERAPY TREATMENT NOTE - INPATIENT    Room Number: 7602/7602-A     Session: 1    Number of Visits to Meet Established Goals: 3    Presenting Problem: Intraparenchymal hemtoma of brain, HDL, DVT,  Co-Morbidities : HTN, CHAZ< DVT, CAD s/p CABG    Reason for Therapy: Mobility Dysfunction and Discharge Planning    CT Brain 2/19  CONCLUSION:  Stable 2.8 x 2.7 x 1.8 cm acute intraparenchymal hematoma arising from the left insular region with a small amount of surrounding vasogenic edema and stable mild localized mass effect.      Mild diffuse cerebral and cerebellar atrophy with chronic microvascular ischemic changes of aging.     US Venous   CONCLUSION:  Partial DVT in the proximal mid and distal segments of the left superficial femoral   vein as well as the proximal segment of the left popliteal vein.  Extent of DVT has progressed since 1/17/2024 exam.  Critical exam results phoned to nurse Thomas on 2/18/2024 at 1042 hours with read back.      No DVT in the right leg.      Posterior tibial veins of the left calf could not be visualized secondary to ulceration.         ASSESSMENT   Patient demonstrates fair progress this session, goals  remain in progress.    Patient continues to function below baseline with bed mobility, transfers, gait, and stair negotiation.  Contributing factors to remaining limitations include R arm flaccidity, r side awareness, dec proprioception, decreased balance in sitting, pain, impaired standing balance, dec strength in RLE, dec coordination and impaired motor planning. Next session anticipate patient to progress in balance, bed mobility and transfers.  Physical Therapy will continue to follow patient for duration of hospitalization.    Patient continues to benefit from continued skilled PT services: to facilitate return to prior level of function as patient demonstrates high motivation with excellent tolerance to an intensive therapy program .    PLAN  PT Treatment Plan: Bed  mobility;Body mechanics;Gait training;Strengthening;Stair training;Transfer training;Balance training  Rehab Potential : Good  Frequency (Obs): 3-5x/week    CURRENT GOALS   Goal #1 Patient is able to demonstrate supine - sit EOB @ level: minimum assistance     Goal #2 Patient is able to demonstrate transfers EOB to/from BSC at assistance level: moderate assistance     Goal #3 Patient is able to ambulate 10 feet with assist device: walker - platform at assistance level: moderate assistance     Goal #4    Goal #5    Goal #6    Goal Comments: Goals established on 2024 all goals ongoing.     SUBJECTIVE   \" My leg is killing me.\" Pt declined pain medication.     OBJECTIVE  Precautions:  (R side flaccid, Left Leg wound)    WEIGHT BEARING RESTRICTION                   PAIN ASSESSMENT   Ratin  Location: Left shin at wound site and groin at IVC filter site  Management Techniques: Repositioning    BALANCE                                                                                                                       Static Sitting: Poor +  Dynamic Sitting: Poor           Static Standing: Not tested  Dynamic Standing: Not tested    ACTIVITY TOLERANCE     139/82 after sitting and exercises.     AM-PAC '6-Clicks' INPATIENT SHORT FORM - BASIC MOBILITY  How much difficulty does the patient currently have...  Patient Difficulty: Turning over in bed (including adjusting bedclothes, sheets and blankets)?: A Lot   Patient Difficulty: Sitting down on and standing up from a chair with arms (e.g., wheelchair, bedside commode, etc.): A Lot   Patient Difficulty: Moving from lying on back to sitting on the side of the bed?: A Lot   How much help from another person does the patient currently need...   Help from Another: Moving to and from a bed to a chair (including a wheelchair)?: Total   Help from Another: Need to walk in hospital room?: Total   Help from Another: Climbing 3-5 steps with a railing?: Total        AM-PAC Score:  Raw Score: 9   Approx Degree of Impairment: 81.38%   Standardized Score (AM-PAC Scale): 30.55   CMS Modifier (G-Code): CM    FUNCTIONAL ABILITY STATUS  Gait Assessment   Functional Mobility/Gait Assessment  Gait Assistance: Not tested    Skilled Therapy Provided    Bed Mobility:  Rolling: min assist of one towards R side.    Supine<>Sit: mod assist of one for trunk support from head of bed elevated to 40 degrees.   Sit<>Supine: max assist.   Pt unable to safely scoot to edge without min assist to prevent from lob. Pt with dec insight in his balance deficits.      Transfer Mobility:  Sit<>Stand:NT due to pain in kandi le's   Stand<>Sit: NT due to pain   Gait: NT    Therapist's Comments: patient participated in bed mobility training with manual and verbal cues. Patient needed assist to scoot to edge. Initially presented with R side lean. R arm was support on two pillows for protection of shoulder joint . Facilitated weight shift ant<>post, side to side and rotational movements towards R side to promote wb on RUE.   Patient noted to lose balance posteriorly when engaged in dual tasks. Patient maintained sitting for 16 min with min to mod assist of one to prevent lob. Engaged in activity to improve right attention.   Educated on proper limb positioning.     THERAPEUTIC EXERCISES  Lower Extremity ROM ex bilaterally.      Upper Extremity      Position Supine      Repetitions   5-10   Sets   1     Patient End of Session: Up in chair;Needs met;Call light within reach;RN aware of session/findings;All patient questions and concerns addressed;Alarm set;Family present    PT Session Time: 30 minutes  Gait Trainin minutes  Therapeutic Activity: 10 minutes  Therapeutic Exercise: 5 minutes   Neuromuscular Re-education: 15 minutes

## 2024-02-20 NOTE — CM/SW NOTE
Noted PM&R consultation with Dr. Rodney. Acute Rehab referral sent via [facility].     SW/CM will follow up w/ pt to provide list of available facilities for services.    CM/SW will remain available for DC planning and/or support.
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
## 2024-02-20 NOTE — PROGRESS NOTES
[facility]     Hospitalist Progress Note     Kris Colvin Patient Status:  Inpatient    3/6/1960 MRN [id number]   Location [facility] 6NE-A Attending Rex Tim MD   Hosp Day # 3 PCP Lenka Guevara DO     Chief Complaint: Right side weakness     Subjective:     Patient still with no movement to right arm.  Denies fever, chills, n/v.  No cp or sob.  No other acute complaints .     Objective:    Review of Systems:   A comprehensive review of systems was completed; pertinent positive and negatives stated in subjective.    Vital signs:  Temp:  [97.9 °F (36.6 °C)-98.7 °F (37.1 °C)] 98.7 °F (37.1 °C)  Pulse:  [62-75] 63  Resp:  [17-22] 22  BP: (127-156)/(73-93) 134/88  SpO2:  [91 %-95 %] 93 %    Physical Exam:    General: No acute distress, Alert, pleasant   Respiratory: No rhonchi, no wheezes  Cardiovascular: S1, S2. Regular rate and rhythm  Abdomen: Soft, Non-tender, non-distended, positive bowel sounds  Neuro: Right arm power 1/5, LE 4/5, with right facial droop and slurred speech  Extremities: No edema    Diagnostic Data:    Labs:  Recent Labs   Lab 24  1346 24  0429   WBC 12.0* 9.0   HGB 15.1 14.2   MCV 93.7 93.3   .0 166.0   INR 1.07  --        Recent Labs   Lab 24  1346 24  0429   GLU 93 96   BUN 14 13   CREATSERUM 1.25 0.94   CA 9.6 8.6   ALB 3.7  --     143   K 4.1 3.9    111   CO2 28.0 28.0   ALKPHO 78  --    AST 29  --    ALT 36  --    BILT 0.7  --    TP 7.5  --        Estimated Creatinine Clearance: 97.4 mL/min (based on SCr of 0.94 mg/dL).    Recent Labs   Lab 24  1346   TROPHS 32       Recent Labs   Lab 24  1346   PTP 14.0   INR 1.07                  Microbiology    No results found for this visit on 24.      Imaging: Reviewed in Epic.    Medications:    losartan  100 mg Oral Daily    atorvastatin  80 mg Oral Daily    carvedilol  6.25 mg Oral BID       Assessment & Plan:      #Acute intraparenchymal hemorrhage   CT noted findings  above - mild mass effect, no midline shift  S/p andexxa  Neuro checks   Echo normal EF, G1DD  PT/OT/ST  Repeat CT - stable findings   PT/OT - rehab placement   Neuro, Neuro surgery consult     #CAD s/p CABG - hold aspirin given bleed  Coreg, lipitor      #Essential HTN - Losartan, coreg      #Left leg dvt  #Hx of DVT  IVC filter today  Hematology consult     #HLD - satin - resume once speech eval     #Chronic LE wounds - follows with outpatient wound care     Dispo:  Insurance auth for [facility]      Rex Tim MD    Supplementary Documentation:     Quality:  DVT Mechanical Prophylaxis:   SCDs, Early ambuation  DVT Pharmacologic Prophylaxis   Medication   None                Code Status: Prior  Gaines: No urinary catheter in place  Gaines Duration (in days):   Central line:    ZORA: 2/22/2024    Discharge is dependent on: clinical recovery   At this point Mr. Colvin is expected to be discharge to: [facility]    The 21st Century Cures Act makes medical notes like these available to patients in the interest of transparency. Please be advised this is a medical document. Medical documents are intended to carry relevant information, facts as evident, and the clinical opinion of the practitioner. The medical note is intended as peer to peer communication and may appear blunt or direct. It is written in medical language and may contain abbreviations or verbiage that are unfamiliar.

## 2024-02-20 NOTE — IMAGING NOTE
[facility] Stress 5/2023   This is an abnormal perfusion study.    Medium - large, fixed anteroseptal and anteroapical perfusion defect of moderate severity c/w prior infarction.   The left ventricular cavity is noted to be markedly enlarged on the stress study. The left ventricular ejection fraction was calculated to be 35% and left ventricular global function is moderately reduced.  There is anteroapical dyskinesia.         [facility] Echo 10/2022     1. The study quality is good.   2. The left ventricle is normal in size. Global left ventricular systolic function is mildly reduced.  The left ventricular ejection fraction is 45-50%. Regional wall motion analysis shows hypokinesis of mid anteroseptal segment, mid and distal anterior septum, apical septal segment but some septum wall motions related to LBBB.  The left ventricle diastolic function is impaired (Grade I) with left atrial pressure at the upper limit. Mild concentric left ventricular hypertrophy.  3. The left atrial diameter is moderately increased. Left atrial diameter is 4.7 cms.   4. A 25mm pericardial bioprosthetic aortic valve is functioning well, well seated. The trans-aortic mean gradient is 16-18 mmHg. No AI or perivalvular leakage.  5. Cannot estimate pulmonary artery peressure due to lack of TR jet.  6. No other significant valvular abnormalities.  7. As compared to echo doppler 09/2021 - LV systolic function improved. LVEF increased from 35-40% to 45-50%. Otherwise no significant changes.

## 2024-02-21 ENCOUNTER — APPOINTMENT (OUTPATIENT)
Dept: GENERAL RADIOLOGY | Facility: HOSPITAL | Age: 64
End: 2024-02-21
Attending: HOSPITALIST
Payer: COMMERCIAL

## 2024-02-21 LAB
ALBUMIN SERPL-MCNC: 3.2 G/DL (ref 3.4–5)
ALBUMIN/GLOB SERPL: 0.8 {RATIO} (ref 1–2)
ALP LIVER SERPL-CCNC: 74 U/L
ALT SERPL-CCNC: 26 U/L
ANION GAP SERPL CALC-SCNC: 3 MMOL/L (ref 0–18)
AST SERPL-CCNC: 30 U/L (ref 15–37)
BASOPHILS # BLD AUTO: 0.06 X10(3) UL (ref 0–0.2)
BASOPHILS NFR BLD AUTO: 0.6 %
BILIRUB SERPL-MCNC: 1 MG/DL (ref 0.1–2)
BUN BLD-MCNC: 24 MG/DL (ref 9–23)
CALCIUM BLD-MCNC: 9.1 MG/DL (ref 8.5–10.1)
CHLORIDE SERPL-SCNC: 111 MMOL/L (ref 98–112)
CO2 SERPL-SCNC: 30 MMOL/L (ref 21–32)
CREAT BLD-MCNC: 1.19 MG/DL
EGFRCR SERPLBLD CKD-EPI 2021: 69 ML/MIN/1.73M2 (ref 60–?)
EOSINOPHIL # BLD AUTO: 0.39 X10(3) UL (ref 0–0.7)
EOSINOPHIL NFR BLD AUTO: 4.1 %
ERYTHROCYTE [DISTWIDTH] IN BLOOD BY AUTOMATED COUNT: 12.8 %
GLOBULIN PLAS-MCNC: 3.8 G/DL (ref 2.8–4.4)
GLUCOSE BLD-MCNC: 106 MG/DL (ref 70–99)
GLUCOSE BLD-MCNC: 127 MG/DL (ref 70–99)
GLUCOSE BLD-MCNC: 133 MG/DL (ref 70–99)
GLUCOSE BLD-MCNC: 140 MG/DL (ref 70–99)
HCT VFR BLD AUTO: 43.6 %
HGB BLD-MCNC: 14.8 G/DL
IMM GRANULOCYTES # BLD AUTO: 0.02 X10(3) UL (ref 0–1)
IMM GRANULOCYTES NFR BLD: 0.2 %
LYMPHOCYTES # BLD AUTO: 2.58 X10(3) UL (ref 1–4)
LYMPHOCYTES NFR BLD AUTO: 26.9 %
MCH RBC QN AUTO: 32.1 PG (ref 26–34)
MCHC RBC AUTO-ENTMCNC: 33.9 G/DL (ref 31–37)
MCV RBC AUTO: 94.6 FL
MONOCYTES # BLD AUTO: 0.92 X10(3) UL (ref 0.1–1)
MONOCYTES NFR BLD AUTO: 9.6 %
NEUTROPHILS # BLD AUTO: 5.62 X10 (3) UL (ref 1.5–7.7)
NEUTROPHILS # BLD AUTO: 5.62 X10(3) UL (ref 1.5–7.7)
NEUTROPHILS NFR BLD AUTO: 58.6 %
OSMOLALITY SERPL CALC.SUM OF ELEC: 302 MOSM/KG (ref 275–295)
PLATELET # BLD AUTO: 127 10(3)UL (ref 150–450)
POTASSIUM SERPL-SCNC: 4.1 MMOL/L (ref 3.5–5.1)
PROT SERPL-MCNC: 7 G/DL (ref 6.4–8.2)
RBC # BLD AUTO: 4.61 X10(6)UL
SODIUM SERPL-SCNC: 144 MMOL/L (ref 136–145)
WBC # BLD AUTO: 9.6 X10(3) UL (ref 4–11)

## 2024-02-21 PROCEDURE — 74230 X-RAY XM SWLNG FUNCJ C+: CPT | Performed by: HOSPITALIST

## 2024-02-21 PROCEDURE — 99233 SBSQ HOSP IP/OBS HIGH 50: CPT | Performed by: HOSPITALIST

## 2024-02-21 RX ORDER — SPIRONOLACTONE 25 MG/1
12.5 TABLET ORAL ONCE
Status: COMPLETED | OUTPATIENT
Start: 2024-02-21 | End: 2024-02-21

## 2024-02-21 RX ORDER — POLYETHYLENE GLYCOL 3350 17 G/17G
17 POWDER, FOR SOLUTION ORAL DAILY
Status: DISCONTINUED | OUTPATIENT
Start: 2024-02-21 | End: 2024-02-22

## 2024-02-21 RX ORDER — DOCUSATE SODIUM 100 MG/1
100 CAPSULE, LIQUID FILLED ORAL 2 TIMES DAILY
Status: DISCONTINUED | OUTPATIENT
Start: 2024-02-21 | End: 2024-02-25

## 2024-02-21 RX ORDER — SPIRONOLACTONE 25 MG/1
12.5 TABLET ORAL DAILY
Status: DISCONTINUED | OUTPATIENT
Start: 2024-02-21 | End: 2024-02-21

## 2024-02-21 RX ORDER — SPIRONOLACTONE 25 MG/1
25 TABLET ORAL DAILY
Status: DISCONTINUED | OUTPATIENT
Start: 2024-02-22 | End: 2024-02-22

## 2024-02-21 NOTE — PHYSICAL THERAPY NOTE
PHYSICAL THERAPY TREATMENT NOTE - INPATIENT    Room Number: 7602/7602-A     Session: 2     Number of Visits to Meet Established Goals: 3    Presenting Problem: Intraparenchymal hemtoma of brain, HDL, DVT,  Co-Morbidities : HTN, CHAZ< DVT, CAD s/p CABG      CT Brain 2/19  CONCLUSION:  Stable 2.8 x 2.7 x 1.8 cm acute intraparenchymal hematoma arising from the left insular region with a small amount of surrounding vasogenic edema and stable mild localized mass effect.      Mild diffuse cerebral and cerebellar atrophy with chronic microvascular ischemic changes of aging.      US Venous   CONCLUSION:  Partial DVT in the proximal mid and distal segments of the left superficial femoral   vein as well as the proximal segment of the left popliteal vein.  Extent of DVT has progressed since 1/17/2024 exam.  Critical exam results phoned to nurse Thomas on 2/18/2024 at 1042 hours with read back.      No DVT in the right leg.      Posterior tibial veins of the left calf could not be visualized secondary to ulceration.      2/20 Pt underwent IVC filter  ASSESSMENT   Patient demonstrates limited progress this session, goals  remain in progress. Pt was able to perform sit to stand at the naman steady. Pt has improve with standing balance but still requires continuous cues to weight shift towards the L side, to bring COG over RASHAWN.    Patient continues to function below baseline with bed mobility, transfers, and gait.  Contributing factors to remaining limitations include decreased functional strength, pain, and impaired Standing balance.  Next session anticipate patient to progress bed mobility, transfers, and gait.  Physical Therapy will continue to follow patient for duration of hospitalization.    Patient continues to benefit from continued skilled PT services: to facilitate return to prior level of function as patient demonstrates high motivation with excellent tolerance to an intensive therapy program .    PLAN  PT Treatment  Plan: Bed mobility;Body mechanics;Gait training;Strengthening;Stair training;Transfer training;Balance training  Rehab Potential : Good  Frequency (Obs): 3-5x/week    CURRENT GOALS     Goal #1 Patient is able to demonstrate supine - sit EOB @ level: minimum assistance      Goal #2 Patient is able to demonstrate transfers EOB to/from BSC at assistance level: moderate assistance      Goal #3 Patient is able to ambulate 10 feet with assist device: walker - platform at assistance level: moderate assistance      Goal #4     Goal #5     Goal #6     Goal Comments: Goals established on 2/19/2024 2/21/2024 all goals ongoing    SUBJECTIVE  \"I feel ok \"    OBJECTIVE  Precautions:  (R side flaccid, Left Leg wound)    WEIGHT BEARING RESTRICTION                   PAIN ASSESSMENT   Rating: Unable to rate  Location: Left shin at wound  Management Techniques: Other (Comment) (Notified RN for pain pill)    BALANCE                                                                                                                       Static Sitting: Fair -  Dynamic Sitting: Poor +           Static Standing: Poor +  Dynamic Standing: Not tested    ACTIVITY TOLERANCE                         O2 WALK         AM-PAC '6-Clicks' INPATIENT SHORT FORM - BASIC MOBILITY  How much difficulty does the patient currently have...  Patient Difficulty: Turning over in bed (including adjusting bedclothes, sheets and blankets)?: A Little   Patient Difficulty: Sitting down on and standing up from a chair with arms (e.g., wheelchair, bedside commode, etc.): A Lot   Patient Difficulty: Moving from lying on back to sitting on the side of the bed?: A Lot   How much help from another person does the patient currently need...   Help from Another: Moving to and from a bed to a chair (including a wheelchair)?: A Lot   Help from Another: Need to walk in hospital room?: A Lot   Help from Another: Climbing 3-5 steps with a railing?: Total       AM-PAC Score:  Raw  Score: 12   Approx Degree of Impairment: 68.66%   Standardized Score (AM-PAC Scale): 35.33   CMS Modifier (G-Code): CL    FUNCTIONAL ABILITY STATUS  Gait Assessment   Functional Mobility/Gait Assessment  Gait Assistance: Not tested    Skilled Therapy Provided    Bed Mobility:  Rolling: NT   Supine<>Sit: mod A   Sit<>Supine: NT     Transfer Mobility:  Sit<>Stand: Min A. 2 reps.  from bed to naman steady. Pt required bed to be elevated. Pt was able to initiate activity and pull himself up to standing position.   Stand<>Sit: Min A    Gait: NT    Therapist's Comments: Pt was limited on standing tolerance due to increase pain on the L shin. As pt was standing, pt was consistently cued to increase weight shifting towards the L as the pt has a tendency to lean towards the R. Pt still demonstrated flaccidity on the RUE preventing pt to utilize RW. At next session will utilize srikanth-walker to perform sit to  preparation for stand pivot transfer.         Patient End of Session: Up in chair;With  staff;Needs met;Call light within reach;All patient questions and concerns addressed;RN aware of session/findings;Alarm set;Family present    PT Session Time: 23 minutes  Therapeutic Activity: 23 minutes

## 2024-02-21 NOTE — VIDEO SWALLOW STUDY NOTE
ADULT VIDEOFLUOROSCOPIC SWALLOWING STUDY    Admission Date: 2/17/2024  Evaluation Date: 02/21/24  Radiologist: Dr. Mcginnis    RECOMMENDATIONS   Diet Recommendations - Solids: Regular  Diet Recommendations - Liquids: Nectar thick liquids/ Mildly thick    Further Follow-up:  Follow Up Needed (Documentation Required): Yes  SLP Follow-up Date: 02/22/24  Compensatory Strategies Recommended: No straws;Slow rate;Small bites and sips;Multiple swallows  Aspiration Precautions: Upright position;Slow rate;Small bites and sips;No straw  Medication Administration Recommendations: One pill at a time;Present with thickened liquid  Treatment Plan/Recommendations: Dysphagia therapy;Aspiration precautions;Communication evaluation    HISTORY   Background/Objective Information:    Problem List  Principal Problem:    Intraparenchymal hematoma of brain, left, without loss of consciousness, initial encounter ([facility])  Active Problems:    Hyperlipidemia    HFrEF (heart failure with reduced ejection fraction) ([facility])    Coronary artery disease involving coronary bypass graft of native heart    Deep vein thrombosis (DVT) of proximal vein of left lower extremity ([facility])    Vasogenic cerebral edema ([facility])      Past Medical History  Past Medical History:   Diagnosis Date    Cataract     High blood pressure     High cholesterol     Obstructive sleep apnea, adult [place] 6-20-16    autoPAP 5-12     Stroke ([facility]) 2015       Current Diet Consistency: Regular;Thin liquids  Prior Level of Function: Independent  Prior Living Situation: Home with spouse  History of Recent: No recent respiratory difficulty  Precautions: Aspiration  Imaging results:     Reason for Referral: R/O aspiration (define pharyngeal phase)    Family/Patient Goals:  To eat and drink     ASSESSMENT   DYSPHAGIA ASSESSMENT  Test completed in conjunction with Radiologist.  Patient Positioned: Upright;Midline;HARDEEP chair.  Patient Viewed: Lateral.  Patient Alertness: Fully alert.  Consistencies  Presented: Thin liquids;Nectar thick liquids/ Mildly thick;Puree;Hard solid to assess oropharyngeal swallow function and assess for compensatory strategies to improve safe swallow function.    THIN LIQUIDS  Method of Presentation: Teaspoon;Cup  Oral Phase of Swallow (VFSS - Thin Liquids): Within Functional Limits  Triggered at: Valleculae  Residue Severity, Location: Mild;Valleculae;Pyriform sinuses  Cleared/Reduced with: Secondary swallow  Laryngeal Penetration: Before the swallow;After the swallow  Tracheal Aspiration: After the swallow  Cough/Throat Clear Response: Yes  Cough/Throat Clear Effective: No  Strategy(ies) Implemented (Thin Liquids): Small bites and sips;Multiple swallows  Effectiveness: No  NECTAR THICK LIQUIDS/ MILDLY THICK  Method of Presentation: Teaspoon;Cup  Oral Phase of Swallow (VFSS - Nectar Thick Liquids): Within Functional Limits  Triggered at: Base of tongue  Residue Severity, Location: Mild;Valleculae (base of tongue)  Cleared/Reduced with: Secondary swallow  Laryngeal Penetration: None  Tracheal Aspiration: None  Strategy(ies) Implemented (Nectar Thick): Multiple swallows  Effectiveness: Yes     PUREE  Oral Phase of Swallow (VFSS - Puree): Within Functional Limits  Triggered at: Valleculae  Residue Severity, Location: Mild;Valleculae (base of tongue)  Cleared/Reduced with: Secondary swallow  Laryngeal Penetration: None  Tracheal Aspiration: None  Strategy(ies) Implemented (Puree): Multple swallows  Effectiveness: Yes     HARD SOLID  Oral Phase of Swallow (VFSS - Hard Solid): Within Functional Limits  Triggered at: Valleculae  Residue Severity, Location: Mild;Valleculae (base of tongue)  Cleared/Reduced with: Secondary swallow  Laryngeal Penetration: None  Tracheal Aspiration: None  Strategy(ies) Implemented (Hard Solid): Small bites and sips;Multiple swallows  Effectiveness: Yes  Penetration Aspiration Scale Score: Score 7: Material enters the airway, passes below the vocal folds, and is  not ejected from the trachea despite effort       Overall Impression:   Videofluoroscopic swallow study was completed in conjunction with the radiologist.  Patient was assessed with  thin liquids via spoon and cup, mildly thick liquids, puree and solid consistencies. Patient demonstrated a functional oral phase and mild-moderately impaired pharyngeal phase of swallow with cough in response to aspirated material of thin liquid. Patient with laryngeal penetration and aspiration during the controlled conditions of this exam with thin liquids.   Oral phase revealed cohesive bolus hold, timely and efficient chewing and mashing, brisk tongue motion, and complete clearing of oral cavity.   Pharyngeal phase revealed decreased base of tongue motion and diminished pharyngeal stripping.  Impairments of the pharyngeal swallow resulted in laryngeal penetration during the swallow and aspiration of thin liquid residue as he aspirated the overflow of residue from valleculae with a cough in response to aspirated material.  Swallow strategy assessed was a double swallow which effectively cleared the residue.  Patient is at high risk of aspiration due to weakness.     Following exam, education provided to patient and wife regarding results, aspiration risks, diet recommendations, aspiration precautions and plan with understanding verbalized. Collaborated with RN on results and recommendations with RN in agreement.  Will continue to follow as per plan of care.               GOALS  Goal #1 The patient will tolerate regular consistency and mildly thick liquids without overt signs or symptoms of aspiration with 95 % accuracy over 2-3 session(s).  In Progress   Goal #2 The patient will utilize compensatory strategies as outlined by  VFSS including Slow rate, Small bites, Small sips, Multiple swallows, No straws, Upright 90 degrees with min assistance 95 % of the time across 2 sessions.    In Progress   Goal #3 The patient/family/caregiver  will demonstrate understanding and implementation of aspiration precautions and swallow strategies independently over 2-3 session(s).  In Progress   Goal #4 Patient will complete oral and pharyngeal musculature exercises:  Effortful swallow x 10  Shaker Exercise x 10    In Progress                         PLAN: Skilled dysphagia therapy targeting oral and pharyngeal musculature exercises to help strengthen and coordinate the muscles involved in swallowing.       EDUCATION/INSTRUCTION  Reviewed results and recommendations with patient and his wife.  Agreement/Understanding verbalized and all questions answered to their apparent satisfaction.    INTERDISCIPLINARY COMMUNICATION  Reviewed results with Radiologist; agreement verbalized.    Patient Experiencing Pain: No                FOLLOW UP  Treatment Plan/Recommendations: Dysphagia therapy;Aspiration precautions;Communication evaluation  Number of Visits to Meet Established Goals: 4      Thank you for your referral.   If you have any questions, please contact [facility]

## 2024-02-21 NOTE — PLAN OF CARE
Assumed care @1930  AOx4, RA  NSR on tele  Neuro checks unchanged, see flowsheet  VSS/Afebrile  Denies pain/discomfort  Voiding via urinal  LLE wound dressing in place, C/D/I  IVC filter, R. Groin dressing C/D/I. Soft/No hematoma  Seizure precaution  Safety measure in place, call light within reach    Plan for MJ pending insurance auth/med clearance

## 2024-02-21 NOTE — PAYOR COMM NOTE
ASKING FOR RECONSIDERATION OF INPT  2/17-2/19  ADMISSION REVIEW     Payor: [facility]  Subscriber #:  [id number]  Authorization Number: [id number]    Admit date: 2/17/24  Admit time:  3:22 PM       REVIEW DOCUMENTATION:     ED Provider Notes        ED Provider Notes signed by Adalberto Murray MD at 2/17/2024  7:34 PM       Author: Adalberto Murray MD Service: -- Author Type: Physician    Filed: 2/17/2024  7:34 PM Date of Service: 2/17/2024  1:11 PM Status: Signed    : Adalberto Murray MD (Physician)           Patient Seen in: [facility] Emergency Department      History     Chief Complaint   Patient presents with    Stroke     Stated Complaint: right side deficit 0930    Subjective:   HPI    63-year-old male with past medical history of paroxysmal A-fib on Xarelto, previous CVA with no residual deficits, hypertension, hyperlipidemia presents today for concern of potential stroke.  Patient's wife left the home at 9:30 AM and he was at his baseline health.  He estimates that around 10:30 AM, he was using the washroom and he began noticing right-sided weakness.  Patient's wife returned home approximately 30 minutes prior to arrival and found him on the ground.  Patient denies any headache, vomiting.    Objective:   Past Medical History:   Diagnosis Date    Cataract     High blood pressure     High cholesterol     Obstructive sleep apnea, adult [facility] 6-20-16    autoPAP 5-12     Stroke (HCC) 2015              Past Surgical History:   Procedure Laterality Date    CABG      CATARACT      COLONOSCOPY N/A 11/29/2018    Procedure: COLONOSCOPY, POSSIBLE BIOPSY, POSSIBLE POLYPECTOMY 10435;  Surgeon: Zack Giordano MD;  Location: [facility]                Social History     Socioeconomic History    Marital status:    Tobacco Use    Smoking status: Never    Smokeless tobacco: Never   Vaping Use    Vaping Use: Never used   Substance and Sexual Activity    Alcohol use: Not  Currently     Alcohol/week: 1.7 standard drinks of alcohol     Types: 2 Cans of beer per week     Comment: Occastional    Drug use: No   Other Topics Concern    Caffeine Concern No     Comment: occasionally    Exercise No     Social Determinants of Health     Food Insecurity: No Food Insecurity (2/17/2024)    Food Insecurity     Food Insecurity: Never true   Transportation Needs: No Transportation Needs (2/17/2024)    Transportation Needs     Lack of Transportation: No   Housing Stability: Low Risk  (2/17/2024)    Housing Stability     Housing Instability: No              Review of Systems    Positive for stated complaint: right side deficit 0930  Other systems are as noted in HPI.  Constitutional and vital signs reviewed.      All other systems reviewed and negative except as noted above.    Physical Exam     ED Triage Vitals   BP 02/17/24 1306 (!) 185/115   Pulse 02/17/24 1306 67   Resp 02/17/24 1306 14   Temp 02/17/24 1306 98 °F (36.7 °C)   Temp src 02/17/24 1500 Temporal   SpO2 02/17/24 1306 97 %   O2 Device 02/17/24 1306 None (Room air)       Current:/67 (BP Location: Left arm)   Pulse 81   Temp 97.9 °F (36.6 °C) (Temporal)   Resp 14   Wt (!) 137.4 kg   SpO2 94%   BMI 37.86 kg/m²         Physical Exam  Vitals and nursing note reviewed.   Constitutional:       Appearance: Normal appearance.   HENT:      Head: Normocephalic and atraumatic.      Nose: Nose normal.      Mouth/Throat:      Mouth: Mucous membranes are moist.   Eyes:      Extraocular Movements: Extraocular movements intact.      Pupils: Pupils are equal, round, and reactive to light.   Cardiovascular:      Rate and Rhythm: Normal rate and regular rhythm.   Pulmonary:      Effort: Pulmonary effort is normal.      Breath sounds: Normal breath sounds.   Abdominal:      Palpations: Abdomen is soft.      Tenderness: There is no abdominal tenderness.   Musculoskeletal:         General: Normal range of motion.      Cervical back: Neck supple.    Skin:     General: Skin is warm.   Neurological:      General: No focal deficit present.      Mental Status: He is alert.      Comments: Right-sided facial droop.  No significant dysarthria or aphasia.  0 out of 5 right upper extremity strength.  4-5 right lower extremity strength.  Gross sensation intact.   Psychiatric:         Mood and Affect: Mood normal.           ED Course     Labs Reviewed   CBC W/ DIFFERENTIAL - Abnormal; Notable for the following components:       Result Value    WBC 12.0 (*)     Neutrophil Absolute Prelim 7.82 (*)     Neutrophil Absolute 7.82 (*)     All other components within normal limits   COMP METABOLIC PANEL (14) - Normal   PROTHROMBIN TIME (PT) - Normal   PTT, ACTIVATED - Normal   TROPONIN I HIGH SENSITIVITY - Normal   POCT GLUCOSE - Normal   RAPID SARS-COV-2 BY PCR - Normal   ED/MRSA SCREEN BY PCR-CC - Normal   CBC WITH DIFFERENTIAL WITH PLATELET    Narrative:     The following orders were created for panel order CBC With Differential With Platelet.  Procedure                               Abnormality         Status                     ---------                               -----------         ------                     CBC W/ DIFFERENTIAL[[id number]]          Abnormal            Final result                 Please view results for these tests on the individual orders.   RAINBOW DRAW LAVENDER   RAINBOW DRAW LIGHT GREEN   RAINBOW DRAW BLUE   RAINBOW DRAW GOLD     EKG    Rate, intervals and axes as noted on EKG Report.  Rate: 64  Rhythm: Sinus Rhythm  Reading: No STEMI                    XR CHEST AP PORTABLE  (CPT=71045)    Result Date: 2/17/2024  PROCEDURE:  XR CHEST AP PORTABLE  (CPT=71045)  TECHNIQUE:  AP chest radiograph was obtained.  COMPARISON:  [facility] , XR, XR CHEST DECUBITUS BILAT INCL PA AND LAT(4 VIEWS)(CPT=71048), 6/22/2020, 7:22 AM.  [facility] , XR, XR CHEST AP PORTABLE  (CPT=71045), 6/15/2020, 11:43 AM.  INDICATIONS:  hx of HF, admission for stroke, holding meds  PATIENT  STATED HISTORY: (As transcribed by Technologist)  Patient offered no additional history at this time.    FINDINGS:  There is cardiomegaly, which is similar to prior.  No new focal consolidation..  No pleural effusion.  No pneumothorax.  Median sternotomy wires.  No new osseous findings.            CONCLUSION:  No acute cardiopulmonary findings.   LOCATION:  [facility]      Dictated by (CST): Edmond Dent MD on 2/17/2024 at 4:21 PM     Finalized by (CST): Edmond Dent MD on 2/17/2024 at 4:22 PM       CT STROKE BRAIN (NO IV)(CPT=70450)    Result Date: 2/17/2024  PROCEDURE:  CT STROKE BRAIN (NO IV)(CPT=70450)  COMPARISON:  [facility] , CT, CT BRAIN OR HEAD (93895), 3/03/2015, 1:49 PM.  INDICATIONS:  right side deficit 0930  TECHNIQUE:  Noncontrast CT scanning is performed through the brain.  Dose reduction techniques were used. Dose information is transmitted to the ACR (American College of Radiology) NRDR (National Radiology Data Registry) which includes the Dose Index Registry.  PATIENT STATED HISTORY: (As transcribed by Technologist)  Patient is here for stroke, with right sided deficits.    FINDINGS:  There is an acute intraparenchymal hemorrhage within the insular region of the right parietal lobe measuring 2.6 x 3.0 x 2.2 cm in maximal dimensions.  Mild associated mass effect.  No midline shift.  Mild diffuse atrophy and white matter disease noted.  The bony calvarium is intact.  Paranasal sinuses and mastoid air cells are well pneumatized.            CONCLUSION:  1. Acute intraparenchymal hemorrhage within the insular region of the right parietal lobe measuring 2.6 x 3.0 x 2.2 cm.  Mild associated mass effect.  No midline shift. 2. Mild diffuse atrophy and white matter disease consistent with chronic small vessel ischemic changes. 3. The findings of this critical value study were discussed with the ordering ER physician, Dr. Murray on 2/17/2024 at 1324 hours.  Read back was performed.    LOCATION:  [facility]    Dictated by (CST): Brian Bhatt DO on 2/17/2024 at 1:21 PM     Finalized by (CST): Brian Bhatt DO on 2/17/2024 at 1:25 PM              [facility]      Differential Diagnosis  63-year-old male presents today for evaluation of a possible CVA.  His last normal was approximately 10:30 AM according the patient.  His wife last saw him at 9:30 AM.  Presently, he is 0 out of 5 strength in the right upper extremity.  He has right-sided facial droop.  He has 4/5 strength in right lower extremity.  His gross sensation is intact.  His speech seems clear without significant dysarthria or aphasia.  Concern for CVA, intracranial hemorrhage or TIA.  Plan for CT head.  Stroke navigator assisting with care.  Patient took his Xarelto this morning, he will not be candidate for thrombolytics given his anticoagulant use.    1:26 pm  Patient noted to have a left-sided intraparenchymal hemorrhage.  I consulted with pharmacy, will give Andexxa and start patient on Cardene drip for blood pressure control.  Neurosurgery Dr. Guadarrama along with critical care Dr. Weeks notified of need for ICU admission.  I spoke with the hospitalist in regards to admission.    2:45 pm   systolic.  Patient stable.  Wife states he had a slight flicker in his arm which is improved from previous.  Will admit to the ICU for continued close monitoring and care.    A total of 30 minutes of critical care time (exclusive of billable procedures) was administered to manage the patient's critical imaging findings and neurologic instability due to his intraparenchymal hemorrhage.  This involved direct patient intervention, complex decision making, and/or extensive discussions with the patient, family, and clinical staff.    History from [facility]  Patient's wife helps supplement history    Discussions of Management  Case reviewed with neurosurgery, neuro ICU along with the hospitalist    Independent Interpretation  I independently interpreted the CT scan,  patient with left-sided intraparenchymal hemorrhage  Admission disposition: 2/17/2024  1:34 PM                                        Medical Decision Making      Disposition and Plan     Clinical Impression:  1. Intraparenchymal hematoma of brain, left, without loss of consciousness, initial encounter ([facility])         Disposition:  Admit  2/17/2024  1:34 pm      Hospital Problems       Present on Admission  Date Reviewed: 1/18/2024            ICD-10-CM Noted POA    * (Principal) Intraparenchymal hematoma of brain, left, without loss of consciousness, initial encounter ([facility]) S06.320A 2/17/2024 Unknown    Coronary artery disease involving coronary bypass graft of native heart I25.810 2/17/2024 Unknown    Deep vein thrombosis (DVT) of proximal vein of left lower extremity ([facility]) I82.4Y2 2/17/2024 Unknown    HFrEF (heart failure with reduced ejection fraction) ([facility]) I50.20 2/17/2024 Unknown    Hyperlipidemia E78.5 Unknown Yes                     Signed by Adalberto Murray MD on 2/17/2024  7:34 PM       02/17/24 2300 -- 75 10 91/67 98 % -- -- -- RPA   02/17/24 2200 -- 70 13 115/102 Abnormal  94 % -- -- -- RPA   02/17/24 2130 -- 74 11 159/54 96 % -- -- -- RPA   02/17/24 2100 -- 76 19 117/76 95 % -- -- -- RPA   02/17/24 2000 98.3 °F (36.8 °C) 87 16 118/73 95 % -- Nasal cannula 2 L/min RPA   02/17/24 1945 -- 83 27 Abnormal  102/65 94 % -- -- -- RPA   02/17/24 1900 -- 81 14 115/67 94 % -- None (Room air) -- HB   02/17/24 1840 -- 84 15 115/64 95 % -- -- -- HB   02/17/24 1815 -- 89 19 144/86 93 % -- -- -- HB   02/17/24 1800 -- 71 19 106/57 91 % -- None (Room air) -- HB   02/17/24 1700 -- 82 22 129/74 96 % -- None (Room air) -- HB   02/17/24 1606 -- -- -- -- -- 302 lb 14.6 oz Abnormal  -- -- HB   02/17/24 1600 97.9 °F (36.6 °C) 73 14 141/77 96 % -- None (Room air) -- HB   02/17/24 1545 -- 74 20 127/89 -- -- -- -- HB   02/17/24 1530 -- 73 15 124/83 -- -- -- -- HB   02/17/24 1515 -- 79 14 127/83 95 % -- None (Room air) --  HB   02/17/24 1500 98.2 °F (36.8 °C) -- -- -- -- -- -- -- HB   02/17/24 1445 -- 76 16 153/83 97 % -- -- -- FC   02/17/24 1440 -- 74 15 138/89 99 % -- -- -- FC   02/17/24 1435 -- 72 17 141/94 Abnormal  98 % -- -- -- FC   02/17/24 1430 -- 72 26 148/95 Abnormal  98 % -- -- -- FC   02/17/24 1425 -- 77 15 150/108 Abnormal  97 % -- -- -- FC   02/17/24 1420 -- 70 18 144/88 98 % -- -- -- FC   02/17/24 1415 -- 74 16 152/96 Abnormal  97 % -- -- -- FC   02/17/24 1410 -- 74 15 154/89 99 % -- -- -- FC   02/17/24 1400 -- 71 21 154/94 Abnormal  98 % -- None (Room air) -- FC   02/17/24 1355 -- 72 18 162/95 Abnormal  99 % -- -- -- FC   02/17/24 1350 -- 71 13 160/99 Abnormal  97 % -- -- --    02/17/24 1348 -- -- -- -- -- 302 lb 14.6 oz Abnormal  -- -- FC   02/17/24 1345 -- 70 19 180/103 Abnormal  97 % -- -- -- FC   02/17/24 1340 -- 68 12 174/102 Abnormal  97 % -- -- -- FC   02/17/24 13:30:14 -- -- -- -- -- -- None (Room air) -- FC   02/17/24 1321 -- 59 12 195/110 Abnormal  98 % -- None (Room air) -- FC   02/17/24 1306 98 °F (36.7 °C) 67 14 185/115 Abnormal  97 % -- None (Room air) --        2/17 HOSPITALIST NOTE  Chief Complaint: Right sided weakness         Subjective:   History of Present Illness:      Kris Colvin is a 63 year old male with of HTN, CHAZ, previous stroke, previous dvt who presented to the ER with right sided weakness.  Patient was on the toilet when he suddenly couldn't move his right arm.  He scooted onto the ground. He also noticed he couldn't speak very well.         Objective:   Physical Exam:    BP (!) 174/102   Pulse 68   Temp 98 °F (36.7 °C)   Resp 12   SpO2 97%   General: No acute distress, Alert, pleasant   Respiratory: No rhonchi, no wheezes  Cardiovascular: S1, S2. Regular rate and rhythm  Abdomen: Soft, Non-tender, non-distended, positive bowel sounds  Neuro: Right arm power 1/5, LE 4/5, with right facial droop and slurred speech  Extremities: No edema          Assessment & Plan:   #Acute  intraparenchymal hemorrhage   CT noted findings above - mild mass effect, no midline shift  Admit to neuro critical care unit  Neuro checks q1hr  NPO, Speech, PT/OT  Nicardipine for SBP goal 130-140  Andexxa being given for xarelto reversal   Neuro, Neuro surgery consult     #CAD s/p CABG - hold aspirin given bleed  Hold lipitor, coreg, valsartan until speech eval     #Essential HTN - valsartan, coreg on hold     #Hx of DVT - hold xarelto      #HLD - satin - resume once speech eval     #Chronic LE wounds - follows with outpatient wound care      2/17 NEUROCRITICAL CARE CONSULT NOTE    History      History of Presenting Illness  63 year old male with PMH of HTN, CHAZ, prior stroke, DVT on AC, CAD s/p CABG p/w R weakness.  Stroke code was called, initial NIH was 6.  CT head demonstrating a left insular ICH. . AC was reversed with Andexxa. Started on Cardene for BP control.  Patient states that he was in the washroom and when he tried to stand up he could not lift himself up completely so slowly slid down to the floor.  Was down for about an hour and a half before help arrived.  States blood pressure typically is in the 140s.  EF has improved in the past few years up to 40% per his discussions with his cardiologist.     ome Meds       Current Outpatient Medications   Medication Instructions    aspirin 81 mg, Oral, Daily    atorvastatin 80 MG Oral Tab TAKE 1 TABLET BY MOUTH EVERY NIGHT    carvedilol (COREG) 6.25 mg, Oral, 2 times daily    Multaq 400 mg, Oral, 2 times daily    Multiple Vitamins-Minerals (MULTI FOR HER 50+) Oral Tab 1 tablet, Oral, Daily    Naproxen Sodium (ALEVE) 220 mg, Oral, DAILY PRN    rivaroxaban (XARELTO) 20 mg, Oral, Daily with food    spironolactone (ALDACTONE) 12.5 mg, Oral, Daily    valsartan (DIOVAN) 160 mg, Oral, Daily      Scheduled Meds:   coagulation factor Xa inactivated-zhzo (Andexxa-andexanet janusz) 960 mg in 96 mL infusion (HIGH DOSE)  960 mg Intravenous Once      Continuous  Infusions:   niCARdipine 10 mg/hr (02/17/24 1429)       OBJECTIVE   VITAL SIGNS:   Temp:  [98 °F (36.7 °C)] 98 °F (36.7 °C)  Pulse:  [59-77] 72  Resp:  [12-26] 26  BP: (144-195)/() 148/95  SpO2:  [97 %-99 %] 98 %     INTAKE/OUTPUT:  Intake/Output Summary (Last 24 hours) at 2/17/2024 1436  Last data filed at 2/17/2024 1429      Gross per 24 hour   Intake 80 ml   Output --   Net 80 ml      PHYSICAL EXAM:  GENERAL APPEARANCE: Patient resting comfortably in bed, NAD  HEENT: Normocephalic, atraumatic.   LUNGS: Normal respiratory rate and effort   HEART: Regular rate and rhythm   ABDOMEN: Soft. No tenderness, guarding, or rebound.   EXTREMTIEIS: LLE wrapped, L calf wound present on admission      NEUROLOGIC:    Mental Status:  A&O x 4, Follows simple commands, no obvious aphasia, moderate dysarthria  Cranial nerves: PERRL.  Visual fields full.  EOMI.  R facial droop.  Hearing grossly intact. Tongue midline   Motor: Drift:  RLE, distally 5/5, RUE 2-/5. L strong  Sensation: Intact to light touch bilaterally  Cerebellar: Normal Finger-To-Nose test on the left, unable to complete on the right       Radiology:    CT STROKE BRAIN (NO IV)(CPT=70450)     Result Date: 2/17/2024  CONCLUSION:  1. Acute intraparenchymal hemorrhage within the insular region of the right parietal lobe measuring 2.6 x 3.0 x 2.2 cm.  Mild associated mass effect.  No midline shift. 2. Mild diffuse atrophy and white matter disease consistent with chronic small vessel ischemic changes. 3. The findings of this critical value study were discussed with the ordering ER physician, Dr. Murray on 2/17/2024 at 1324 hours.  Read back was performed.    LOCATION:  [facility]   Dictated by (CST): Brian Bhatt DO on 2/17/2024 at 1:21 PM     Finalized by (CST): Brian Bhatt DO on 2/17/2024 at 1:25 PM      ASSESSMENT/PLAN   63 year old male with PMH of HTN, CHAZ, prior stroke, DVT on AC, CAD s/p CABG p/w L ICH.     Neurological:  Left insular intracerebral  Hemorrhage, PBD 0         - After initial brain imaging,  ICH Score 0         - CT Head as above         - Coags wnl         - S/p AC reversal with Andexxa         - Repeat CT in 6 hours for stability         - Goal Plt > 100, INR < 1.5          - SBP goal < 150         - Nsg following, appreciate recs          - PT/OT/SLP evals     Cardiovascular:  HTN Emergency                      - SBP goal as noted above          - Hold home medications         - IV labetalol/hydralazine pushes and Nicardipine gtt prn            CAD s/p CABG  HFrEF         - ECHO on 3/21 with EF of 35%         - Daily Weights, Strict I/Os         - Hold home antiplatelet medication and home heart failure meds     HLD - Continue home med     Pulmonary:  CHAZ        - Admission CXR pending          - Satting well on RA, encourage IS          - CPAP prn      Renal:         - Monitor I&Os          - IVF while NPO     Gastrointestinal:  Nutrition:        - NPO pending stability scan         - Bowel regimen: ordered prn     Infectious Disease:   Leukocytosis - Afebrile, likely reactive, continue to monitor      Heme/Onc - Hb goal > 7, continue to monitor daily      Hx of DVT         - Dx in June, AC on hold, hypercoag olivia negative as outpatient         - LE dopplers, possible IVC filter if clots remain         - Wound care for L calf wound      Endocrine:   - Accuchecks q6 with SSI prn while NPO      Checklist         - Lines: PIVs         - DVT Prophylaxis: SCDs          - Diet: NPO         - IVF: NS          - Electrolytes: Replete per protocol         - Code Status: FULL     Dispo: CNICU    2/18 NEUROCRITICAL CARE NOTE    SUBJECTIVE      24 Hour Significant Events: CT scan stable.  Speech eval passed.  Weaned off of Cardene.  Chest x-ray without signs of fluid overload.  Lower extremity Dopplers pending     OBJECTIVE   VITAL SIGNS:   Temp:  [97.7 °F (36.5 °C)-98.3 °F (36.8 °C)] 97.7 °F (36.5 °C)  Pulse:  [59-89] 72  Resp:  [10-27] 14  BP:  ()/() 141/95  SpO2:  [91 %-99 %] 96 %     INTAKE/OUTPUT:  Intake/Output Summary (Last 24 hours) at 2/18/2024 0921  Last data filed at 2/18/2024 0700      Gross per 24 hour   Intake 1794.92 ml   Output 850 ml   Net 944.92 ml       NEUROLOGIC:    Mental Status:  A&O x 4, Follows simple commands, no obvious aphasia, mild dysarthria  Cranial nerves: PERRL.  Visual fields full.  EOMI.  R facial droop.  Hearing grossly intact. Tongue midline   Motor: Drift:  RLE to bed, distally 4/5, RUE 1/5. L strong  Sensation: Intact to light touch bilaterally  Cerebellar: Normal Finger-To-Nose test on the left, unable to complete on the right      LABORATORY DATA:  Last 24 hour labs were reviewed in detail.       Recent Labs   Lab 02/17/24  1346 02/18/24  0429    143   K 4.1 3.9    111   CO2 28.0 28.0   GLU 93 96   BUN 14 13   CREATSERUM 1.25 0.94           Recent Labs   Lab 02/17/24  1346 02/18/24  0429   WBC 12.0* 9.0   HGB 15.1 14.2   .0 166.0     Brian fu, DO on 2/17/2024 at 1:25 PM      ASSESSMENT/PLAN   63 year old male with PMH of HTN, CHAZ, prior stroke, DVT on AC, CAD s/p CABG p/w L ICH.     Neurological:  Left insular intracerebral Hemorrhage, PBD 1         - After initial brain imaging,  ICH Score 0, etiology likely hypertensive worsened in setting of anticoagulation use         - CT Head as above, 6-hour scan stable         - Coags wnl         - S/p AC reversal with Andexxa         - Repeat 24-hour stability scan pending this afternoon         - Goal Plt > 100, INR < 1.5          - SBP goal < 150         - Nsg following, appreciate recs          - PT/OT/SLP evals         - No further inpatient neurowork-up needed pending above, follow-up with neuro in 4 to 6 weeks, for call with further questions or concerns     Cardiovascular:  HTN Emergency                      - SBP goal as noted above          - Resume home meds as tolerated         - IV labetalol/hydralazine pushes  prn            CAD  s/p CABG  HFrEF         - ECHO on 3/21 with EF of 35%         - Daily Weights, Strict I/Os         - Resume home medications as tolerated         - Hold home antiplatelet medication until cleared by neurosurgery     HLD - Continue home med     Pulmonary:  CHAZ        - Admission CXR without signs of congestion        - Satting well on RA, encourage IS         - CPAP prn      Renal:   - Monitor I&Os       Gastrointestinal:  Nutrition:        - Passed speech eval, ADAT          - Bowel regimen: ordered prn     Infectious Disease:   Leukocytosis - Afebrile, likely reactive, resolved, continue to monitor      Heme/Onc - Hb goal > 7, continue to monitor daily      Hx of DVT         - Dx in June, AC on hold, hypercoag olivia negative as outpatient         - LE dopplers, possible IVC filter if clots remain, no AC until cleared by nsg          - Wound care for L calf wound      Endocrine: - Accuchecks q6 with SSI prn while NPO      Checklist         - Lines: PIVs         - DVT Prophylaxis: SCDs          - Diet: ADAT         - IVF: NS - Dc         - Electrolytes: Replete per protocol         - Code Status: FULL     Dispo: CNICU pending stability scan and hemodynamic stability off of Cardene    2/18 NEUROSURGERY CONSULT NOTE     REASON FOR CONSULT:  ICH     HISTORY OF PRESENT ILLNESS:  Kris Colvin is a(n) 63 year old male with paroxysmal A-fib on Xarelto, previous CVA with no residual deficits, hypertension, hyperlipidemia who is admitted to the hospital after being found to have a left insular ICH.  Patient and wife who is present at bedside reports that he went to the washroom yesterday morning and was unable to move his right arm suddenly.  Denied any headaches or other neurologic symptoms.  He fell and was down for about an hour until his wife returned home and found him down.  He continues to deny any headaches, nausea, vomiting.  He continues to have some slurred speech and right sided weakness although the slurred speech  is somewhat better today.  His anticoagulation was reversed in the ER yesterday, and his had a repeat CT scan demonstrated stability of the small hemorrhage.       NEUROLOGIC EXAMINATION:  A&Ox3   PERRL, EOMI   Face symmetric, no numbness   5 out of 5 left hemibody, 1 out of 5 right upper extremity, 4 out of 5 right lower extremity  Sensation decreased to touch in the right hemibody     IMAGING:  CT head 218 2024 x 2: Stable appearance of small left insular ICH without significant brain compression, mass effect, midline shift.     ASSESSMENT:  Mr. Colvin is admitted with a left insular spontaneous ICH, likely hypertensive in nature.  This has remained stable on CT scan.  His

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
## 2024-02-21 NOTE — OCCUPATIONAL THERAPY NOTE
OCCUPATIONAL THERAPY TREATMENT NOTE - INPATIENT     Room Number: 7602/7602-A  Session: 1   Number of Visits to Meet Established Goals: 5    Presenting Problem: L ICH  Prior Level of Function: Pt typically independent with ADLs and mobility. Pt does not use AD.    ASSESSMENT   Patient demonstrates good  progress this session, goals remain in progress.    Patient continues to function below baseline with toileting, lower body dressing, bed mobility, transfers, stating sitting balance, dynamic sitting balance, static standing balance, and dynamic standing balance.   Contributing factors to remaining limitations include decreased functional strength, decreased functional reach, decreased endurance, pain, impaired sitting balance, impaired coordination, impaired motor planning, decreased muscular endurance, and medical status.  Next session anticipate patient to progress toileting, bed mobility, transfers, stating sitting balance, dynamic sitting balance, static standing balance, dynamic standing balance, and maintaining seated position.  Occupational Therapy will continue to follow patient for duration of hospitalization.    Patient continues to benefit from continued skilled OT services: to facilitate return to prior level of function as patient demonstrates high motivation with excellent tolerance to an intensive therapy program .          History: Patient is a 63 year old male admitted on 2/17/2024 with Presenting Problem: L ICH. Co-Morbidities : HTN, CHAZ< DVT, CAD s/p CABG    WEIGHT BEARING RESTRICTION                     TREATMENT SESSION:  Patient Start of Session: semi supine in bed  FUNCTIONAL TRANSFER ASSESSMENT  Sit to Stand: Edge of Bed  Edge of Bed: Minimal Assist (pull to stand from naman stedy)    BED MOBILITY  Rolling: Maximum Assist  Supine to Sit : Maximum Assist  Sit to Supine (OT): Not Tested  Scooting: Max A    BALANCE ASSESSMENT  Static Sitting: Minimal Assist (min initially; progress to  SBA)  Sitting Bilateral: Minimal Assist (occasional CGA)  Static Standing: Not Tested    FUNCTIONAL ADL ASSESSMENT  Grooming Seated: Not Tested  LB Dressing Seated: Not Tested      ACTIVITY TOLERANCE: WFL                         O2 SATURATIONS       EDUCATION PROVIDED  Patient: Role of Occupational Therapy; Plan of Care; Discharge Recommendations; Functional Transfer Techniques; Fall Prevention; Posture/Positioning; Energy Conservation; Proper Body Mechanics  Patient's Response to Education: Verbalized Understanding; Returned Demonstration; Requires Further Education      Therapist comments: Pt received semi supine in bed, pleasant and cooperative for OT session. Pt agreeable for EOB activity and transitioning to functional transfer training in preparation for bedside commode/toilet transfer (with naman stedy). Pt performs bed mobility at max A to sit EOB with cues provided for hand placement and sequencing. Pt educated on hook technique to manage R LE (weak leg) over to EOB with use of L LE. Additional cues provided to attend to R UE during mobility. Pt engages in 2 bouts of sit to stand transfers from bed and pulling up to stand from naman stedy requiring min A. In standing, pt cued to weight shift over to L side for upright posture. Pt took seated rest break before 2nd stand. During 2nd stand, pt transferred to chair via naman stedy. Pt and family educated on elevating pt's R UE for proper positioning. Pt left in chair with all needs.  Patient End of Session: Up in chair;Needs met;Call light within reach;RN aware of session/findings;All patient questions and concerns addressed;Alarm set;Family present    SUBJECTIVE  \"I'm ready to do whatever you need me to do.\"     PAIN ASSESSMENT  Rating: Unable to rate  Location: L shin;L LE        OBJECTIVE  Precautions:  (R side flaccid, Left Leg wound)    AM-PAC ‘6-Clicks’ Inpatient Daily Activity Short Form  -   Putting on and taking off regular lower body clothing?: A Lot  -    Bathing (including washing, rinsing, drying)?: A Lot  -   Toileting, which includes using toilet, bedpan or urinal? : A Lot  -   Putting on and taking off regular upper body clothing?: A Lot  -   Taking care of personal grooming such as brushing teeth?: A Lot  -   Eating meals?: A Lot    AM-PAC Score:  Score: 12  Approx Degree of Impairment: 66.57%  Standardized Score (AM-PAC Scale): 30.6    PLAN  OT Treatment Plan: Balance activities;Energy conservation/work simplification techniques;ADL training;IADL training;Continued evaluation;Compensatory technique education;Equipment eval/education;Patient/Family training;Patient/Family education;Endurance training;Functional transfer training;UE strengthening/ROM  Rehab Potential : Good  Frequency: 3-5x/week    OT Goals:     All goals ongoing 02/21    ADL Goals   Patient will perform eating: with set up  Patient will perform grooming: with min assist     Functional Transfer Goals  Patient will transfer from sit to supine:  with min assist  Patient will transfer from supine to sit:  with min assist  Patient will transfer from sit to stand:  with mod assist    OT Session Time: 25 minutes  Therapeutic Activity: 25 minutes

## 2024-02-22 ENCOUNTER — APPOINTMENT (OUTPATIENT)
Dept: WOUND CARE | Facility: HOSPITAL | Age: 64
End: 2024-02-22
Attending: FAMILY MEDICINE
Payer: COMMERCIAL

## 2024-02-22 PROBLEM — N17.9 AKI (ACUTE KIDNEY INJURY) (HCC): Status: ACTIVE | Noted: 2024-01-01

## 2024-02-22 PROBLEM — N17.9 AKI (ACUTE KIDNEY INJURY) (HCC): Status: ACTIVE | Noted: 2024-02-22

## 2024-02-22 PROBLEM — N17.9 AKI (ACUTE KIDNEY INJURY): Status: ACTIVE | Noted: 2024-01-01

## 2024-02-22 LAB
ANION GAP SERPL CALC-SCNC: 5 MMOL/L (ref 0–18)
BUN BLD-MCNC: 41 MG/DL (ref 9–23)
CALCIUM BLD-MCNC: 9.9 MG/DL (ref 8.5–10.1)
CHLORIDE SERPL-SCNC: 109 MMOL/L (ref 98–112)
CO2 SERPL-SCNC: 24 MMOL/L (ref 21–32)
CREAT BLD-MCNC: 1.57 MG/DL
EGFRCR SERPLBLD CKD-EPI 2021: 50 ML/MIN/1.73M2 (ref 60–?)
GLUCOSE BLD-MCNC: 104 MG/DL (ref 70–99)
GLUCOSE BLD-MCNC: 107 MG/DL (ref 70–99)
GLUCOSE BLD-MCNC: 137 MG/DL (ref 70–99)
GLUCOSE BLD-MCNC: 141 MG/DL (ref 70–99)
OSMOLALITY SERPL CALC.SUM OF ELEC: 298 MOSM/KG (ref 275–295)
POTASSIUM SERPL-SCNC: 4 MMOL/L (ref 3.5–5.1)
SODIUM SERPL-SCNC: 138 MMOL/L (ref 136–145)

## 2024-02-22 PROCEDURE — 99233 SBSQ HOSP IP/OBS HIGH 50: CPT | Performed by: HOSPITALIST

## 2024-02-22 RX ORDER — POLYETHYLENE GLYCOL 3350 17 G/17G
17 POWDER, FOR SOLUTION ORAL DAILY PRN
Status: DISCONTINUED | OUTPATIENT
Start: 2024-02-22 | End: 2024-02-25

## 2024-02-22 RX ORDER — BISACODYL 10 MG
10 SUPPOSITORY, RECTAL RECTAL ONCE
Status: DISCONTINUED | OUTPATIENT
Start: 2024-02-22 | End: 2024-02-22

## 2024-02-22 RX ORDER — SODIUM CHLORIDE 9 MG/ML
INJECTION, SOLUTION INTRAVENOUS CONTINUOUS
Status: DISCONTINUED | OUTPATIENT
Start: 2024-02-22 | End: 2024-02-23

## 2024-02-22 NOTE — PROGRESS NOTES
[facility]     Hospitalist Progress Note     Kris Colvin Patient Status:  Inpatient    3/6/1960 MRN [id number]   [facility] 7NE-A Attending Rex Tim MD   Hosp Day # 5 PCP Lenak Guevara DO     Chief Complaint: ICH    Subjective:   Patient denies complaints of pain. On room air. Had BM. No nausea, vomiting.    Current medications:   docusate sodium  100 mg Oral BID    spironolactone  25 mg Oral Daily    losartan  100 mg Oral Daily    atorvastatin  80 mg Oral Daily    carvedilol  6.25 mg Oral BID       Objective:    Review of Systems:   10 point ROS completed and was negative, except for pertinent positive and negatives stated in subjective.    Vital signs:  Temp:  [97.7 °F (36.5 °C)-97.9 °F (36.6 °C)] 97.8 °F (36.6 °C)  Pulse:  [65-87] 77  Resp:  [17-18] 18  BP: ()/(65-94) 116/83  SpO2:  [92 %-94 %] 92 %  Patient Weight for the past 72 hrs:   Weight   24 1348 (!) 302 lb 14.6 oz (137.4 kg)   24 1606 (!) 302 lb 14.6 oz (137.4 kg)     Physical Exam:    General: No acute distress.   Respiratory: Diminished breath sounds  Cardiovascular: S1, S2. Regular rate and rhythm.  Abdomen: Soft, nontender, nondistended.  Positive bowel sounds.   Extremities: No edema. RUE 0/5, RLE 3+/5  Neuro: AAOx3    Diagnostic Data:    Labs:  Recent Labs   Lab 24  1346 24  0429 24  0634   WBC 12.0* 9.0 9.6   HGB 15.1 14.2 14.8   MCV 93.7 93.3 94.6   .0 166.0 127.0*   INR 1.07  --   --        Recent Labs   Lab 24  1346 24  0429 24  0634 24  0621   GLU 93 96 106* 137*   BUN 14 13 24* 41*   CREATSERUM 1.25 0.94 1.19 1.57*   CA 9.6 8.6 9.1 9.9   ALB 3.7  --  3.2*  --     143 144 138   K 4.1 3.9 4.1 4.0    111 111 109   CO2 28.0 28.0 30.0 24.0   ALKPHO 78  --  74  --    AST 29  --  30  --    ALT 36  --  26  --    BILT 0.7  --  1.0  --    TP 7.5  --  7.0  --        Estimated Creatinine Clearance: 58.3 mL/min (A) (based on SCr of 1.57 mg/dL  (H)).    Recent Labs   Lab 02/17/24  1346   PTP 14.0   INR 1.07            COVID-19 Lab Results    COVID-19  Lab Results   Component Value Date    COVID19 Not Detected 02/17/2024    COVID19 Not Detected 03/25/2022    COVID19 Not Detected 07/06/2020       Pro-Calcitonin  No results for input(s): \"PCT\" in the last 168 hours.    Cardiac  No results for input(s): \"TROP\", \"PBNP\" in the last 168 hours.    Creatinine Kinase  No results for input(s): \"CK\" in the last 168 hours.    Inflammatory Markers  No results for input(s): \"CRP\", \"KENDRA\", \"LDH\", \"DDIMER\" in the last 168 hours.    Recent Labs   Lab 02/17/24  1346   TROPHS 32       Imaging: Imaging data reviewed in Epic.    Medications:    docusate sodium  100 mg Oral BID    spironolactone  25 mg Oral Daily    losartan  100 mg Oral Daily    atorvastatin  80 mg Oral Daily    carvedilol  6.25 mg Oral BID       Assessment & Plan:    Acute intraparenchymal hemorrhage right parietal lobe with mass effect, no midline shift sp DOAC reversal in ER, repeat CT stable  Cerebrovascular disease  Repeat CT in 2 weeks (expected 3/3/2024) as outpatient, then determine resuming DOAC at that time per NS  NS consult  History of VTE, now with progression sp IVC filter 2/20/2024  Hematology consult   Acute kidney injury after resuming Spironolactone  IVF  Stop Spironolactone  Hold Losartan  Bladder scan  Monitor UOP, creatinine and electrolytes  Paroxysmal atrial fibrillation on DOAC   CAD sp CABG sp AVR  Chronic diastolic heart failure  Essential hypertension  Dyslipidemia  Coreg - add hold parameters  Losartan - held as above  Lipitor  Spironolactone stopped as above  Multaq remains on hold- cardiology to discuss with EP   DOAC on hold given ICH  Monitor hemodynamics   Telemetry  Cardiology consult  Aspiration risk  Speech evaluation   Atelectasis  Incentive spirometry  Flutter valve  Constipation, resolved  CHAZ    Supplementary Documentation:   Quality:  DVT Prophylaxis: IVC filter    Acute  Rehab once arranged.     Plan of care discussed with patient and RN.     Rupert Valles MD
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
## 2024-02-22 NOTE — OCCUPATIONAL THERAPY NOTE
OCCUPATIONAL THERAPY TREATMENT NOTE - INPATIENT     Room Number: 7602/7602-A  Session: 3  Number of Visits to Meet Established Goals: 5    Presenting Problem: L ICH  Prior Level of Function: Pt typically independent with ADLs and mobility. Pt does not use AD.    ASSESSMENT   Patient demonstrates good  progress this session, goals remain in progress.    Patient continues to function below baseline with toileting, lower body dressing, bed mobility, transfers, stating sitting balance, dynamic sitting balance, static standing balance, and dynamic standing balance.   Contributing factors to remaining limitations include decreased functional strength, decreased functional reach, decreased endurance, pain, impaired sitting balance, impaired coordination, impaired motor planning, decreased muscular endurance, and medical status.  Next session anticipate patient to progress toileting, bed mobility, transfers, stating sitting balance, dynamic sitting balance, static standing balance, dynamic standing balance, and maintaining seated position.  Occupational Therapy will continue to follow patient for duration of hospitalization.    Patient continues to benefit from continued skilled OT services: to facilitate return to prior level of function as patient demonstrates high motivation with excellent tolerance to an intensive therapy program .          History: Patient is a 63 year old male admitted on 2/17/2024 with Presenting Problem: L ICH. Co-Morbidities : HTN, CHAZ< DVT, CAD s/p CABG    WEIGHT BEARING RESTRICTION                     TREATMENT SESSION:  Patient Start of Session: semi supine in bed  FUNCTIONAL TRANSFER ASSESSMENT  Sit to Stand: Edge of Bed  Edge of Bed: Not Tested    BED MOBILITY  Rolling: Moderate Assist  Supine to Sit : Maximum Assist  Sit to Supine (OT): Maximum Assist  Scooting: Max A    BALANCE ASSESSMENT  Static Sitting: Stand-by Assist  Sitting Unilateral: Contact Guard Assist  Sitting Bilateral: Contact  Guard Assist  Static Standing: Not Tested    FUNCTIONAL ADL ASSESSMENT  Grooming Seated: Not Tested  LB Dressing Seated: Not Tested      ACTIVITY TOLERANCE: WFL                         O2 SATURATIONS       EDUCATION PROVIDED  Patient: Role of Occupational Therapy; Plan of Care; Discharge Recommendations; Functional Transfer Techniques; Fall Prevention; Posture/Positioning; Energy Conservation; Proper Body Mechanics  Patient's Response to Education: Verbalized Understanding; Requires Further Education    THERAPEUTIC EXERCISES  Lower Extremity      Upper Extremity AROM  Scap retraction    AAROM  Shoulder raises  Forward punches  Elbow flexion/extension    PROM  Forearm pronation/supination  Wrist flexion/extension   Position Sitting EOB     Repetitions 10-15   Sets 1       Therapist comments: Pt received semi supine in bed, pleasant and cooperative for OT session. Pt agreeable for EOB activity and transitioning to functional transfer training in preparation for bedside commode/toilet transfer (with naman weiss). Pt performs bed mobility at max A to sit EOB with cues provided for hand placement and sequencing. Pt with continued education on hook technique to manage R LE (weak leg) over to EOB with use of L LE with improvements noted. Pt more attentive to R UE during mobility and able to manage without cues. Pt engages in UE AROM/AAROM/PROM exercises to facilitate UE function required for ADLs. Improved sitting balance noted. Pt returns to supine in bed with all needs. RN made aware pt requesting meds for pain.  Patient End of Session: In bed;With  staff;Needs met;Call light within reach;RN aware of session/findings;All patient questions and concerns addressed;Alarm set;Family present    SUBJECTIVE  \"I'm ready to do whatever you need me to do.\"     PAIN ASSESSMENT  Rating: Unable to rate  Location: L shin        OBJECTIVE  Precautions:  (R side flaccid, Left Leg wound)    AM-PAC ‘6-Clicks’ Inpatient Daily Activity  Short Form  -   Putting on and taking off regular lower body clothing?: A Lot  -   Bathing (including washing, rinsing, drying)?: A Lot  -   Toileting, which includes using toilet, bedpan or urinal? : A Lot  -   Putting on and taking off regular upper body clothing?: A Lot  -   Taking care of personal grooming such as brushing teeth?: A Lot  -   Eating meals?: A Lot    AM-PAC Score:  Score: 12  Approx Degree of Impairment: 66.57%  Standardized Score (AM-PAC Scale): 30.6    PLAN  OT Treatment Plan: Balance activities;Energy conservation/work simplification techniques;ADL training;IADL training;Continued evaluation;Compensatory technique education;Equipment eval/education;Patient/Family training;Patient/Family education;Endurance training;Functional transfer training;UE strengthening/ROM  Rehab Potential : Good  Frequency: 3-5x/week    OT Goals:     All goals ongoing 02/22    ADL Goals   Patient will perform eating: with set up  Patient will perform grooming: with min assist     Functional Transfer Goals  Patient will transfer from sit to supine:  with min assist  Patient will transfer from supine to sit:  with min assist  Patient will transfer from sit to stand:  with mod assist    OT Session Time: 25 minutes  Therapeutic Activity: 25 minutes

## 2024-02-22 NOTE — PLAN OF CARE
Assumed care at 0730  A&Ox4, VSS, up with 2 max stand/pivot   Tylenol given for pain   L leg dressing- CDI   No change in neuro status   PT/OT eval complete  Video swallow complete  R groin site- CDI, no hematoma present   Patient and family updated on POC   All questions answered   Call light within reach

## 2024-02-22 NOTE — PLAN OF CARE
Assumed care at approximately 1930.  A & O x 4.  Room air.   NSR on tele.   Neuros q shift, no new deficits. Slurred speech, mild R facial droop and R sided weakness noted. See flowsheets.   C/o L moderate leg pain at wound site, pain managed with PRN Tylenol.   LLE wound changed, CDI.   R groin site CDI.   Awaiting insurance auth for MJ.   Bed in lowest position, call light within reach.   Patient updated on plan of care.

## 2024-02-23 LAB
ALBUMIN SERPL-MCNC: 3.3 G/DL (ref 3.4–5)
ALP LIVER SERPL-CCNC: 63 U/L
ALT SERPL-CCNC: 24 U/L
ANION GAP SERPL CALC-SCNC: 4 MMOL/L (ref 0–18)
APTT PPP: 32 SECONDS (ref 23.3–35.6)
AST SERPL-CCNC: 27 U/L (ref 15–37)
BASOPHILS # BLD AUTO: 0.05 X10(3) UL (ref 0–0.2)
BASOPHILS NFR BLD AUTO: 0.5 %
BILIRUB DIRECT SERPL-MCNC: 0.2 MG/DL (ref 0–0.2)
BILIRUB SERPL-MCNC: 1.3 MG/DL (ref 0.1–2)
BUN BLD-MCNC: 43 MG/DL (ref 9–23)
CALCIUM BLD-MCNC: 9 MG/DL (ref 8.5–10.1)
CHLORIDE SERPL-SCNC: 112 MMOL/L (ref 98–112)
CO2 SERPL-SCNC: 25 MMOL/L (ref 21–32)
CREAT BLD-MCNC: 1.19 MG/DL
EGFRCR SERPLBLD CKD-EPI 2021: 69 ML/MIN/1.73M2 (ref 60–?)
EOSINOPHIL # BLD AUTO: 0.94 X10(3) UL (ref 0–0.7)
EOSINOPHIL NFR BLD AUTO: 8.9 %
ERYTHROCYTE [DISTWIDTH] IN BLOOD BY AUTOMATED COUNT: 13.2 %
FIBRINOGEN PPP-MCNC: 189 MG/DL (ref 180–480)
GLUCOSE BLD-MCNC: 102 MG/DL (ref 70–99)
GLUCOSE BLD-MCNC: 105 MG/DL (ref 70–99)
GLUCOSE BLD-MCNC: 107 MG/DL (ref 70–99)
GLUCOSE BLD-MCNC: 90 MG/DL (ref 70–99)
GLUCOSE BLD-MCNC: 97 MG/DL (ref 70–99)
HCT VFR BLD AUTO: 41 %
HGB BLD-MCNC: 13.6 G/DL
IMM GRANULOCYTES # BLD AUTO: 0.06 X10(3) UL (ref 0–1)
IMM GRANULOCYTES NFR BLD: 0.6 %
INR BLD: 1.28 (ref 0.8–1.2)
LYMPHOCYTES # BLD AUTO: 2.76 X10(3) UL (ref 1–4)
LYMPHOCYTES NFR BLD AUTO: 26 %
MCH RBC QN AUTO: 32.2 PG (ref 26–34)
MCHC RBC AUTO-ENTMCNC: 33.2 G/DL (ref 31–37)
MCV RBC AUTO: 97.2 FL
MONOCYTES # BLD AUTO: 1.03 X10(3) UL (ref 0.1–1)
MONOCYTES NFR BLD AUTO: 9.7 %
NEUTROPHILS # BLD AUTO: 5.77 X10 (3) UL (ref 1.5–7.7)
NEUTROPHILS # BLD AUTO: 5.77 X10(3) UL (ref 1.5–7.7)
NEUTROPHILS NFR BLD AUTO: 54.3 %
OSMOLALITY SERPL CALC.SUM OF ELEC: 303 MOSM/KG (ref 275–295)
PLATELET # BLD AUTO: 81 10(3)UL (ref 150–450)
POTASSIUM SERPL-SCNC: 3.8 MMOL/L (ref 3.5–5.1)
PROT SERPL-MCNC: 6.6 G/DL (ref 6.4–8.2)
PROTHROMBIN TIME: 16 SECONDS (ref 11.6–14.8)
RBC # BLD AUTO: 4.22 X10(6)UL
SODIUM SERPL-SCNC: 141 MMOL/L (ref 136–145)
WBC # BLD AUTO: 10.6 X10(3) UL (ref 4–11)

## 2024-02-23 PROCEDURE — 99232 SBSQ HOSP IP/OBS MODERATE 35: CPT | Performed by: HOSPITALIST

## 2024-02-23 RX ORDER — ACETAMINOPHEN 325 MG/1
650 TABLET ORAL EVERY 4 HOURS PRN
Status: SHIPPED | COMMUNITY
Start: 2024-02-23

## 2024-02-23 RX ORDER — POTASSIUM CHLORIDE 20 MEQ/1
40 TABLET, EXTENDED RELEASE ORAL ONCE
Status: COMPLETED | OUTPATIENT
Start: 2024-02-23 | End: 2024-02-23

## 2024-02-23 RX ORDER — LOSARTAN POTASSIUM 100 MG/1
50 TABLET ORAL DAILY
Qty: 30 TABLET | Refills: 3 | Status: SHIPPED | OUTPATIENT
Start: 2024-02-23 | End: 2024-02-24

## 2024-02-23 NOTE — SLP NOTE
SPEECH DAILY NOTE - INPATIENT    ASSESSMENT & PLAN   ASSESSMENT  Pt seen for dysphagia tx to assess tolerance with recommended diet, ensure proper utilization of aspiration precautions and provide pt/family education. Spouse present. Patient received awake, alert, and motivated. Patient completed dysphagia therapeutic exercises x10 each, therapeutic PO trials of thin liquid via tspn sip x10 with 90% acc in no s/s aspiration observed, as well as x5 ice chip trials with no overt clinical s/s aspiration observed or suspected.  Patient completed double swallow 100% of the time.    Patient was administered the [facility] Mental Status Exam (UMS) which is an exam for detecting mild cognitive impairment and dementia. Patient achieved a raw score of 30/30  (Normal= 27-30; Mild= 21-26; Dementia= 1-20) which was WNL.  Patient exhibited fluent language with no evidence of paraphasias or word finding difficulty. Right facial droop however adequate ROM.  Speech intelligibility suspect mildly reduced  however 90+% intelligible across contexts.   Education/counseling completed regarding role/purpose of SLP, post acute speech therapy recommendation, as well as swallow strategies and aspiration precautions. Patient/spouse expressed understanding and agreement.         Diet Recommendations - Solids: Regular  Diet Recommendations - Liquids: Nectar thick liquids/ Mildly thick (ice chips ok)  Compensatory Strategies Recommended: No straws;Slow rate;Small bites and sips;Multiple swallows  Aspiration Precautions: Upright position;Slow rate;Small bites and sips;No straw  Medication Administration Recommendations: One pill at a time;Present with thickened liquid    Patient Experiencing Pain: Yes  Pain Rating: 3  Pain Location: left lower leg/wound site  Pain Control: PO meds  Nursing Aware of Pain?: Yes    Treatment Plan  Treatment Plan/Recommendations: Dysarthria therapy;Aspiration precautions;Dysphagia  therapy        GOALS  Goal #1 The patient will tolerate regular consistency and mildly thick liquids without overt signs or symptoms of aspiration with 95 % accuracy over 2-3 session(s).  In Progress   Goal #2 The patient will utilize compensatory strategies as outlined by  VFSS including Slow rate, Small bites, Small sips, Multiple swallows, No straws, Upright 90 degrees with min assistance 95 % of the time across 2 sessions.     In Progress   Goal #3 The patient/family/caregiver will demonstrate understanding and implementation of aspiration precautions and swallow strategies independently over 2-3 session(s).  In Progress   Goal #4 Patient will complete oral and pharyngeal musculature exercises:  Effortful swallow x 10  Shaker Exercise x 10     In Progress   Goal #5 Cognitive communication evaluation MET     FOLLOW UP  Follow Up Needed (Documentation Required): Yes  SLP Follow-up Date: 02/26/24  Number of Visits to Meet Established Goals: 4    Session: 1, since VFSS completed    If you have any questions, please contact GIN Robert, MS CCC-SLP/L, pager [phone]  Speech-Language Pathologist

## 2024-02-23 NOTE — PLAN OF CARE
Pt seen  no new issues .   2000 on IS   Leg pain minimal today  drsg change today   Isurance auth still P  Jennifer will call  as renal function better- could be dc'ed .   NSR   RUE still flaccid, moves r LL.   Speech clear.   Stop IVF   Plts lower  81 K  , Hgb stable  off any meds that would lower plts levels   reached  out to Dr Osullivan - await response   Spoke w/ Dr Osullivan- repeat LFT's/ coags  and will need check next week- but unless markedly abnormal should be able to go to      Nena Helm, NP

## 2024-02-23 NOTE — PROGRESS NOTES
[facility]     Hospitalist Progress Note     Kris Colvin Patient Status:  Inpatient    3/6/1960 MRN [id number]   [facility] 7NE-A Attending Rex Tim MD   Hosp Day # 6 PCP Lenka Guevara DO     Chief Complaint: ICH    Subjective:   Patient denies new complaints. No chest pain or shortness of breath. No nausea, vomiting, diarrhea.    Current medications:   dronedarone  400 mg Oral BID with meals    docusate sodium  100 mg Oral BID    [Held by provider] losartan  100 mg Oral Daily    atorvastatin  80 mg Oral Daily    carvedilol  6.25 mg Oral BID       Objective:    Review of Systems:   10 point ROS completed and was negative, except for pertinent positive and negatives stated in subjective.    Vital signs:  Temp:  [97.5 °F (36.4 °C)-98.5 °F (36.9 °C)] 97.5 °F (36.4 °C)  Pulse:  [61-79] 67  Resp:  [16-19] 18  BP: ()/(53-97) 137/89  SpO2:  [93 %-95 %] 94 %  Patient Weight for the past 72 hrs:   Weight   24 1348 (!) 302 lb 14.6 oz (137.4 kg)   24 1606 (!) 302 lb 14.6 oz (137.4 kg)     Physical Exam:    General: No acute distress.   Respiratory: Diminished breath sounds on right  Cardiovascular: S1, S2. Regular rate and rhythm.  Abdomen: Soft, nontender, nondistended.  Positive bowel sounds.   Extremities: No edema. RUE 0/5, RLE 3+/5  Neuro: AAOx3    Diagnostic Data:    Labs:  Recent Labs   Lab 24  1346 24  0429 24  0634 24  0536 24  0932   WBC 12.0* 9.0 9.6 10.6  --    HGB 15.1 14.2 14.8 13.6  --    MCV 93.7 93.3 94.6 97.2  --    .0 166.0 127.0* 81.0*  --    INR 1.07  --   --   --  1.28*       Recent Labs   Lab 24  1346 24  0429 24  0634 24  0621 24  0536   GLU 93   < > 106* 137* 105*   BUN 14   < > 24* 41* 43*   CREATSERUM 1.25   < > 1.19 1.57* 1.19   CA 9.6   < > 9.1 9.9 9.0   ALB 3.7  --  3.2*  --  3.3*      < > 144 138 141   K 4.1   < > 4.1 4.0 3.8      < > 111 109 112   CO2 28.0   < > 30.0 24.0  25.0   ALKPHO 78  --  74  --  63   AST 29  --  30  --  27   ALT 36  --  26  --  24   BILT 0.7  --  1.0  --  1.3   TP 7.5  --  7.0  --  6.6    < > = values in this interval not displayed.       Estimated Creatinine Clearance: 76.9 mL/min (based on SCr of 1.19 mg/dL).    Recent Labs   Lab 02/17/24  1346 02/23/24  0932   PTP 14.0 16.0*   INR 1.07 1.28*            COVID-19 Lab Results    COVID-19  Lab Results   Component Value Date    COVID19 Not Detected 02/17/2024    COVID19 Not Detected 03/25/2022    COVID19 Not Detected 07/06/2020       Pro-Calcitonin  No results for input(s): \"PCT\" in the last 168 hours.    Cardiac  No results for input(s): \"TROP\", \"PBNP\" in the last 168 hours.    Creatinine Kinase  No results for input(s): \"CK\" in the last 168 hours.    Inflammatory Markers  No results for input(s): \"CRP\", \"KENDRA\", \"LDH\", \"DDIMER\" in the last 168 hours.    Recent Labs   Lab 02/17/24  1346   TROPHS 32       Imaging: Imaging data reviewed in Epic.    Medications:    dronedarone  400 mg Oral BID with meals    docusate sodium  100 mg Oral BID    [Held by provider] losartan  100 mg Oral Daily    atorvastatin  80 mg Oral Daily    carvedilol  6.25 mg Oral BID       Assessment & Plan:    Acute intraparenchymal hemorrhage right parietal lobe with mass effect, no midline shift sp DOAC reversal in ER, repeat CT stable  Cerebrovascular disease  Repeat CT in 2 weeks (expected 3/3/2024) as outpatient, then determine resuming DOAC at that time per NS  NS consult  History of VTE, now with progression sp IVC filter 2/20/2024  Hematology consult   Acute kidney injury after resuming Spironolactone, resolved  Stop IVF  Hold Losartan and Spironolactone  Monitor UOP, creatinine and electrolytes   Paroxysmal atrial fibrillation on DOAC   CAD sp CABG sp AVR  Chronic diastolic heart failure  Essential hypertension  Dyslipidemia  Coreg  Lipitor  Losartan & Elton - held as above  Lipitor  Multaq  DOAC on hold given ICH  Monitor hemodynamics    Telemetry  Cardiology consult  Thrombocytopenia  Hematology evaluation   Aspiration risk  Speech evaluation   Atelectasis  Incentive spirometry  Flutter valve  Constipation, resolved  CHAZ    Supplementary Documentation:   Quality:  DVT Prophylaxis: IVC filter    Acute Rehab once arranged.    Plan of care discussed with patient and NP.    Rupert Valles MD
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
## 2024-02-23 NOTE — PLAN OF CARE
Assumed care at 0730  A&Ox4, VSS, 2 max stand/pivot   Incontinent at times   Tylenol given for pain   No change in neuro status   OT eval complete  Tolerating diet   R groin site- CDI, no hematoma present   Patient and family updated on POC   All questions answered   Call light within reach

## 2024-02-23 NOTE — CM/SW NOTE
Notified by BLAKE Barrett, that pt is ready for discharge today.  Insurance auth is still pending, per [facility] liaison, Pauline    / to remain available for support and/or discharge planning.     Jennifer Rosales MBA MSN, RN CTL/  [id number]

## 2024-02-23 NOTE — OCCUPATIONAL THERAPY NOTE
OCCUPATIONAL THERAPY TREATMENT NOTE - INPATIENT     Room Number: 7602/7602-A  Session: 3  Number of Visits to Meet Established Goals: 5    Presenting Problem: L ICH  Prior Level of Function: Pt typically independent with ADLs and mobility. Pt does not use AD.    ASSESSMENT   Patient demonstrates good  progress this session, goals remain in progress.    Patient continues to function below baseline with toileting, lower body dressing, bed mobility, transfers, stating sitting balance, dynamic sitting balance, static standing balance, and dynamic standing balance.   Contributing factors to remaining limitations include decreased functional strength, decreased functional reach, decreased endurance, pain, impaired sitting balance, impaired coordination, impaired motor planning, decreased muscular endurance, and medical status.  Next session anticipate patient to progress toileting, bed mobility, transfers, stating sitting balance, dynamic sitting balance, static standing balance, dynamic standing balance, and maintaining seated position.  Occupational Therapy will continue to follow patient for duration of hospitalization.    Patient continues to benefit from continued skilled OT services: to facilitate return to prior level of function as patient demonstrates high motivation with excellent tolerance to an intensive therapy program .          History: Patient is a 63 year old male admitted on 2/17/2024 with Presenting Problem: L ICH. Co-Morbidities : HTN, CHAZ< DVT, CAD s/p CABG    WEIGHT BEARING RESTRICTION                     TREATMENT SESSION:  Patient Start of Session: semi supine in bed  FUNCTIONAL TRANSFER ASSESSMENT  Sit to Stand: Edge of Bed  Edge of Bed: Moderate Assist (min A x 2)    BED MOBILITY  Rolling: Moderate Assist  Supine to Sit : Moderate Assist  Sit to Supine (OT): Not Tested  Scooting: NT    BALANCE ASSESSMENT  Static Sitting: Stand-by Assist  Sitting Unilateral: Contact Guard Assist  Sitting  Bilateral: Contact Guard Assist  Static Standing: Minimal Assist  Standing Unilateral: Dependent (mod A x 2/max A x 2)  Standing Bilateral: Dependent (mod A x 2/max A x 2)    FUNCTIONAL ADL ASSESSMENT  Grooming Seated: Not Tested  LB Dressing Seated: Not Tested      ACTIVITY TOLERANCE: WFL                         O2 SATURATIONS       EDUCATION PROVIDED  Patient: Role of Occupational Therapy; Plan of Care; Discharge Recommendations; Functional Transfer Techniques; Fall Prevention; Posture/Positioning; Energy Conservation; Proper Body Mechanics  Patient's Response to Education: Verbalized Understanding; Returned Demonstration; Requires Further Education    THERAPEUTIC EXERCISES  Lower Extremity      Upper Extremity AROM  Scap retraction    AAROM  Shoulder raises  Forward punches  Elbow flexion/extension    PROM  Forearm pronation/supination  Wrist flexion/extension   Position Sitting EOB     Repetitions 10-15   Sets 1       Therapist comments: Pt received semi supine in bed, pleasant and cooperative for OT session. Pt performs bed mobility at mod A to sit EOB with cues provided for hand placement and sequencing. R UE sling donned in preparation to trial standing and transfers with srikanth walker. Pt engages in sit to stand transfers requiring min A x 2 with cues provided for L hand placement and sequencing. Pt demonstrates step transfer with srikanth walker requiring max A x 2 with multimodal cueing provided for walker sequence and weight shifting in order to advance R LE. Discussed with PT to trial R platform RW for increased safety and stability with transfer in preparation for transferring to bedside commode.   Patient End of Session: Up in chair;Needs met;Call light within reach;RN aware of session/findings;All patient questions and concerns addressed;Alarm set;Family present    SUBJECTIVE  \"My right leg is moving better.\"  \"My left shin still hurts, but it feels better than yesterday.\"    PAIN ASSESSMENT  Rating:  7  Location: L shin        OBJECTIVE  Precautions:  (R side flaccid, Left Leg wound)    AM-PAC ‘6-Clicks’ Inpatient Daily Activity Short Form  -   Putting on and taking off regular lower body clothing?: A Lot  -   Bathing (including washing, rinsing, drying)?: A Lot  -   Toileting, which includes using toilet, bedpan or urinal? : A Lot  -   Putting on and taking off regular upper body clothing?: A Lot  -   Taking care of personal grooming such as brushing teeth?: A Lot  -   Eating meals?: A Lot    AM-PAC Score:  Score: 12  Approx Degree of Impairment: 66.57%  Standardized Score (AM-PAC Scale): 30.6    PLAN  OT Treatment Plan: Balance activities;Energy conservation/work simplification techniques;ADL training;IADL training;Continued evaluation;Compensatory technique education;Equipment eval/education;Patient/Family training;Patient/Family education;Endurance training;Functional transfer training;UE strengthening/ROM  Rehab Potential : Good  Frequency: 3-5x/week    OT Goals:     All goals ongoing 02/23    ADL Goals   Patient will perform eating: with set up  Patient will perform grooming: with min assist     Functional Transfer Goals  Patient will transfer from sit to supine:  with min assist  Patient will transfer from supine to sit:  with min assist  Patient will transfer from sit to stand:  with mod assist    OT Session Time: 30 minutes  Therapeutic Activity: 30 minutes

## 2024-02-23 NOTE — PLAN OF CARE
Assumed care at 1900.  Alert and oriented x4; neuros completed with no new deficits noted.   NSR; RA; denies pain.   Pt able to use urinal with assistance; incontinent at times.   Fall and seizure precautions in place.   Call light within reach.     Problem: NEUROLOGICAL - ADULT  Goal: Achieves stable or improved neurological status  Description: INTERVENTIONS  - Assess for and report changes in neurological status  - Initiate measures to prevent increased intracranial pressure  - Maintain blood pressure and fluid volume within ordered parameters to optimize cerebral perfusion and minimize risk of hemorrhage  - Monitor temperature, glucose, and sodium. Initiate appropriate interventions as ordered  Outcome: Progressing  Goal: Achieves maximal functionality and self care  Description: INTERVENTIONS  - Monitor swallowing and airway patency with patient fatigue and changes in neurological status  - Encourage and assist patient to increase activity and self care with guidance from PT/OT  - Encourage visually impaired, hearing impaired and aphasic patients to use assistive/communication devices  Outcome: Progressing

## 2024-02-23 NOTE — PHYSICAL THERAPY NOTE
PHYSICAL THERAPY TREATMENT NOTE - INPATIENT    Room Number: 7602/7602-A     Session: 3     Number of Visits to Meet Established Goals: 8    Presenting Problem: Intraparenchymal hemtoma of brain, HDL, DVT,  Co-Morbidities : HTN, CHAZ< DVT, CAD s/p CABG    CT Brain 2/19  CONCLUSION:  Stable 2.8 x 2.7 x 1.8 cm acute intraparenchymal hematoma arising from the left insular region with a small amount of surrounding vasogenic edema and stable mild localized mass effect.      Mild diffuse cerebral and cerebellar atrophy with chronic microvascular ischemic changes of aging.      US Venous   CONCLUSION:  Partial DVT in the proximal mid and distal segments of the left superficial femoral   vein as well as the proximal segment of the left popliteal vein.  Extent of DVT has progressed since 1/17/2024 exam.  Critical exam results phoned to nurse Thomas on 2/18/2024 at 1042 hours with read back.      No DVT in the right leg.      Posterior tibial veins of the left calf could not be visualized secondary to ulceration.      2/20 Pt underwent IVC filter    ASSESSMENT   Patient demonstrates fair progress this session, goals  remain in progress.    Patient continues to function below baseline with bed mobility, transfers, gait, stair negotiation, maintaining seated position, and standing prolonged periods.  Contributing factors to remaining limitations include decreased functional strength, decreased endurance/aerobic capacity, pain, impaired standing balance, impaired coordination, impaired motor planning, decreased muscular endurance, and medical status.  Next session anticipate patient to progress bed mobility, transfers, gait, maintaining seated position, and standing prolonged periods.  Physical Therapy will continue to follow patient for duration of hospitalization.    Patient continues to benefit from continued skilled PT services: to facilitate return to prior level of function as patient demonstrates high motivation with  excellent tolerance to an intensive therapy program .    PLAN  PT Treatment Plan: Bed mobility;Body mechanics;Gait training;Strengthening;Stair training;Transfer training;Balance training  Rehab Potential : Good  Frequency (Obs): 3-5x/week    CURRENT GOALS     Goal #1 Patient is able to demonstrate supine - sit EOB @ level: minimum assistance      Goal #2 Patient is able to demonstrate transfers EOB to/from BS at assistance level: moderate assistance      Goal #3 Patient is able to ambulate 10 feet with assist device: walker - platform at assistance level: moderate assistance      Goal #4     Goal #5     Goal #6     Goal Comments: Goals established on 2024 all goals ongoing        SUBJECTIVE  Pt states his right leg is moving a little better today.    OBJECTIVE  Precautions:  (R side flaccid, Left Leg wound)    WEIGHT BEARING RESTRICTION                   PAIN ASSESSMENT   Ratin  Location: left shin wound  Management Techniques: Repositioning;Relaxation    BALANCE                                                                                                                       Static Sitting: Fair +  Dynamic Sitting: Fair           Static Standing: Poor +  Dynamic Standing: Poor -    ACTIVITY TOLERANCE                         O2 WALK  Oxygen Therapy  SPO2% on Room Air at Rest: 97      AM-PAC '6-Clicks' INPATIENT SHORT FORM - BASIC MOBILITY  How much difficulty does the patient currently have...  Patient Difficulty: Turning over in bed (including adjusting bedclothes, sheets and blankets)?: A Little   Patient Difficulty: Sitting down on and standing up from a chair with arms (e.g., wheelchair, bedside commode, etc.): A Lot   Patient Difficulty: Moving from lying on back to sitting on the side of the bed?: A Lot   How much help from another person does the patient currently need...   Help from Another: Moving to and from a bed to a chair (including a wheelchair)?: A Lot   Help from Another:  Need to walk in hospital room?: A Lot   Help from Another: Climbing 3-5 steps with a railing?: Total       AM-PAC Score:  Raw Score: 12   Approx Degree of Impairment: 68.66%   Standardized Score (AM-PAC Scale): 35.33   CMS Modifier (G-Code): CL    FUNCTIONAL ABILITY STATUS  Gait Assessment   Functional Mobility/Gait Assessment  Gait Assistance: Maximum assistance  Distance (ft): 2  Assistive Device: Other (Comment) (srikanth walker)    Skilled Therapy Provided    Bed Mobility:  Rolling: min A   Supine<>Sit: mod A   Sit<>Supine: NT     Transfer Mobility:  Sit<>Stand: min A x 2 with cues for L hand placement and sequencing to attain full uptight standing position.   Stand<>Sit: min-mod A x 2   Gait: Pt took 2 steps with srikanth walker and max A  x 2; pt required max A to weight shift to left while requiring additional max A (additional person) to advance R LE along floor while completing SPT to bedside chair.    Therapist's Comments: Pt motivated and agreeable to PT. NO active movement noted R UE; R UE placed in sling by OT at beginning of session. Worked on sitting balance with L UE reaching while cued to keep L foot on floor as pt initially kept lifting L foot off floor when sitting. Worked on R LE positioning in neutral while sitting. Once in standing, pt required frequent cues to shift weight more toward the left to maintain upright standing. Pt may benefit from trial with R platform walker during next PT session.      THERAPEUTIC EXERCISES  Lower Extremity Alternating marching  Ankle pumps  Hip AB/AD  Knee extension  Quad sets     Upper Extremity      Position Sitting     Repetitions   6-10   Sets   1     Patient End of Session: Up in chair;Needs met;Call light within reach;RN aware of session/findings;All patient questions and concerns addressed;Family present    PT Session Time: 28 minutes    Therapeutic Activity: 23 minutes  Therapeutic Exercise: 5 minutes

## 2024-02-23 NOTE — PLAN OF CARE
Assumed care at 0730  A&Ox4, VSS, up with 2 max stand/pivot   Incontinent at times   Tylenol given for pain   No change in neuro status   L leg dressing changed- CDI   PT/OT/ST eval complete  K replaced   Patient and family updated on POC   All questions answered   Call light within reach

## 2024-02-23 NOTE — SPIRITUAL CARE NOTE
Spiritual Care Visit Note    Patient Name: Kris Colvin Date of Spiritual Care Visit: 24   : 3/6/1960 Primary Dx: Intraparenchymal hematoma of brain, left, without loss of consciousness, initial encounter (HCC)       Referred By: Referral From:  ( Rounds)    Spiritual Care Taxonomy:    Intended Effects: Convey a calming presence    Methods: Offer support    Interventions: Acknowledge current situation;Active listening;Share words of hope and inspiration;Explain  role;Identify supportive relationship(s)    Visit Type/Summary:     - Spiritual Care: Consulted with RN prior to visit. Provided information regarding how to contact Spiritual Care and left a Spiritual Care information card.    Spiritual Care support can be requested via an Epic consult.   For urgent/immediate needs, please contact the On Call  at: Rod: ext [phone]

## 2024-02-24 LAB
ANION GAP SERPL CALC-SCNC: 3 MMOL/L (ref 0–18)
BASOPHILS # BLD AUTO: 0.07 X10(3) UL (ref 0–0.2)
BASOPHILS NFR BLD AUTO: 0.6 %
BUN BLD-MCNC: 38 MG/DL (ref 9–23)
CALCIUM BLD-MCNC: 9.4 MG/DL (ref 8.5–10.1)
CHLORIDE SERPL-SCNC: 110 MMOL/L (ref 98–112)
CO2 SERPL-SCNC: 27 MMOL/L (ref 21–32)
CREAT BLD-MCNC: 1.03 MG/DL
EGFRCR SERPLBLD CKD-EPI 2021: 82 ML/MIN/1.73M2 (ref 60–?)
EOSINOPHIL # BLD AUTO: 0.79 X10(3) UL (ref 0–0.7)
EOSINOPHIL NFR BLD AUTO: 6.8 %
ERYTHROCYTE [DISTWIDTH] IN BLOOD BY AUTOMATED COUNT: 13.2 %
FOLATE SERPL-MCNC: 35.5 NG/ML (ref 8.7–?)
GLUCOSE BLD-MCNC: 113 MG/DL (ref 70–99)
GLUCOSE BLD-MCNC: 122 MG/DL (ref 70–99)
GLUCOSE BLD-MCNC: 90 MG/DL (ref 70–99)
GLUCOSE BLD-MCNC: 94 MG/DL (ref 70–99)
GLUCOSE BLD-MCNC: 97 MG/DL (ref 70–99)
HCT VFR BLD AUTO: 40.4 %
HGB BLD-MCNC: 13.6 G/DL
IMM GRANULOCYTES # BLD AUTO: 0.06 X10(3) UL (ref 0–1)
IMM GRANULOCYTES NFR BLD: 0.5 %
LYMPHOCYTES # BLD AUTO: 2.56 X10(3) UL (ref 1–4)
LYMPHOCYTES NFR BLD AUTO: 21.9 %
MCH RBC QN AUTO: 32.6 PG (ref 26–34)
MCHC RBC AUTO-ENTMCNC: 33.7 G/DL (ref 31–37)
MCV RBC AUTO: 96.9 FL
MONOCYTES # BLD AUTO: 1.26 X10(3) UL (ref 0.1–1)
MONOCYTES NFR BLD AUTO: 10.8 %
NEUTROPHILS # BLD AUTO: 6.96 X10 (3) UL (ref 1.5–7.7)
NEUTROPHILS # BLD AUTO: 6.96 X10(3) UL (ref 1.5–7.7)
NEUTROPHILS NFR BLD AUTO: 59.4 %
OSMOLALITY SERPL CALC.SUM OF ELEC: 299 MOSM/KG (ref 275–295)
PLATELET # BLD AUTO: 76 10(3)UL (ref 150–450)
POTASSIUM SERPL-SCNC: 4.1 MMOL/L (ref 3.5–5.1)
POTASSIUM SERPL-SCNC: 4.1 MMOL/L (ref 3.5–5.1)
RBC # BLD AUTO: 4.17 X10(6)UL
SODIUM SERPL-SCNC: 140 MMOL/L (ref 136–145)
VIT B12 SERPL-MCNC: 558 PG/ML (ref 193–986)
WBC # BLD AUTO: 11.7 X10(3) UL (ref 4–11)

## 2024-02-24 PROCEDURE — 99232 SBSQ HOSP IP/OBS MODERATE 35: CPT | Performed by: INTERNAL MEDICINE

## 2024-02-24 PROCEDURE — 99232 SBSQ HOSP IP/OBS MODERATE 35: CPT | Performed by: HOSPITALIST

## 2024-02-24 NOTE — PROGRESS NOTES
[facility]     Hospitalist Progress Note     Kris Colvin Patient Status:  Inpatient    3/6/1960 MRN [id number]   [facility] 7NE-A Attending Rex Tim MD   Hosp Day # 7 PCP Lenka Guevara DO     Chief Complaint: ICH    Subjective:   Patient denies new complaints.    Current medications:   dronedarone  400 mg Oral BID with meals    docusate sodium  100 mg Oral BID    atorvastatin  80 mg Oral Daily    carvedilol  6.25 mg Oral BID       Objective:    Review of Systems:   10 point ROS completed and was negative, except for pertinent positive and negatives stated in subjective.    Vital signs:  Temp:  [97.7 °F (36.5 °C)-98.4 °F (36.9 °C)] 98.4 °F (36.9 °C)  Pulse:  [60-69] 66  Resp:  [16-20] 18  BP: (115-133)/(55-91) 132/86  SpO2:  [92 %-98 %] 97 %  Patient Weight for the past 72 hrs:   Weight   24 1348 (!) 302 lb 14.6 oz (137.4 kg)   24 1606 (!) 302 lb 14.6 oz (137.4 kg)     Physical Exam:    General: No acute distress.   Respiratory: Clear to auscultation   Cardiovascular: S1, S2. Regular rate and rhythm.  Abdomen: Soft, nontender, nondistended.  Positive bowel sounds.   Extremities: No pitting edema. RUE 0/5, RLE 3+/5  Neuro: AAOx3    Diagnostic Data:    Labs:  Recent Labs   Lab 24  1346 24  0429 24  0634 24  0536 24  0932 24  0554   WBC 12.0* 9.0 9.6 10.6  --  11.7*   HGB 15.1 14.2 14.8 13.6  --  13.6   MCV 93.7 93.3 94.6 97.2  --  96.9   .0 166.0 127.0* 81.0*  --  76.0*   INR 1.07  --   --   --  1.28*  --        Recent Labs   Lab 24  1346 24  0429 24  0634 24  0621 24  0536 24  0554   GLU 93   < > 106* 137* 105* 97   BUN 14   < > 24* 41* 43* 38*   CREATSERUM 1.25   < > 1.19 1.57* 1.19 1.03   CA 9.6   < > 9.1 9.9 9.0 9.4   ALB 3.7  --  3.2*  --  3.3*  --       < > 144 138 141 140   K 4.1   < > 4.1 4.0 3.8 4.1  4.1      < > 111 109 112 110   CO2 28.0   < > 30.0 24.0 25.0 27.0   ALKPHO 78  --   74  --  63  --    AST 29  --  30  --  27  --    ALT 36  --  26  --  24  --    BILT 0.7  --  1.0  --  1.3  --    TP 7.5  --  7.0  --  6.6  --     < > = values in this interval not displayed.       Estimated Creatinine Clearance: 88.9 mL/min (based on SCr of 1.03 mg/dL).    Recent Labs   Lab 02/17/24  1346 02/23/24  0932   PTP 14.0 16.0*   INR 1.07 1.28*            COVID-19 Lab Results    COVID-19  Lab Results   Component Value Date    COVID19 Not Detected 02/17/2024    COVID19 Not Detected 03/25/2022    COVID19 Not Detected 07/06/2020       Pro-Calcitonin  No results for input(s): \"PCT\" in the last 168 hours.    Cardiac  No results for input(s): \"TROP\", \"PBNP\" in the last 168 hours.    Creatinine Kinase  No results for input(s): \"CK\" in the last 168 hours.    Inflammatory Markers  No results for input(s): \"CRP\", \"KENDRA\", \"LDH\", \"DDIMER\" in the last 168 hours.    Recent Labs   Lab 02/17/24  1346   TROPHS 32       Imaging: Imaging data reviewed in Epic.    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  Thrombocytopenia   Monitor counts   Hematology consult   Acute kidney injury after resuming Spironolactone, resolved  Monitor UOP, creatinine and electrolytes   Paroxysmal atrial fibrillation on DOAC   CAD sp CABG sp AVR  Chronic diastolic heart failure  Essential hypertension  Dyslipidemia  Coreg  Lipitor  No plan for Losartan inpatient or on DC, will re-assess as outpatient, discussed with cardiologist  Keep off Spironolactone d/t  ALEN  Lipitor  Multaq  DOAC on hold given ICH  Monitor hemodynamics   Telemetry  Cardiology consult  Aspiration risk  Speech evaluation   Atelectasis  Incentive spirometry  Flutter valve  Constipation, resolved  CHAZ    Supplementary Documentation:   Quality:  DVT Prophylaxis: IVC filter    Acute Rehab once arranged.    Plan of care discussed with patient and cardiologist.    Rupert Valles MD

## 2024-02-24 NOTE — PLAN OF CARE
Assumed care at 0700.  Pt Aox4, afebrile, complained of pain in LLE 4/10. Tylenol given with relief.   Q4 neuro checks, no new deficits noted.  Family at bedside.   No BM this shift.   Encouraged the pt to sit up on a chair during the day as tolerated, pt refusing at this time.   Safety measures in place. Call light within reach.     Problem: NEUROLOGICAL - ADULT  Goal: Achieves stable or improved neurological status  Description: INTERVENTIONS  - Assess for and report changes in neurological status  - Initiate measures to prevent increased intracranial pressure  - Maintain blood pressure and fluid volume within ordered parameters to optimize cerebral perfusion and minimize risk of hemorrhage  - Monitor temperature, glucose, and sodium. Initiate appropriate interventions as ordered  Outcome: Progressing

## 2024-02-24 NOTE — CM/SW NOTE
Spoke with Ekaterina from  and insurance auth is still pending.    SW/CM to remain available for support and/or discharge planning.    JOSE August  Discharge Planner

## 2024-02-24 NOTE — PROGRESS NOTES
[facility]    Progress Note    Kris Colvin Patient Status:  Inpatient    3/6/1960 MRN [id number]   Location [facility] 7NE-A Attending Rupert Vallse MD   Hosp Day # 7 PCP Lenka Guevara,      Subjective:  Kris Colvin is a(n) 63 year old male with history of DVT admitted with an acute intracranial hemorrhage. Anticoagulation is on hold. IVC filter is in place. His platelet count has declined over the past few days.  He has not received heparin. No bleeding.     Objective:  Blood pressure 117/76, pulse 60, temperature 98.4 °F (36.9 °C), temperature source Oral, resp. rate 16, weight 126.3 kg (278 lb 8 oz), SpO2 96%.    Labs:   Lab Results   Component Value Date    WBC 11.7 2024    HGB 13.6 2024    HCT 40.4 2024    PLT 76.0 2024    CREATSERUM 1.03 2024    BUN 38 2024     2024    K 4.1 2024    K 4.1 2024     2024    CO2 27.0 2024    GLU 97 2024    CA 9.4 2024    PTT 32.0 2024    INR 1.28 2024       Physical Exam:  General: afebrile, alert and oriented x 3, pleasant  CV: Regular rate and rhythm, no chest pain  Lungs: CTA  Abdomen: soft, nontender, positive BS  Extremity: pulses positive, 1+ BLE edema    Imaging:      Medications reviewed.    Assessment and Plan:  Patient Active Problem List   Diagnosis    Cerebral infarction (HCC)    Hypertensive emergency    NSVT (nonsustained ventricular tachycardia) ([facility])    White matter abnormality on MRI of brain    Vitamin B 12 deficiency    Vitamin D deficiency    History of CVA (cerebrovascular accident)    Essential hypertension    Paresthesias/numbness    Dilated cardiomyopathy ([facility])    Encounter for screening colonoscopy    Chronic atrial fibrillation (HCC)    Preop testing    Hyperlipidemia    CHAZ (obstructive sleep apnea)    Fever    H/O cardiac radiofrequency ablation    History of atrial fibrillation    Other thrombophilia (HCC)    Intraparenchymal  hematoma of brain, left, without loss of consciousness, initial encounter (HCC)    HFrEF (heart failure with reduced ejection fraction) (HCC)    Coronary artery disease involving coronary bypass graft of native heart    Deep vein thrombosis (DVT) of proximal vein of left lower extremity (HCC)    Vasogenic cerebral edema (HCC)    ALEN (acute kidney injury) (HCC)     Thrombocytopenia: Unclear etiology. Stable today. Will review the peripheral smear and check B12 and Folate levels. Repeat count tomorrow.    DVT: IVC filter I'm place. Anticoagulation on hold due to ICH. Eventual plan should be tor restart anticoagulation without aspirin. He will follow up with Dr. Osullivan after rehab to consider restarting anticoagulation.        Tae Mcallister MD  2/24/2024  8:24 AM

## 2024-02-24 NOTE — PLAN OF CARE
Assumed care at 1900.  Alert and oriented x4; neuros completed with no new deficits noted.   NSR; RA; denies pain.   Pt able to use urinal with assistance; incontinent at times.  Pending placement at .   Fall and seizure precautions in place.   Call light within reach.

## 2024-02-25 VITALS
HEART RATE: 73 BPM | BODY MASS INDEX: 35 KG/M2 | RESPIRATION RATE: 18 BRPM | WEIGHT: 278.5 LBS | DIASTOLIC BLOOD PRESSURE: 84 MMHG | SYSTOLIC BLOOD PRESSURE: 135 MMHG | TEMPERATURE: 97 F | OXYGEN SATURATION: 96 %

## 2024-02-25 PROBLEM — D69.6 THROMBOCYTOPENIA (HCC): Status: ACTIVE | Noted: 2024-02-25

## 2024-02-25 PROBLEM — D69.6 THROMBOCYTOPENIA: Status: ACTIVE | Noted: 2024-01-01

## 2024-02-25 PROBLEM — D69.6 THROMBOCYTOPENIA (HCC): Status: ACTIVE | Noted: 2024-01-01

## 2024-02-25 LAB
BASOPHILS # BLD AUTO: 0.04 X10(3) UL (ref 0–0.2)
BASOPHILS NFR BLD AUTO: 0.4 %
EOSINOPHIL # BLD AUTO: 0.49 X10(3) UL (ref 0–0.7)
EOSINOPHIL NFR BLD AUTO: 4.6 %
ERYTHROCYTE [DISTWIDTH] IN BLOOD BY AUTOMATED COUNT: 13 %
GLUCOSE BLD-MCNC: 97 MG/DL (ref 70–99)
HCT VFR BLD AUTO: 39.6 %
HGB BLD-MCNC: 13.1 G/DL
IMM GRANULOCYTES # BLD AUTO: 0.05 X10(3) UL (ref 0–1)
IMM GRANULOCYTES NFR BLD: 0.5 %
LYMPHOCYTES # BLD AUTO: 2.38 X10(3) UL (ref 1–4)
LYMPHOCYTES NFR BLD AUTO: 22.5 %
MCH RBC QN AUTO: 32 PG (ref 26–34)
MCHC RBC AUTO-ENTMCNC: 33.1 G/DL (ref 31–37)
MCV RBC AUTO: 96.6 FL
MONOCYTES # BLD AUTO: 1 X10(3) UL (ref 0.1–1)
MONOCYTES NFR BLD AUTO: 9.5 %
NEUTROPHILS # BLD AUTO: 6.61 X10 (3) UL (ref 1.5–7.7)
NEUTROPHILS # BLD AUTO: 6.61 X10(3) UL (ref 1.5–7.7)
NEUTROPHILS NFR BLD AUTO: 62.5 %
PLATELET # BLD AUTO: 78.1 10(3)UL (ref 150–450)
PLATELET # BLD AUTO: 87 10(3)UL (ref 150–450)
RBC # BLD AUTO: 4.1 X10(6)UL
WBC # BLD AUTO: 10.6 X10(3) UL (ref 4–11)

## 2024-02-25 PROCEDURE — 99239 HOSP IP/OBS DSCHRG MGMT >30: CPT | Performed by: HOSPITALIST

## 2024-02-25 PROCEDURE — 99232 SBSQ HOSP IP/OBS MODERATE 35: CPT | Performed by: INTERNAL MEDICINE

## 2024-02-25 NOTE — PLAN OF CARE
Assumed patient care 0730  Patient alert and oriented X4  On room air, VSS, NSR on tele   Qshift neuro, no new deficits   Patient c/o LLE pain 4/10, managed with PRN Tylenol   Patient is 1-2 assist to turn side to side in bed, voiding per urinal, no BM this shift  Tolerating diet  LLE wound dressing changed per order   Fall and seizure precautions in place  Patient cleared for discharge by all consults   Call light within reach        NURSING DISCHARGE NOTE    All discharge documentation including AVS, follow ups, and medications given to transport staff.  Report called to JUAN Singh at [facility], all questions and concerns addressed and answered, provided callback number.   Discharged Rehab facility via Ambulance.  Accompanied by Spouse and Support staff  Belongings Taken by patient/family.    Problem: NEUROLOGICAL - ADULT  Goal: Achieves stable or improved neurological status  Description: INTERVENTIONS  - Assess for and report changes in neurological status  - Initiate measures to prevent increased intracranial pressure  - Maintain blood pressure and fluid volume within ordered parameters to optimize cerebral perfusion and minimize risk of hemorrhage  - Monitor temperature, glucose, and sodium. Initiate appropriate interventions as ordered  Outcome: Progressing  Goal: Achieves maximal functionality and self care  Description: INTERVENTIONS  - Monitor swallowing and airway patency with patient fatigue and changes in neurological status  - Encourage and assist patient to increase activity and self care with guidance from PT/OT  - Encourage visually impaired, hearing impaired and aphasic patients to use assistive/communication devices  Outcome: Progressing

## 2024-02-25 NOTE — CM/SW NOTE
02/25/24 1300   Discharge disposition   Expected discharge disposition Short Term H   Post Acute Care Provider [facility]   Discharge transportation [facility]     Notified by RN pt is medically cleared for discharge. Pt will admit to [facility] room 1169. [facility] arranged for 3:30pm. Updated RN and Jennifer tapia . RN to update pt/family. PCS updated.     [facility]   [phone]    [facility]  [phone]    JOSE Underwood  Discharge Planner

## 2024-02-25 NOTE — PROGRESS NOTES
[facility]     Hospitalist Progress Note     Kris Colvin Patient Status:  Inpatient    3/6/1960 MRN [id number]   [facility] 7NE-A Attending Rex Tim MD   Hosp Day # 8 PCP Lenka Guevara DO     Chief Complaint: ICH    Subjective:   Patient denies new complaints.     Current medications:   dronedarone  400 mg Oral BID with meals    docusate sodium  100 mg Oral BID    atorvastatin  80 mg Oral Daily    carvedilol  6.25 mg Oral BID       Objective:    Review of Systems:   10 point ROS completed and was negative, except for pertinent positive and negatives stated in subjective.    Vital signs:  Temp:  [97.7 °F (36.5 °C)-98.8 °F (37.1 °C)] 98.5 °F (36.9 °C)  Pulse:  [64-80] 75  Resp:  [18-20] 18  BP: (109-156)/(68-83) 116/81  SpO2:  [94 %-96 %] 94 %  Patient Weight for the past 72 hrs:   Weight   24 1348 (!) 302 lb 14.6 oz (137.4 kg)   24 1606 (!) 302 lb 14.6 oz (137.4 kg)     Physical Exam:    General: No acute distress.   Respiratory: Clear to auscultation   Cardiovascular: S1, S2. Irregular  Abdomen: Soft, nontender, nondistended.  Positive bowel sounds.   Extremities: Trace edema. RUE 0/5, RLE 3+/5  Neuro: AAOx3    Diagnostic Data:    Labs:  Recent Labs   Lab 24  0634 24  0536 24  0932 24  0554 24  0714   WBC 9.6 10.6  --  11.7* 10.6   HGB 14.8 13.6  --  13.6 13.1   MCV 94.6 97.2  --  96.9 96.6   .0* 81.0*  --  76.0* 87.0*   INR  --   --  1.28*  --   --        Recent Labs   Lab 24  0634 24  0621 24  0536 24  0554   * 137* 105* 97   BUN 24* 41* 43* 38*   CREATSERUM 1.19 1.57* 1.19 1.03   CA 9.1 9.9 9.0 9.4   ALB 3.2*  --  3.3*  --     138 141 140   K 4.1 4.0 3.8 4.1  4.1    109 112 110   CO2 30.0 24.0 25.0 27.0   ALKPHO 74  --  63  --    AST 30  --  27  --    ALT 26  --  24  --    BILT 1.0  --  1.3  --    TP 7.0  --  6.6  --        Estimated Creatinine Clearance: 88.9 mL/min (based on SCr of 1.03  mg/dL).    Recent Labs   Lab 02/23/24  0932   PTP 16.0*   INR 1.28*            COVID-19 Lab Results    COVID-19  Lab Results   Component Value Date    COVID19 Not Detected 02/17/2024    COVID19 Not Detected 03/25/2022    COVID19 Not Detected 07/06/2020       Pro-Calcitonin  No results for input(s): \"PCT\" in the last 168 hours.    Cardiac  No results for input(s): \"TROP\", \"PBNP\" in the last 168 hours.    Creatinine Kinase  No results for input(s): \"CK\" in the last 168 hours.    Inflammatory Markers  No results for input(s): \"CRP\", \"KENDRA\", \"LDH\", \"DDIMER\" in the last 168 hours.    No results for input(s): \"TROP\", \"TROPHS\", \"CK\" in the last 168 hours.      Imaging: Imaging data reviewed in Epic.    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  Thrombocytopenia   Monitor counts   Hematology consult   Paroxysmal atrial fibrillation on DOAC   CAD sp CABG sp AVR  Chronic diastolic heart failure  Essential hypertension  Dyslipidemia  Coreg - may need to increase if rates uncontrolled  Lipitor  No plan for Losartan inpatient or on DC, will re-assess as outpatient, discussed with cardiologist  Keep off Spironolactone d/t ALEN  Lipitor  Multaq  DOAC on hold given ICH  Monitor hemodynamics   Telemetry  Cardiology consult  Aspiration risk  Speech evaluation   Atelectasis  Incentive spirometry  Flutter valve  Acute kidney injury after resuming Spironolactone, resolved  Constipation, resolved  CHAZ    Supplementary Documentation:   Quality:  DVT Prophylaxis: IVC  filter    Acute Rehab once arranged.    Plan of care discussed with patient and RN.    Rupert Valles MD

## 2024-02-25 NOTE — PROGRESS NOTES
[facility]    Progress Note    Kris Colvin Patient Status:  Inpatient    3/6/1960 MRN [id number]   Location [facility] 7NE-A Attending Rupert Valles MD   Hosp Day # 8 PCP Lenka Guevara,      Subjective:  Kris Colvin is a(n) 63 year old male with history of DVT admitted with an acute intracranial hemorrhage. Anticoagulation is on hold. IVC filter is in place. His platelet count declined, but is now recovering.  He has not received heparin. No bleeding.   I reviewed the peripheral smear. No schistocytes noted. A few small platelet clumps noted.    Objective:  Blood pressure 109/68, pulse 80, temperature 98.8 °F (37.1 °C), temperature source Oral, resp. rate 18, weight 126.3 kg (278 lb 8 oz), SpO2 96%.    Labs:   Lab Results   Component Value Date    WBC 10.6 2024    HGB 13.1 2024    HCT 39.6 2024    PLT 87.0 2024    B12 558 2024       Physical Exam:  General: afebrile, alert and oriented x 3, pleasant  CV: Regular rate and rhythm, no chest pain  Lungs: CTA  Abdomen: soft, nontender, positive BS  Extremity: pulses positive, 1+ BLE edema    Imaging:      Medications reviewed.    Assessment and Plan:  Patient Active Problem List   Diagnosis    Cerebral infarction (HCC)    Hypertensive emergency    NSVT (nonsustained ventricular tachycardia) ([facility])    White matter abnormality on MRI of brain    Vitamin B 12 deficiency    Vitamin D deficiency    History of CVA (cerebrovascular accident)    Essential hypertension    Paresthesias/numbness    Dilated cardiomyopathy ([facility])    Encounter for screening colonoscopy    Chronic atrial fibrillation ([facility])    Preop testing    Hyperlipidemia    CHAZ (obstructive sleep apnea)    Fever    H/O cardiac radiofrequency ablation    History of atrial fibrillation    Other thrombophilia ([facility])    Intraparenchymal hematoma of brain, left, without loss of consciousness, initial encounter ([facility])    HFrEF (heart failure with reduced ejection fraction)  (HCC)    Coronary artery disease involving coronary bypass graft of native heart    Deep vein thrombosis (DVT) of proximal vein of left lower extremity (HCC)    Vasogenic cerebral edema (HCC)    ALEN (acute kidney injury) (HCC)     Thrombocytopenia: Unclear etiology, but improving. Review of the peripheral smear did not reveal evidence of TTP. Small platelet clumps are present on the smear, which may be contributing, but no improvement in a citrate tube. Will continue to monitor. He is OK for rehab when cleared by all other services.     DVT: IVC filter I'm place. Anticoagulation on hold due to ICH. Eventual plan should be tor restart anticoagulation without aspirin. He will follow up with Dr. Osullivan after rehab to consider restarting anticoagulation.    Tae Mcallister M.D.  [facility]  Co-Medical Director, [facility]

## 2024-02-25 NOTE — CM/SW NOTE
DENISSE spoke with Jennifer ([phone]) at [facility] regarding insurance authorization status. Jennifer stated pt has insurance approval for [facility] and auth is approved 2/25/24-2/29/24. Jennifer stated pt will admit into room 1169 and number for report is [phone]. Jennifer requested transport at 3:30pm. Updated RN. RN stated she reached out to MD regarding medical clearance. DENISSE will continue to follow.     JOSE Underwood  Discharge Planner

## 2024-02-26 ENCOUNTER — PATIENT OUTREACH (OUTPATIENT)
Dept: CASE MANAGEMENT | Age: 64
End: 2024-02-26

## 2024-02-26 ENCOUNTER — APPOINTMENT (OUTPATIENT)
Dept: WOUND CARE | Facility: HOSPITAL | Age: 64
End: 2024-02-26
Attending: FAMILY MEDICINE
Payer: COMMERCIAL
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
## 2024-02-26 NOTE — DISCHARGE SUMMARY
[facility]  DISCHARGE SUMMARY     Kris Colvin Patient Status:  Inpatient    3/6/1960 MRN [id number]   Location [facility] 7NE-A Attending No att. providers found   Hosp Day # 8 PCP Lenka Guevara DO     Date of Admission: 2024  Date of Discharge:  2024     Discharge Disposition: Acute Care Short Term Hospital    Discharge Diagnosis:  Acute intraparenchymal hemorrhage right parietal lobe with mass effect, no midline shift sp DOAC reversal in ER, repeat CT stable  Cerebrovascular disease  History of VTE, now with progression sp IVC filter 2024  Thrombocytopenia   Paroxysmal atrial fibrillation on DOAC   CAD sp CABG sp AVR  Chronic diastolic heart failure  Essential hypertension  Dyslipidemia  Aspiration risk  Atelectasis  Acute kidney injury after resuming Spironolactone, resolved  Constipation, resolved  CHAZ       History of Present Illness: Kris Colvin is a 63 year old male with of HTN, CHAZ, previous stroke, previous dvt who presented to the ER with right sided weakness.  Patient was on the toilet when he suddenly couldn't move his right arm.  He scooted onto the ground. He also noticed he couldn't speak very well.  He denies cp, sob, fever, chills, n/v.  No other acute complaints.     Brief Synopsis: Patient presented to the ER with right sided weakness. He was evaluated in the ER and found to have acute intraparenchymal hemorrhage right parietal lobe with mass effect, no midline shift. He was on DOAC for VTE which was reversed in the ER. Patient admitted to CNICU with NCC & NS on consult. Repeat CT stable. Hematology consulted. Doppler with progression of DVT therefore patient underwent IVC filter 2024. Patient was seen by cardiology as he has been off DOAC in setting of ICH and history PAF. Antihypertensives adjusted. PT/OT > acute rehab. Plan for repeat head CT 3/3/2024 and then determine if patient can resume anticoagulation. MJ in stable condition.      Lace+ Score:  68  59-90 High Risk  29-58 Medium Risk  0-28   Low Risk  Patient was referred to the [facility].    TCM Follow-Up Recommendation:  LACE > 58: High Risk of readmission after discharge from the hospital.      Discharge Medication List:     Discharge Medications        START taking these medications        Instructions Prescription details   acetaminophen 325 MG Tabs  Commonly known as: Tylenol      Take 2 tablets (650 mg total) by mouth every 4 (four) hours as needed.   Refills: 0            CONTINUE taking these medications        Instructions Prescription details   atorvastatin 80 MG Tabs  Commonly known as: Lipitor      TAKE 1 TABLET BY MOUTH EVERY NIGHT   Quantity: 90 tablet  Refills: 0     carvedilol 6.25 MG Tabs  Commonly known as: Coreg      Take 1 tablet (6.25 mg total) by mouth 2 (two) times daily.   Refills: 0     Multaq 400 MG Tabs  Generic drug: dronedarone      Take 1 tablet (400 mg total) by mouth 2 (two) times daily.   Refills: 0     Multi For Her 50+ Tabs      Take 1 tablet by mouth daily.   Refills: 0            STOP taking these medications      Aleve 220 MG Caps  Generic drug: Naproxen Sodium        aspirin 81 MG Tabs        rivaroxaban 20 MG Tabs  Commonly known as: Xarelto        spironolactone 25 MG Tabs  Commonly known as: Aldactone        valsartan 160 MG Tabs  Commonly known as: Diovan                 [place]: [place]    Follow-up appointment:   Shelby Gordon APRN  [address]  [place] [address]  [phone]    Follow up on 3/4/2024  at 2:15 pm with repeat imaging prior to appointment    Getachew Osullivan MD  [address]  [facility]  [place] [address]  [phone]    Follow up in 3 week(s)  MD visit    Lenka Guevara DO  [address] [place] [address] [address]  [facility] [id number] [phone]    Follow up  after dc from rehab    Javier Marie MD  [address] [facility] [facility] [address]  4TH FLO[address]  [facility] [id number] [phone]    Follow up in 1  week(s)  Office will call you for follow up appt    Manisha Mancia MD  [address] [place] [address]  [phone]    Follow up in 3 week(s)  Office will call you for follow up appt    Appointments for Next 30 Days 2/26/2024 - 3/27/2024        Date Arrival Time Visit Type Length Department Provider     2/29/2024 10:00 AM  FOLLOW UP-HEM/ONC [6701] 15 min [facility] in [place] Getachew Osullivan MD    Patient Instructions:         Location Instructions:     **IF YOU NEED LABWORK OR AN INFUSION ALONG WITH YOUR APPOINTMENT, YOU MUST CALL TO SCHEDULE.**  Your appointment is scheduled at the [facility] in [place]. The address is [address] [place] [address]. Please park in the GREEN LOT and enter through the CANCER CENTER ENTRANCE of BUILDING A. Once you arrive, please register at the [facility]  on the second floor.  Masks are optional for all patients and visitors, unless otherwise indicated.               3/4/2024  2:15 PM  FOLLOW UP VISIT [7738] 60 min [facility] [place]    Patient Instructions:         Location Instructions:     Masks are optional for all patients and visitors, unless otherwise indicated.               3/7/2024  7:30 AM  WC FOLLOW UP [2403] 15 min [facility] Ricky Darby MD    Patient Instructions:         Location Instructions:     Your appointment is scheduled at [facility]. The address is&nbsp; [address]. To reach Registration, park in the [address] Parking Garage. Go through the entrance doors located on the ground floor. Veer left past the Information Desk and proceed to the Wound Care Center.  Masks are optional for all patients and visitors, unless otherwise indicated.               3/11/2024  7:30 AM  WC NURSE ONLY [1260] 15 min [facility] WOUND CARE NURSE    Patient Instructions:          Location Instructions:     Your appointment is scheduled at [facility]. The address is&nbsp; [address] [place] [address]. To reach Registration, park in the [place] [address]. Go through the entrance doors located on the ground floor. Veer left past the Information Desk and proceed to the Wound Care Center.  Masks are optional for all patients and visitors, unless otherwise indicated.               3/14/2024  7:30 AM  WC FOLLOW UP [7197] 15 min [facility] Ricky Darby MD    Patient Instructions:         Location Instructions:     Your appointment is scheduled at [facility]. The address is&nbsp; [address] [place] [address]. To reach Registration, park in the [place] [address]. Go through the entrance doors located on the ground floor. Veer left past the Information Desk and proceed to the Wound Care Center.  Masks are optional for all patients and visitors, unless otherwise indicated.               3/18/2024  7:30 AM  WC NURSE ONLY [7195] 15 min [facility] WOUND CARE NURSE    Patient Instructions:         Location Instructions:     Your appointment is scheduled at [facility]. The address is&nbsp; [address] [place] [address]. To reach Registration, park in the [place] [address]. Go through the entrance doors located on the ground floor. Veer left past the Information Desk and proceed to the Wound Care Center.  Masks are optional for all patients and visitors, unless otherwise indicated.               3/21/2024  7:30 AM  WC FOLLOW UP [7197] 15 min [facility] Ricky Darby MD    Patient Instructions:         Location Instructions:     Your appointment is scheduled at [facility]. The address is&nbsp; [address] [place] [address]. To reach Registration, park in the [place] [address]. Go through the entrance doors located on the ground floor. Veer left past the Information Desk and  proceed to the Wound Care Center.  Masks are optional for all patients and visitors, unless otherwise indicated.               3/25/2024  7:30 AM  WC NURSE ONLY [7195] 15 min [facility] Care [facility] WOUND CARE NURSE    Patient Instructions:         Location Instructions:     Your appointment is scheduled at [facility]. The address is&nbsp; [address]. To reach Registration, park in the [place] Parking Garage. Go through the entrance doors located on the ground floor. Veer left past the Information Desk and proceed to the Wound Care Center.  Masks are optional for all patients and visitors, unless otherwise indicated.                        -----------------------------------------------------------------------------------------------  PATIENT DISCHARGE INSTRUCTIONS: See electronic chart    Rupert Valles MD    Total time spent on discharge plannin minutes     The  [employer] Cures Act makes medical notes like these available to patients in the interest of transparency. Please be advised this is a medical document. Medical documents are intended to carry relevant information, facts as evident, and the clinical opinion of the practitioner. The medical note is intended as peer to peer communication and may appear blunt or direct. It is written in medical language and may contain abbreviations or verbiage that are unfamiliar.

## 2024-02-27 ENCOUNTER — HOSPITAL ENCOUNTER (EMERGENCY)
Facility: HOSPITAL | Age: 64
Discharge: HOME | End: 2024-02-27
Attending: EMERGENCY MEDICINE
Payer: COMMERCIAL

## 2024-02-27 VITALS
OXYGEN SATURATION: 93 % | TEMPERATURE: 99 F | DIASTOLIC BLOOD PRESSURE: 80 MMHG | WEIGHT: 270 LBS | HEART RATE: 67 BPM | HEIGHT: 75 IN | SYSTOLIC BLOOD PRESSURE: 135 MMHG | BODY MASS INDEX: 33.57 KG/M2 | RESPIRATION RATE: 16 BRPM

## 2024-02-27 DIAGNOSIS — I82.4Y1 ACUTE DEEP VEIN THROMBOSIS (DVT) OF PROXIMAL VEIN OF RIGHT LOWER EXTREMITY (HCC): Primary | ICD-10-CM

## 2024-02-27 PROCEDURE — 99283 EMERGENCY DEPT VISIT LOW MDM: CPT

## 2024-02-28 ENCOUNTER — TELEPHONE (OUTPATIENT)
Dept: HEMATOLOGY/ONCOLOGY | Facility: HOSPITAL | Age: 64
End: 2024-02-28

## 2024-02-28 NOTE — TELEPHONE ENCOUNTER
Please call Dayna at [facility] regarding 2/28/24 follow up.  He is in [facility] and would like to speak to someone regarding the patient's condition.  Thank you.

## 2024-02-28 NOTE — ED INITIAL ASSESSMENT (HPI)
recent bleed - known dvt with IVC filter - rle swelling - extensive occlusive dvt on right entire leg - was on xarelto which was stopped bc of bleed. mental status alert x 4

## 2024-02-28 NOTE — TELEPHONE ENCOUNTER
Called again and spoke with Della.  The patient was in ED yesterday for new clot in his right leg.  He has an IVC filter in place.  Della is questioning if this appointment tomorrow is necessary or should he wait until after rehab.   Will discuss with Dr. Osullivan and call her back.

## 2024-02-28 NOTE — ED QUICK NOTES
Called [facility] to ensure pt still has bed at facility. Received confirmation from staff there that pt still has a bed..

## 2024-02-28 NOTE — ED PROVIDER NOTES
Patient Seen in: [facility] Emergency Department      History     Chief Complaint   Patient presents with    Deep Vein Thrombosis     recent bleed - known dvt with IVC filter - rle swelling - extensive occlusive dvt on right entire leg - was on xarelto which was stopped bc of bleed. mental status alert x 4     Stated Complaint: recent bleed - known dvt with IVC filter - rle swelling - extensive occlusive d*    Subjective:   HPI    63-year-old male presents from rehab after CT scan today demonstrated an extensive DVT in the right lower extremity.  He is 10 days status post intracranial hemorrhage while on anticoagulation.  He has a history of clots in the left leg after a trauma.  He says he is never had a clot in the right.  They were doing some imaging to evaluate the extent of clot in the left leg and included both legs and found a clot in the right leg today.  He denies any pain in the right leg.  No blistering or weeping of fluid.  He had a IVC filter placed just a couple of days ago.  He was sent by the rehab facility for further evaluation.    Objective:   Past Medical History:   Diagnosis Date    Cataract     High blood pressure     High cholesterol     Obstructive sleep apnea, adult [place] 6-20-16    autoPAP 5-12     Stroke (HCC) 2015              Past Surgical History:   Procedure Laterality Date    CABG      CATARACT      COLONOSCOPY N/A 11/29/2018    Procedure: COLONOSCOPY, POSSIBLE BIOPSY, POSSIBLE POLYPECTOMY 63657;  Surgeon: Zack Giordano MD;  Location: [facility]                Social History     Socioeconomic History    Marital status:    Tobacco Use    Smoking status: Never    Smokeless tobacco: Never   Vaping Use    Vaping Use: Never used   Substance and Sexual Activity    Alcohol use: Not Currently     Alcohol/week: 1.7 standard drinks of alcohol     Types: 2 Cans of beer per week     Comment: Occastional    Drug use: No   Other Topics Concern    Caffeine Concern No      Comment: occasionally    Exercise No     Social Determinants of Health     Food Insecurity: No Food Insecurity (2/17/2024)    Food Insecurity     Food Insecurity: Never true   Transportation Needs: No Transportation Needs (2/17/2024)    Transportation Needs     Lack of Transportation: No   Housing Stability: Low Risk  (2/17/2024)    Housing Stability     Housing Instability: No              Review of Systems    Positive for stated complaint: recent bleed - known dvt with IVC filter - rle swelling - extensive occlusive d*  Other systems are as noted in HPI.  Constitutional and vital signs reviewed.      All other systems reviewed and negative except as noted above.    Physical Exam     ED Triage Vitals   BP 02/27/24 1945 117/41   Pulse 02/27/24 1915 70   Resp 02/27/24 1915 18   Temp 02/27/24 1915 99.1 °F (37.3 °C)   Temp src 02/27/24 1915 Temporal   SpO2 02/27/24 1915 92 %   O2 Device 02/27/24 1915 None (Room air)       Current:/81   Pulse 66   Temp 99.1 °F (37.3 °C) (Temporal)   Resp 15   Ht 190.5 cm (6' 3\")   Wt 122.5 kg   SpO2 97%   BMI 33.75 kg/m²         Physical Exam    General:  Vitals as listed.  No acute distress   Heart: regular rate rhythm and no murmur   Abdomen: Soft and nontender.  No abdominal masses.  No peritoneal signs   Extremities:  chronic wound to the anterior aspect of the left tibial region.  No surrounding erythema to suggest infection.  Left leg has no pitting edema.  No calf tenderness or popliteal tenderness on palpation.  No significant discoloration, blistering, weeping of fluid.  Normal peripheral pulses   Neuro: Alert oriented. right-sided weakness from hemorrhagic stroke 2 weeks ago.  Mild slurring of speech.    Skin: no rashes or nodules    ED Course   Labs Reviewed - No data to display                   MDM      63-year-old male presents after a CT scan of the lower extremities today demonstrated an acute clot in the right leg.  Recently suffered an intracranial  hemorrhage due to anticoagulation he was on for left-sided DVT.  Has a IVC filter in place now.    Differential includes but is not limited to acute DVT, limb ischemia, a life threat.    On examination he has normal perfusion to bilateral legs.  Good distal pulses.  No skin changes in the right leg with acute clot.  I reviewed these findings with hematology who agrees that there is no management for this clot.  The patient cannot receive anticoagulation due to recent hemorrhage.  He is not in danger because he has an IVC filter.  Discussed this with the patient he is comfortable with this and he will be sent back to rehab.  Hematology states they will pass word along to his hematologist so they can set up prompt follow-up.  He has no cardiopulmonary symptoms.                                       Medical Decision Making      Disposition and Plan     Clinical Impression:  1. Acute deep vein thrombosis (DVT) of proximal vein of right lower extremity (HCC)         Disposition:  Discharge  2/27/2024  9:29 pm    Follow-up:  Getachew Osullivan MD  [address] [place] [address] Cancer Ctr  [facility] [address]  [phone]    Follow up            Medications Prescribed:  Current Discharge Medication List

## 2024-02-28 NOTE — TELEPHONE ENCOUNTER
Della notified that patient does not need to keep appointment tomorrow.  He would not be able to go back on anticoagulation at this time.  He should call to reschedule when he is being discharged from rehab.  Della will inform the patient.  Appointment will be cancelled.

## 2024-02-29 ENCOUNTER — APPOINTMENT (OUTPATIENT)
Dept: WOUND CARE | Facility: HOSPITAL | Age: 64
End: 2024-02-29
Attending: FAMILY MEDICINE
Payer: COMMERCIAL

## 2024-02-29 ENCOUNTER — APPOINTMENT (OUTPATIENT)
Dept: HEMATOLOGY/ONCOLOGY | Age: 64
End: 2024-02-29
Attending: INTERNAL MEDICINE
Payer: COMMERCIAL

## 2024-03-01 ENCOUNTER — HOSPITAL ENCOUNTER (OUTPATIENT)
Dept: CT IMAGING | Facility: HOSPITAL | Age: 64
Discharge: HOME | End: 2024-03-01
Attending: NURSE PRACTITIONER
Payer: COMMERCIAL

## 2024-03-01 DIAGNOSIS — S06.320A INTRAPARENCHYMAL HEMATOMA OF BRAIN, LEFT, WITHOUT LOSS OF CONSCIOUSNESS, INITIAL ENCOUNTER (HCC): ICD-10-CM

## 2024-03-01 PROCEDURE — 70450 CT HEAD/BRAIN W/O DYE: CPT | Performed by: NURSE PRACTITIONER

## 2024-03-04 ENCOUNTER — OFFICE VISIT (OUTPATIENT)
Dept: SURGERY | Facility: CLINIC | Age: 64
End: 2024-03-04
Payer: COMMERCIAL

## 2024-03-04 ENCOUNTER — APPOINTMENT (OUTPATIENT)
Dept: WOUND CARE | Facility: HOSPITAL | Age: 64
End: 2024-03-04
Attending: FAMILY MEDICINE
Payer: COMMERCIAL

## 2024-03-04 VITALS — SYSTOLIC BLOOD PRESSURE: 130 MMHG | DIASTOLIC BLOOD PRESSURE: 70 MMHG | HEART RATE: 70 BPM

## 2024-03-04 DIAGNOSIS — S06.35AD: Primary | ICD-10-CM

## 2024-03-04 PROBLEM — I62.9 INTRACRANIAL HEMORRHAGE (HCC): Status: ACTIVE | Noted: 2024-02-25

## 2024-03-04 PROBLEM — I87.2 VENOUS INSUFFICIENCY: Status: ACTIVE | Noted: 2024-02-25

## 2024-03-04 PROBLEM — I87.2 VENOUS INSUFFICIENCY: Status: ACTIVE | Noted: 2024-01-01

## 2024-03-04 PROBLEM — I83.009 CHRONIC CUTANEOUS VENOUS STASIS ULCER (HCC): Status: ACTIVE | Noted: 2024-01-11

## 2024-03-04 PROBLEM — I82.502 LEG DVT (DEEP VENOUS THROMBOEMBOLISM), CHRONIC, LEFT (HCC): Status: ACTIVE | Noted: 2024-01-11

## 2024-03-04 PROBLEM — I50.22 CHRONIC SYSTOLIC CHF (CONGESTIVE HEART FAILURE) (HCC): Status: ACTIVE | Noted: 2019-03-05

## 2024-03-04 PROBLEM — I62.9 INTRACRANIAL HEMORRHAGE (HCC): Status: ACTIVE | Noted: 2024-01-01

## 2024-03-04 PROBLEM — I82.502 CHRONIC DEEP VEIN THROMBOSIS (DVT) OF LEFT LOWER EXTREMITY (HCC): Status: ACTIVE | Noted: 2024-02-25

## 2024-03-04 PROBLEM — I82.502 LEG DVT (DEEP VENOUS THROMBOEMBOLISM), CHRONIC, LEFT (HCC): Status: ACTIVE | Noted: 2024-01-01

## 2024-03-04 PROBLEM — I83.009 CHRONIC CUTANEOUS VENOUS STASIS ULCER (HCC): Status: ACTIVE | Noted: 2024-01-01

## 2024-03-04 PROBLEM — R13.10 DYSPHAGIA: Status: ACTIVE | Noted: 2024-02-25

## 2024-03-04 PROBLEM — L97.909 CHRONIC CUTANEOUS VENOUS STASIS ULCER (HCC): Status: ACTIVE | Noted: 2024-01-11

## 2024-03-04 PROBLEM — I82.502 CHRONIC DEEP VEIN THROMBOSIS (DVT) OF LEFT LOWER EXTREMITY (HCC): Status: ACTIVE | Noted: 2024-01-01

## 2024-03-04 PROBLEM — R13.10 DYSPHAGIA: Status: ACTIVE | Noted: 2024-01-01

## 2024-03-04 PROBLEM — F43.23 ADJUSTMENT REACTION WITH ANXIETY AND DEPRESSION: Status: ACTIVE | Noted: 2024-01-01

## 2024-03-04 PROBLEM — F43.23 ADJUSTMENT REACTION WITH ANXIETY AND DEPRESSION: Status: ACTIVE | Noted: 2024-02-26

## 2024-03-04 PROBLEM — L97.909 CHRONIC CUTANEOUS VENOUS STASIS ULCER (HCC): Status: ACTIVE | Noted: 2024-01-01

## 2024-03-04 NOTE — PROGRESS NOTES
[facility]  Neurological Surgery Clinic Note    Kris Colvin  3/6/1960  [id number]  PCP: Lenka Guevara DO    REASON FOR VISIT:  Hospital follow up   ICH    HISTORY OF PRESENT ILLNESS:  Kris Colvin is a 63 year old male with a PMH of HTN, HLD, CVA and prior DVT on Xarelto who presented to the ED after a fall on 2/17/24. On CT head he has a left temporal ICH. He was given Andexxa for reversal. Follow up head Cts were stable. He received an IVC filter on 2/20/24. He was DC to [facility] on 2/25/24. He had a follow up head CT on 3/1/24 and presents today.    Patient continues to have therapy at [facility]. Has tried standing with a standing device. He is eating foods, but drinking thick liquids. RUE not antigravity, RLE can lift a little bit.    PAST MEDICAL HISTORY:  Past Medical History:   Diagnosis Date    Cataract     High blood pressure     High cholesterol     Obstructive sleep apnea, adult [place] 6-20-16    autoPAP 5-12     Stroke (HCC) 2015       PAST SURGICAL HISTORY:  Past Surgical History:   Procedure Laterality Date    CABG      CATARACT      COLONOSCOPY N/A 11/29/2018    Procedure: COLONOSCOPY, POSSIBLE BIOPSY, POSSIBLE POLYPECTOMY 38837;  Surgeon: Zack Giordano MD;  Location: [facility]       FAMILY HISTORY:  family history includes Breast Cancer in his mother; Diabetes in his father, maternal grandmother, and paternal grandmother.    SOCIAL HISTORY:   reports that he has never smoked. He has never used smokeless tobacco. He reports that he does not currently use alcohol after a past usage of about 1.7 standard drinks of alcohol per week. He reports that he does not use drugs.    ALLERGIES:  No Known Allergies    MEDICATIONS:  Current Outpatient Medications on File Prior to Visit   Medication Sig Dispense Refill    acetaminophen 325 MG Oral Tab Take 2 tablets (650 mg total) by mouth every 4 (four) hours as needed.      atorvastatin 80 MG Oral Tab TAKE 1 TABLET BY MOUTH EVERY NIGHT 90  tablet 0    MULTAQ 400 MG Oral Tab Take 1 tablet (400 mg total) by mouth 2 (two) times daily.      carvedilol 6.25 MG Oral Tab Take 1 tablet (6.25 mg total) by mouth 2 (two) times daily.      Multiple Vitamins-Minerals (MULTI FOR HER 50+) Oral Tab Take 1 tablet by mouth daily.       No current facility-administered medications on file prior to visit.       REVIEW OF SYSTEMS:  A 10-point system was reviewed.  Pertinent positives and negatives are noted in HPI.      PHYSICAL EXAMINATION:  VITAL SIGNS: There were no vitals taken for this visit.    A&Ox3, no acute distress  PERRL, EOMi, right droop  RUE 1/5, RLE 3/5, LUE 4/5, LLE 4/5  Sensation intact       Imaging Review:  CT BRAIN OR HEAD (74674)    Result Date: 3/1/2024  CONCLUSION:  The hyperdense hemorrhage has decreased in size, although there has been increase in the surrounding vasogenic edema, involving the left hemispheric subacute hemorrhage.  Midline shift to the right is 4 mm.  No sign of herniation or hydrocephalus.  Other findings as above.    LOCATION:  [facility]   Dictated by (CST): Alex Hubbard MD on 3/01/2024 at 2:26 PM     Finalized by (CST): Alex Hubbard MD on 3/01/2024 at 2:30 PM       XR VIDEO SWALLOW (CPT=74230)    Result Date: 2/21/2024  PROCEDURE:  XR VIDEO SWALLOW (CPT=74230)  TECHNIQUE:  Video fluoroscopic swallowing study was performed in cooperation with the speech pathologist.  Barium of varying consistencies was administered orally with patient in the lateral projection.  COMPARISON:  None.  INDICATIONS:  r/o aspiration  PATIENT STATED HISTORY: (As transcribed by Technologist)  Rule out aspiration.   CINE CAPTURES:  7 images. FLUORSCOPY TIME:  1 minute and 8 seconds. RADIATION DOSE (AIR KERMA PRODUCT):  82.4  uGy/m2  FINDINGS:  The patient was challenged with thin, nectar, pudding and cracker consistencies by the speech pathologist.  Penetration and aspiration observed with thin liquids.    PLEASE SEE SPEECH PATHOLOGIST REPORT FOR FULL  ASSESSMENT OF SWALLOWING MECHANISM.   LOCATION:  [facility]    Dictated by (CST): Scott Mcginnis MD on 2/21/2024 at 10:48 AM     Finalized by (CST): Scott Mcginnis MD on 2/21/2024 at 10:48 AM       IR IVC FILTER PROCEDURE    Result Date: 2/20/2024  CONCLUSION:  Placement of an infrarenal [place] IVC filter.  This is a potentially removable device.  If the patient's clinical scenario allows consideration, recommend referral within 6-8 weeks to optimize the chances of uneventful retrieval.   LOCATION:  [facility]    Dictated by (CST): Scott Mcginnis MD on 2/20/2024 at 12:05 PM     Finalized by (CST): Scott Mcginnis MD on 2/20/2024 at 12:09 PM         Reviewed head CT with Dr. Salinas and discussed plan    ASSESSMENT:  62 yo male with left temporal ICH  S/p IVC filter placement    Plan:  Continue to hold AC  Repeat head CT in 1 month  Then follow up in clinic to discuss results    NAMITA Perez, CNP  Neurological Surgery  [facility]    Care Time: 30 min including face to face time, chart review, imaging interpretation, and coordination of care

## 2024-03-04 NOTE — PATIENT INSTRUCTIONS
Refill policies:    Allow 2-3 business days for refills; controlled substances may take longer.  Contact your pharmacy at least 5 days prior to running out of medication and have them send an electronic request or submit request through the “request refill” option in your [employer] account.  Refills are not addressed on weekends; covering physicians do not authorize routine medications on weekends.  No narcotics or controlled substances are refilled after noon on Fridays or by on call physicians.  By law, narcotics must be electronically prescribed.  A 30 day supply with no refills is the maximum allowed.  If your prescription is due for a refill, you may be due for a follow up appointment.  To best provide you care, patients receiving routine medications need to be seen at least once a year.  Patients receiving narcotic/controlled substance medications need to be seen at least once every 3 months.  In the event that your preferred pharmacy does not have the requested medication in stock (e.g. Backordered), it is your responsibility to find another pharmacy that has the requested medication available.  We will gladly send a new prescription to that pharmacy at your request.    Scheduling Tests:    If your physician has ordered radiology tests such as MRI or CT scans, please contact Central Scheduling at [phone] right away to schedule the test.  Once scheduled, the [facility] Centralized Referral Team will work with your insurance carrier to obtain pre-certification or prior authorization.  Depending on your insurance carrier, approval may take 3-10 days.  It is highly recommended patients assure they have received an authorization before having a test performed.  If test is done without insurance authorization, patient may be responsible for the entire amount billed.      Precertification and Prior Authorizations:  If your physician has recommended that you have a procedure or additional testing performed the [facility]  Centralized Referral Team will contact your insurance carrier to obtain pre-certification or prior authorization.    You are strongly encouraged to contact your insurance carrier to verify that your procedure/test has been approved and is a COVERED benefit.  Although the [facility] Centralized Referral Team does its due diligence, the insurance carrier gives the disclaimer that \"Although the procedure is authorized, this does not guarantee payment.\"    Ultimately the patient is responsible for payment.   Thank you for your understanding in this matter.  Paperwork Completion:  If you require FMLA or disability paperwork for your recovery, please make sure to either drop it off or have it faxed to our office at [phone]. Be sure the form has your name and date of birth on it.  The form will be faxed to our Forms Department and they will complete it for you.  There is a 25$ fee for all forms that need to be filled out.  Please be aware there is a 10-14 day turnaround time.  You will need to sign a release of information (NILA) form if your paperwork does not come with one.  You may call the Forms Department with any questions at [phone].  Their fax number is [phone].

## 2024-03-06 ENCOUNTER — TELEPHONE (OUTPATIENT)
Dept: FAMILY MEDICINE CLINIC | Facility: CLINIC | Age: 64
End: 2024-03-06

## 2024-03-07 ENCOUNTER — APPOINTMENT (OUTPATIENT)
Dept: WOUND CARE | Facility: HOSPITAL | Age: 64
End: 2024-03-07
Attending: FAMILY MEDICINE
Payer: COMMERCIAL

## 2024-03-09 ENCOUNTER — TELEMEDICINE (OUTPATIENT)
Dept: FAMILY MEDICINE CLINIC | Facility: CLINIC | Age: 64
End: 2024-03-09
Payer: COMMERCIAL

## 2024-03-09 DIAGNOSIS — G81.91 RIGHT HEMIPARESIS (HCC): ICD-10-CM

## 2024-03-09 DIAGNOSIS — I42.0 DILATED CARDIOMYOPATHY (HCC): ICD-10-CM

## 2024-03-09 DIAGNOSIS — G47.33 OSA (OBSTRUCTIVE SLEEP APNEA): ICD-10-CM

## 2024-03-09 DIAGNOSIS — Z86.79 HISTORY OF ATRIAL FIBRILLATION: ICD-10-CM

## 2024-03-09 DIAGNOSIS — I62.9 INTRACRANIAL HEMORRHAGE (HCC): Primary | ICD-10-CM

## 2024-03-09 DIAGNOSIS — I82.4Z2 DEEP VEIN THROMBOSIS (DVT) OF DISTAL VEIN OF LEFT LOWER EXTREMITY, UNSPECIFIED CHRONICITY (HCC): ICD-10-CM

## 2024-03-09 DIAGNOSIS — I10 ESSENTIAL HYPERTENSION: ICD-10-CM

## 2024-03-09 NOTE — PROGRESS NOTES
Telehealth outside of [facility]  Telehealth Verbal Consent   I conducted a telehealth visit with Kris Colvin today, 03/09/24, which was completed using two-way, real-time interactive audio and video communication. This has been done in good jaelyn to provide continuity of care in the best interest of the provider-patient relationship, due to the COVID -19 public health crisis/national emergency where restrictions of face-to-face office visits are ongoing. Every conscious effort was taken to allow for sufficient and adequate time to complete the visit.  The patient was made aware of the limitations of the telehealth visit, including treatment limitations as no physical exam could be performed.  The patient was advised to call 911 or to go to the ER in case there was an emergency.  The patient was also advised of the potential privacy & security concerns related to the telehealth platform.   The patient was made aware of where to find [facility]'s notice of privacy practices, telehealth consent form and other related consent forms and documents.  which are located on the [facility] website. The patient verbally agreed to telehealth consent form, related consents and the risks discussed.    Lastly, the patient confirmed that they were in [place].   Included in this visit, time may have been spent reviewing labs, medications, radiology tests and decision making. Appropriate medical decision-making and tests are ordered as detailed in the plan of care above.  Coding/billing information is submitted for this visit based on complexity of care and/or time spent for the visit.      This visit is conducted using Telemedicine with live, interactive video and audio.      Subjective:   Kris Colvin is a 64 year old male who presents for hospital follow up.     IntAcute intraparenchymal hemorrhage right parietal lobe with mass effect, no midline shift sp DOAC reversal in ER, repeat CT stable  Cerebrovascular disease  History of VTE, now with  progression sp IVC filter 2/20/2024  Thrombocytopenia   Paroxysmal atrial fibrillation on DOAC   CAD sp CABG sp AVR  Chronic diastolic heart failure  Essential hypertension  Dyslipidemia  Aspiration risk  Atelectasis  Acute kidney injury after resuming Spironolactone, resolved  Constipation, resolved  OSAeractive contact within 2 business days post discharge first initiated on Date: 2/26/2024    During the visit, the following was completed:  Obtained and reviewed discharge summary, continuity of care documents, and Hospitalist notes  Reviewed Labs (CBC, CMP) and CT radiology results    HPI: pt currently in [facility] for rehab and will likely go to an additional facility due to right hemiparesis.   Pt cannot use his right hand   Pt unable to walk  Pt is unable to return to work as he cannot fulfill his job description     History/Other:   Current Medications:  Medication Reconciliation:  I am aware of an inpatient discharge within the last 30 days.  The discharge medication list has been reconciled with the patient's current medication list and reviewed by me.  See medication list for additions of new medication, and changes to current doses of medications and discontinued medications.  No outpatient medications have been marked as taking for the 3/9/24 encounter (Telemedicine) with Lenka Guevara DO.       Review of Systems:  GENERAL: weight stable, energy stable, no sweating  SKIN: denies any unusual skin lesions  EYES: denies blurred vision or double vision  HEENT: denies nasal congestion, sinus pain or ST  LUNGS: denies shortness of breath with exertion  CARDIOVASCULAR: denies chest pain on exertion or palpitations  GI: denies abdominal pain, denies heartburn, denies diarrhea  MUSCULOSKELETAL: denies pain, normal range of motion of extremities  NEURO: denies headaches, denies dizziness, denies weakness  PSYCHE: denies depression or anxiety  HEMATOLOGIC: denies hx of anemia, or bruising, denies  bleeding  ENDOCRINE: denies thyroid history  ALL/ASTHMA: denies hx of allergy or asthma    Objective:   alert, appears stated age and cooperative, Normocephalic, without obvious abnormality, atraumatic, lips, mucosa, and tongue normal; teeth and gums normal, Speaking in full sentences comfortably, Normal work of breathing, Skin color, texture, turgor normal. No rashes or lesions and age appropriate, normal, logical connections, person, place and time/date and no suicidal ideation      Assessment & Plan:   1. Intracranial hemorrhage (HCC) (Primary)  2. Right hemiparesis (HCC)  3. Deep vein thrombosis (DVT) of distal vein of left lower extremity, unspecified chronicity (HCC)  4. History of atrial fibrillation  5. CHAZ (obstructive sleep apnea)  6. Dilated cardiomyopathy (HCC)  7. Essential hypertension      Return in 3 months (on 6/9/2024).

## 2024-03-11 ENCOUNTER — APPOINTMENT (OUTPATIENT)
Dept: WOUND CARE | Facility: HOSPITAL | Age: 64
End: 2024-03-11
Attending: FAMILY MEDICINE
Payer: COMMERCIAL

## 2024-03-14 ENCOUNTER — APPOINTMENT (OUTPATIENT)
Dept: WOUND CARE | Facility: HOSPITAL | Age: 64
End: 2024-03-14
Attending: FAMILY MEDICINE
Payer: COMMERCIAL

## 2024-03-18 ENCOUNTER — APPOINTMENT (OUTPATIENT)
Dept: WOUND CARE | Facility: HOSPITAL | Age: 64
End: 2024-03-18
Attending: FAMILY MEDICINE
Payer: COMMERCIAL

## 2024-03-21 ENCOUNTER — APPOINTMENT (OUTPATIENT)
Dept: WOUND CARE | Facility: HOSPITAL | Age: 64
End: 2024-03-21
Attending: FAMILY MEDICINE
Payer: COMMERCIAL

## 2024-03-25 ENCOUNTER — APPOINTMENT (OUTPATIENT)
Dept: WOUND CARE | Facility: HOSPITAL | Age: 64
End: 2024-03-25
Attending: FAMILY MEDICINE
Payer: COMMERCIAL

## 2024-03-28 ENCOUNTER — APPOINTMENT (OUTPATIENT)
Dept: WOUND CARE | Facility: HOSPITAL | Age: 64
End: 2024-03-28
Attending: FAMILY MEDICINE
Payer: COMMERCIAL

## 2024-04-04 ENCOUNTER — OFFICE VISIT (OUTPATIENT)
Dept: HEMATOLOGY/ONCOLOGY | Age: 64
End: 2024-04-04
Attending: INTERNAL MEDICINE
Payer: COMMERCIAL

## 2024-04-04 VITALS
HEART RATE: 64 BPM | OXYGEN SATURATION: 96 % | DIASTOLIC BLOOD PRESSURE: 80 MMHG | TEMPERATURE: 97 F | HEIGHT: 75 IN | SYSTOLIC BLOOD PRESSURE: 118 MMHG | BODY MASS INDEX: 34 KG/M2 | RESPIRATION RATE: 20 BRPM

## 2024-04-04 DIAGNOSIS — I82.512 CHRONIC DEEP VEIN THROMBOSIS (DVT) OF FEMORAL VEIN OF LEFT LOWER EXTREMITY (HCC): Primary | ICD-10-CM

## 2024-04-04 DIAGNOSIS — Z86.73 HISTORY OF CVA (CEREBROVASCULAR ACCIDENT): ICD-10-CM

## 2024-04-04 DIAGNOSIS — I87.032 POSTTHROMBOTIC SYNDROME OF LEFT LEG WITH ULCER AND INFLAMMATION (HCC): ICD-10-CM

## 2024-04-04 PROCEDURE — 99214 OFFICE O/P EST MOD 30 MIN: CPT | Performed by: INTERNAL MEDICINE

## 2024-04-04 RX ORDER — VALSARTAN 40 MG/1
40 TABLET ORAL DAILY
COMMUNITY

## 2024-04-04 NOTE — PROGRESS NOTES
Patient is here for follow up for DVT after brain bleed hospitalization.  He has been off of xarelto since that hospitalization.    His leg swelling is down somewhat.  His left leg wound is healed.  He does not have pain any longer.  He is on a diuretic for the swelling.   He is continuing physical therapy at [facility].  He hopes to go home soon and continue PT at home.   He is scheduled to follow up with neurology in April and have repeat MRI for the brain.  He must wait for any anticoagulation until after that.  He is very anxious about resuming this.     Education Record    Learner:  Patient    Disease / Diagnosis: brain bleed on xarelto    Barriers / Limitations:  None   Comments:    Method:  Discussion   Comments:    General Topics:  Side effects and symptom management   Comments:    Outcome:  Shows understanding   Comments:

## 2024-04-04 NOTE — PROGRESS NOTES
Cancer Center Progress Note    Problem List:      Patient Active Problem List   Diagnosis    Cerebral infarction (HCC)    Hypertensive emergency    NSVT (nonsustained ventricular tachycardia) ([facility])    White matter abnormality on MRI of brain    Vitamin B 12 deficiency    Vitamin D deficiency    History of CVA (cerebrovascular accident)    Essential hypertension    Paresthesias/numbness    Dilated cardiomyopathy ([facility])    Encounter for screening colonoscopy    Chronic atrial fibrillation ([facility])    Preop testing    Hyperlipidemia    CHAZ (obstructive sleep apnea)    Fever    H/O cardiac radiofrequency ablation    History of atrial fibrillation    Other thrombophilia ([facility])    Intraparenchymal hematoma of brain, left, without loss of consciousness, initial encounter ([facility])    HFrEF (heart failure with reduced ejection fraction) ([facility])    Coronary artery disease involving coronary bypass graft of native heart    Deep vein thrombosis (DVT) of proximal vein of left lower extremity ([facility])    Vasogenic cerebral edema (HCC)    ALEN (acute kidney injury) ([facility])    Thrombocytopenia (HCC)    Adjustment reaction with anxiety and depression    Bicuspid aortic valve (HCC)    Carotid artery stenosis    Chronic cutaneous venous stasis ulcer (HCC)    Chronic deep vein thrombosis (DVT) of left lower extremity ([facility])    Chronic systolic CHF (congestive heart failure) ([facility])    Dysphagia    Intracranial hemorrhage ([facility])    LBBB (left bundle branch block)    Leg DVT (deep venous thromboembolism), chronic, left ([facility])    Venous insufficiency       Interim History:    Kris Colvin presents today for evaluation and management of a diagnosis of left leg DVT.    He has an IVC filter placed on 2/20/2024 after presenting with acute intracranial hemorrhage on 2/17/2024. He had repeat CT of the brain on 3/1/2024 that showed improvement in the hemorrhage but there was increase in surrounding vasogenic edema. He is scheduled to get a repeat CT scan within the  next week.     He has bilateral leg edema that might be slightly better. He has no leg pain. The left leg wound has healed. He has been wearing below knee compression stockings during the day. He has no chest pain and no dyspnea.       Review of Systems:   Constitutional: Negative for anorexia, fatigue, fevers, chills, night sweats and weight loss.  Eyes: Negative for visual disturbance, irritation and redness.  Respiratory: Negative for cough, hemoptysis, chest pain, or dyspnea.  Cardiovascular: Negative for angina, orthopnea or palpitations.  Gastrointestinal: Negative for nausea, vomiting, change in bowel habits, diarrhea, constipation and abdominal pain.  Integument/breast: Negative for rash, skin lesions, and pruritus.  Hematologic/lymphatic: Negative for easy bruising, bleeding, and lymphadenopathy.  Genitourinary: Negative for dysuria or hematuria  Psychiatric: The patient's mood was calm and appropriate for this visit.  The pertinent positives and negatives were described. All other systems were negative.    PMH/PSH:  Past Medical History:   Diagnosis Date    Cataract     High blood pressure     High cholesterol     Obstructive sleep apnea, adult [facility] [place] 6-20-16    autoPAP 5-12     Stroke (HCC) 2015       Past Surgical History:   Procedure Laterality Date    CABG      CATARACT      COLONOSCOPY N/A 11/29/2018    Procedure: COLONOSCOPY, POSSIBLE BIOPSY, POSSIBLE POLYPECTOMY 80989;  Surgeon: Zack Giordano MD;  Location: [facility]       Family History Reviewed:  Family History   Problem Relation Age of Onset    Breast Cancer Mother     Diabetes Father     Diabetes Maternal Grandmother     Diabetes Paternal Grandmother        Allergies:     No Known Allergies    Medications:   valsartan 40 MG Oral Tab Take 1 tablet (40 mg total) by mouth daily.      Cholecalciferol 25 MCG (1000 UT) Oral Tab Take 25 mcg by mouth daily.      atorvastatin 80 MG Oral Tab TAKE 1 TABLET BY MOUTH EVERY NIGHT 90 tablet 0     MULTAQ 400 MG Oral Tab Take 1 tablet (400 mg total) by mouth 2 (two) times daily.      carvedilol 6.25 MG Oral Tab Take 1 tablet (6.25 mg total) by mouth 2 (two) times daily.      Multiple Vitamins-Minerals (MULTI FOR HER 50+) Oral Tab Take 1 tablet by mouth daily.           Vital Signs:      Height: 190.5 cm (6' 3\") (04/04 1311)  Weight: --  BSA (Calculated - sq m): --  Pulse: 64 (04/04 1311)  BP: 118/80 (04/04 1311)  Temp: 96.8 °F (36 °C) (04/04 1311)  Do Not Use - Resp Rate: --  SpO2: 96 % (04/04 1311)      Physical Examination:    Constitutional: Patient is alert and oriented x 3, not in acute distress.  Eyes: Anicteric sclera, pink conjunctiva.  HEENT:  Oropharynx is clear. Neck is supple.  Respiratory: Clear to auscultation and percussion. No rales.  No wheezes.  Cardiovascular: Regular rate and rhythm. No murmurs.  Gastrointestinal: Soft, non tender with good bowel sounds.  Musculoskeletal: Bilateral LE compression stocking with bilateral edema. No calf tenderness.  Lymphatics: There is no palpable lymphadenopathy throughout in the cervical, supraclavicular, or axillary regions.  Psychiatric: The patient's mood is calm and appropriate for this visit.      Labs reviewed at this visit:     Lab Results   Component Value Date    WBC 10.6 02/25/2024    RBC 4.10 (L) 02/25/2024    HGB 13.1 02/25/2024    HCT 39.6 02/25/2024    MCV 96.6 02/25/2024    MCH 32.0 02/25/2024    MCHC 33.1 02/25/2024    RDW 13.0 02/25/2024    PLT 87.0 (L) 02/25/2024     Lab Results   Component Value Date     02/24/2024    K 4.1 02/24/2024    K 4.1 02/24/2024     02/24/2024    CO2 27.0 02/24/2024    BUN 38 (H) 02/24/2024    CREATSERUM 1.03 02/24/2024    GLU 97 02/24/2024    CA 9.4 02/24/2024    ALKPHO 63 02/23/2024    ALT 24 02/23/2024    AST 27 02/23/2024    BILT 1.3 02/23/2024    ALB 3.3 (L) 02/23/2024    TP 6.6 02/23/2024     Component  Ref Range & Units 3/11/24 0457   WBC  3.5 - 10.5 10ˆ3/µL 10.7 High    RBC  (Based on  documented legal sex) 4.30-5.80 10ˆ6/µL 3.59 Low    HGB  (Based on documented legal sex) 13.0-17.5 g/dL 11.1 Low    HCT  (Based on documented legal sex) 38.0-50.0 % 34.6 Low    MCV  80.0 - 99.0 fL 96.4   MCH  27.0 - 34.0 pg 30.9   MCHC  32.0 - 35.5 g/dL 32.1   RDW  11.0 - 15.0 % 13.1   PLT  150 - 400 10ˆ3/µL 280   MPV  8.8 - 12.1 fL 10.7   NRBC's  0.0 % 0.0   Absolute NRBCs  0.0 10ˆ3/µL 0.0       Radiologic imaging reviewed at this visit:    Venous doppler study from 2/27/2024:  Impression:     Right:   Extensive occlusive deep vein thrombosis of the right lower extremity essentially involving the entire leg.   This is expansile and hypoechoic consistent with acute thrombus.     Left:   There is moderate nonocclusive thrombus identified in the left femoral vein.   Occlusive thrombus of a gastrocnemius vein.   Probable chronic thrombus in the distal femoral vein and popliteal vein.     IMPRESSION:     Bilateral deep vein thrombosis of the lower extremities.   Right-sided deep vein thrombosis extensive and appears acute.   Left-sided deep vein thrombosis is comparatively mild and both acute and chronic.      Assessment/Plan:     Bilateral DVT:    His original DVT occurred after left leg trauma. He now has ICH. He is s/p IVC filter. He continues to have contraindication for anticoagulation. He has resolving hemorrhage. He will have repeat CT scan and neurosurgery follow up this month. I will plan to see him in about 8 weeks. We will decide at that time whether to resume anticoagulation or whether to repeat the venous doppler study.      Getachew Osullivan MD

## 2024-04-05 ENCOUNTER — TELEPHONE (OUTPATIENT)
Dept: FAMILY MEDICINE CLINIC | Facility: CLINIC | Age: 64
End: 2024-04-05

## 2024-04-05 NOTE — TELEPHONE ENCOUNTER
WIFE BROUGHT IN FORM FOR . (TEMPORARY DISABILITY CLAIM)    PLS FILL OUT AND FAX THIS FORM ALSO CALL HER WHEN READY.    SHE WILL PICKUP ORIGINAL.    SENT TO ROOMER   AMEER

## 2024-04-12 NOTE — TELEPHONE ENCOUNTER
Perla from [employer] (disability dept) said forms were faxed to them (3 different fax numbers) but they have not received them.  She's asking if they can be emailed to her at [email or web address]?  Pls advise

## 2024-04-26 ENCOUNTER — HOSPITAL ENCOUNTER (OUTPATIENT)
Dept: CT IMAGING | Age: 64
Discharge: HOME | End: 2024-04-26
Attending: NURSE PRACTITIONER
Payer: COMMERCIAL

## 2024-04-26 DIAGNOSIS — S06.35AD: ICD-10-CM

## 2024-04-26 PROCEDURE — 70450 CT HEAD/BRAIN W/O DYE: CPT | Performed by: NURSE PRACTITIONER

## 2024-04-29 ENCOUNTER — OFFICE VISIT (OUTPATIENT)
Dept: SURGERY | Facility: CLINIC | Age: 64
End: 2024-04-29
Payer: COMMERCIAL

## 2024-04-29 VITALS
HEART RATE: 60 BPM | DIASTOLIC BLOOD PRESSURE: 60 MMHG | SYSTOLIC BLOOD PRESSURE: 130 MMHG | WEIGHT: 265 LBS | HEIGHT: 75 IN | OXYGEN SATURATION: 97 % | BODY MASS INDEX: 32.95 KG/M2

## 2024-04-29 DIAGNOSIS — I62.9 INTRACRANIAL HEMORRHAGE (HCC): Primary | ICD-10-CM

## 2024-04-29 PROBLEM — G81.91 RIGHT HEMIPARESIS (HCC): Status: ACTIVE | Noted: 2024-04-04

## 2024-04-29 PROBLEM — G81.91 RIGHT HEMIPARESIS (HCC): Status: ACTIVE | Noted: 2024-01-01

## 2024-04-29 PROCEDURE — 3008F BODY MASS INDEX DOCD: CPT | Performed by: NURSE PRACTITIONER

## 2024-04-29 PROCEDURE — 99213 OFFICE O/P EST LOW 20 MIN: CPT | Performed by: NURSE PRACTITIONER

## 2024-04-29 PROCEDURE — 3075F SYST BP GE 130 - 139MM HG: CPT | Performed by: NURSE PRACTITIONER

## 2024-04-29 PROCEDURE — 3078F DIAST BP <80 MM HG: CPT | Performed by: NURSE PRACTITIONER

## 2024-04-29 RX ORDER — FUROSEMIDE 20 MG/1
20 TABLET ORAL EVERY OTHER DAY
COMMUNITY
Start: 2024-04-04

## 2024-04-29 RX ORDER — MELATONIN
COMMUNITY
Start: 2024-04-04

## 2024-04-29 RX ORDER — SPIRONOLACTONE 25 MG/1
TABLET ORAL
COMMUNITY
Start: 2024-04-21

## 2024-04-29 NOTE — PROGRESS NOTES
Pt here today to follow up on his Traumatic left-sided intracerebral hemorrhage.    Pt states he is doing well     Imaging of the James is in chart.

## 2024-04-29 NOTE — PROGRESS NOTES
Critical access hospital  Neurological Surgery Clinic Note    Kris Colvin  3/6/1960  [id number]  PCP: Lenka Guevara DO    REASON FOR VISIT:  ICH follow up     HISTORY OF PRESENT ILLNESS:  Kris Colvin is a 64 year old male with a PMH of HTN, HLD, CVA and prior DVT on Xarelto who presented to the ED after a fall on 2/17/24. On CT head he has a left temporal ICH. He was given Andexxa for reversal. Follow up head Cts were stable. He received an IVC filter on 2/20/24. He was DC to [facility] on 2/25/24.     He had a follow up head CT on 3/1/24 which showed reduction in size of the hematoma. He was instructed to continue to hold AC and repeat CT head in 1 month. He presents today after a CT head was completed on 4/26/24.      He was DC from [facility] on 3/20/24 and transferred to [facility]'s rehab and was released home 3 weeks ago. PT and OT at home. No difficulty with speech. Now able to lift RUE antigravity. He has BLE DVTs.        PAST MEDICAL HISTORY:  Past Medical History:    Cataract    High blood pressure    High cholesterol    ICH (intracerebral hemorrhage) (HCC)    Obstructive sleep apnea, adult    autoPAP 5-12     Stroke (HCC)       PAST SURGICAL HISTORY:  Past Surgical History:   Procedure Laterality Date    Cabg      Cataract      Colonoscopy N/A 11/29/2018    Procedure: COLONOSCOPY, POSSIBLE BIOPSY, POSSIBLE POLYPECTOMY 54142;  Surgeon: Zack Giordano MD;  Location: [facility]       FAMILY HISTORY:  family history includes Breast Cancer in his mother; Diabetes in his father, maternal grandmother, and paternal grandmother.    SOCIAL HISTORY:   reports that he has never smoked. He has never used smokeless tobacco. He reports that he does not currently use alcohol after a past usage of about 1.7 standard drinks of alcohol per week. He reports that he does not use drugs.    ALLERGIES:  No Known Allergies    MEDICATIONS:  Current Outpatient Medications on File Prior to Visit   Medication Sig Dispense Refill     cyanocobalamin 1000 MCG Oral Tab Vitamin B-12  1,000 mcg tablet, [RxNorm: 749505]      furosemide 20 MG Oral Tab Take 1 tablet (20 mg total) by mouth every other day.      spironolactone 25 MG Oral Tab       Cholecalciferol 25 MCG (1000 UT) Oral Tab Take 25 mcg by mouth daily.      atorvastatin 80 MG Oral Tab TAKE 1 TABLET BY MOUTH EVERY NIGHT 90 tablet 0    MULTAQ 400 MG Oral Tab Take 1 tablet (400 mg total) by mouth 2 (two) times daily.      carvedilol 6.25 MG Oral Tab Take 1 tablet (6.25 mg total) by mouth 2 (two) times daily.      Multiple Vitamins-Minerals (MULTI FOR HER 50+) Oral Tab Take 1 tablet by mouth daily.      rivaroxaban (XARELTO) 20 MG Oral Tab Take 1 tablet (20 mg total) by mouth one time. (Patient not taking: Reported on 3/4/2024)       No current facility-administered medications on file prior to visit.       REVIEW OF SYSTEMS:  A 10-point system was reviewed.  Pertinent positives and negatives are noted in HPI.      PHYSICAL EXAMINATION:  VITAL SIGNS: /60 (BP Location: Left arm, Patient Position: Sitting, Cuff Size: large)   Pulse 60   Ht 75\"   Wt 265 lb (120.2 kg)   SpO2 97%   BMI 33.12 kg/m²     A&Ox3, no acute distress  PERRL, EOMi, FS, TM  RUE 4-/5, Distal strength 3/5  RLE 4-/5    LUE/LE 5/5  BLE reddened and swollen      Imaging Review:  CT BRAIN OR HEAD (40785)    Result Date: 4/26/2024  CONCLUSION:  Resolution of left subinsular cortex hemorrhage with sequela of ischemic change.  No new acute intracranial process.    LOCATION:  [facility]   Dictated by (CST): Shaquille Brooks MD on 4/26/2024 at 8:50 AM     Finalized by (CST): Shaquille Brooks MD on 4/26/2024 at 8:55 AM         I have reviewed the imaging personally.     ASSESSMENT:  63 yo male s/p ICH      Plan:  CT show resolution of blood products  OK to resume AC if deemed appropriate  No further scans planned    We reviewed stroke symptoms and when to go to the ED. We discussed going to the ED with symptoms of WHOL. The patient was  instructed to continue healthy lifestyle habits to include not smoking, managing blood pressure, healthy eating and regular exercise.    All questions were answered and the patient was instructed to call or message with any additional questions or concerns.     NAMITA Perez, CNP  Neurological Surgery  [facility]    Care Time: 30 min including face to face time, chart review, imaging interpretation, and coordination of care

## 2024-04-30 ENCOUNTER — TELEPHONE (OUTPATIENT)
Dept: HEMATOLOGY/ONCOLOGY | Age: 64
End: 2024-04-30

## 2024-04-30 NOTE — TELEPHONE ENCOUNTER
Dr Osullivan patient - hx of DVT right and left lower extremities. Hx of CVA. Has an IVC filter. Anticoagulation on hold.    Swelling    Ori reports he has bilateral LE edema going from his calf up to is upper thigh on both legs. They are equal in size. They have had a sandoval color for the last 3-4 months. No warmth or pain. Patient is taking 20 mg of furosemide every other day. Patient had a CT of the brain on 4/26/2024 and saw NAMITA Ryan Neurology yesterday.    Ori is asking what he should do about the swelling? He also said he was told be Dr Osullivan to come back to see him after his CT scan ordered by Neurology. Ori has a follow up appointment with Dr Osullivan on 5/31/2024 and wants to know if he should move it up to this week?

## 2024-05-09 ENCOUNTER — TELEPHONE (OUTPATIENT)
Dept: FAMILY MEDICINE CLINIC | Facility: CLINIC | Age: 64
End: 2024-05-09

## 2024-05-09 NOTE — TELEPHONE ENCOUNTER
Bianca called for a verbal to change the date of patients PT.    Verbal given.  Paperwork to follow.

## 2024-05-28 ENCOUNTER — TELEPHONE (OUTPATIENT)
Dept: FAMILY MEDICINE CLINIC | Facility: CLINIC | Age: 64
End: 2024-05-28

## 2024-05-30 ENCOUNTER — APPOINTMENT (OUTPATIENT)
Dept: HEMATOLOGY/ONCOLOGY | Age: 64
End: 2024-05-30
Attending: INTERNAL MEDICINE
Payer: COMMERCIAL

## 2024-05-31 ENCOUNTER — OFFICE VISIT (OUTPATIENT)
Dept: HEMATOLOGY/ONCOLOGY | Age: 64
End: 2024-05-31
Attending: INTERNAL MEDICINE
Payer: COMMERCIAL

## 2024-05-31 VITALS
RESPIRATION RATE: 18 BRPM | DIASTOLIC BLOOD PRESSURE: 81 MMHG | TEMPERATURE: 98 F | HEART RATE: 62 BPM | SYSTOLIC BLOOD PRESSURE: 122 MMHG | OXYGEN SATURATION: 97 %

## 2024-05-31 DIAGNOSIS — Z86.73 HISTORY OF CVA (CEREBROVASCULAR ACCIDENT): ICD-10-CM

## 2024-05-31 DIAGNOSIS — I82.512 CHRONIC DEEP VEIN THROMBOSIS (DVT) OF FEMORAL VEIN OF LEFT LOWER EXTREMITY (HCC): Primary | ICD-10-CM

## 2024-05-31 DIAGNOSIS — I87.032 POSTTHROMBOTIC SYNDROME OF LEFT LEG WITH ULCER AND INFLAMMATION (HCC): ICD-10-CM

## 2024-05-31 LAB
ALBUMIN SERPL-MCNC: 3.3 G/DL (ref 3.4–5)
ALBUMIN/GLOB SERPL: 0.7 {RATIO} (ref 1–2)
ALP LIVER SERPL-CCNC: 82 U/L
ALT SERPL-CCNC: 14 U/L
ANION GAP SERPL CALC-SCNC: 2 MMOL/L (ref 0–18)
AST SERPL-CCNC: 13 U/L (ref 15–37)
BASOPHILS # BLD AUTO: 0.06 X10(3) UL (ref 0–0.2)
BASOPHILS NFR BLD AUTO: 0.5 %
BILIRUB SERPL-MCNC: 0.7 MG/DL (ref 0.1–2)
BUN BLD-MCNC: 13 MG/DL (ref 9–23)
CALCIUM BLD-MCNC: 9.6 MG/DL (ref 8.5–10.1)
CHLORIDE SERPL-SCNC: 105 MMOL/L (ref 98–112)
CO2 SERPL-SCNC: 30 MMOL/L (ref 21–32)
CREAT BLD-MCNC: 1.13 MG/DL
EGFRCR SERPLBLD CKD-EPI 2021: 73 ML/MIN/1.73M2 (ref 60–?)
EOSINOPHIL # BLD AUTO: 0.22 X10(3) UL (ref 0–0.7)
EOSINOPHIL NFR BLD AUTO: 2 %
ERYTHROCYTE [DISTWIDTH] IN BLOOD BY AUTOMATED COUNT: 14.4 %
GLOBULIN PLAS-MCNC: 4.5 G/DL (ref 2.8–4.4)
GLUCOSE BLD-MCNC: 94 MG/DL (ref 70–99)
HCT VFR BLD AUTO: 40.5 %
HGB BLD-MCNC: 13.1 G/DL
IMM GRANULOCYTES # BLD AUTO: 0.04 X10(3) UL (ref 0–1)
IMM GRANULOCYTES NFR BLD: 0.4 %
LYMPHOCYTES # BLD AUTO: 2.88 X10(3) UL (ref 1–4)
LYMPHOCYTES NFR BLD AUTO: 26.3 %
MCH RBC QN AUTO: 29.8 PG (ref 26–34)
MCHC RBC AUTO-ENTMCNC: 32.3 G/DL (ref 31–37)
MCV RBC AUTO: 92.3 FL
MONOCYTES # BLD AUTO: 1.01 X10(3) UL (ref 0.1–1)
MONOCYTES NFR BLD AUTO: 9.2 %
NEUTROPHILS # BLD AUTO: 6.75 X10 (3) UL (ref 1.5–7.7)
NEUTROPHILS # BLD AUTO: 6.75 X10(3) UL (ref 1.5–7.7)
NEUTROPHILS NFR BLD AUTO: 61.6 %
OSMOLALITY SERPL CALC.SUM OF ELEC: 284 MOSM/KG (ref 275–295)
PLATELET # BLD AUTO: 175 10(3)UL (ref 150–450)
POTASSIUM SERPL-SCNC: 4 MMOL/L (ref 3.5–5.1)
PROT SERPL-MCNC: 7.8 G/DL (ref 6.4–8.2)
RBC # BLD AUTO: 4.39 X10(6)UL
SODIUM SERPL-SCNC: 137 MMOL/L (ref 136–145)
WBC # BLD AUTO: 11 X10(3) UL (ref 4–11)

## 2024-05-31 PROCEDURE — 99214 OFFICE O/P EST MOD 30 MIN: CPT | Performed by: INTERNAL MEDICINE

## 2024-05-31 NOTE — PROGRESS NOTES
Patient here for f/u for DVT. Patient has bilateral lower extremity edema. No open wounds. Patient started lasix 20 mg every other day. Patient denies dyspnea, chest pain, dizziness or headache. Patient has history of stroke in February 2024. Patient has no further concerns or complaints at this time. Patient is accompanied by his wife.     Education Record    Learner:  Patient    Disease / Diagnosis: History of DVT    Barriers / Limitations:  None   Comments:    Method:  Discussion   Comments:    General Topics:  Medication, Side effects and symptom management, and Plan of care reviewed   Comments:    Outcome:  Shows understanding   Comments:

## 2024-05-31 NOTE — PROGRESS NOTES
Cancer Center Progress Note    Problem List:      Patient Active Problem List   Diagnosis    Cerebral infarction (HCC)    Hypertensive emergency    NSVT (nonsustained ventricular tachycardia) ([facility])    White matter abnormality on MRI of brain    Vitamin B 12 deficiency    Vitamin D deficiency    History of CVA (cerebrovascular accident)    Essential hypertension    Paresthesias/numbness    Dilated cardiomyopathy (HCC)    Encounter for screening colonoscopy    Chronic atrial fibrillation ([facility])    Preop testing    Hyperlipidemia    CHAZ (obstructive sleep apnea)    Fever    H/O cardiac radiofrequency ablation    History of atrial fibrillation    Other thrombophilia ([facility])    Intraparenchymal hematoma of brain, left, without loss of consciousness, initial encounter ([facility])    HFrEF (heart failure with reduced ejection fraction) ([facility])    Coronary artery disease involving coronary bypass graft of native heart    Deep vein thrombosis (DVT) of proximal vein of left lower extremity ([facility])    Vasogenic cerebral edema (HCC)    ALEN (acute kidney injury) ([facility])    Thrombocytopenia (HCC)    Adjustment reaction with anxiety and depression    Bicuspid aortic valve (HCC)    Carotid artery stenosis    Chronic cutaneous venous stasis ulcer (HCC)    Chronic deep vein thrombosis (DVT) of left lower extremity ([facility])    Chronic systolic CHF (congestive heart failure) ([facility])    Dysphagia    Intracranial hemorrhage ([facility])    LBBB (left bundle branch block)    Leg DVT (deep venous thromboembolism), chronic, left (HCC)    Venous insufficiency    Right hemiparesis (HCC)       Interim History:    Kris Colvin presents today for evaluation and management of a diagnosis of left leg DVT.    He has an IVC filter placed on 2/20/2024 after presenting with acute intracranial hemorrhage on 2/17/2024. He had repeat CT of the brain on 3/1/2024 that showed improvement in the hemorrhage but there was increase in surrounding vasogenic edema. He had repeat CNS  imaging with improvement. He now has been back on apixaban 5 mg BID for the last four weeks. He has noticed decrease in the lower extremity edema since being on the apixaban. He has no chest pain and no dyspnea.     Review of Systems:   Constitutional: Negative for anorexia, fatigue, fevers, chills, night sweats and weight loss.  Eyes: Negative for visual disturbance, irritation and redness.  Respiratory: Negative for cough, hemoptysis, chest pain, or dyspnea.  Cardiovascular: Negative for angina, orthopnea or palpitations.  Gastrointestinal: Negative for nausea, vomiting, change in bowel habits, diarrhea, constipation and abdominal pain.  Integument/breast: Negative for rash, skin lesions, and pruritus.  Hematologic/lymphatic: Negative for easy bruising, bleeding, and lymphadenopathy.  Genitourinary: Negative for dysuria or hematuria  Psychiatric: The patient's mood was calm and appropriate for this visit.  The pertinent positives and negatives were described. All other systems were negative.    PMH/PSH:  Past Medical History:    Cataract    High blood pressure    High cholesterol    ICH (intracerebral hemorrhage) (HCC)    Obstructive sleep apnea, adult    autoPAP 5-12     Stroke (HCC)       Past Surgical History:   Procedure Laterality Date    Cabg      Cataract      Colonoscopy N/A 11/29/2018    Procedure: COLONOSCOPY, POSSIBLE BIOPSY, POSSIBLE POLYPECTOMY 31648;  Surgeon: Zack Giordano MD;  Location: [facility]       Family History Reviewed:  Family History   Problem Relation Age of Onset    Breast Cancer Mother     Diabetes Father     Diabetes Maternal Grandmother     Diabetes Paternal Grandmother        Allergies:     No Known Allergies    Medications:   apixaban 5 MG Oral Tab Take 1 tablet (5 mg total) by mouth 2 (two) times daily. 60 tablet 11    cyanocobalamin 1000 MCG Oral Tab Vitamin B-12  1,000 mcg tablet, [RxNorm: 398341]      furosemide 20 MG Oral Tab Take 1 tablet (20 mg total) by mouth every  other day.      spironolactone 25 MG Oral Tab       Cholecalciferol 25 MCG (1000 UT) Oral Tab Take 25 mcg by mouth daily.      atorvastatin 80 MG Oral Tab TAKE 1 TABLET BY MOUTH EVERY NIGHT 90 tablet 0    MULTAQ 400 MG Oral Tab Take 1 tablet (400 mg total) by mouth 2 (two) times daily.      carvedilol 6.25 MG Oral Tab Take 1 tablet (6.25 mg total) by mouth 2 (two) times daily.      Multiple Vitamins-Minerals (MULTI FOR HER 50+) Oral Tab Take 1 tablet by mouth daily.           Vital Signs:      Height: --  Weight: --  BSA (Calculated - sq m): --  Pulse: 62 (05/31 1319)  BP: 122/81 (05/31 1319)  Temp: 97.5 °F (36.4 °C) (05/31 1319)  Do Not Use - Resp Rate: --  SpO2: 97 % (05/31 1319)      Physical Examination:    Constitutional: Patient is alert and oriented x 3, not in acute distress.  Eyes: Anicteric sclera, pink conjunctiva.  HEENT:  Oropharynx is clear. Neck is supple.  Respiratory: Clear to auscultation and percussion. No rales.  No wheezes.  Cardiovascular: Regular rate and rhythm. No murmurs.  Gastrointestinal: Soft, non tender with good bowel sounds.  Musculoskeletal: Bilateral LE edema. No calf tenderness. No ulceration.  Lymphatics: There is no palpable lymphadenopathy throughout in the cervical, supraclavicular, or axillary regions.  Psychiatric: The patient's mood is calm and appropriate for this visit.      Labs reviewed at this visit:     Lab Results   Component Value Date    WBC 11.0 05/31/2024    RBC 4.39 05/31/2024    HGB 13.1 05/31/2024    HCT 40.5 05/31/2024    MCV 92.3 05/31/2024    MCH 29.8 05/31/2024    MCHC 32.3 05/31/2024    RDW 14.4 05/31/2024    .0 05/31/2024     Lab Results   Component Value Date     05/31/2024    K 4.0 05/31/2024     05/31/2024    CO2 30.0 05/31/2024    BUN 13 05/31/2024    CREATSERUM 1.13 05/31/2024    GLU 94 05/31/2024    CA 9.6 05/31/2024    ALKPHO 82 05/31/2024    ALT 14 (L) 05/31/2024    AST 13 (L) 05/31/2024    BILT 0.7 05/31/2024    ALB 3.3 (L)  05/31/2024    TP 7.8 05/31/2024     Radiologic imaging reviewed at this visit:    Venous doppler study from 2/27/2024:  Impression:     Right:   Extensive occlusive deep vein thrombosis of the right lower extremity essentially involving the entire leg.   This is expansile and hypoechoic consistent with acute thrombus.     Left:   There is moderate nonocclusive thrombus identified in the left femoral vein.   Occlusive thrombus of a gastrocnemius vein.   Probable chronic thrombus in the distal femoral vein and popliteal vein.     IMPRESSION:     Bilateral deep vein thrombosis of the lower extremities.   Right-sided deep vein thrombosis extensive and appears acute.   Left-sided deep vein thrombosis is comparatively mild and both acute and chronic.      Assessment/Plan:     Bilateral DVT:    His original DVT occurred after left leg trauma. He now has ICH. He is s/p IVC filter. His now back on apixaban 5mg BID. He will continue this another two weeks and then repeat the bilateral venous doppler. I repeat labs today that have improved blood counts. I recommended that he wear the compression stockings. He will proceed with physical therapy both for strength but I would recommend continued compression of the lower extremities.      Getachew Osullivan MD

## 2024-06-04 ENCOUNTER — TELEPHONE (OUTPATIENT)
Dept: FAMILY MEDICINE CLINIC | Facility: CLINIC | Age: 64
End: 2024-06-04

## 2024-06-04 NOTE — TELEPHONE ENCOUNTER
Received forms to complete    Please contact the wife when completed      Emailed and sent originals to the form department.

## 2024-06-05 NOTE — TELEPHONE ENCOUNTER
Patient's (spouse) called and provided details    Type of Leave: Disability (extension)  Reason for Leave: Stroke ( unable to use right side)  Start date of leave: Start: 7/5/24  End: 1/5/2025  How much time needed?:   Forms Due Date:  Was Fee and Turnaround info Given?: Yes

## 2024-06-06 NOTE — TELEPHONE ENCOUNTER
(Z96.1) Presence of intraocular lens - Assesment : Patient is Pseudophakic OS. - Plan : Signs and symptoms of infection and retinal detachment are outlined in your surgical packet. Do not rub operated eye. Follow drop schedule. If redness, pain, decreased vision, flashes or floaters occur then contact clinic. Forms completed and faxed to [facility] fax: [phone]. Fax confirmation received. scroll kitt message sent to patient.

## 2024-06-18 NOTE — TELEPHONE ENCOUNTER
Form completed.  Patient notified to pick it up at the .    Emailed completed form to the forms department.

## 2024-06-20 ENCOUNTER — TELEPHONE (OUTPATIENT)
Dept: FAMILY MEDICINE CLINIC | Facility: CLINIC | Age: 64
End: 2024-06-20

## 2024-06-20 NOTE — TELEPHONE ENCOUNTER
Email received with completed form for Parking Placard. Scanned into patient record for providers office. Forms Dept does not do parking placard forms.

## 2024-07-16 ENCOUNTER — HOSPITAL ENCOUNTER (INPATIENT)
Facility: HOSPITAL | Age: 64
LOS: 6 days | Discharge: HOME HEALTH | End: 2024-07-23
Attending: EMERGENCY MEDICINE | Admitting: HOSPITALIST
Payer: COMMERCIAL

## 2024-07-16 ENCOUNTER — HOSPITAL ENCOUNTER (INPATIENT)
Facility: HOSPITAL | Age: 64
LOS: 6 days | Discharge: HOME | End: 2024-07-23
Attending: EMERGENCY MEDICINE | Admitting: HOSPITALIST
Payer: COMMERCIAL

## 2024-07-16 DIAGNOSIS — R50.9 ACUTE FEBRILE ILLNESS: ICD-10-CM

## 2024-07-16 DIAGNOSIS — A41.9 SEPSIS WITHOUT ACUTE ORGAN DYSFUNCTION, DUE TO UNSPECIFIED ORGANISM (HCC): Primary | ICD-10-CM

## 2024-07-16 LAB — GLUCOSE BLD-MCNC: 130 MG/DL (ref 70–99)

## 2024-07-17 ENCOUNTER — APPOINTMENT (OUTPATIENT)
Dept: CT IMAGING | Facility: HOSPITAL | Age: 64
End: 2024-07-17
Attending: INTERNAL MEDICINE
Payer: COMMERCIAL

## 2024-07-17 ENCOUNTER — APPOINTMENT (OUTPATIENT)
Dept: CT IMAGING | Facility: HOSPITAL | Age: 64
End: 2024-07-17
Attending: EMERGENCY MEDICINE
Payer: COMMERCIAL

## 2024-07-17 ENCOUNTER — APPOINTMENT (OUTPATIENT)
Dept: GENERAL RADIOLOGY | Facility: HOSPITAL | Age: 64
End: 2024-07-17
Attending: EMERGENCY MEDICINE
Payer: COMMERCIAL

## 2024-07-17 PROBLEM — A41.9 SEPSIS WITHOUT ACUTE ORGAN DYSFUNCTION, DUE TO UNSPECIFIED ORGANISM (HCC): Status: ACTIVE | Noted: 2024-01-01

## 2024-07-17 PROBLEM — A41.9 SEPSIS WITHOUT ACUTE ORGAN DYSFUNCTION, DUE TO UNSPECIFIED ORGANISM (HCC): Status: ACTIVE | Noted: 2024-07-17

## 2024-07-17 PROBLEM — I50.30 (HFPEF) HEART FAILURE WITH PRESERVED EJECTION FRACTION (HCC): Chronic | Status: ACTIVE | Noted: 2024-07-17

## 2024-07-17 PROBLEM — I50.30 (HFPEF) HEART FAILURE WITH PRESERVED EJECTION FRACTION (HCC): Chronic | Status: ACTIVE | Noted: 2024-01-01

## 2024-07-17 PROBLEM — R50.9 ACUTE FEBRILE ILLNESS: Status: ACTIVE | Noted: 2024-01-01

## 2024-07-17 PROBLEM — R50.9 ACUTE FEBRILE ILLNESS: Status: ACTIVE | Noted: 2024-07-17

## 2024-07-17 LAB
ADENOVIRUS PCR:: NOT DETECTED
ALBUMIN SERPL-MCNC: 2.9 G/DL (ref 3.2–4.8)
ALBUMIN/GLOB SERPL: 0.9 {RATIO} (ref 1–2)
ALP LIVER SERPL-CCNC: 79 U/L
ALT SERPL-CCNC: 27 U/L
ANION GAP SERPL CALC-SCNC: 4 MMOL/L (ref 0–18)
AST SERPL-CCNC: 44 U/L (ref ?–34)
ATRIAL RATE: 87 BPM
B PARAPERT DNA SPEC QL NAA+PROBE: NOT DETECTED
B PERT DNA SPEC QL NAA+PROBE: NOT DETECTED
BASOPHILS # BLD AUTO: 0.03 X10(3) UL (ref 0–0.2)
BASOPHILS NFR BLD AUTO: 0.2 %
BILIRUB SERPL-MCNC: 1 MG/DL (ref 0.2–1.1)
BILIRUB UR QL STRIP.AUTO: NEGATIVE
BUN BLD-MCNC: 24 MG/DL (ref 9–23)
C PNEUM DNA SPEC QL NAA+PROBE: NOT DETECTED
CALCIUM BLD-MCNC: 8.7 MG/DL (ref 8.7–10.4)
CHLORIDE SERPL-SCNC: 105 MMOL/L (ref 98–112)
CLARITY UR REFRACT.AUTO: CLEAR
CO2 SERPL-SCNC: 26 MMOL/L (ref 21–32)
COLOR UR AUTO: YELLOW
CORONAVIRUS 229E PCR:: NOT DETECTED
CORONAVIRUS HKU1 PCR:: NOT DETECTED
CORONAVIRUS NL63 PCR:: NOT DETECTED
CORONAVIRUS OC43 PCR:: NOT DETECTED
CREAT BLD-MCNC: 1.04 MG/DL
EGFRCR SERPLBLD CKD-EPI 2021: 81 ML/MIN/1.73M2 (ref 60–?)
EOSINOPHIL # BLD AUTO: 0.04 X10(3) UL (ref 0–0.7)
EOSINOPHIL NFR BLD AUTO: 0.2 %
ERYTHROCYTE [DISTWIDTH] IN BLOOD BY AUTOMATED COUNT: 14.8 %
FLUAV + FLUBV RNA SPEC NAA+PROBE: NEGATIVE
FLUAV + FLUBV RNA SPEC NAA+PROBE: NEGATIVE
FLUAV RNA SPEC QL NAA+PROBE: NOT DETECTED
FLUBV RNA SPEC QL NAA+PROBE: NOT DETECTED
GLOBULIN PLAS-MCNC: 3.4 G/DL (ref 2.8–4.4)
GLUCOSE BLD-MCNC: 122 MG/DL (ref 70–99)
GLUCOSE UR STRIP.AUTO-MCNC: NORMAL MG/DL
HCT VFR BLD AUTO: 30.6 %
HGB BLD-MCNC: 10.3 G/DL
IMM GRANULOCYTES # BLD AUTO: 0.21 X10(3) UL (ref 0–1)
IMM GRANULOCYTES NFR BLD: 1.1 %
KETONES UR STRIP.AUTO-MCNC: NEGATIVE MG/DL
LACTATE SERPL-SCNC: 1.2 MMOL/L (ref 0.5–2)
LEUKOCYTE ESTERASE UR QL STRIP.AUTO: NEGATIVE
LYMPHOCYTES # BLD AUTO: 0.87 X10(3) UL (ref 1–4)
LYMPHOCYTES NFR BLD AUTO: 4.7 %
MCH RBC QN AUTO: 28.5 PG (ref 26–34)
MCHC RBC AUTO-ENTMCNC: 33.7 G/DL (ref 31–37)
MCV RBC AUTO: 84.5 FL
METAPNEUMOVIRUS PCR:: NOT DETECTED
MONOCYTES # BLD AUTO: 0.72 X10(3) UL (ref 0.1–1)
MONOCYTES NFR BLD AUTO: 3.9 %
MYCOPLASMA PNEUMONIA PCR:: NOT DETECTED
NEUTROPHILS # BLD AUTO: 16.53 X10 (3) UL (ref 1.5–7.7)
NEUTROPHILS # BLD AUTO: 16.53 X10(3) UL (ref 1.5–7.7)
NEUTROPHILS NFR BLD AUTO: 89.9 %
NITRITE UR QL STRIP.AUTO: NEGATIVE
OSMOLALITY SERPL CALC.SUM OF ELEC: 285 MOSM/KG (ref 275–295)
P AXIS: 15 DEGREES
P-R INTERVAL: 192 MS
PARAINFLUENZA 1 PCR:: NOT DETECTED
PARAINFLUENZA 2 PCR:: NOT DETECTED
PARAINFLUENZA 3 PCR:: NOT DETECTED
PARAINFLUENZA 4 PCR:: NOT DETECTED
PH UR STRIP.AUTO: 6 [PH] (ref 5–8)
PLATELET # BLD AUTO: 121 10(3)UL (ref 150–450)
PLATELETS.RETICULATED NFR BLD AUTO: 6 % (ref 0–7)
POTASSIUM SERPL-SCNC: 2.9 MMOL/L (ref 3.5–5.1)
POTASSIUM SERPL-SCNC: 3.6 MMOL/L (ref 3.5–5.1)
PROCALCITONIN SERPL-MCNC: 0.49 NG/ML (ref ?–0.05)
PROT SERPL-MCNC: 6.3 G/DL (ref 5.7–8.2)
PROT UR STRIP.AUTO-MCNC: 30 MG/DL
Q-T INTERVAL: 408 MS
QRS DURATION: 114 MS
QTC CALCULATION (BEZET): 490 MS
R AXIS: -51 DEGREES
RBC # BLD AUTO: 3.62 X10(6)UL
RHINOVIRUS/ENTERO PCR:: NOT DETECTED
RSV RNA SPEC NAA+PROBE: NEGATIVE
RSV RNA SPEC QL NAA+PROBE: NOT DETECTED
SARS-COV-2 RNA NPH QL NAA+NON-PROBE: NOT DETECTED
SARS-COV-2 RNA RESP QL NAA+PROBE: NOT DETECTED
SODIUM SERPL-SCNC: 135 MMOL/L (ref 136–145)
SP GR UR STRIP.AUTO: 1.03 (ref 1–1.03)
T AXIS: 83 DEGREES
UROBILINOGEN UR STRIP.AUTO-MCNC: 2 MG/DL
VENTRICULAR RATE: 87 BPM
WBC # BLD AUTO: 18.4 X10(3) UL (ref 4–11)

## 2024-07-17 PROCEDURE — 74177 CT ABD & PELVIS W/CONTRAST: CPT | Performed by: EMERGENCY MEDICINE

## 2024-07-17 PROCEDURE — 71260 CT THORAX DX C+: CPT | Performed by: EMERGENCY MEDICINE

## 2024-07-17 PROCEDURE — 71045 X-RAY EXAM CHEST 1 VIEW: CPT | Performed by: EMERGENCY MEDICINE

## 2024-07-17 PROCEDURE — 70450 CT HEAD/BRAIN W/O DYE: CPT | Performed by: INTERNAL MEDICINE

## 2024-07-17 PROCEDURE — 74174 CTA ABD&PLVS W/CONTRAST: CPT | Performed by: INTERNAL MEDICINE

## 2024-07-17 PROCEDURE — 99223 1ST HOSP IP/OBS HIGH 75: CPT | Performed by: HOSPITALIST

## 2024-07-17 RX ORDER — ONDANSETRON 2 MG/ML
4 INJECTION INTRAMUSCULAR; INTRAVENOUS EVERY 6 HOURS PRN
Status: DISCONTINUED | OUTPATIENT
Start: 2024-07-17 | End: 2024-07-22

## 2024-07-17 RX ORDER — SODIUM CHLORIDE 9 MG/ML
INJECTION, SOLUTION INTRAVENOUS CONTINUOUS
Status: DISCONTINUED | OUTPATIENT
Start: 2024-07-17 | End: 2024-07-17

## 2024-07-17 RX ORDER — POLYETHYLENE GLYCOL 3350 17 G/17G
17 POWDER, FOR SOLUTION ORAL DAILY PRN
Status: DISCONTINUED | OUTPATIENT
Start: 2024-07-17 | End: 2024-07-23

## 2024-07-17 RX ORDER — SENNOSIDES 8.6 MG
17.2 TABLET ORAL NIGHTLY PRN
Status: DISCONTINUED | OUTPATIENT
Start: 2024-07-17 | End: 2024-07-23

## 2024-07-17 RX ORDER — POTASSIUM CHLORIDE 20 MEQ/1
40 TABLET, EXTENDED RELEASE ORAL EVERY 4 HOURS
Status: COMPLETED | OUTPATIENT
Start: 2024-07-17 | End: 2024-07-17

## 2024-07-17 RX ORDER — BISACODYL 10 MG
10 SUPPOSITORY, RECTAL RECTAL
Status: DISCONTINUED | OUTPATIENT
Start: 2024-07-17 | End: 2024-07-23

## 2024-07-17 RX ORDER — PROCHLORPERAZINE EDISYLATE 5 MG/ML
5 INJECTION INTRAMUSCULAR; INTRAVENOUS EVERY 8 HOURS PRN
Status: DISCONTINUED | OUTPATIENT
Start: 2024-07-17 | End: 2024-07-19

## 2024-07-17 RX ORDER — ENEMA 19; 7 G/133ML; G/133ML
1 ENEMA RECTAL ONCE AS NEEDED
Status: DISCONTINUED | OUTPATIENT
Start: 2024-07-17 | End: 2024-07-23

## 2024-07-17 RX ORDER — ACETAMINOPHEN 500 MG
1000 TABLET ORAL ONCE
Status: COMPLETED | OUTPATIENT
Start: 2024-07-17 | End: 2024-07-17

## 2024-07-17 RX ORDER — MELATONIN
3 NIGHTLY PRN
Status: DISCONTINUED | OUTPATIENT
Start: 2024-07-17 | End: 2024-07-23

## 2024-07-17 RX ORDER — ATORVASTATIN CALCIUM 80 MG/1
80 TABLET, FILM COATED ORAL NIGHTLY
Status: DISCONTINUED | OUTPATIENT
Start: 2024-07-17 | End: 2024-07-23

## 2024-07-17 RX ORDER — ACETAMINOPHEN 500 MG
500 TABLET ORAL EVERY 4 HOURS PRN
Status: DISCONTINUED | OUTPATIENT
Start: 2024-07-17 | End: 2024-07-23

## 2024-07-17 NOTE — PAYOR COMM NOTE
--------------  ADMISSION REVIEW     Payor: JUSTIN VENTURA  Subscriber #:  [id number]  Authorization Number: [id number]    Admit date: 7/17/24  Admit time:  4:56 AM       REVIEW DOCUMENTATION:     ED Provider Notes          Patient Seen in: [facility] Emergency Department      History     Chief Complaint   Patient presents with    Fever     Stated Complaint:     Subjective:   HPI    64-year-old male with a past medical history as below including hypertension, hyperlipidemia, stroke/ICH and DVT on Xarelto brought by EMS due to worsening fever that started 2 to 3 days ago.  Wife states patient was initially having intermittent low-grade fevers around 100 but it spiked up to 102F this evening.  She states patient has been generally weak and having a poor appetite.  Patient mitts to feeling weak but denies any other specific complaints.  He denies cough or cold symptoms.  He denies urinary symptoms.  He denies abdominal pain, nausea or vomiting.  Wife states he has some chronic swelling with brawny discoloration and redness in his lower legs that is unchanged.    Objective:   Past Medical History:    Cataract    High blood pressure    High cholesterol    ICH (intracerebral hemorrhage) (HCC)    Obstructive sleep apnea, adult    autoPAP 5-12     Stroke (HCC)       Review of Systems    Positive for stated Chief Complaint: Fever    Other systems are as noted in HPI.  Constitutional and vital signs reviewed.      All other systems reviewed and negative except as noted above.    Physical Exam     ED Triage Vitals [07/16/24 2358]   /76   Pulse 91   Resp 18   Temp (!) 102.3 °F (39.1 °C)   Temp src Oral   SpO2 96 %   O2 Device Nasal cannula       Current Vitals:   Vital Signs  BP: 108/65  Pulse: 63  Resp: 18  Temp: 97.7 °F (36.5 °C)  Temp src: Rectal  MAP (mmHg): 79    Oxygen Therapy  SpO2: 95 %  O2 Device: None (Room air)  O2 Flow Rate (L/min): 2 L/min            Physical Exam  Vitals and nursing note reviewed.    Constitutional:       General: He is not in acute distress.     Appearance: He is well-developed.   HENT:      Head: Normocephalic and atraumatic.      Mouth/Throat:      Mouth: Mucous membranes are moist.   Eyes:      General: No scleral icterus.     Extraocular Movements: Extraocular movements intact.   Cardiovascular:      Rate and Rhythm: Normal rate and regular rhythm.   Pulmonary:      Effort: Pulmonary effort is normal.      Breath sounds: Normal breath sounds.   Abdominal:      Palpations: Abdomen is soft.      Tenderness: There is no abdominal tenderness.   Musculoskeletal:      Cervical back: Neck supple.   Skin:     General: Skin is warm and dry.      Capillary Refill: Capillary refill takes less than 2 seconds.   Neurological:      Mental Status: He is alert and oriented to person, place, and time.      GCS: GCS eye subscore is 4. GCS verbal subscore is 5. GCS motor subscore is 6.   Psychiatric:         Mood and Affect: Mood normal.         Behavior: Behavior normal.               ED Course     Labs Reviewed   COMP METABOLIC PANEL (14) - Abnormal; Notable for the following components:       Result Value    Glucose 122 (*)     Sodium 135 (*)     Potassium 2.9 (*)     BUN 24 (*)     AST 44 (*)     Albumin 2.9 (*)     A/G Ratio 0.9 (*)     All other components within normal limits   URINALYSIS WITH CULTURE REFLEX - Abnormal; Notable for the following components:    Blood Urine Trace (*)     Protein Urine 30 (*)     Urobilinogen Urine 2 (*)     All other components within normal limits   POCT GLUCOSE - Abnormal; Notable for the following components:    POC Glucose 130 (*)     All other components within normal limits   CBC W/ DIFFERENTIAL - Abnormal; Notable for the following components:    WBC 18.4 (*)     RBC 3.62 (*)     HGB 10.3 (*)     HCT 30.6 (*)     .0 (*)     Neutrophil Absolute Prelim 16.53 (*)     Neutrophil Absolute 16.53 (*)     Lymphocyte Absolute 0.87 (*)     All other components  within normal limits   LACTIC ACID, PLASMA - Normal   SARS-COV-2/FLU A AND B/RSV BY PCR (GENEXPERT) - Normal    Narrative:     This test is intended for the qualitative detection and differentiation of SARS-CoV-2, influenza A, influenza B, and respiratory syncytial virus (RSV) viral RNA in nasopharyngeal or nares swabs from individuals suspected of respiratory viral infection consistent with COVID-19 by their healthcare provider. Signs and symptoms of respiratory viral infection due to SARS-CoV-2, influenza, and RSV can be similar.    Test performed using the Xpert Xpress SARS-CoV-2/FLU/RSV (real time RT-PCR)  assay on the GeneXpert instrument, [employer], [place] [address].   This test is being used under the Food and Drug Administration's Emergency Use Authorization.    The authorized Fact Sheet for Healthcare Providers for this assay is available upon request from the laboratory.   CBC WITH DIFFERENTIAL WITH PLATELET    Narrative:     The following orders were created for panel order CBC With Differential With Platelet.  Procedure                               Abnormality         Status                     ---------                               -----------         ------                     CBC W/ DIFFERENTIAL[[id number]]          Abnormal            Final result                 Please view results for these tests on the individual orders.   PROCALCITONIN   RAINBOW DRAW LAVENDER   RAINBOW DRAW LIGHT GREEN   RAINBOW DRAW BLUE   BLOOD CULTURE   BLOOD CULTURE     EKG    Rate, intervals and axes as noted on EKG Report.  Rate: 87  Rhythm: Sinus Rhythm  Reading: Normal sinus rhythm, LAFB, minimal criteria for LVH                   Preliminary Radiology Report  [facility], [facility]  [phone] - Phone    [facility]    NAME: LARRY KNAPP    DATE OF EXAM: 07/17/2024  Patient No:  [id number]  Physician:  CHRISTOPHER^YAYO^ZULEYKA  YOB: 1960    Past Medical History (entered by Technologist):    Reason  For Exam (entered by Technologist):  fever, hypoxia  Other Notes (entered by Technologist): no priors, [phone] [place]    Additional Information (per [facility]): Septic.  Hypoxia      CT chest abdomen and pelvis with contrast    IMPRESSION:  Chest:  No intrathoracic source of sepsis  No evidence for pneumonia or pulmonary edema  Mild bibasilar atelectasis    No pleural effusion or pneumothorax  Previous median sternotomy, CABG and aortic valve replacement        Abdomen and pelvis:  Urinary bladder wall is thickened with perivesical fat stranding  Possible cystitis  However, in discussion with with ER no signs of urinary tract infection    No other appreciable source for intra-abdominal sepsis    No obstructing ureteral calculus, hydronephrosis or signs of pyelonephritis    No acute bowel findings.  No obstruction, appreciable inflammatory process, or perforation  Stomach appears within normal limits  Possible sludge in the gallbladder.  No radiodense gallstones.  No clear secondary findings to suggest cholecystitis  No evidence for pancreatitis    Infrarenal IVC filter  Thrombus within the filter and inferior to the filter within the IVC and common iliac veins  Prominent edema within the pelvic fat planes, probably related to venous congestion    Moderate amount of stranding surrounding the SMA  Apparent focal irregularity of the SMA lumen as can be seen on series 9 images 53-56  May signify motion artifact.  Focal SMA dissection would be difficult to exclude  This could be more definitively evaluated with CTA or MRA       Right renal cyst  Atherosclerotic calcifications abdominal aorta and iliofemoral vessels  Degenerative changes in the spine, pelvis, and hips      Case discussed with   at 5 AM ET    Fareed Wilson M.D.  This report has been electronically signed and verified by the Radiologist whose name is printed above.        A total of 52 minutes of critical care time (exclusive of  billable procedures) was administered to manage the patient's cardiovascular instability due to his sepsis.  This involved direct patient intervention, complex decision making, and/or extensive discussions with the patient, family, and clinical staff.    MDM   64-year-old male with a past medical history as below including hypertension, hyperlipidemia, stroke/ICH and DVT on Xarelto brought by EMS due to worsening fever that started 2 to 3 days ago.      Differential includes but is not limited to sepsis, UTI, pneumonia, viral respiratory infection, dehydration, electrolyte abnormality    Labs show elevated WBC 18 K with left shift, lactic acid is normal.  Potassium level is 2.9 with normal renal function.  UA without evidence of UTI.    Independent interpretation of chest x-ray shows no evidence of pneumonia.  Radiology report reviewed as above.    Due to unclear source of fever, CT chest/abdomen/pelvis obtained.  Independent interpretation shows no pneumonia or infectious process in the abdomen.  Discussed with radiologist and report reviewed as above.  Radiologist notes thrombus in the IVC and some prominent edema within the pelvic fat planes likely related to venous congestion.  No pneumonia or other infectious cause was noted.    Patient's blood pressure was initially 120/76 however dropped to the 90s over 60s.  Patient covered with 30 mill per KG fluid bolus and treated with sepsis.  Will admit for further workup and management.  Discussed with hospitalist Dr. Pereyra.         [facility]   [facility] of [facility]      Sepsis Reassessment Note    /65   Pulse 63   Temp 97.7 °F (36.5 °C) (Rectal)   Resp 18   Ht 190.5 cm (6' 3\")   Wt 117.9 kg   SpO2 95%   BMI 32.50 kg/m²      I completed the sepsis reassessment at 4:19 AM      Cardiac:  Regularity: Regular  Rate: Normal  Heart Sounds: S1,S2    Lungs:   Right: Clear  Left: Clear    Peripheral Pulses:  Radial: Left 1+      Capillary Refill:  <3  Secs    Skin:  Temp/Moisture: Dry  Color: Normal      Annita Magana MD  7/17/2024  4:18 AM         Medical Decision Making  Amount and/or Complexity of Data Reviewed  Independent Historian: spouse  Labs: ordered.  Radiology: ordered and independent interpretation performed. Decision-making details documented in ED Course.  ECG/medicine tests: ordered and independent interpretation performed. Decision-making details documented in ED Course.  Discussion of management or test interpretation with external provider(s): Hospitalist, radiologist    Risk  Decision regarding hospitalization.        Disposition and Plan     Clinical Impression:  1. Sepsis without acute organ dysfunction, due to unspecified organism (HCC)    2. Acute febrile illness         Disposition:  Admit  7/17/2024  3:57 am       Hospital Problems       Present on Admission  Date Reviewed: 5/31/2024            ICD-10-CM Noted POA    * (Principal) Sepsis without acute organ dysfunction, due to unspecified organism (HCC) A41.9 7/17/2024 Unknown               Signed by Annita Magana MD on 7/17/2024  4:20 AM             History and Physical          Chief Complaint: Fever     Subjective:    History of Present Illness:      Kris Colvin is a 64 year old male with history of atrial fibrillation hemorrhagic stroke, hypertension, hyperlipidemia, chronic HFpEF, DVT presents to the emergency room with fever that started 3 days ago.  This started out being low-grade fevers around 100 and then last night spiked up to 102.  Patient's been having some generalized weakness poor appetite.  No chills, nausea, vomiting, diarrhea.  No abdominal pain, back pain, chest pain, shortness of breath.  No headache, cough.  No pain or soreness in the muscles or joints.  No hematochezia, melena, hematuria, dysuria.  No dizziness, lightheadedness, or syncope.     Assessment & Plan:       # Sepsis  - Unknown source  - Imaging negative  - UA, COVID negative  - Will check  procalcitonin  -Will order expanded viral panel  -Blood cultures pending  -Continue Zosyn  -Patient got 3.5 L of IV fluid in the ER per sepsis bolus.  Will hold off on further IV fluids due to history of heart failure.     # Chronic atrial fibrillation  -Will continue on Multaq for rate control  -Will continue on Eliquis for anticoagulation     # Hx  stroke/ ICH  - continue statin therapy     # Hx DVT  - Continue on eliquis     # Hypertension  - Hold coreg due to hypotension.     # HFpEF  - Echo in 2/2024 shows EF of 50-55% with grade 1 diastolic dysfunction.  -Coreg Lasix and spironolactone on hold due to hypotension.     # Hyperlipidemia  -Will continue on statin therapy.        Plan of care discussed with patient at bedside.     Gualberto Lyon, DO            CONSULT  Impression:    CONCLUSION:    1. Thrombus within and inferior to IVC filter extending to the common iliac veins.  Prominent edema within the pelvic fat planes, probably related to venous congestion.  2. Stranding surrounds the superior mesenteric artery with appearance focal irregularity.  Findings may represent motion artifact, however focal dissection cannot be excluded.  CTA or MRA can be performed for further evaluation as clinically indicated.  3. Please see details as above.     ED M.D. notified of these findings with preliminary radiology report from [email or web address].     LOCATION:  [facility]     Dictated by (CST): Latoya Alberts MD on 7/17/2024 at 7:00 AM      Finalized by (CST): Latoya Alberts MD on 7/17/2024 at 7:44 AM     ASSESSMENT:  Fevers, possibly related to #2. R/o BSI in the setting of AVR.   UA negative. VRP negative. 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  Leukocytosis  CAD, s/p CABG; CHF; AVR, HTN, HLD  H/o ICH  [facility]     PLAN:  - continue IV Zosyn  - follow blood cultures  - follow temps and wbc  - check c. Diff if further diarrhea  - noted plans for vascular to see  - reviewed  labs, micro, imaging reports, available old records     Discussed case with RN, Dr. Painting, patient and patient's wife.      Thank you for allowing us to participate in the care of this patient. Please do not hesitate to call if you have any questions.   We will continue to follow with you and will make further recommendations based on his progress.     [facility]      MEDICATIONS ADMINISTERED IN LAST 1 DAY:  acetaminophen (Tylenol Extra Strength) tab 1,000 mg       Date Action Dose Route User    7/17/2024 0019 Given 1,000 mg Oral Quoc Carl RN          apixaban (Eliquis) tab 5 mg       Date Action Dose Route User    7/17/2024 1040 Given 5 mg Oral Rhonda Magana RN          dronedarone (Multaq) tab 400 mg       Date Action Dose Route User    7/17/2024 1040 Given 400 mg Oral Rhonda Magnaa RN          iopamidol 76% (ISOVUE-370) injection for power injector       Date Action Dose Route User    7/17/2024 0248 Given 100 mL Intravenous Chala, Baltazar          iopamidol 76% (ISOVUE-370) injection for power injector       Date Action Dose Route User    7/17/2024 0944 Given 100 mL Intravenous Sun, Sakina A          piperacillin-tazobactam (Zosyn) 3.375 g in dextrose 5% 100 mL IVPB-ADDV       Date Action Dose Route User    7/17/2024 1034 New Bag 3.375 g Intravenous Rhonda Magana RN          piperacillin-tazobactam (Zosyn) 4.5 g in dextrose 5% 100 mL IVPB-ADDV       Date Action Dose Route User    7/17/2024 0251 New Bag 4.5 g Intravenous Quoc Carl RN          potassium chloride 40 mEq in 250mL sodium chloride 0.9% IVPB premix       Date Action Dose Route User    7/17/2024 0159 New Bag 40 mEq Intravenous Quoc Carl RN          potassium chloride (Klor-Con M20) tab 40 mEq       Date Action Dose Route User    7/17/2024 1511 Given 40 mEq Oral Rohnda Magana RN    7/17/2024 1040 Given 40 mEq Oral Rhonda Magana  RN          sodium chloride 0.9 % IV bolus 1,000 mL       Date Action Dose Route User    7/17/2024 0036 New Bag 1,000 mL Intravenous Quoc Carl RN          sodium chloride 0.9 % IV bolus 1,000 mL       Date Action Dose Route User    7/17/2024 0159 New Bag 1,000 mL Intravenous Quoc Carl RN          sodium chloride 0.9 % IV bolus 3,537 mL       Date Action Dose Route User    7/17/2024 0357 New Bag 3,537 mL Intravenous Brooke Srinivasan RN            Vitals (last day)       Date/Time Temp Pulse Resp BP SpO2 Weight O2 Device O2 Flow Rate (L/min) [facility]    07/17/24 1200 -- 67 20 107/65 -- -- -- --     07/17/24 0526 -- -- -- -- -- 260 lb (117.9 kg) -- --     07/17/24 0510 97.4 °F (36.3 °C) 64 18 111/70 -- -- -- -- EV    07/17/24 0430 -- 62 18 97/61 95 % -- None (Room air) --     07/17/24 0400 -- 63 18 108/65 95 % -- None (Room air) --     07/17/24 0330 -- 66 18 97/65 94 % -- None (Room air) --     07/17/24 0311 97.7 °F (36.5 °C) -- -- -- -- -- -- --     07/17/24 0245 -- 70 18 98/71 96 % -- None (Room air) -- TOMY    07/17/24 0145 -- 73 22 109/67 97 % -- Nasal cannula 2 L/min TOMY    07/17/24 0130 -- 79 20 98/66 96 % -- Nasal cannula 2 L/min TOMY    07/17/24 0120 101.5 °F (38.6 °C) -- -- -- -- -- -- -- TOMY    07/16/24 2358 102.3 °F (39.1 °C) 91 18 120/76 96 % 260 lb (117.9 kg) Nasal cannula 4 L/min

## 2024-07-17 NOTE — RESPIRATORY THERAPY NOTE
PATIENT HAS HISTORY OF [facility]. PATIENT DID NOT USE CPAP AT HOME. [facility] PROTOCOL IN CHART.     Statement Selected

## 2024-07-17 NOTE — PLAN OF CARE
PT ADMITTED A/O, AFEBRILE, HR 64, DENIES PAIN OR SOB, WEAKNESS TO MIGUEL CAPUTO, LIMITED MOVEMENT TO RT ARM, EDEMA/DISCOLORED, RED/BROWN TO MIGUEL CAPUTO MD AT BEDSIDE, ORDERS TO DISCONTINUE IVF, INSTRUCTED PT ON POC    Problem: RISK FOR INFECTION - ADULT  Goal: Absence of fever/infection during anticipated neutropenic period  Description: INTERVENTIONS  - Monitor WBC  - Administer growth factors as ordered  - Implement neutropenic guidelines  Outcome: Progressing     Problem: SAFETY ADULT - FALL  Goal: Free from fall injury  Description: INTERVENTIONS:  - Assess pt frequently for physical needs  - Identify cognitive and physical deficits and behaviors that affect risk of falls.  - [place] fall precautions as indicated by assessment.  - Educate pt/family on patient safety including physical limitations  - Instruct pt to call for assistance with activity based on assessment  - Modify environment to reduce risk of injury  - Provide assistive devices as appropriate  - Consider OT/PT consult to assist with strengthening/mobility  - Encourage toileting schedule  Outcome: Progressing

## 2024-07-17 NOTE — CONSULTS
[facility]   part of [facility] ID CONSULT NOTE    Kris Colvin Patient Status:  Inpatient    3/6/1960 MRN [id number]   Location [facility] 4NW-A Attending Lizet Miller,    Hosp Day # 0 PCP Lenka Guevara DO       Reason for Consultation:  Possible infected thrombus    History of Present Illness:  Kris Colvin is a 64 year old male with CHF, CAD s/p CABG and AVR, HTN, pA. Fib, DVT (patient with IVC filter) and HLD. Patient also with a history of a ICH 2024.     Patient presented with fevers, generalized weakness and poor po intake. Patient's wife reports that he started having low grade fevers at night about 1 week ago. However, the day prior to admission, his fever was 102.7 with severe sweats. Some diarrhea prior to admission and a dry cough. Denies any abdominal pain, nausea, vomiting, dysuria or SOB. Denies any headache. Denies any known sick contacts.      History:  Past Medical History:    Arrhythmia    Cataract    Congestive heart disease (HCC)    Deep vein thrombosis (HCC)    Depression    Disorder of liver    Heart valve disease    High blood pressure    High cholesterol    ICH (intracerebral hemorrhage) (HCC)    Muscle weakness    Obstructive sleep apnea, adult    autoPAP 5-12     Sleep apnea    Stroke (HCC)    Visual impairment     Past Surgical History:   Procedure Laterality Date    Cabg      Cataract      Colonoscopy N/A 2018    Procedure: COLONOSCOPY, POSSIBLE BIOPSY, POSSIBLE POLYPECTOMY 17011;  Surgeon: Zack Giordano MD;  Location: [facility]     Family History   Problem Relation Age of Onset    Breast Cancer Mother     Diabetes Father     Diabetes Maternal Grandmother     Diabetes Paternal Grandmother       reports that he has never smoked. He has never used smokeless tobacco. He reports current alcohol use of about 1.0 standard drink of alcohol per week. He reports that he does not use drugs.    Allergies:  No Known  Allergies    Medications:    Current Facility-Administered Medications:     apixaban (Eliquis) tab 5 mg, 5 mg, Oral, BID    atorvastatin (Lipitor) tab 80 mg, 80 mg, Oral, Nightly    dronedarone (Multaq) tab 400 mg, 400 mg, Oral, BID    melatonin tab 3 mg, 3 mg, Oral, Nightly PRN    acetaminophen (Tylenol Extra Strength) tab 500 mg, 500 mg, Oral, Q4H PRN    polyethylene glycol (PEG 3350) (Miralax) 17 g oral packet 17 g, 17 g, Oral, Daily PRN    sennosides (Senokot) tab 17.2 mg, 17.2 mg, Oral, Nightly PRN    bisacodyl (Dulcolax) 10 MG rectal suppository 10 mg, 10 mg, Rectal, Daily PRN    fleet enema (Fleet) 7-19 GM/118ML rectal enema 133 mL, 1 enema, Rectal, Once PRN    ondansetron (Zofran) 4 MG/2ML injection 4 mg, 4 mg, Intravenous, Q6H PRN    prochlorperazine (Compazine) 10 MG/2ML injection 5 mg, 5 mg, Intravenous, Q8H PRN    piperacillin-tazobactam (Zosyn) 3.375 g in dextrose 5% 100 mL IVPB-ADDV, 3.375 g, Intravenous, Q8H    Review of Systems:  CONSTITUTIONAL:  + weakness.   HEENT:  Eyes:  No visual loss. Ears, Nose, Throat:  No hearing loss  SKIN:  No rash or itching.  CARDIOVASCULAR:  No chest pain  RESPIRATORY:  No shortness of breath, cough  GASTROINTESTINAL:  No nausea, vomiting, + diarrhea. No abdominal pain  GENITOURINARY:  No Burning on urination.   NEUROLOGICAL:  No headache  MUSCULOSKELETAL: No back pain  HEMATOLOGIC:  No bleeding  ALLERGIES:  No history of asthma    Physical Exam:  Vital signs: Blood pressure 111/70, pulse 64, temperature 97.4 °F (36.3 °C), temperature source Oral, resp. rate 18, height 190.5 cm (6' 3\"), weight 260 lb (117.9 kg), SpO2 95%.    General: Alert, oriented, NAD, on room air.   HEENT: Moist mucous membranes.   Neck: No lymphadenopathy.  Supple.  Cardiovascular: RRR  Respiratory: Clear to auscultation bilaterally.  No wheezes. No rhonchi.  Abdomen: Soft, nontender, nondistended.   Musculoskeletal: LE edema with chronic appearing stasis changes (per wife erythema is at  baseline)  Integument: No rash.    Laboratory Data:  Recent Labs   Lab 07/17/24  0002   RBC 3.62*   HGB 10.3*   HCT 30.6*   MCV 84.5   MCH 28.5   MCHC 33.7   RDW 14.8   NEPRELIM 16.53*   WBC 18.4*   .0*     Recent Labs   Lab 07/17/24  0002   *   BUN 24*   CREATSERUM 1.04   CA 8.7   ALB 2.9*   *   K 2.9*      CO2 26.0   ALKPHO 79   AST 44*   ALT 27   BILT 1.0   TP 6.3       Microbiology: Reviewed in EMR    Radiology: Reviewed.    PROCEDURE:  XR CHEST AP PORTABLE  (CPT=71045)     TECHNIQUE:  AP chest radiograph was obtained.     COMPARISON:  [facility] , XR, XR CHEST AP PORTABLE  (CPT=71045), 2/17/2024, 3:54 PM.     INDICATIONS:  SOB     PATIENT STATED HISTORY: (As transcribed by Technologist)  Patient offered no additional history at this time.         FINDINGS:       Mildly enlarged cardiac silhouette.     Median sternotomy hardware is noted.     No consolidation, pleural effusion or pneumothorax.     Impression:    CONCLUSION:  No consolidation.     Agree with preliminary radiology report from [facility].     LOCATION:  [place]     Dictated by (CST): Justino Jeter MD on 7/17/2024 at 8:16 AM      Finalized by (CST): Justino Jeter MD on 7/17/2024 at 8:16 AM         PROCEDURE:  CT CHEST+ABDOMEN+PELVIS(ALL CNTRST ONLY)(CPT=71260/97937)     COMPARISON:  None.     INDICATIONS:  fever/hypoxia     TECHNIQUE:  IV contrast-enhanced scanning through the chest, abdomen, and pelvis was performed.  Dose reduction techniques were used. Dose information is transmitted to the ACR (American College of Radiology) NRDR (National Radiology Data Registry) which   includes the Dose Index Registry.     PATIENT STATED HISTORY:(As transcribed by Technologist)  fever, hypoxia      CONTRAST USED:  100 mLcc of Isovue 370     FINDINGS:       CHEST:    LUNGS:  There is image degradation due to motion.  No focal consolidation.  Lingular and left lower lobe linear densities are most consistent with atelectasis  and or scar.    MEDIASTINUM:  No mass or adenopathy.  Mediastinal clips status post CABG.  YASMINE:  No mass or adenopathy.    CARDIAC:  Enlarged.  Status post CABG.  Aortic valve replacement.  No pericardial effusion.  PLEURA:  No mass or effusion.    CHEST WALL:  No mass or axillary adenopathy.  Sternotomy wires status post CABG.  AORTA:  Atherosclerosis.  No aneurysm or dissection.    VASCULATURE:  No visible pulmonary arterial thrombus or attenuation.       ABDOMEN/PELVIS:  LIVER:  No enlargement, atrophy, abnormal density, or significant focal lesion.    BILIARY:  Possible sludge and or small stones within the gallbladder.  Sonogram can be performed for further evaluation as clinically indicated.  No visible dilatation.    PANCREAS:  No lesion, fluid collection, ductal dilatation, or atrophy.    SPLEEN:  No enlargement or focal lesion.    KIDNEYS:  Small 1.5 cm cyst within the right superior pole for which no further follow-up is required.  No mass, obstruction, or calcification.    ADRENALS:  No mass or enlargement.    AORTA:  Infrarenal IVC filter.  There is thrombus within and inferior to the filter extending to the common iliac veins.  Prominent edema within the pelvic fat planes probably related to venous congestion.  Stranding surrounds the superior mesenteric  artery with suggestion of focal irregularity, best seen on images 53-56 of series 9.  Atherosclerosis.  No aneurysm or dissection.    RETROPERITONEUM:  No mass or adenopathy.    BOWEL/MESENTERY:  Normal caliber small and large bowel.    ABDOMINAL WALL:  No mass or hernia.    URINARY BLADDER:  Bladder is nearly empty most likely accounts for diffuse bladder wall thickening given no signs cystitis per ED MD. No visible focal wall thickening, lesion, or calculus.    PELVIC NODES:  No adenopathy.    PELVIC ORGANS:  Pelvic organs appropriate for patient age.    BONES:  Degenerative change predominantly involves the lower lumbar spine.  No fracture.        Impression:    CONCLUSION:    1. Thrombus within and inferior to IVC filter extending to the common iliac veins.  Prominent edema within the pelvic fat planes, probably related to venous congestion.  2. Stranding surrounds the superior mesenteric artery with appearance focal irregularity.  Findings may represent motion artifact, however focal dissection cannot be excluded.  CTA or MRA can be performed for further evaluation as clinically indicated.  3. Please see details as above.     ED M.D. notified of these findings with preliminary radiology report from [facility].     LOCATION:  [place]     Dictated by (CST): Latoya Alberts MD on 7/17/2024 at 7:00 AM      Finalized by (CST): Latoya Alberts MD on 7/17/2024 at 7:44 AM    ASSESSMENT:  Fevers, possibly related to #2. R/o BSI in the setting of AVR.   UA negative. VRP negative. 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  Leukocytosis  CAD, s/p CABG; CHF; AVR, HTN, HLD  H/o ICH  CHAZ    PLAN:  - continue IV Zosyn  - follow blood cultures  - follow temps and wbc  - check c. Diff if further diarrhea  - noted plans for vascular to see  - reviewed labs, micro, imaging reports, available old records    Discussed case with RN, Dr. Painting, patient and patient's wife.     Thank you for allowing us to participate in the care of this patient. Please do not hesitate to call if you have any questions.   We will continue to follow with you and will make further recommendations based on his progress.    MUNA Cruz   [facility]  [phone]  7/17/2024    ID ATTENDING ADDENDUM     Pt seen an examined independently. Chart reviewed. Agree with above. Note has been reviewed by me and modified as needed.  Exam and Impression/ Recs as noted above.  Imaging shows IVC thrombosis with ? Of SMA thrombosis or dissection, however pt w/o pain and abd exam benign.   Also at risk for bacterial translocation from  bowel if SMA thrombus or dissection present.  Await vascular eval, follow Bcx. Cont zosyn for now. Follow temps and wbc    D/w staff and with pt and wife. D/w Dr Miller, pt and wife report dysarthria since yesterday, she will order CT head.  More than 50% of clinical time and 100% of the clinical decision making performed by me.    Maren Painting MD

## 2024-07-17 NOTE — ED INITIAL ASSESSMENT (HPI)
Patient began having low grade fevers per the wife that have been getting progressively worse over the last few days. Patient was given tylenol earlier in the evening by the wife.

## 2024-07-17 NOTE — ED QUICK NOTES
Orders for admission, patient is aware of plan and ready to go upstairs. Any questions, please call ED RN johnathan  at extension [phone].     Patient Covid vaccination status: Fully vaccinated     COVID Test Ordered in ED: SARS-CoV-2/Flu A and B/RSV by PCR (GeneXpert)    COVID Suspicion at Admission: N/A    Running Infusions:    sodium chloride      potassium    Mental Status/LOC at time of transport: a/o x 3    Other pertinent information: ns 3537 ordered, bag 3 hanging  CIWA score: N/A   NIH score:  N/A

## 2024-07-17 NOTE — H&P
[facility]     Kris Colvin Patient Status:  Emergency    3/6/1960 MRN [id number]   Location [facility] EMERGENCY DEPARTMENT Attending Anntia Magana MD   Hosp Day # 0 PCP Lenka Guevara DO     Chief Complaint: Fever    Subjective:    History of Present Illness:     Kris Colvin is a 64 year old male with history of atrial fibrillation hemorrhagic stroke, hypertension, hyperlipidemia, chronic HFpEF, DVT presents to the emergency room with fever that started 3 days ago.  This started out being low-grade fevers around 100 and then last night spiked up to 102.  Patient's been having some generalized weakness poor appetite.  No chills, nausea, vomiting, diarrhea.  No abdominal pain, back pain, chest pain, shortness of breath.  No headache, cough.  No pain or soreness in the muscles or joints.  No hematochezia, melena, hematuria, dysuria.  No dizziness, lightheadedness, or syncope.    History/Other:    Past Medical History:  Past Medical History:    Cataract    High blood pressure    High cholesterol    ICH (intracerebral hemorrhage) (HCC)    Obstructive sleep apnea, adult    autoPAP 5-12     Stroke (HCC)     Past Surgical History:   Past Surgical History:   Procedure Laterality Date    Cabg      Cataract      Colonoscopy N/A 2018    Procedure: COLONOSCOPY, POSSIBLE BIOPSY, POSSIBLE POLYPECTOMY 30014;  Surgeon: Zack Giordano MD;  Location: [facility]      Family History:   Family History   Problem Relation Age of Onset    Breast Cancer Mother     Diabetes Father     Diabetes Maternal Grandmother     Diabetes Paternal Grandmother      Social History:    reports that he has never smoked. He has never used smokeless tobacco. He reports that he does not currently use alcohol after a past usage of about 1.7 standard drinks of alcohol per week. He reports that he does not use drugs.     Allergies: No Known Allergies    Medications:    No current facility-administered  medications on file prior to encounter.     Current Outpatient Medications on File Prior to Encounter   Medication Sig Dispense Refill    apixaban 5 MG Oral Tab Take 1 tablet (5 mg total) by mouth 2 (two) times daily. 60 tablet 11    cyanocobalamin 1000 MCG Oral Tab Vitamin B-12  1,000 mcg tablet, [RxNorm: 512462]      furosemide 20 MG Oral Tab Take 1 tablet (20 mg total) by mouth every other day.      spironolactone 25 MG Oral Tab       Cholecalciferol 25 MCG (1000 UT) Oral Tab Take 25 mcg by mouth daily.      atorvastatin 80 MG Oral Tab TAKE 1 TABLET BY MOUTH EVERY NIGHT 90 tablet 0    MULTAQ 400 MG Oral Tab Take 1 tablet (400 mg total) by mouth 2 (two) times daily.      carvedilol 6.25 MG Oral Tab Take 1 tablet (6.25 mg total) by mouth 2 (two) times daily.      Multiple Vitamins-Minerals (MULTI FOR HER 50+) Oral Tab Take 1 tablet by mouth daily.         Review of Systems:   A comprehensive review of systems was completed.    Pertinent positives and negatives noted in the HPI.    Objective:   Physical Exam:    /65   Pulse 63   Temp 97.7 °F (36.5 °C) (Rectal)   Resp 18   Ht 6' 3\" (1.905 m)   Wt 260 lb (117.9 kg)   SpO2 95%   BMI 32.50 kg/m²   General: No acute distress, Alert  Respiratory: No rhonchi, no wheezes  Cardiovascular: S1, S2. Regular rate and rhythm  Abdomen: Soft, Non-tender, non-distended, positive bowel sounds  Neuro: No new focal deficits  Extremities: No edema      Results:    Labs:      Labs Last 24 Hours:    Recent Labs   Lab 07/17/24  0002   RBC 3.62*   HGB 10.3*   HCT 30.6*   MCV 84.5   MCH 28.5   MCHC 33.7   RDW 14.8   NEPRELIM 16.53*   WBC 18.4*   .0*       Recent Labs   Lab 07/17/24  0002   *   BUN 24*   CREATSERUM 1.04   EGFRCR 81   CA 8.7   ALB 2.9*   *   K 2.9*      CO2 26.0   ALKPHO 79   AST 44*   ALT 27   BILT 1.0   TP 6.3       Lab Results   Component Value Date    INR 1.28 (H) 02/23/2024    INR 1.07 02/17/2024    INR 1.80 (H) 10/22/2020       No  results for input(s): \"TROP\", \"TROPHS\", \"CK\" in the last 168 hours.    No results for input(s): \"TROP\", \"PBNP\" in the last 168 hours.    No results for input(s): \"PCT\" in the last 168 hours.    Imaging: Imaging data reviewed in Epic.    Assessment & Plan:      # Sepsis  - Unknown source  - Imaging negative  - UA, COVID negative  - Will check procalcitonin  -Will order expanded viral panel  -Blood cultures pending  -Continue Zosyn  -Patient got 3.5 L of IV fluid in the ER per sepsis bolus.  Will hold off on further IV fluids due to history of heart failure.    # Chronic atrial fibrillation  -Will continue on Multaq for rate control  -Will continue on Eliquis for anticoagulation    # Hx  stroke/ ICH  - continue statin therapy    # Hx DVT  - Continue on eliquis    # Hypertension  - Hold coreg due to hypotension.    # HFpEF  - Echo in 2/2024 shows EF of 50-55% with grade 1 diastolic dysfunction.  -Coreg Lasix and spironolactone on hold due to hypotension.    # Hyperlipidemia  -Will continue on statin therapy.      Plan of care discussed with patient at bedside.    Gualberto Lyon, DO    Supplementary Documentation:     The 21st Century Cures Act makes medical notes like these available to patients in the interest of transparency. Please be advised this is a medical document. Medical documents are intended to carry relevant information, facts as evident, and the clinical opinion of the practitioner. The medical note is intended as peer to peer communication and may appear blunt or direct. It is written in medical language and may contain abbreviations or verbiage that are unfamiliar.

## 2024-07-17 NOTE — PHYSICAL THERAPY NOTE
PT order received and chart reviewed. Attempted to see pt and pt politely refused therapy services today due to feeling weak. With encouragement and education on the benefits of OOB activity, pt and family continued to refuse politely. RN made aware. PT will continue to follow.

## 2024-07-17 NOTE — ED PROVIDER NOTES
Patient Seen in: [facility] Emergency Department      History     Chief Complaint   Patient presents with    Fever     Stated Complaint:     Subjective:   HPI    64-year-old male with a past medical history as below including hypertension, hyperlipidemia, stroke/ICH and DVT on Xarelto brought by EMS due to worsening fever that started 2 to 3 days ago.  Wife states patient was initially having intermittent low-grade fevers around 100 but it spiked up to 102F this evening.  She states patient has been generally weak and having a poor appetite.  Patient mitts to feeling weak but denies any other specific complaints.  He denies cough or cold symptoms.  He denies urinary symptoms.  He denies abdominal pain, nausea or vomiting.  Wife states he has some chronic swelling with brawny discoloration and redness in his lower legs that is unchanged.    Objective:   Past Medical History:    Cataract    High blood pressure    High cholesterol    ICH (intracerebral hemorrhage) (HCC)    Obstructive sleep apnea, adult    autoPAP 5-12     Stroke (HCC)              Past Surgical History:   Procedure Laterality Date    Cabg      Cataract      Colonoscopy N/A 11/29/2018    Procedure: COLONOSCOPY, POSSIBLE BIOPSY, POSSIBLE POLYPECTOMY 25426;  Surgeon: Zack Giordano MD;  Location: [facility]                Social History     Socioeconomic History    Marital status:    Tobacco Use    Smoking status: Never    Smokeless tobacco: Never   Vaping Use    Vaping status: Never Used   Substance and Sexual Activity    Alcohol use: Not Currently     Alcohol/week: 1.7 standard drinks of alcohol     Types: 2 Cans of beer per week     Comment: Occastional    Drug use: No   Other Topics Concern    Caffeine Concern No     Comment: occasionally    Exercise No     Social Determinants of Health     Food Insecurity: Low Risk  (3/19/2024)    [facility]    Food Insecurity      Have there been times that your food ran out, and you didn't have money to get more?: No     Are there times that you worry that this might happen?: No   Transportation Needs: Low Risk  (3/19/2024)    Received from [facility]    Transportation Needs     Has lack of transportation kept you from medical appointments, meetings, work, or from getting things needed for daily living: No   Physical Activity: Sufficiently Active (3/17/2020)    [facility]    Exercise Vital Sign     Days of Exercise per Week: 6 days     Minutes of Exercise per Session: 60 min   Housing Stability: Low Risk  (3/19/2024)    Received from [facility]    Housing Stability     Are you concerned about having a safe and reliable place to live?: No              Review of Systems    Positive for stated Chief Complaint: Fever    Other systems are as noted in HPI.  Constitutional and vital signs reviewed.      All other systems reviewed and negative except as noted above.    Physical Exam     ED Triage Vitals [07/16/24 2358]   /76   Pulse 91   Resp 18   Temp (!) 102.3 °F (39.1 °C)   Temp src Oral   SpO2 96 %   O2 Device Nasal cannula       Current Vitals:   Vital Signs  BP: 108/65  Pulse: 63  Resp: 18  Temp: 97.7 °F (36.5 °C)  Temp src: Rectal  MAP (mmHg): 79    Oxygen Therapy  SpO2: 95 %  O2 Device: None (Room air)  O2 Flow Rate (L/min): 2 L/min            Physical Exam  Vitals and nursing note reviewed.   Constitutional:       General: He is not in acute distress.     Appearance: He is well-developed.   HENT:      Head: Normocephalic and atraumatic.      Mouth/Throat:      Mouth: Mucous membranes are moist.   Eyes:      General: No scleral icterus.     Extraocular Movements: Extraocular movements intact.   Cardiovascular:      Rate and Rhythm: Normal rate and regular rhythm.   Pulmonary:       Effort: Pulmonary effort is normal.      Breath sounds: Normal breath sounds.   Abdominal:      Palpations: Abdomen is soft.      Tenderness: There is no abdominal tenderness.   Musculoskeletal:      Cervical back: Neck supple.   Skin:     General: Skin is warm and dry.      Capillary Refill: Capillary refill takes less than 2 seconds.   Neurological:      Mental Status: He is alert and oriented to person, place, and time.      GCS: GCS eye subscore is 4. GCS verbal subscore is 5. GCS motor subscore is 6.   Psychiatric:         Mood and Affect: Mood normal.         Behavior: Behavior normal.               ED Course     Labs Reviewed   COMP METABOLIC PANEL (14) - Abnormal; Notable for the following components:       Result Value    Glucose 122 (*)     Sodium 135 (*)     Potassium 2.9 (*)     BUN 24 (*)     AST 44 (*)     Albumin 2.9 (*)     A/G Ratio 0.9 (*)     All other components within normal limits   URINALYSIS WITH CULTURE REFLEX - Abnormal; Notable for the following components:    Blood Urine Trace (*)     Protein Urine 30 (*)     Urobilinogen Urine 2 (*)     All other components within normal limits   POCT GLUCOSE - Abnormal; Notable for the following components:    POC Glucose 130 (*)     All other components within normal limits   CBC W/ DIFFERENTIAL - Abnormal; Notable for the following components:    WBC 18.4 (*)     RBC 3.62 (*)     HGB 10.3 (*)     HCT 30.6 (*)     .0 (*)     Neutrophil Absolute Prelim 16.53 (*)     Neutrophil Absolute 16.53 (*)     Lymphocyte Absolute 0.87 (*)     All other components within normal limits   LACTIC ACID, PLASMA - Normal   SARS-COV-2/FLU A AND B/RSV BY PCR (GENEXPERT) - Normal    Narrative:     This test is intended for the qualitative detection and differentiation of SARS-CoV-2, influenza A, influenza B, and respiratory syncytial virus (RSV) viral RNA in nasopharyngeal or nares swabs from individuals suspected of respiratory viral infection consistent with  COVID-19 by their healthcare provider. Signs and symptoms of respiratory viral infection due to SARS-CoV-2, influenza, and RSV can be similar.    Test performed using the Xpert Xpress SARS-CoV-2/FLU/RSV (real time RT-PCR)  assay on the GeneXpert instrument, [employer], [employer] [place] [address].   This test is being used under the Food and Drug Administration's Emergency Use Authorization.    The authorized Fact Sheet for Healthcare Providers for this assay is available upon request from the laboratory.   CBC WITH DIFFERENTIAL WITH PLATELET    Narrative:     The following orders were created for panel order CBC With Differential With Platelet.  Procedure                               Abnormality         Status                     ---------                               -----------         ------                     CBC W/ DIFFERENTIAL[[id number]]          Abnormal            Final result                 Please view results for these tests on the individual orders.   PROCALCITONIN   RAINBOW DRAW LAVENDER   RAINBOW DRAW LIGHT GREEN   RAINBOW DRAW BLUE   BLOOD CULTURE   BLOOD CULTURE     EKG    Rate, intervals and axes as noted on EKG Report.  Rate: 87  Rhythm: Sinus Rhythm  Reading: Normal sinus rhythm, LAFB, minimal criteria for LVH                   Preliminary Radiology Report  [facility], [facility] [place]  [phone] - Phone    [facility]    NAME: LARRY KNAPP    DATE OF EXAM: 07/17/2024  Patient No:  [id number]  Physician:  CHRISTOPHER^YAYO^ZULEYKA  YOB: 1960    Past Medical History (entered by Technologist):    Reason For Exam (entered by Technologist):  fever, hypoxia  Other Notes (entered by Technologist): no priors, [phone] Christopher    Additional Information (per [facility]iologist): Septic.  Hypoxia      CT chest abdomen and pelvis with contrast    IMPRESSION:  Chest:  No intrathoracic source of sepsis  No evidence for pneumonia or pulmonary edema  Mild bibasilar atelectasis    No pleural effusion or  pneumothorax  Previous median sternotomy, CABG and aortic valve replacement        Abdomen and pelvis:  Urinary bladder wall is thickened with perivesical fat stranding  Possible cystitis  However, in discussion with with ER no signs of urinary tract infection    No other appreciable source for intra-abdominal sepsis    No obstructing ureteral calculus, hydronephrosis or signs of pyelonephritis    No acute bowel findings.  No obstruction, appreciable inflammatory process, or perforation  Stomach appears within normal limits  Possible sludge in the gallbladder.  No radiodense gallstones.  No clear secondary findings to suggest cholecystitis  No evidence for pancreatitis    Infrarenal IVC filter  Thrombus within the filter and inferior to the filter within the IVC and common iliac veins  Prominent edema within the pelvic fat planes, probably related to venous congestion    Moderate amount of stranding surrounding the SMA  Apparent focal irregularity of the SMA lumen as can be seen on series 9 images 53-56  May signify motion artifact.  Focal SMA dissection would be difficult to exclude  This could be more definitively evaluated with CTA or MRA       Right renal cyst  Atherosclerotic calcifications abdominal aorta and iliofemoral vessels  Degenerative changes in the spine, pelvis, and hips      Case discussed with   at 5 AM ET    Fareed Wilson M.D.  This report has been electronically signed and verified by the Radiologist whose name is printed above.         A total of 52 minutes of critical care time (exclusive of billable procedures) was administered to manage the patient's cardiovascular instability due to his sepsis.  This involved direct patient intervention, complex decision making, and/or extensive discussions with the patient, family, and clinical staff.    MDM   64-year-old male with a past medical history as below including hypertension, hyperlipidemia, stroke/ICH and DVT on Xarelto brought by EMS  due to worsening fever that started 2 to 3 days ago.      Differential includes but is not limited to sepsis, UTI, pneumonia, viral respiratory infection, dehydration, electrolyte abnormality    Labs show elevated WBC 18 K with left shift, lactic acid is normal.  Potassium level is 2.9 with normal renal function.  UA without evidence of UTI.    Independent interpretation of chest x-ray shows no evidence of pneumonia.  Radiology report reviewed as above.    Due to unclear source of fever, CT chest/abdomen/pelvis obtained.  Independent interpretation shows no pneumonia or infectious process in the abdomen.  Discussed with radiologist and report reviewed as above.  Radiologist notes thrombus in the IVC and some prominent edema within the pelvic fat planes likely related to venous congestion.  No pneumonia or other infectious cause was noted.    Patient's blood pressure was initially 120/76 however dropped to the 90s over 60s.  Patient covered with 30 mill per KG fluid bolus and treated with sepsis.  Will admit for further workup and management.  Discussed with hospitalist Dr. Pereyra.         [facility]   part of Located within [facility]      Sepsis Reassessment Note    /65   Pulse 63   Temp 97.7 °F (36.5 °C) (Rectal)   Resp 18   Ht 190.5 cm (6' 3\")   Wt 117.9 kg   SpO2 95%   BMI 32.50 kg/m²      I completed the sepsis reassessment at 4:19 AM      Cardiac:  Regularity: Regular  Rate: Normal  Heart Sounds: S1,S2    Lungs:   Right: Clear  Left: Clear    Peripheral Pulses:  Radial: Left 1+      Capillary Refill:  <3 Secs    Skin:  Temp/Moisture: Dry  Color: Normal      Annita Magana MD  7/17/2024  4:18 AM         Medical Decision Making  Amount and/or Complexity of Data Reviewed  Independent Historian: spouse  Labs: ordered.  Radiology: ordered and independent interpretation performed. Decision-making details documented in ED Course.  ECG/medicine tests: ordered and independent interpretation performed.  Decision-making details documented in ED Course.  Discussion of management or test interpretation with external provider(s): Hospitalist, radiologist    Risk  Decision regarding hospitalization.        Disposition and Plan     Clinical Impression:  1. Sepsis without acute organ dysfunction, due to unspecified organism (HCC)    2. Acute febrile illness         Disposition:  Admit  7/17/2024  3:57 am    Follow-up:  No follow-up provider specified.        Medications Prescribed:  Current Discharge Medication List                            Hospital Problems       Present on Admission  Date Reviewed: 5/31/2024            ICD-10-CM Noted POA    * (Principal) Sepsis without acute organ dysfunction, due to unspecified organism (HCC) A41.9 7/17/2024 Unknown

## 2024-07-17 NOTE — PLAN OF CARE
Patient is alert and orientated, slurred speech due to history of stroke. VSS, RA, afebrile, complained of headache and neck pain relieved with PO Tylenol. Consulted speech for swallow evaluation for aspiration risk. CTA of abdomen and CT of brain completed. Vascular and ID consulted. IV abx infusing. Spouse at bedside. Call light and safety precautions in place.     Problem: RISK FOR INFECTION - ADULT  Goal: Absence of fever/infection during anticipated neutropenic period  Description: INTERVENTIONS  - Monitor WBC  - Administer growth factors as ordered  - Implement neutropenic guidelines  Outcome: Progressing     Problem: SAFETY ADULT - FALL  Goal: Free from fall injury  Description: INTERVENTIONS:  - Assess pt frequently for physical needs  - Identify cognitive and physical deficits and behaviors that affect risk of falls.  - [place] fall precautions as indicated by assessment.  - Educate pt/family on patient safety including physical limitations  - Instruct pt to call for assistance with activity based on assessment  - Modify environment to reduce risk of injury  - Provide assistive devices as appropriate  - Consider OT/PT consult to assist with strengthening/mobility  - Encourage toileting schedule  Outcome: Progressing

## 2024-07-17 NOTE — CONSULTS
Samer F. Najjar, MD.    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  The patient was admitted 2 days ago with 3 days of fever and generalized weakness.  His last fever was 102.7.  He underwent a CT scanning of the abdomen that revealed distal focal SMA thrombus or a possible dissection with mild nonspecific stranding. The study was not very clear due to motion artifact.  Patient was also noted to have thrombosis of the distal IVC below the IVC filter.  He has a history of atrial fibrillation, hemorrhagic stroke CABG with AVR hypertension, hyperlipidemia, chronic HFpEF, DVT, status post an IVC filter.  He denies any abdominal pain, back pain, chest pain, shortness of breath.  He was noted to have a white count of 17.4.  He was admitted and started on IV antibiotics.  Blood cultures were positive for Staph epidermidis.    PAST MEDICAL HISTORY:    Past Medical History:    Arrhythmia    Cataract    Congestive heart disease (HCC)    Deep vein thrombosis (HCC)    Depression    Disorder of liver    Heart valve disease    High blood pressure    High cholesterol    ICH (intracerebral hemorrhage) (HCC)    Muscle weakness    Obstructive sleep apnea, adult    autoPAP 5-12     Sleep apnea    Stroke (HCC)    Visual impairment       PAST SURGICAL HISTORY:   Past Surgical History:   Procedure Laterality Date    Cabg      Cataract      Colonoscopy N/A 2018    Procedure: COLONOSCOPY, POSSIBLE BIOPSY, POSSIBLE POLYPECTOMY 98673;  Surgeon: Zack Giordano MD;  Location: [facility]       MEDICATIONS:     Current Facility-Administered Medications:      apixaban (Eliquis) tab 5 mg, 5 mg, Oral, BID    atorvastatin (Lipitor) tab 80 mg, 80 mg, Oral, Nightly    dronedarone (Multaq) tab 400 mg, 400 mg, Oral, BID    melatonin tab 3 mg, 3 mg, Oral, Nightly PRN    acetaminophen (Tylenol Extra Strength) tab 500 mg, 500 mg, Oral, Q4H PRN    polyethylene glycol (PEG 3350) (Miralax) 17 g oral packet 17 g, 17 g, Oral, Daily PRN    sennosides (Senokot) tab 17.2 mg, 17.2 mg, Oral, Nightly PRN    bisacodyl (Dulcolax) 10 MG rectal suppository 10 mg, 10 mg, Rectal, Daily PRN    fleet enema (Fleet) 7-19 GM/118ML rectal enema 133 mL, 1 enema, Rectal, Once PRN    ondansetron (Zofran) 4 MG/2ML injection 4 mg, 4 mg, Intravenous, Q6H PRN    prochlorperazine (Compazine) 10 MG/2ML injection 5 mg, 5 mg, Intravenous, Q8H PRN    piperacillin-tazobactam (Zosyn) 3.375 g in dextrose 5% 100 mL IVPB-ADDV, 3.375 g, Intravenous, Q8H    ALLERGIES:    He has No Known Allergies.    SOCIAL HISTORY:    Patient  reports that he has never smoked. He has never used smokeless tobacco. He reports current alcohol use of about 1.0 standard drink of alcohol per week. He reports that he does not use drugs.    FAMILY HISTORY:    Patient's family history includes Breast Cancer in his mother; Diabetes in his father, maternal grandmother, and paternal grandmother.    ROS:    Comprehensive ROS reviewed and negative except for what's stated above.  Including negative for chest pain, shortness of breath, syncope.     EXAM:  /73 (BP Location: Right arm)   Pulse 79   Temp 98 °F (36.7 °C) (Oral)   Resp 20   Ht 6' 3\" (1.905 m)   Wt 260 lb (117.9 kg)   SpO2 95%   BMI 32.50 kg/m²   GENERAL: alert and orientated   NEURO/PSYCH: normal mood and affect.  The right side of his body is paralyzed  NECK: supple   CAROTID: No bruits  RESPIRATORY: no rales, rhonchi, or wheezes B  CARDIO: RRR without murmur, no murmur, no gallop   ABDOMEN: soft, non-tender with no palpable aneurysm or masses  EXTREMITIES: no  tenderness  VASCULAR:        Femoral Popliteal DP PT Peroneal Edema   Right 1+       biphasic signal biphasic signal       none   Left 1+       biphasic signal biphasic signal       none        no sensory or motor deficits      Diagnostic Data:      LABS:  Recent Labs   Lab 07/17/24  0002   WBC 18.4*   HGB 10.3*   MCV 84.5   .0*       Recent Labs   Lab 07/17/24  0002   *   K 2.9*      CO2 26.0   BUN 24*   CREATSERUM 1.04   *   CA 8.7     No results for input(s): \"PTP\", \"INR\", \"PTT\" in the last 168 hours.  Recent Labs   Lab 07/17/24  0002   ALT 27   AST 44*   ALB 2.9*     No results for input(s): \"TROP\" in the last 168 hours.  No results found for: \"ANAS\", \"ANDRE\", \"ANASCRN\"  No results for input(s): \"PCACT\", \"PSACT\", \"AT3ACT\", \"HIPAB\", \"PATHI\", \"STALA\", \"DRVVTRATIO\", \"DRVVT\", \"STACLOT\", \"CARIGG\", \"M2KM4PFDPQ\", \"H7PK7IPMAS\", \"RA\", \"HAVIGM\", \"HBCIGM\", \"HCVAB\", \"HBSAG\", \"HBCAB\", \"HBVDNAINTERP\", \"ANAS\", \"C3\", \"C4\" in the last 168 hours.    Lab Results   Component Value Date    HGB 10.3 (L) 07/17/2024    HGB 13.1 05/31/2024    HGB 13.1 02/25/2024    HGB 13.6 02/24/2024    HGB 13.6 02/23/2024    HGB 14.8 02/21/2024    CREATSERUM 1.04 07/17/2024    CREATSERUM 1.13 05/31/2024    CREATSERUM 1.03 02/24/2024    CREATSERUM 1.19 02/23/2024    CREATSERUM 1.57 (H) 02/22/2024    CREATSERUM 1.19 02/21/2024           Radiology: CTA ABD/PEL (CPT=74174)    Result Date: 7/17/2024  PROCEDURE:  CTA ABD/PEL (CPT=74174)  COMPARISON:  [facility] , CT, CT CHEST+ABDOMEN+PELVIS(ALL CNTRST ONLY)(CPT=71260/07094), 7/17/2024, 2:34 AM.  INDICATIONS:  possible SMA dissection  TECHNIQUE:  CT images of the abdomen and pelvis were obtained pre- and post- injection of non-ionic intravenous contrast material. Multi-planar reformatted/3-D images were created to optimize visualization of vascular anatomy.  Dose reduction techniques were used. Dose information is transmitted to the ACR (American College of Radiology) NRDR (National  Radiology Data Registry) which includes the Dose Index Registry.  PATIENT STATED HISTORY:(As transcribed by Technologist)  fevers, recent ct cap 250 am. evaluate SMA dissection   CONTRAST USED:  100cc of Isovue 370  FINDINGS:  This is a follow-up to CT performed at 7/17/2024 at 0234 hours.  AORTA/VASCULAR:  Exam is degraded by motion artifact.  There is soft tissue stranding surrounding the SMA at the site of vessel narrowing with subsequent lack of intraluminal contrast, series 9, images 167 through 204. There is subsequent opacification of more distal SMA branches.  Findings knee or SMA thrombosis.  A localized dissection cannot be excluded.  The origin of the SMA is widely patent.  Celiac artery is patent.  DAPHNE is well visualized and patent.  There is dense calcific plaque at the origins of the main renal arteries bilaterally.  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  LIVER:  Stable CT appearance of the liver BILIARY:  No biliary ductal dilatation.  PANCREAS:  Stable.  Homogeneous enhancement SPLEEN:  Normal caliber KIDNEYS:  Stable.  No hydronephrosis. ADRENALS:  Stable.  RETROPERITONEUM:  Stable scattered periaortic lymph nodes. BOWEL/MESENTERY:  There is soft tissue stranding and edema in the mesentery.  There is colonic diverticulosis.  No evidence of dilated small bowel loops.  There are air-fluid levels in the sigmoid colon with suggestion of mild wall thickening of the rectosigmoid colon.  There is soft tissue stranding in the pericolic gutters bilaterally.  There is a small amount of free fluid in soft tissue stranding in the presacral space.  PELVIC ORGANS:  Circumferential wall thickening in a partially decompressed urinary bladder.  BONES:  For trophic degenerative changes lumbar spine.  Benign appearing cyst L4.  Degenerative disc disease L3-4 L4-5.            CONCLUSION:  There is lack of  intraluminal contrast involving the SMA at the level of the central mesentery with surrounding soft tissue stranding and mesenteric edema with reconstitution of more distal SMA branches.  Findings concerning for SMA thrombosis versus localized dissection as questioned on 7/17/2024 CT performed earlier in the day.  Findings phoned to nurse Vida on 7/17/2024.  There is soft tissue stranding and small amount of free fluid in the mesentery as well as soft tissue stranding and a small amount of free fluid in the presacral space and pericolic gutters.  Thrombosis of the infrarenal IVC unchanged.   LOCATION:  [facility]   Dictated by (CST): Court Kline MD on 7/17/2024 at 10:53 AM     Finalized by (CST): Court Kline MD on 7/17/2024 at 11:04 AM       XR CHEST AP PORTABLE  (CPT=71045)    Result Date: 7/17/2024  PROCEDURE:  XR CHEST AP PORTABLE  (CPT=71045)  TECHNIQUE:  AP chest radiograph was obtained.  COMPARISON:  [facility] , XR, XR CHEST AP PORTABLE  (CPT=71045), 2/17/2024, 3:54 PM.  INDICATIONS:  SOB  PATIENT STATED HISTORY: (As transcribed by Technologist)  Patient offered no additional history at this time.    FINDINGS:   Mildly enlarged cardiac silhouette.  Median sternotomy hardware is noted.  No consolidation, pleural effusion or pneumothorax.            CONCLUSION:  No consolidation.  Agree with preliminary radiology report from [facility].    LOCATION:  [facility]      Dictated by (CST): Justino Jeter MD on 7/17/2024 at 8:16 AM     Finalized by (CST): Justino Jeter MD on 7/17/2024 at 8:16 AM       CT CHEST+ABDOMEN+PELVIS(ALL CNTRST ONLY)(CPT=71260/60066)    Result Date: 7/17/2024  PROCEDURE:  CT CHEST+ABDOMEN+PELVIS(ALL CNTRST ONLY)(CPT=71260/29337)  COMPARISON:  None.  INDICATIONS:  fever/hypoxia  TECHNIQUE:  IV contrast-enhanced scanning through the chest, abdomen, and pelvis was performed.  Dose reduction techniques were used. Dose information is transmitted to the ACR (American College of Radiology)  NRDR (National Radiology Data Registry) which  includes the Dose Index Registry.  PATIENT STATED HISTORY:(As transcribed by Technologist)  fever, hypoxia   CONTRAST USED:  100 mLcc of Isovue 370  FINDINGS:   CHEST:  LUNGS:  There is image degradation due to motion.  No focal consolidation.  Lingular and left lower lobe linear densities are most consistent with atelectasis and or scar.  MEDIASTINUM:  No mass or adenopathy.  Mediastinal clips status post CABG. YASMINE:  No mass or adenopathy.  CARDIAC:  Enlarged.  Status post CABG.  Aortic valve replacement.  No pericardial effusion. PLEURA:  No mass or effusion.  CHEST WALL:  No mass or axillary adenopathy.  Sternotomy wires status post CABG. AORTA:  Atherosclerosis.  No aneurysm or dissection.  VASCULATURE:  No visible pulmonary arterial thrombus or attenuation.   ABDOMEN/PELVIS: LIVER:  No enlargement, atrophy, abnormal density, or significant focal lesion.  BILIARY:  Possible sludge and or small stones within the gallbladder.  Sonogram can be performed for further evaluation as clinically indicated.  No visible dilatation.  PANCREAS:  No lesion, fluid collection, ductal dilatation, or atrophy.  SPLEEN:  No enlargement or focal lesion.  KIDNEYS:  Small 1.5 cm cyst within the right superior pole for which no further follow-up is required.  No mass, obstruction, or calcification.  ADRENALS:  No mass or enlargement.  AORTA:  Infrarenal IVC filter.  There is thrombus within and inferior to the filter extending to the common iliac veins.  Prominent edema within the pelvic fat planes probably related to venous congestion.  Stranding surrounds the superior mesenteric artery with suggestion of focal irregularity, best seen on images 53-56 of series 9.  Atherosclerosis.  No aneurysm or dissection.  RETROPERITONEUM:  No mass or adenopathy.  BOWEL/MESENTERY:  Normal caliber small and large bowel.  ABDOMINAL WALL:  No mass or hernia.  URINARY BLADDER:  Bladder is nearly empty  most likely accounts for diffuse bladder wall thickening given no signs cystitis per ED MD. No visible focal wall thickening, lesion, or calculus.  PELVIC NODES:  No adenopathy.  PELVIC ORGANS:  Pelvic organs appropriate for patient age.  BONES:  Degenerative change predominantly involves the lower lumbar spine.  No fracture.             CONCLUSION:  1. Thrombus within and inferior to IVC filter extending to the common iliac veins.  Prominent edema within the pelvic fat planes, probably related to venous congestion. 2. Stranding surrounds the superior mesenteric artery with appearance focal irregularity.  Findings may represent motion artifact, however focal dissection cannot be excluded.  CTA or MRA can be performed for further evaluation as clinically indicated. 3. Please see details as above.  [facility] M.D. notified of these findings with preliminary radiology report from [facility].     LOCATION:  [facility]    Dictated by (CST): Latoya Alberts MD on 7/17/2024 at 7:00 AM     Finalized by (CST): Latoya Alberts MD on 7/17/2024 at 7:44 AM              ASSESSMENT AND PLAN:     The patient is a 64 year old male who presented with fevers and positive blood cultures and an incidental finding of the distal SMA focal occlusion with no abdominal pain prior to admission or during this admission.  On my exam today his abdomen was very soft.  I believe this also may focal thrombus is chronic and is not related to his current presentation.  It would be very unusual for the patient to have an acute SMA thrombus without abdominal pain.  The patient does have a history of atrial fibrillation and certainly this could have embolized in the past.  He is currently on anticoagulation.  I explained to him and his wife that we will intervention is necessary from a vascular surgery standpoint as it appears that he likely developed adequate collateralization.  I will sign off.  The patient indicated an understanding of these issues and agreed to the  plan and all questions were answered.     Thank you for allowing to participate in the patient's care.         Samer F. Najjar, MD    Vascular Surgery

## 2024-07-17 NOTE — ED QUICK NOTES
Assumed care of pt, rectal temp rechecked. Pt awake/alert denies pain at this time. Ivf running as ordered. Wife at bedside.

## 2024-07-18 ENCOUNTER — APPOINTMENT (OUTPATIENT)
Dept: CV DIAGNOSTICS | Facility: HOSPITAL | Age: 64
End: 2024-07-18
Attending: PHYSICIAN ASSISTANT
Payer: COMMERCIAL

## 2024-07-18 LAB
ANION GAP SERPL CALC-SCNC: 4 MMOL/L (ref 0–18)
BASOPHILS # BLD AUTO: 0.09 X10(3) UL (ref 0–0.2)
BASOPHILS NFR BLD AUTO: 0.5 %
BUN BLD-MCNC: 12 MG/DL (ref 9–23)
C DIFF TOX B STL QL: NEGATIVE
CALCIUM BLD-MCNC: 9 MG/DL (ref 8.7–10.4)
CHLORIDE SERPL-SCNC: 108 MMOL/L (ref 98–112)
CO2 SERPL-SCNC: 26 MMOL/L (ref 21–32)
CREAT BLD-MCNC: 0.84 MG/DL
EGFRCR SERPLBLD CKD-EPI 2021: 98 ML/MIN/1.73M2 (ref 60–?)
EOSINOPHIL # BLD AUTO: 0.08 X10(3) UL (ref 0–0.7)
EOSINOPHIL NFR BLD AUTO: 0.4 %
ERYTHROCYTE [DISTWIDTH] IN BLOOD BY AUTOMATED COUNT: 15.7 %
GLUCOSE BLD-MCNC: 109 MG/DL (ref 70–99)
HCT VFR BLD AUTO: 29 %
HGB BLD-MCNC: 10.1 G/DL
IMM GRANULOCYTES # BLD AUTO: 0.23 X10(3) UL (ref 0–1)
IMM GRANULOCYTES NFR BLD: 1.2 %
LYMPHOCYTES # BLD AUTO: 2.2 X10(3) UL (ref 1–4)
LYMPHOCYTES NFR BLD AUTO: 11.7 %
MCH RBC QN AUTO: 29.1 PG (ref 26–34)
MCHC RBC AUTO-ENTMCNC: 34.8 G/DL (ref 31–37)
MCV RBC AUTO: 83.6 FL
MONOCYTES # BLD AUTO: 1.1 X10(3) UL (ref 0.1–1)
MONOCYTES NFR BLD AUTO: 5.8 %
NEUTROPHILS # BLD AUTO: 15.14 X10 (3) UL (ref 1.5–7.7)
NEUTROPHILS # BLD AUTO: 15.14 X10(3) UL (ref 1.5–7.7)
NEUTROPHILS NFR BLD AUTO: 80.4 %
OSMOLALITY SERPL CALC.SUM OF ELEC: 286 MOSM/KG (ref 275–295)
PLATELET # BLD AUTO: 141 10(3)UL (ref 150–450)
POTASSIUM SERPL-SCNC: 3.4 MMOL/L (ref 3.5–5.1)
RBC # BLD AUTO: 3.47 X10(6)UL
SODIUM SERPL-SCNC: 138 MMOL/L (ref 136–145)
WBC # BLD AUTO: 18.8 X10(3) UL (ref 4–11)

## 2024-07-18 PROCEDURE — 93306 TTE W/DOPPLER COMPLETE: CPT | Performed by: PHYSICIAN ASSISTANT

## 2024-07-18 PROCEDURE — 99233 SBSQ HOSP IP/OBS HIGH 50: CPT | Performed by: INTERNAL MEDICINE

## 2024-07-18 RX ORDER — VANCOMYCIN 1.75 GRAM/500 ML IN 0.9 % SODIUM CHLORIDE INTRAVENOUS
15 EVERY 12 HOURS
Status: DISCONTINUED | OUTPATIENT
Start: 2024-07-18 | End: 2024-07-20

## 2024-07-18 NOTE — PROGRESS NOTES
Attempted to see pt but he was out of his room getting his bead CT. Spoke with his wife who stated that he did not have any abdominal pain recently. Will evaluate tomorrow afternoon.
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
## 2024-07-18 NOTE — PHYSICAL THERAPY NOTE
PHYSICAL THERAPY EVALUATION - INPATIENT     Room Number: 408/408-A  Evaluation Date: 7/18/2024  Type of Evaluation: Initial  Physician Order: PT Eval and Treat    Presenting Problem: weakness, sepsis  Co-Morbidities : CVA (02/2024), DVT, HTN, HLD  Reason for Therapy: Mobility Dysfunction and Discharge Planning    Previous Admission:  2/17-2/25: Admitted for Acute intraparenchymal hemorrhage right parietal lobe with mass effect, no midline shift sp DOAC reversal in ER and discharged to [facility] acute rehab      PHYSICAL THERAPY ASSESSMENT   Patient is currently functioning below baseline with bed mobility, transfers, gait, maintaining seated position, and standing prolonged periods.  Prior to admission, patient's baseline is modified independent with household ambulation with rolling walker.  Patient is requiring maximum assist as a result of the following impairments: decreased functional strength, decreased endurance/aerobic capacity, decreased muscular endurance, and medical status.  Physical Therapy will continue to follow for duration of hospitalization.    Patient will benefit from continued skilled PT Services to promote return to prior level of function and safety with continuous assistance and gradual rehabilitative therapy .    PLAN  PT Treatment Plan: Bed mobility;Body mechanics;Energy conservation;Patient education;Family education;Endurance;Gait training;Stair training;Transfer training;Balance training  Rehab Potential : Fair  Frequency (Obs): 3-5x/week  Number of Visits to Meet Established Goals: 5      CURRENT GOALS    Goal #1 Patient is able to demonstrate supine - sit EOB @ level: moderate assistance     Goal #2 Patient is able to demonstrate transfers Sit to/from Stand at assistance level: moderate assistance     Goal #3 Patient is able to ambulate 20 feet with assist device: walker - rolling at assistance level: supervision     Goal #4 Patient is able to navigate 2 stoop steps with  assistance level: minimal assistance and rolling walker in order to enter/exit his home safely.    Goal #5    Goal #6    Goal Comments: Goals established on 7/18/2024      PHYSICAL THERAPY MEDICAL/SOCIAL HISTORY  History related to current admission: Patient is a 64 year old male admitted on 7/16/2024 from home for fevers.  Pt diagnosed with sepsis.      HOME SITUATION  Type of Home: House   Home Layout: Multi-level;Able to live on main level  Stairs to Enter : 2  Railing: No          Lives With: Spouse  Drives: No  Patient Owned Equipment: Rolling walker  Patient Regularly Uses: None    Prior Level of [place]: Pt and wife present to provide history. About a week and a half ago pt began just being able to amb on the first floor with a rolling walker. Pt at the time could navigate 2 steps to get in and out of his garage to get in a vehicle in order to be driven to outpatient therapy. Pt has been in outpatient therapy at [facility] working on cardio, strengthening and mobility for 4 weeks now. Pt described sudden loss of global strength on Sunday 7/14. Wife reports one week ago pt was able to stand and give himself cloth baths and stand at the sink to wash his hands and was able to go to the restroom ind. Pt wife works 9 hours a day and pt was able to be mod I in the home to get food, go to the bathroom and get around. Pt receives assistance with ADLs from wife. Tuesday 7/16 was the last time the patient was up and walking. Pt sits up in a wheelchair at home when he is not moving around. Pt sleeps on sofa in the living room. Pt was unable to transfer out of bed or a recliner.   Pt had a stroke in February of this year and was admitted to [facility] where he spent 3 weeks at for acute rehab. After [facility], pt was admitted at [facility] for rehab where he was practicing walking with a chair follow. Pt received home health PT/OT after that admission.   No history of falls reported.     SUBJECTIVE  \"My R arm is  immobile.\"      OBJECTIVE  Precautions: Bed/chair alarm  Fall Risk: Standard fall risk    WEIGHT BEARING RESTRICTION  Weight Bearing Restriction: None                PAIN ASSESSMENT  Ratin          COGNITION  Orientation Level:  oriented x4  Memory:  appears intact  Following Commands:  follows multistep commands with increased time    RANGE OF MOTION AND STRENGTH ASSESSMENT  UE: See OT assessment for more detail. Due to stroke, R UE is flaccid.     Lower extremity ROM is within functional limits     Lower extremity strength:   R and L Dorsiflexion at least     UE and LE Sensation: intact    BALANCE  Static Sitting: Not tested  Dynamic Sitting: Not tested  Static Standing: Not tested  Dynamic Standing: Not tested    ADDITIONAL TESTS                 Modified [place]: 4                  ACTIVITY TOLERANCE                         O2 WALK       NEUROLOGICAL FINDINGS                        AM-PAC '6-Clicks' INPATIENT SHORT FORM - BASIC MOBILITY  How much difficulty does the patient currently have...  Patient Difficulty: Turning over in bed (including adjusting bedclothes, sheets and blankets)?: Unable   Patient Difficulty: Sitting down on and standing up from a chair with arms (e.g., wheelchair, bedside commode, etc.): Unable   Patient Difficulty: Moving from lying on back to sitting on the side of the bed?: Unable   How much help from another person does the patient currently need...   Help from Another: Moving to and from a bed to a chair (including a wheelchair)?: Total   Help from Another: Need to walk in hospital room?: Total   Help from Another: Climbing 3-5 steps with a railing?: Total       AM-PAC Score:  Raw Score: 6   Approx Degree of Impairment: 100%   Standardized Score (AM-PAC Scale): 23.55   CMS Modifier (G-Code): CN    FUNCTIONAL ABILITY STATUS  Gait Assessment   Functional Mobility/Gait Assessment  Gait Assistance: Not tested  Distance (ft): 0    Skilled Therapy Provided     Bed Mobility:  Rolling:  NT  Supine to long sit: Max Ax1 // pt required handheld assist with the LUE to bring trunk into seated position in long sitting, RUE unable to help due to be flaccid, pt was able to pull up with his LUE and trunk to help get into this position, pt was able to begin moving legs towards side of the bed when cued but remained in long sitting with mod Ax1 to maintain sitting balance // this was done to simulate how pt gets off of the sofa at home  Sit to supine: NT    Transfer Mobility:  Sit to stand: NT   Stand to sit: NT  Gait = NT    Therapist's Comments: Pt received in bed with wife present. Pt reports feeling weak and unable to get up but was agreeable to doing as much as he could with therapy. Pt has slurred speech residual from previous stroke. R arm observed to be flaccid. Pt performed UE exercises and ROM with OT (see note). Pt then performed therapeutic exercise with writer for BLE as described below. Pt then performed supine to long sit as described. Pt left in bed with needs met. Pt encouraged to continue to do in bed exercises while still in the hospital.     Exercise/Education Provided:  Bed mobility  Body mechanics  Energy conservation  Functional activity tolerated  Strengthening  Lower therapeutic exercise:  Ankle pumps  Glut sets  Heel slides  Quad sets  SAQ  SLR    Patient End of Session: In bed;Needs met;Call light within reach;All patient questions and concerns addressed;Alarm set;Family present      Patient Evaluation Complexity Level:  History High - 3 or more personal factors and/or co-morbidities   Examination of body systems Moderate - addressing a total of 3 or more elements   Clinical Presentation Moderate - Evolving   Clinical Decision Making Moderate - Evolving       PT Session Time: 30 minutes    Therapeutic Activity: 5 minutes    Therapeutic Exercise: 15 minutes

## 2024-07-18 NOTE — OCCUPATIONAL THERAPY NOTE
OCCUPATIONAL THERAPY EVALUATION - INPATIENT     Room Number: 408/408-A  Evaluation Date: 7/18/2024  Type of Evaluation: Initial  Presenting Problem: Sepsis without acute organ dysfunction    Physician Order: IP Consult to Occupational Therapy  Reason for Therapy: ADL/IADL Dysfunction and Discharge Planning    OCCUPATIONAL THERAPY ASSESSMENT   Patient is currently functioning below baseline with toileting, bathing, upper body dressing, lower body dressing, grooming, bed mobility, transfers, static sitting balance, dynamic sitting balance, static standing balance, dynamic standing balance, maintaining seated position, and functional standing tolerance. Prior to admission, patient's baseline is assist with ADLs (physical or set up), toileting IND. Patient is requiring maximum assist as a result of the following impairments: decreased functional strength, decreased endurance, decreased muscular endurance, and medical status. Occupational Therapy will continue to follow for duration of hospitalization.    Patient will benefit from continued skilled OT Services to promote return to prior level of function and safety with continuous assistance and gradual rehabilitative therapy      History Related to Current Admission: Patient is a 64 year old male admitted on 7/16/2024 with Presenting Problem: Sepsis without acute organ dysfunction. Co-Morbidities : CVA (02/2024), DVT, HTN, HLD    WEIGHT BEARING RESTRICTION  Weight Bearing Restriction: None                Recommendations for nursing staff:   Transfers: sit to stand  Toileting location: bed level    EVALUATION SESSION:  Patient Start of Session: pt. Seated in bed; spouse present at bedside  FUNCTIONAL TRANSFER ASSESSMENT  Sit to Stand: Edge of Bed  Edge of Bed: Not Tested    BED MOBILITY  Rolling: Not Tested  Scooting: NT    BALANCE ASSESSMENT     FUNCTIONAL ADL ASSESSMENT  Eating: Modified Independent (Pt. seated with HOB elevated demonstrating hand to mouth movement  using LUE required for self feeding.)  Grooming Seated: Modified Independent (Pt. seated with HOB elevated demonstrating grooming using LUE to wipe face with washcloth)      ACTIVITY TOLERANCE: Vitals stable                         O2 SATURATIONS       COGNITION  Overall Cognitive Status:  WFL - within functional limits    Upper Extremity   ROM: within functional limits except for the following:   Strength: within functional limits except for the following; RUE  Coordination  Gross motor: Impaired  Fine motor: LUE: WFL; RUE: impaired  Sensation: Light touch:  intact    EDUCATION PROVIDED  Patient: Discharge Recommendations; Plan of Care; Role of Occupational Therapy; UE HEP for Strengthening; UE HEP for ROM  Patient's Response to Education: Verbalized Understanding; Returned Demonstration    Equipment used: hospital bed       Therapist comments: Pt. Completed bed exercises to maintain muscle endurance during hospitalization as pt. Was feeling too weak to attempt EOB sitting at this time. Pt. Educated on importance of exercises and to continue with them multiple times a day if he can tolerate. Pt. Completed 10 of each exercises with LUE AROM and RUE PROM provided: gross hand grasps, elbow flexion/extension, shoulder flexion. Pt. Completed hand to mouth/face ADLs with step up seated in bed; pt. Reports being too tired to attempt unsupported sitting at this time. Pt. Completed long sit from supine with PT earlier this date requiring max A x1.     Patient End of Session: In bed;Needs met;Call light within reach;All patient questions and concerns addressed;Family present;Alarm set    OCCUPATIONAL PROFILE    HOME SITUATION  Type of Home: House  Home Layout: Multi-level;Able to live on main level  Lives With: Spouse    Toilet and Equipment: Comfort height toilet  Shower/Tub and Equipment: Other (Comment);Walk-in shower (Pt. sponge bathing due to living on first floor)             Drives: No  Patient Regularly Uses:  None    Prior Level of Function: Prior to admission pt. Was receiving assist for all ADLs; able to sponge bathe self once set up. Pt. Was mod I with toileting completing toileting and transfers while wife was at work. Pt. Home alone for about 9 hours while wife is at work. Pt. Sleeps on couch with mattress pad. Pt. Was up and walking with RW and WC prior to hospitalization; getting OPPT at [facility]. Pt. RUE immobilized using LUE to move it for transfers, etc.     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 : A Lot  -   Putting on and taking off regular upper body clothing?: A Lot  -   Taking care of personal grooming such as brushing teeth?: None  -   Eating meals?: None    AM-PAC Score:  Score: 16  Approx Degree of Impairment: 53.32%  Standardized Score (AM-PAC Scale): 35.96    ADDITIONAL TESTS     NEUROLOGICAL FINDINGS      COGNITION ASSESSMENTS       PLAN  OT Treatment Plan: Balance activities;ADL training;Functional transfer training;UE strengthening/ROM;Endurance training;Patient/Family education;Patient/Family training;Compensatory technique education  Rehab Potential : Good  Frequency: 3-5x/week  Number of Visits to Meet Established Goals: 4    ADL Goals   Patient will perform grooming: with supervision and while at edge of bed  Patient will perform lower body dressing:  with max assist and while standing at edge of bed  Patient will perform toileting: with mod assist    Functional Transfer Goals  Patient will transfer from sit to supine:  with min assist  Patient will transfer from supine to sit:  with min  assist  Patient will transfer from sit to stand:  with mod assist  Patient will transfer to bedside commode:  with mod assist    UE Exercise Program Goal  Patient will be modified independent with left AROM HEP (home exercise program).    Additional Goals      Therapist Goals    Patient Evaluation Complexity Level:   Occupational Profile/Medical History MODERATE - Expanded review of history including review of medical or therapy record   Specific performance deficits impacting engagement in ADL/IADL MODERATE  3 - 5 performance deficits   Client Assessment/Performance Deficits MODERATE - Comorbidities and min to mod modifications of tasks    Clinical Decision Making MODERATE - Analysis of occupational profile, detailed assessments, several treatment options    Overall Complexity MODERATE     OT Session Time: 30 minutes  Self-Care Home Management: 5 minutes  Therapeutic Activity: 0 minutes  Neuromuscular Re-education: 0 minutes  Therapeutic Exercise: 10 minutes  Cognitive Skills: 0 minutes  Sensory Integrative: 0 minutes  Orthotic Management and Trainin minutes  Can add/delete any of these

## 2024-07-18 NOTE — PLAN OF CARE
Alert, speech is slurred from previous stroke, afebrile, has + blood culture, ID was here, blood cultures were ordered and drawn, started on another antibiotics Vancomycin.  Had loose BM, sent for C dif which was negative.  Has no appetite, ate few bites for lunch and did not breakfast.  Diet supplement ordered.  Seen by speech, placed on soft easy to chew and small bites diet, no straws and aspiration precautions.  MRI of the brain due to increased slurred speech and hx of stroke.   MRI screening was completed .  Problem: RISK FOR INFECTION - ADULT  Goal: Absence of fever/infection during anticipated neutropenic period  Description: INTERVENTIONS  - Monitor WBC  - Administer growth factors as ordered  - Implement neutropenic guidelines  Outcome: Not Progressing     Problem: SAFETY ADULT - FALL  Goal: Free from fall injury  Description: INTERVENTIONS:  - Assess pt frequently for physical needs  - Identify cognitive and physical deficits and behaviors that affect risk of falls.  - [place] fall precautions as indicated by assessment.  - Educate pt/family on patient safety including physical limitations  - Instruct pt to call for assistance with activity based on assessment  - Modify environment to reduce risk of injury  - Provide assistive devices as appropriate  - Consider OT/PT consult to assist with strengthening/mobility  - Encourage toileting schedule  Outcome: Not Progressing

## 2024-07-18 NOTE — PLAN OF CARE
Patient alert and oriented x4. Speech slurred d/t hx of stroke. VSS. Afebrile. No c/o pain at this time. See MAR. MD notified of positive blood cx, order received. Safety precautions continued. Call light within reach.   Problem: RISK FOR INFECTION - ADULT  Goal: Absence of fever/infection during anticipated neutropenic period  Description: INTERVENTIONS  - Monitor WBC  - Administer growth factors as ordered  - Implement neutropenic guidelines  Outcome: Progressing     Problem: SAFETY ADULT - FALL  Goal: Free from fall injury  Description: INTERVENTIONS:  - Assess pt frequently for physical needs  - Identify cognitive and physical deficits and behaviors that affect risk of falls.  - [place] fall precautions as indicated by assessment.  - Educate pt/family on patient safety including physical limitations  - Instruct pt to call for assistance with activity based on assessment  - Modify environment to reduce risk of injury  - Provide assistive devices as appropriate  - Consider OT/PT consult to assist with strengthening/mobility  - Encourage toileting schedule  Outcome: Progressing

## 2024-07-18 NOTE — CONSULTS
[facility] Dosing Service      Initial Pharmacokinetic Consult for Vancomycin Dosing     Kris Colvin is a 64 year old male who is being initiated on vancomycin therapy for Staph epidermidis bacteremia.  Pharmacy has been asked to dose vancomycin by MUNA Cruz.  The initial treatment and monitoring approach will be non-AUC strategy.        Weight and Temperature:    Wt Readings from Last 1 Encounters:   24 117.9 kg (260 lb)        Temp Readings from Last 1 Encounters:   24 98.1 °F (36.7 °C) (Tympanic)      Labs:   Recent Labs   Lab 24  0002   CREATSERUM 1.04      Estimated Creatinine Clearance: 86.9 mL/min (based on SCr of 1.04 mg/dL).     Recent Labs   Lab 24  0002   WBC 18.4*          The Pharmacokinetic Target is:    Trough/random 10-15 mg/L    Renal Dosing Considerations:    None     Assessment/Plan:    Pharmacy will dose Vancomycin 1750 mg IV (15 mg/kg, capped at 2250 mg) every 12 hrs.         Monitorin) Plan for vancomycin trough to be obtained at steady state    2) Pharmacy will order SCr as clinically indicated to assess renal function.    3) Pharmacy will monitor for toxicity and efficacy, adjust vancomycin dose and/or frequency, and order vancomycin levels as appropriate per the Pharmacy and Therapeutics Committee approved protocol until discontinuation of the medication.       We appreciate the opportunity to assist in the care of this patient.     Glenda Judd, PharmD  2024  10:31 AM  [facility] Extension: [phone]

## 2024-07-18 NOTE — SLP NOTE
ADULT SWALLOWING EVALUATION    ASSESSMENT    ASSESSMENT/OVERALL IMPRESSION:  Order received for BSE to r/o aspiration. Chart reviewed. Briefly, this is a 64 y.o male who presented from home with x1 week of increased weakness and poor PO intake. Spouse present and reported patient on baseline regular diet texture with thin liquids. Reported patient feeding himself while seated upright in wheelchair. Was going to outpatient PT to [facility]. Patient speech currently more \"slurred\" compared to his baseline, though improved from yesterday. Initial head CT negative. D/W RN and infectious MD.  CXR negative for acute process. Medical history significant for L-IPH in 2/2024 with subsequent acute rehab and [facility] re[facility] with eventual discharge to home with spouse,   DVT, HTN, CAD s/p CABG, afib, previous CVA without deficits.     Patient received awake, alert, cooperative. Dry oral mucosa noted. Speech with decreased intelligibility; mouth open at rest. Patient assisted more upright position and SLP rendered oral care/hygiene with removal of dried secretions. Speech production mildly improved. Reflexive swallow response observed multiple times. SLP assisted with PO trials due to his UE weakness.     Patient with increased clinical s/s aspiration with larger bolus volume of thin liquid trials, mild to moderate oral residuals with advanced solid textures, and suspect at least mild to mild/moderate oral dysphagia.  Time spent with patient on diet texture analysis and patient./family education and training in recommended swallow strategies and rationale.  All expressed good understanding and agreement.     Recommend soft & bite sized texture diet with thin liquids by tspn sip at a time to control bolus volume.  Recommend aspiration precautions: position upright for any PO intake and slow rate/small bites/sips at a time.  Assist with feeding as needed due to UE weakness and underlying lethargy. SL to follow. Video swallow study to be  completed if CXR declines, increase in clinical signs of aspiration, and/or MD desires.           RECOMMENDATIONS   Diet Recommendations - Solids: Mechanical soft chopped/ Soft & Bite Sized  Diet Recommendations - Liquids: Thin Liquids (by tspn sip at a time)   Compensatory Strategies Recommended: Liquids via spoon;No straws  Aspiration Precautions: Upright position;Slow rate;Small bites and sips;No straw  Medication Administration Recommendations: Whole in puree;One pill at a time  Treatment Plan/Recommendations: Aspiration precautions;Dysphagia therapy    HISTORY   MEDICAL HISTORY  Reason for Referral: R/O aspiration    Problem List  Principal Problem:    Sepsis without acute organ dysfunction, due to unspecified organism (HCC)  Active Problems:    Essential hypertension    Chronic atrial fibrillation (HCC)    Hyperlipidemia    Coronary artery disease involving coronary bypass graft of native heart    Deep vein thrombosis (DVT) of proximal vein of left lower extremity (HCC)    Acute febrile illness    (HFpEF) heart failure with preserved ejection fraction (HCC)      Past Medical History  Past Medical History:    Arrhythmia    Cataract    Congestive heart disease (HCC)    Deep vein thrombosis (HCC)    Depression    Disorder of liver    Heart valve disease    High blood pressure    High cholesterol    ICH (intracerebral hemorrhage) (HCC)    Muscle weakness    Obstructive sleep apnea, adult    autoPAP 5-12     Sleep apnea    Stroke (HCC)    Visual impairment       Prior Living Situation: Home with spouse  Diet Prior to Admission: Regular;Thin liquids       Patient/Family Goals: to drink thin liquids    SWALLOWING HISTORY  Current Diet Consistency: Regular;Thin liquids  [facility] regular diet and thin liquids recommended.   Imaging Results: CXR negative; head CT negative for acute process      OBJECTIVE   ORAL MOTOR EXAMINATION  Dentition: Functional  Symmetry:  Within Functional Limits  Strength: Reduced right lingual;Reduced left lingual     Range of Motion: Reduced right lingual;Reduced left lingual  Rate of Motion: Reduced    Voice Quality: Clear  Respiratory Status: Unlabored  Consistencies Trialed: Thin liquids;Puree;Soft solid  Method of Presentation: Staff/Clinician assistance;Spoon  Patient Positioning: Upright;Midline    Oral Phase of Swallow: Impaired  Bolus Retrieval: Intact  Bilabial Seal: Intact  Bolus Formation: Impaired  Bolus Propulsion: Impaired  Mastication: Impaired  Retention: Impaired    Pharyngeal Phase of Swallow: Within Functional Limits           (Please note: Silent aspiration cannot be evaluated clinically. Videofluoroscopic Swallow Study is required to rule-out silent aspiration.)    Esophageal Phase of Swallow: No complaints consistent with possible esophageal involvement              GOALS  Goal #1 The patient will tolerate soft & bite sized consistency and thin by tspn liquids without overt signs or symptoms of aspiration with 80 % accuracy over 1-2 session(s).  In Progress   Goal #2 The patient/family/caregiver will demonstrate understanding and implementation of aspiration precautions and swallow strategies independently over 2-3 session(s).    In Progress   Goal #3 The patient will tolerate trial upgrade of regular consistency and thin liquids without overt signs or symptoms of aspiration with 80 % accuracy over 2-3 session(s).  In Progress     FOLLOW UP  Treatment Plan/Recommendations: Aspiration precautions;Dysphagia therapy     Follow Up Needed (Documentation Required): Yes  SLP Follow-up Date: 07/19/24    Thank you for your referral.   If you have any questions, please contact Kandis Perdomo, GIN Perdomo, MS CCC-SLP/L, pager [phone]  Speech-LanguagePathologist

## 2024-07-18 NOTE — PROGRESS NOTES
[facility]   part of [facility] ID PROGRESS NOTE    Kris Colvin Patient Status:  Inpatient    3/6/1960 MRN [id number]   [facility] 4NW-A Attending Lizet Miller DO   Hosp Day # 1 PCP Lenka Guevara DO     Abx: IV Zosyn     Subjective: Patient seen and examined today. Denies any headaches. Denies any abdominal pain, nausea, vomiting or diarrhea. Speech has improved today but not yet at baseline per patient's wife. Afebrile. On room air.     Patient reports recent teeth cleaning at the end of  but no dental procedure.     Allergies:  No Known Allergies    Medications:    Current Facility-Administered Medications:     vancomycin (Vancocin) 1.75 g in sodium chloride 0.9% 500mL IVPB premix, 15 mg/kg, Intravenous, Q12H    apixaban (Eliquis) tab 5 mg, 5 mg, Oral, BID    atorvastatin (Lipitor) tab 80 mg, 80 mg, Oral, Nightly    dronedarone (Multaq) tab 400 mg, 400 mg, Oral, BID    melatonin tab 3 mg, 3 mg, Oral, Nightly PRN    acetaminophen (Tylenol Extra Strength) tab 500 mg, 500 mg, Oral, Q4H PRN    polyethylene glycol (PEG 3350) (Miralax) 17 g oral packet 17 g, 17 g, Oral, Daily PRN    sennosides (Senokot) tab 17.2 mg, 17.2 mg, Oral, Nightly PRN    bisacodyl (Dulcolax) 10 MG rectal suppository 10 mg, 10 mg, Rectal, Daily PRN    fleet enema (Fleet) 7-19 GM/118ML rectal enema 133 mL, 1 enema, Rectal, Once PRN    ondansetron (Zofran) 4 MG/2ML injection 4 mg, 4 mg, Intravenous, Q6H PRN    prochlorperazine (Compazine) 10 MG/2ML injection 5 mg, 5 mg, Intravenous, Q8H PRN    piperacillin-tazobactam (Zosyn) 3.375 g in dextrose 5% 100 mL IVPB-ADDV, 3.375 g, Intravenous, Q8H    Review of Systems:  Completed. See pertinent positives and negatives above.     Physical Exam:  Vital signs: Blood pressure 127/79, pulse 72, temperature 98.1 °F (36.7 °C), temperature source Tympanic, resp. rate 20, height 190.5 cm (6' 3\"), weight 260 lb (117.9 kg), SpO2 96%.    General: Alert, oriented, NAD,  on room air.   HEENT: Moist mucous membranes.   Neck: No lymphadenopathy.  Supple.  Cardiovascular: RRR  Respiratory: Clear to auscultation bilaterally.  No wheezes. No rhonchi.  Abdomen: Soft, nontender, nondistended.   Musculoskeletal: LE edema with chronic appearing stasis changes (per wife erythema is at baseline)  Integument: No rash.    Laboratory Data:  Recent Labs   Lab 07/17/24  0002   RBC 3.62*   HGB 10.3*   HCT 30.6*   MCV 84.5   MCH 28.5   MCHC 33.7   RDW 14.8   NEPRELIM 16.53*   WBC 18.4*   .0*     Recent Labs   Lab 07/17/24  0002 07/17/24  1828   *  --    BUN 24*  --    CREATSERUM 1.04  --    CA 8.7  --    ALB 2.9*  --    *  --    K 2.9* 3.6     --    CO2 26.0  --    ALKPHO 79  --    AST 44*  --    ALT 27  --    BILT 1.0  --    TP 6.3  --        Microbiology: Reviewed in EMR    Radiology: Reviewed.    PROCEDURE:  CTA ABD/PEL (CPT=74174)     COMPARISON:  [facility] , CT, CT CHEST+ABDOMEN+PELVIS(ALL CNTRST ONLY)(CPT=71260/66274), 7/17/2024, 2:34 AM.     INDICATIONS:  possible SMA dissection     TECHNIQUE:  CT images of the abdomen and pelvis were obtained pre- and post- injection of non-ionic intravenous contrast material. Multi-planar reformatted/3-D images were created to optimize visualization of vascular anatomy.  Dose reduction techniques  were used. Dose information is transmitted to the ACR (American College of Radiology) NRDR (National Radiology Data Registry) which includes the Dose Index Registry.     PATIENT STATED HISTORY:(As transcribed by Technologist)  fevers, recent ct cap 250 am. evaluate SMA dissection      CONTRAST USED:  100cc of Isovue 370     FINDINGS:  This is a follow-up to CT performed at 7/17/2024 at 0234 hours.    AORTA/VASCULAR:  Exam is degraded by motion artifact.  There is soft tissue stranding surrounding the SMA at the site of vessel narrowing with subsequent lack of intraluminal contrast, series 9, images 167 through 204. There is subsequent  opacification  of more distal SMA branches.  Findings knee or SMA thrombosis.  A localized dissection cannot be excluded.  The origin of the SMA is widely patent.  Celiac artery is patent.  DAPHNE is well visualized and patent.  There is dense calcific plaque at the  origins of the main renal arteries bilaterally.  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    LIVER:  Stable CT appearance of the liver  BILIARY:  No biliary ductal dilatation.    PANCREAS:  Stable.  Homogeneous enhancement  SPLEEN:  Normal caliber  KIDNEYS:  Stable.  No hydronephrosis.  ADRENALS:  Stable.    RETROPERITONEUM:  Stable scattered periaortic lymph nodes.  BOWEL/MESENTERY:  There is soft tissue stranding and edema in the mesentery.  There is colonic diverticulosis.  No evidence of dilated small bowel loops.  There are air-fluid levels in the sigmoid colon with suggestion of mild wall thickening of the  rectosigmoid colon.  There is soft tissue stranding in the pericolic gutters bilaterally.  There is a small amount of free fluid in soft tissue stranding in the presacral space.    PELVIC ORGANS:  Circumferential wall thickening in a partially decompressed urinary bladder.    BONES:  For trophic degenerative changes lumbar spine.  Benign appearing cyst L4.  Degenerative disc disease L3-4 L4-5.     Impression:    CONCLUSION:  There is lack of intraluminal contrast involving the SMA at the level of the central mesentery with surrounding soft tissue stranding and mesenteric edema with reconstitution of more distal SMA branches.  Findings concerning for SMA  thrombosis versus localized dissection as questioned on 7/17/2024 CT performed earlier in the day.  Findings phoned to nurse Mcpherson on 7/17/2024.     There is soft tissue stranding and small amount of free fluid in the mesentery as well as soft tissue stranding and a small amount of free  fluid in the presacral space and pericolic gutters.     Thrombosis of the infrarenal IVC unchanged     LOCATION:  [facility]     Dictated by (CST): Court Kline MD on 7/17/2024 at 10:53 AM      Finalized by (CST): Court Kline MD on 7/17/2024 at 11:04 AM       PROCEDURE:  CT BRAIN OR HEAD ([id number])     COMPARISON:  [facility] , CT, CT BRAIN OR HEAD ([id number]), 3/01/2024, 1:33 PM.  [facility], CT, CT BRAIN OR HEAD ([id number]), 4/26/2024, 7:23 AM.     INDICATIONS:  Rule out CVA     TECHNIQUE:  Noncontrast CT scanning is performed through the brain. Dose reduction techniques were used. Dose information is transmitted to the ACR (American College of Radiology) NRDR (National Radiology Data Registry) which includes the Dose Index  Registry.     PATIENT STATED HISTORY: (As transcribed by Technologist)  ams         FINDINGS:       VENTRICLES/SULCI:   Ventricles and sulci are prominent indicating atrophy.     INTRACRANIAL:  There are no abnormal extraaxial fluid collections.  There is no midline shift.  There are no acute appearing intraparenchymal brain abnormalities.  There is nothing specific for acute territorial infarct.  There is no hemorrhage or mass  lesion.  There is chronic microvascular ischemic white matter disease in the cerebrum bilaterally.  Nothing definite for acute infarct.  Residual encephalomalacia mild from previously demonstrated left hemisphere hematoma.     SINUSES:  No acute sinusitis.       MASTOIDS:  The mastoids are clear.     SKULL:  No evidence for fracture or acute osseous abnormality.     OTHER:  None.     Impression:    CONCLUSION:  Chronic changes in the brain, as described, but no acute intracranial bleed, or other acute intracranial process identified. If additional imaging is needed for suspected ischemia and/or infarct based on the clinical signs and symptoms, then   consider follow-up with MRI.    LOCATION:  [facility]     Dictated by (CST): Alex Hubbard MD on 7/17/2024 at 5:37 PM      Finalized by  (CST): Alex Hubbard MD on 7/17/2024 at 5:39 PM        PROCEDURE:  XR CHEST AP PORTABLE  (CPT=71045)     TECHNIQUE:  AP chest radiograph was obtained.     COMPARISON:  [facility] , XR, XR CHEST AP PORTABLE  (CPT=71045), 2/17/2024, 3:54 PM.     INDICATIONS:  SOB     PATIENT STATED HISTORY: (As transcribed by Technologist)  Patient offered no additional history at this time.         FINDINGS:       Mildly enlarged cardiac silhouette.     Median sternotomy hardware is noted.     No consolidation, pleural effusion or pneumothorax.     Impression:    CONCLUSION:  No consolidation.     Agree with preliminary radiology report from [facility].     LOCATION:  [facility]     Dictated by (CST): Justino Jeter MD on 7/17/2024 at 8:16 AM      Finalized by (CST): Justino Jeter MD on 7/17/2024 at 8:16 AM         PROCEDURE:  CT CHEST+ABDOMEN+PELVIS(ALL CNTRST ONLY)(CPT=71260/09755)     COMPARISON:  None.     INDICATIONS:  fever/hypoxia     TECHNIQUE:  IV contrast-enhanced scanning through the chest, abdomen, and pelvis was performed.  Dose reduction techniques were used. Dose information is transmitted to the ACR (American College of Radiology) NRDR (National Radiology Data Registry) which   includes the Dose Index Registry.     PATIENT STATED HISTORY:(As transcribed by Technologist)  fever, hypoxia      CONTRAST USED:  100 mLcc of Isovue 370     FINDINGS:       CHEST:    LUNGS:  There is image degradation due to motion.  No focal consolidation.  Lingular and left lower lobe linear densities are most consistent with atelectasis and or scar.    MEDIASTINUM:  No mass or adenopathy.  Mediastinal clips status post CABG.  YASMINE:  No mass or adenopathy.    CARDIAC:  Enlarged.  Status post CABG.  Aortic valve replacement.  No pericardial effusion.  PLEURA:  No mass or effusion.    CHEST WALL:  No mass or axillary adenopathy.  Sternotomy wires status post CABG.  AORTA:  Atherosclerosis.  No aneurysm or dissection.    VASCULATURE:   No visible pulmonary arterial thrombus or attenuation.       ABDOMEN/PELVIS:  LIVER:  No enlargement, atrophy, abnormal density, or significant focal lesion.    BILIARY:  Possible sludge and or small stones within the gallbladder.  Sonogram can be performed for further evaluation as clinically indicated.  No visible dilatation.    PANCREAS:  No lesion, fluid collection, ductal dilatation, or atrophy.    SPLEEN:  No enlargement or focal lesion.    KIDNEYS:  Small 1.5 cm cyst within the right superior pole for which no further follow-up is required.  No mass, obstruction, or calcification.    ADRENALS:  No mass or enlargement.    AORTA:  Infrarenal IVC filter.  There is thrombus within and inferior to the filter extending to the common iliac veins.  Prominent edema within the pelvic fat planes probably related to venous congestion.  Stranding surrounds the superior mesenteric  artery with suggestion of focal irregularity, best seen on images 53-56 of series 9.  Atherosclerosis.  No aneurysm or dissection.    RETROPERITONEUM:  No mass or adenopathy.    BOWEL/MESENTERY:  Normal caliber small and large bowel.    ABDOMINAL WALL:  No mass or hernia.    URINARY BLADDER:  Bladder is nearly empty most likely accounts for diffuse bladder wall thickening given no signs cystitis per ED MD. No visible focal wall thickening, lesion, or calculus.    PELVIC NODES:  No adenopathy.    PELVIC ORGANS:  Pelvic organs appropriate for patient age.    BONES:  Degenerative change predominantly involves the lower lumbar spine.  No fracture.       Impression:    CONCLUSION:    1. Thrombus within and inferior to IVC filter extending to the common iliac veins.  Prominent edema within the pelvic fat planes, probably related to venous congestion.  2. Stranding surrounds the superior mesenteric artery with appearance focal irregularity.  Findings may represent motion artifact, however focal dissection cannot be excluded.  CTA or MRA can be  performed for further evaluation as clinically indicated.  3. Please see details as above.     [facility] M.D. notified of these findings with preliminary radiology report from [facility].     LOCATION:  [facility]     Dictated by (CST): Latoya Alberts MD on 7/17/2024 at 7:00 AM      Finalized by (CST): Latoya Alberts MD on 7/17/2024 at 7:44 AM    ASSESSMENT:  Staph epi bacteremia. 2/2 Bcx 7/17 for GPC (staph epi by PCR). Per patient, taken from the same site- ? Real vs contaminated. Patient does have AVR, r/o endocarditis.   Fevers, possibly related to #1 vs #3. No further fevers.   UA negative. VRP negative. 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  Dysarthria. Improved today but still not at baseline per patient's wife. Head CT w/o acute process.  Leukocytosis  CAD, s/p CABG; CHF; AVR, HTN, HLD  H/o ICH.  CHAZ    PLAN:  - continue IV Zosyn  - await final blood cultures from 7/17; will repeat an additional set of blood cultures today and then start IV vancomycin   - will check TTE  to r/o vegetation  - follow temps and wbc  - check c. Diff if further diarrhea  - noted plans for vascular to see  - plans for MRI brain    Discussed case with RN, Dr. Painting, speech, Dr. Miller, patient and patient's wife.     MUNA Cruz   [facility]  [phone]    ID ATTENDING ADDENDUM     Pt seen an examined independently. Chart reviewed. Agree with above. Note has been reviewed by me and modified as needed.  Exam and Impression/ Recs as noted above.  +BCx noted- since pt s/p AVR, I am concerned about PVE. Recheck BCX and start vanco. Check ESR. Await TTE  Will follow  D/w staff and with pt and wife  More than 50% of clinical time and 100% of the clinical decision making performed by me.    Maren Painting MD

## 2024-07-19 ENCOUNTER — APPOINTMENT (OUTPATIENT)
Dept: CT IMAGING | Facility: HOSPITAL | Age: 64
End: 2024-07-19
Payer: COMMERCIAL

## 2024-07-19 ENCOUNTER — APPOINTMENT (OUTPATIENT)
Dept: CV DIAGNOSTICS | Facility: HOSPITAL | Age: 64
End: 2024-07-19
Attending: Other
Payer: COMMERCIAL

## 2024-07-19 ENCOUNTER — APPOINTMENT (OUTPATIENT)
Dept: MRI IMAGING | Facility: HOSPITAL | Age: 64
End: 2024-07-19
Attending: INTERNAL MEDICINE
Payer: COMMERCIAL

## 2024-07-19 PROBLEM — D64.9 ANEMIA: Status: ACTIVE | Noted: 2024-01-01

## 2024-07-19 PROBLEM — I63.9 ACUTE CVA (CEREBROVASCULAR ACCIDENT) (HCC): Status: ACTIVE | Noted: 2024-07-19

## 2024-07-19 PROBLEM — I63.9 ACUTE CVA (CEREBROVASCULAR ACCIDENT) (HCC): Status: ACTIVE | Noted: 2024-01-01

## 2024-07-19 PROBLEM — D64.9 ANEMIA: Status: ACTIVE | Noted: 2024-07-19

## 2024-07-19 LAB
ANION GAP SERPL CALC-SCNC: 6 MMOL/L (ref 0–18)
ANION GAP SERPL CALC-SCNC: 7 MMOL/L (ref 0–18)
APTT PPP: 38.4 SECONDS (ref 23–36)
APTT PPP: 95.8 SECONDS (ref 23–36)
BACTERIA BLD CULT: POSITIVE
BACTERIA BLD CULT: POSITIVE
BASOPHILS # BLD AUTO: 0.06 X10(3) UL (ref 0–0.2)
BASOPHILS NFR BLD AUTO: 0.3 %
BUN BLD-MCNC: 14 MG/DL (ref 9–23)
BUN BLD-MCNC: 14 MG/DL (ref 9–23)
CALCIUM BLD-MCNC: 9 MG/DL (ref 8.7–10.4)
CALCIUM BLD-MCNC: 9.1 MG/DL (ref 8.7–10.4)
CHLORIDE SERPL-SCNC: 109 MMOL/L (ref 98–112)
CHLORIDE SERPL-SCNC: 110 MMOL/L (ref 98–112)
CHOLEST SERPL-MCNC: 96 MG/DL (ref ?–200)
CO2 SERPL-SCNC: 24 MMOL/L (ref 21–32)
CO2 SERPL-SCNC: 26 MMOL/L (ref 21–32)
CREAT BLD-MCNC: 0.88 MG/DL
CREAT BLD-MCNC: 0.93 MG/DL
EGFRCR SERPLBLD CKD-EPI 2021: 92 ML/MIN/1.73M2 (ref 60–?)
EGFRCR SERPLBLD CKD-EPI 2021: 97 ML/MIN/1.73M2 (ref 60–?)
EOSINOPHIL # BLD AUTO: 0.11 X10(3) UL (ref 0–0.7)
EOSINOPHIL NFR BLD AUTO: 0.6 %
ERYTHROCYTE [DISTWIDTH] IN BLOOD BY AUTOMATED COUNT: 15.6 %
ERYTHROCYTE [DISTWIDTH] IN BLOOD BY AUTOMATED COUNT: 15.6 %
ERYTHROCYTE [SEDIMENTATION RATE] IN BLOOD: 40 MM/HR
EST. AVERAGE GLUCOSE BLD GHB EST-MCNC: 117 MG/DL (ref 68–126)
GLUCOSE BLD-MCNC: 107 MG/DL (ref 70–99)
GLUCOSE BLD-MCNC: 89 MG/DL (ref 70–99)
GLUCOSE BLD-MCNC: 95 MG/DL (ref 70–99)
HBA1C MFR BLD: 5.7 % (ref ?–5.7)
HCT VFR BLD AUTO: 29.8 %
HCT VFR BLD AUTO: 30.8 %
HDLC SERPL-MCNC: 17 MG/DL (ref 40–59)
HGB BLD-MCNC: 10.1 G/DL
HGB BLD-MCNC: 9.8 G/DL
HGB RETIC QN AUTO: 33.3 PG (ref 28.2–36.6)
IMM GRANULOCYTES # BLD AUTO: 0.23 X10(3) UL (ref 0–1)
IMM GRANULOCYTES NFR BLD: 1.3 %
IMM RETICS NFR: 0.29 RATIO (ref 0.1–0.3)
LDLC SERPL CALC-MCNC: 63 MG/DL (ref ?–100)
LYMPHOCYTES # BLD AUTO: 2 X10(3) UL (ref 1–4)
LYMPHOCYTES NFR BLD AUTO: 11 %
MCH RBC QN AUTO: 28.1 PG (ref 26–34)
MCH RBC QN AUTO: 28.2 PG (ref 26–34)
MCHC RBC AUTO-ENTMCNC: 32.8 G/DL (ref 31–37)
MCHC RBC AUTO-ENTMCNC: 32.9 G/DL (ref 31–37)
MCV RBC AUTO: 85.8 FL
MCV RBC AUTO: 85.9 FL
MONOCYTES # BLD AUTO: 1.09 X10(3) UL (ref 0.1–1)
MONOCYTES NFR BLD AUTO: 6 %
NEUTROPHILS # BLD AUTO: 14.73 X10 (3) UL (ref 1.5–7.7)
NEUTROPHILS # BLD AUTO: 14.73 X10(3) UL (ref 1.5–7.7)
NEUTROPHILS NFR BLD AUTO: 80.8 %
NONHDLC SERPL-MCNC: 79 MG/DL (ref ?–130)
OSMOLALITY SERPL CALC.SUM OF ELEC: 292 MOSM/KG (ref 275–295)
OSMOLALITY SERPL CALC.SUM OF ELEC: 292 MOSM/KG (ref 275–295)
PLATELET # BLD AUTO: 145 10(3)UL (ref 150–450)
PLATELET # BLD AUTO: 158 10(3)UL (ref 150–450)
POTASSIUM SERPL-SCNC: 3.5 MMOL/L (ref 3.5–5.1)
POTASSIUM SERPL-SCNC: 3.6 MMOL/L (ref 3.5–5.1)
RBC # BLD AUTO: 3.47 X10(6)UL
RBC # BLD AUTO: 3.59 X10(6)UL
RETICS # AUTO: 64.9 X10(3) UL (ref 22.5–147.5)
RETICS/RBC NFR AUTO: 1.9 %
S EPIDERMIDIS DNA BLD POS QL NAA+NON-PRB: DETECTED
SODIUM SERPL-SCNC: 141 MMOL/L (ref 136–145)
SODIUM SERPL-SCNC: 141 MMOL/L (ref 136–145)
TRIGL SERPL-MCNC: 78 MG/DL (ref 30–149)
VLDLC SERPL CALC-MCNC: 12 MG/DL (ref 0–30)
WBC # BLD AUTO: 18.2 X10(3) UL (ref 4–11)
WBC # BLD AUTO: 18.9 X10(3) UL (ref 4–11)

## 2024-07-19 PROCEDURE — 99233 SBSQ HOSP IP/OBS HIGH 50: CPT | Performed by: INTERNAL MEDICINE

## 2024-07-19 PROCEDURE — 70498 CT ANGIOGRAPHY NECK: CPT

## 2024-07-19 PROCEDURE — 99255 IP/OBS CONSLTJ NEW/EST HI 80: CPT | Performed by: NURSE PRACTITIONER

## 2024-07-19 PROCEDURE — 99223 1ST HOSP IP/OBS HIGH 75: CPT | Performed by: OTHER

## 2024-07-19 PROCEDURE — 70496 CT ANGIOGRAPHY HEAD: CPT

## 2024-07-19 PROCEDURE — 70553 MRI BRAIN STEM W/O & W/DYE: CPT | Performed by: INTERNAL MEDICINE

## 2024-07-19 RX ORDER — POTASSIUM CHLORIDE 20 MEQ/1
40 TABLET, EXTENDED RELEASE ORAL EVERY 4 HOURS
Status: DISPENSED | OUTPATIENT
Start: 2024-07-19 | End: 2024-07-20

## 2024-07-19 RX ORDER — GADOTERATE MEGLUMINE 376.9 MG/ML
15 INJECTION INTRAVENOUS
Status: COMPLETED | OUTPATIENT
Start: 2024-07-19 | End: 2024-07-19

## 2024-07-19 RX ORDER — PROCHLORPERAZINE EDISYLATE 5 MG/ML
5 INJECTION INTRAMUSCULAR; INTRAVENOUS EVERY 8 HOURS PRN
Status: DISCONTINUED | OUTPATIENT
Start: 2024-07-19 | End: 2024-07-23

## 2024-07-19 RX ORDER — ACETAMINOPHEN 325 MG/1
650 TABLET ORAL EVERY 4 HOURS PRN
Status: DISCONTINUED | OUTPATIENT
Start: 2024-07-19 | End: 2024-07-23

## 2024-07-19 RX ORDER — SODIUM CHLORIDE 9 MG/ML
INJECTION, SOLUTION INTRAVENOUS CONTINUOUS
Status: DISCONTINUED | OUTPATIENT
Start: 2024-07-19 | End: 2024-07-20

## 2024-07-19 RX ORDER — HEPARIN SODIUM AND DEXTROSE 10000; 5 [USP'U]/100ML; G/100ML
INJECTION INTRAVENOUS CONTINUOUS
Status: DISCONTINUED | OUTPATIENT
Start: 2024-07-19 | End: 2024-07-21

## 2024-07-19 RX ORDER — ACETAMINOPHEN 650 MG/1
650 SUPPOSITORY RECTAL EVERY 4 HOURS PRN
Status: DISCONTINUED | OUTPATIENT
Start: 2024-07-19 | End: 2024-07-23

## 2024-07-19 RX ORDER — HEPARIN SODIUM AND DEXTROSE 10000; 5 [USP'U]/100ML; G/100ML
18 INJECTION INTRAVENOUS ONCE
Status: COMPLETED | OUTPATIENT
Start: 2024-07-19 | End: 2024-07-19

## 2024-07-19 RX ORDER — LABETALOL HYDROCHLORIDE 5 MG/ML
10 INJECTION, SOLUTION INTRAVENOUS EVERY 10 MIN PRN
Status: DISCONTINUED | OUTPATIENT
Start: 2024-07-19 | End: 2024-07-23

## 2024-07-19 RX ORDER — ONDANSETRON 2 MG/ML
4 INJECTION INTRAMUSCULAR; INTRAVENOUS EVERY 6 HOURS PRN
Status: DISCONTINUED | OUTPATIENT
Start: 2024-07-19 | End: 2024-07-23

## 2024-07-19 RX ORDER — HYDRALAZINE HYDROCHLORIDE 20 MG/ML
10 INJECTION INTRAMUSCULAR; INTRAVENOUS EVERY 2 HOUR PRN
Status: DISCONTINUED | OUTPATIENT
Start: 2024-07-19 | End: 2024-07-23

## 2024-07-19 NOTE — SLP NOTE
SPEECH DAILY NOTE - INPATIENT    ASSESSMENT & PLAN   ASSESSMENT  Pt seen for dysphagia tx to assess tolerance with recommended diet, ensure proper utilization of aspiration precautions and provide pt/family education. Spouse present. Since last SLP visit, patient underwent brain MRI which reported multiple acute infarcts in frontal and parietal lobes, bilaterally. Patient to transfer to rooms shortly per clinical d/w RN. Reviewed swallow strategies and precautions with patient/spouse who expressed and returned demonstration appropriately. Patient tolerated dinner last night without incident. Patient remains with dry oral mucosa therefore SLP encouraged oral moisture/hydration with increased frequency.   Recommend soft & bite sized texture diet with thin liquids by tspn sip at a time to control bolus volume.  Recommend aspiration precautions: position upright for any PO intake and slow rate/small bites/sips at a time.  Assist with feeding as needed due to UE weakness. SLP to follow. Video swallow study to be completed if CXR declines, increase in clinical signs of aspiration, and/or MD desires.     Diet Recommendations - Solids: Mechanical soft chopped/ Soft & Bite Sized  Diet Recommendations - Liquids: Thin Liquids (by tspn sip at a time)  Compensatory Strategies Recommended: Liquids via spoon;No straws  Aspiration Precautions: Upright position;Slow rate;Small bites and sips;No straw  Medication Administration Recommendations: Whole in puree;One pill at a time    Patient Experiencing Pain: No                Treatment Plan  Treatment Plan/Recommendations: Dysphagia therapy;Aspiration precautions;Communication evaluation    Interdisciplinary Communication: Discussed with RN            GOALS  Goal #1 The patient will tolerate soft & bite sized consistency and thin by tspn liquids without overt signs or symptoms of aspiration with 80 % accuracy over 1-2 session(s).  In Progress   Goal #2 The patient/family/caregiver  will demonstrate understanding and implementation of aspiration precautions and swallow strategies independently over 2-3 session(s).     In Progress   Goal #3 The patient will tolerate trial upgrade of regular consistency and thin liquids without overt signs or symptoms of aspiration with 80 % accuracy over 2-3 session(s).  Defer today 2/2 patient request and room transfer soon   Goal #4 Communication evaluation as per stroke protocol New       FOLLOW UP  Follow Up Needed (Documentation Required): Yes  SLP Follow-up Date: 07/22/24       Session: 1    If you have any questions, please contact GIN Robert, MS CCC-SLP/L, pager [phone]  Speech-LanguagePathologist

## 2024-07-19 NOTE — CM/SW NOTE
SW sent referral for rehab placement via [facility] in the event that patient requires additional therapy post DC. SW will discuss DC plan and choice list if appropriate with family once it becomes available.     SW noted that patient is scheduled for Transfer to Tele floor. PASRR done. [facility] submitted for clinical review.     SW will continue to follow for plan of care changes and remain available for any additional DC needs or concerns.     Naina Adamson MSW, LSW  Discharge Planner   [id number]
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
## 2024-07-19 NOTE — PROGRESS NOTES
Stroke booklet given to patient; the following education was provided:     What is stroke  BEFAST - Stroke warning sings and symptoms  How to initiate EMS  Secondary stroke prevention and personalized risk factors: HTN, HL, CAD, Afib, obesity  Lifestyle modifications (nutrition and exercise)  Post-stroke recovery and follow-up  Community resources (stroke support group and non-emergent stroke line)    Spouse present during education, patient receptive to teachings. All pertinent questions and concerns were addressed.    Patient has chosen spouse Perla as his designated care partner. She can be reached at [phone].      RN Stroke Navigator team  [phone]

## 2024-07-19 NOTE — PLAN OF CARE
Pt a/o x4. VSS, remained afebrile. Meds given per MAR. Notified hospitalist for MRI results at 0758. See orders. IVF infused per orders. Hem/onc, neuro, and cards consulted. Order to transfer to [facility] #[id number]. Report given to Jesi.
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
## 2024-07-19 NOTE — PLAN OF CARE
Patient alert and oriented x4. VSS. Afebrile. Slurred speech d/t hx of stroke. No c/o pain at this time. Medications administered whole with applesauce. See MAR. MRI of brain still to be done. Safety precautions continued. Call light within reach.   Problem: RISK FOR INFECTION - ADULT  Goal: Absence of fever/infection during anticipated neutropenic period  Description: INTERVENTIONS  - Monitor WBC  - Administer growth factors as ordered  - Implement neutropenic guidelines  Outcome: Progressing     Problem: SAFETY ADULT - FALL  Goal: Free from fall injury  Description: INTERVENTIONS:  - Assess pt frequently for physical needs  - Identify cognitive and physical deficits and behaviors that affect risk of falls.  - [place] fall precautions as indicated by assessment.  - Educate pt/family on patient safety including physical limitations  - Instruct pt to call for assistance with activity based on assessment  - Modify environment to reduce risk of injury  - Provide assistive devices as appropriate  - Consider OT/PT consult to assist with strengthening/mobility  - Encourage toileting schedule  Outcome: Progressing
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
## 2024-07-19 NOTE — CM/SW NOTE
07/19/24 1500   CM/SW Referral Data   Referral Source Social Work (self-referral)   Reason for Referral Discharge planning   Informant Spouse/Significant Other;Clinical Staff Member;EM[facility]   Medical Hx   Does patient have an established PCP? Yes   Patient Info   Patient's Home Environment House   Patient lives with Spouse/Significant other   Discharge Needs   Anticipated D/C needs Subacute rehab;To be determined   Services Requested   Submitted to [facility] Yes   Choice of Post-Acute Provider   Informed patient of right to choose their preferred provider Yes   List of appropriate post-acute services provided to patient/family with quality data Yes   Information given to Spouse/Significant other       HOME SITUATION  Type of Home: House   Home Layout: Multi-level;Able to live on main level  Stairs to Enter : 2  Railing: No     Lives With: Spouse  Drives: No  Patient Owned Equipment: Rolling walker  Patient Regularly Uses: None     Prior Level of [place] per PT eval: Pt and wife present to provide history. About a week and a half ago pt began just being able to amb on the first floor with a rolling walker. Pt at the time could navigate 2 steps to get in and out of his garage to get in a vehicle in order to be driven to outpatient therapy. Pt has been in outpatient therapy at [facility] working on cardio, strengthening and mobility for 4 weeks now. Pt described sudden loss of global strength on Sunday 7/14. Wife reports one week ago pt was able to stand and give himself cloth baths and stand at the sink to wash his hands and was able to go to the restroom ind. Pt wife works 9 hours a day and pt was able to be mod I in the home to get food, go to the bathroom and get around. Pt receives assistance with ADLs from wife. Tuesday 7/16 was the last time the patient was up and walking. Pt sits up in a wheelchair at home when he is not moving around. Pt sleeps on sofa in the living room. Pt was unable to transfer out of bed or a  hilda.   Pt had a stroke in February of this year and was admitted to [facility] where he spent 3 weeks at for acute rehab. After [facility], pt was admitted at [facility] for rehab where he was practicing walking with a chair follow. Pt received home health PT/OT after that admission.   No history of falls reported.   (Per PT eval)    Pt is a 63 y/o male admitted with sepsis without acute organ dysfunction. Noted [facility] approved pt for [facility] and [facility] referrals sent. DENISSE met with pt's spouse at bedside to discuss discharge planning, pt off unit.     Pt lives with his spouse and is typically independent with ADLs. Pt's spouse stated pt was hospitalized a few months ago and discharged to [facility], then went to [facility], and discharged home w/HH. Pt's spouse stated pt is current with [facility] for outpatient therapy and [facility] transports pt to his PT sessions. Pt's spouse stated pt stays on the main level of the home and was ambulating until 1 1/2 weeks ago. Pt's spouse stated pt became very weak and missed multiple therapy sessions. Pt's spouse stated pt also had poor appetite. Pt's spouse stated pt had an MRI this admission which showed a new CVA.     Discussed anticipated need for gradual rehabilitative therapy at discharge and informed pt's spouse plan of care may change Pt's spouse stated pt does not want to go back to [facility] but would be open to returning to [facility]. [facility] choice list provided to pt's spouse. Pt's spouse stated she will review the [facility] choice list.     Insurance authorization will be needed prior to discharge. DENISSE will f/u with pt's spouse regarding [facility] choice.     JOSE Underwood  Discharge Planner
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
## 2024-07-19 NOTE — PROGRESS NOTES
[facility]   part of [facility] ID PROGRESS NOTE    Kris Colvin Patient Status:  Inpatient    3/6/1960 MRN [id number]   Location [facility] 4NW-A Attending Lizet Miller DO   Hosp Day # 2 PCP Lenka Guevara DO     Abx: IV Zosyn, IV vancomycin     Subjective: Patient seen and examined today. Speech improving. Denies any abdominal pain or SOB. Denies any headache or visual changes. Denies any new weakness.     Allergies:  No Known Allergies    Medications:    Current Facility-Administered Medications:     sodium chloride 0.9% infusion, , Intravenous, Continuous    acetaminophen (Tylenol) tab 650 mg, 650 mg, Oral, Q4H PRN **OR** acetaminophen (Tylenol) rectal suppository 650 mg, 650 mg, Rectal, Q4H PRN    labetalol (Trandate) 5 mg/mL injection 10 mg, 10 mg, Intravenous, Q10 Min PRN    hydrALAzine (Apresoline) 20 mg/mL injection 10 mg, 10 mg, Intravenous, Q2H PRN    ondansetron (Zofran) 4 MG/2ML injection 4 mg, 4 mg, Intravenous, Q6H PRN    prochlorperazine (Compazine) 10 MG/2ML injection 5 mg, 5 mg, Intravenous, Q8H PRN    vancomycin (Vancocin) 1.75 g in sodium chloride 0.9% 500mL IVPB premix, 15 mg/kg, Intravenous, Q12H    apixaban (Eliquis) tab 5 mg, 5 mg, Oral, BID    atorvastatin (Lipitor) tab 80 mg, 80 mg, Oral, Nightly    dronedarone (Multaq) tab 400 mg, 400 mg, Oral, BID    melatonin tab 3 mg, 3 mg, Oral, Nightly PRN    acetaminophen (Tylenol Extra Strength) tab 500 mg, 500 mg, Oral, Q4H PRN    polyethylene glycol (PEG 3350) (Miralax) 17 g oral packet 17 g, 17 g, Oral, Daily PRN    sennosides (Senokot) tab 17.2 mg, 17.2 mg, Oral, Nightly PRN    bisacodyl (Dulcolax) 10 MG rectal suppository 10 mg, 10 mg, Rectal, Daily PRN    fleet enema (Fleet) 7-19 GM/118ML rectal enema 133 mL, 1 enema, Rectal, Once PRN    ondansetron (Zofran) 4 MG/2ML injection 4 mg, 4 mg, Intravenous, Q6H PRN    piperacillin-tazobactam (Zosyn) 3.375 g in dextrose 5% 100 mL IVPB-ADDV, 3.375 g, Intravenous,  Q8H    Review of Systems:  Completed. See pertinent positives and negatives above.     Physical Exam:  Vital signs: Blood pressure 125/77, pulse 69, temperature 98.1 °F (36.7 °C), temperature source Oral, resp. rate 18, height 190.5 cm (6' 3\"), weight 260 lb (117.9 kg), SpO2 94%.    General: Alert, oriented, NAD, on room air.   HEENT: Moist mucous membranes.   Neck: No lymphadenopathy.  Supple.  Cardiovascular: RRR  Respiratory: Clear to auscultation bilaterally.  No wheezes. No rhonchi.  Abdomen: Soft, nontender, nondistended.   Musculoskeletal: LE edema with chronic appearing stasis changes (per wife erythema is at baseline)  Integument: No rash.    Laboratory Data:  Recent Labs   Lab 07/18/24  1442   RBC 3.47*   HGB 10.1*   HCT 29.0*   MCV 83.6   MCH 29.1   MCHC 34.8   RDW 15.7   NEPRELIM 15.14*   WBC 18.8*   .0*     Recent Labs   Lab 07/17/24  0002 07/17/24  1828 07/18/24  1442   *  --  109*   BUN 24*  --  12   CREATSERUM 1.04  --  0.84   CA 8.7  --  9.0   ALB 2.9*  --   --    *  --  138   K 2.9* 3.6 3.4*     --  108   CO2 26.0  --  26.0   ALKPHO 79  --   --    AST 44*  --   --    ALT 27  --   --    BILT 1.0  --   --    TP 6.3  --   --        Microbiology: Reviewed in EMR    Radiology: Reviewed.    PROCEDURE:  CTA ABD/PEL (CPT=74174)     COMPARISON:  [facility] , CT, CT CHEST+ABDOMEN+PELVIS(ALL CNTRST ONLY)(CPT=71260/67351), 7/17/2024, 2:34 AM.     INDICATIONS:  possible SMA dissection     TECHNIQUE:  CT images of the abdomen and pelvis were obtained pre- and post- injection of non-ionic intravenous contrast material. Multi-planar reformatted/3-D images were created to optimize visualization of vascular anatomy.  Dose reduction techniques  were used. Dose information is transmitted to the ACR (American College of Radiology) NRDR (National Radiology Data Registry) which includes the Dose Index Registry.     PATIENT STATED HISTORY:(As transcribed by Technologist)  fevers, recent ct cap 250  am. evaluate SMA dissection      CONTRAST USED:  100cc of Isovue 370     FINDINGS:  This is a follow-up to CT performed at 7/17/2024 at 0234 hours.    AORTA/VASCULAR:  Exam is degraded by motion artifact.  There is soft tissue stranding surrounding the SMA at the site of vessel narrowing with subsequent lack of intraluminal contrast, series 9, images 167 through 204. There is subsequent opacification  of more distal SMA branches.  Findings knee or SMA thrombosis.  A localized dissection cannot be excluded.  The origin of the SMA is widely patent.  Celiac artery is patent.  DAPHNE is well visualized and patent.  There is dense calcific plaque at the  origins of the main renal arteries bilaterally.  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    LIVER:  Stable CT appearance of the liver  BILIARY:  No biliary ductal dilatation.    PANCREAS:  Stable.  Homogeneous enhancement  SPLEEN:  Normal caliber  KIDNEYS:  Stable.  No hydronephrosis.  ADRENALS:  Stable.    RETROPERITONEUM:  Stable scattered periaortic lymph nodes.  BOWEL/MESENTERY:  There is soft tissue stranding and edema in the mesentery.  There is colonic diverticulosis.  No evidence of dilated small bowel loops.  There are air-fluid levels in the sigmoid colon with suggestion of mild wall thickening of the  rectosigmoid colon.  There is soft tissue stranding in the pericolic gutters bilaterally.  There is a small amount of free fluid in soft tissue stranding in the presacral space.    PELVIC ORGANS:  Circumferential wall thickening in a partially decompressed urinary bladder.    BONES:  For trophic degenerative changes lumbar spine.  Benign appearing cyst L4.  Degenerative disc disease L3-4 L4-5.     Impression:    CONCLUSION:  There is lack of intraluminal contrast involving the SMA at the level of the central mesentery with surrounding soft tissue  stranding and mesenteric edema with reconstitution of more distal SMA branches.  Findings concerning for SMA  thrombosis versus localized dissection as questioned on 7/17/2024 CT performed earlier in the day.  Findings phoned to nurse Vida on 7/17/2024.     There is soft tissue stranding and small amount of free fluid in the mesentery as well as soft tissue stranding and a small amount of free fluid in the presacral space and pericolic gutters.     Thrombosis of the infrarenal IVC unchanged     LOCATION:  [facility]     Dictated by (CST): Court Kline MD on 7/17/2024 at 10:53 AM      Finalized by (CST): Court Kline MD on 7/17/2024 at 11:04 AM       PROCEDURE:  CT BRAIN OR HEAD (61082)     COMPARISON:  [facility] , CT, CT BRAIN OR HEAD (86510), 3/01/2024, 1:33 PM.  [place], CT, CT BRAIN OR HEAD (03440), 4/26/2024, 7:23 AM.     INDICATIONS:  Rule out CVA     TECHNIQUE:  Noncontrast CT scanning is performed through the brain. Dose reduction techniques were used. Dose information is transmitted to the ACR (American College of Radiology) NRDR (National Radiology Data Registry) which includes the Dose Index  Registry.     PATIENT STATED HISTORY: (As transcribed by Technologist)  ams         FINDINGS:       VENTRICLES/SULCI:   Ventricles and sulci are prominent indicating atrophy.     INTRACRANIAL:  There are no abnormal extraaxial fluid collections.  There is no midline shift.  There are no acute appearing intraparenchymal brain abnormalities.  There is nothing specific for acute territorial infarct.  There is no hemorrhage or mass  lesion.  There is chronic microvascular ischemic white matter disease in the cerebrum bilaterally.  Nothing definite for acute infarct.  Residual encephalomalacia mild from previously demonstrated left hemisphere hematoma.     SINUSES:  No acute sinusitis.       MASTOIDS:  The mastoids are clear.     SKULL:  No evidence for fracture or acute osseous abnormality.     OTHER:  None.      Impression:    CONCLUSION:  Chronic changes in the brain, as described, but no acute intracranial bleed, or other acute intracranial process identified. If additional imaging is needed for suspected ischemia and/or infarct based on the clinical signs and symptoms, then   consider follow-up with MRI.    LOCATION:  [facility]     Dictated by (CST): Alex Hubbard MD on 7/17/2024 at 5:37 PM      Finalized by (CST): Alex Hubbard MD on 7/17/2024 at 5:39 PM        PROCEDURE:  XR CHEST AP PORTABLE  (CPT=71045)     TECHNIQUE:  AP chest radiograph was obtained.     COMPARISON:  [facility] , XR, XR CHEST AP PORTABLE  (CPT=71045), 2/17/2024, 3:54 PM.     INDICATIONS:  SOB     PATIENT STATED HISTORY: (As transcribed by Technologist)  Patient offered no additional history at this time.         FINDINGS:       Mildly enlarged cardiac silhouette.     Median sternotomy hardware is noted.     No consolidation, pleural effusion or pneumothorax.     Impression:    CONCLUSION:  No consolidation.     Agree with preliminary radiology report from [facility].     LOCATION:  [facility]     Dictated by (CST): Justino Jeter MD on 7/17/2024 at 8:16 AM      Finalized by (CST): Justino Jeter MD on 7/17/2024 at 8:16 AM         PROCEDURE:  CT CHEST+ABDOMEN+PELVIS(ALL CNTRST ONLY)(CPT=71260/65340)     COMPARISON:  None.     INDICATIONS:  fever/hypoxia     TECHNIQUE:  IV contrast-enhanced scanning through the chest, abdomen, and pelvis was performed.  Dose reduction techniques were used. Dose information is transmitted to the ACR (American College of Radiology) NRDR (National Radiology Data Registry) which   includes the Dose Index Registry.     PATIENT STATED HISTORY:(As transcribed by Technologist)  fever, hypoxia      CONTRAST USED:  100 mLcc of Isovue 370     FINDINGS:       CHEST:    LUNGS:  There is image degradation due to motion.  No focal consolidation.  Lingular and left lower lobe linear densities are most consistent with  atelectasis and or scar.    MEDIASTINUM:  No mass or adenopathy.  Mediastinal clips status post CABG.  YASMINE:  No mass or adenopathy.    CARDIAC:  Enlarged.  Status post CABG.  Aortic valve replacement.  No pericardial effusion.  PLEURA:  No mass or effusion.    CHEST WALL:  No mass or axillary adenopathy.  Sternotomy wires status post CABG.  AORTA:  Atherosclerosis.  No aneurysm or dissection.    VASCULATURE:  No visible pulmonary arterial thrombus or attenuation.       ABDOMEN/PELVIS:  LIVER:  No enlargement, atrophy, abnormal density, or significant focal lesion.    BILIARY:  Possible sludge and or small stones within the gallbladder.  Sonogram can be performed for further evaluation as clinically indicated.  No visible dilatation.    PANCREAS:  No lesion, fluid collection, ductal dilatation, or atrophy.    SPLEEN:  No enlargement or focal lesion.    KIDNEYS:  Small 1.5 cm cyst within the right superior pole for which no further follow-up is required.  No mass, obstruction, or calcification.    ADRENALS:  No mass or enlargement.    AORTA:  Infrarenal IVC filter.  There is thrombus within and inferior to the filter extending to the common iliac veins.  Prominent edema within the pelvic fat planes probably related to venous congestion.  Stranding surrounds the superior mesenteric  artery with suggestion of focal irregularity, best seen on images 53-56 of series 9.  Atherosclerosis.  No aneurysm or dissection.    RETROPERITONEUM:  No mass or adenopathy.    BOWEL/MESENTERY:  Normal caliber small and large bowel.    ABDOMINAL WALL:  No mass or hernia.    URINARY BLADDER:  Bladder is nearly empty most likely accounts for diffuse bladder wall thickening given no signs cystitis per ED MD. No visible focal wall thickening, lesion, or calculus.    PELVIC NODES:  No adenopathy.    PELVIC ORGANS:  Pelvic organs appropriate for patient age.    BONES:  Degenerative change predominantly involves the lower lumbar spine.  No  fracture.       Impression:    CONCLUSION:    1. Thrombus within and inferior to IVC filter extending to the common iliac veins.  Prominent edema within the pelvic fat planes, probably related to venous congestion.  2. Stranding surrounds the superior mesenteric artery with appearance focal irregularity.  Findings may represent motion artifact, however focal dissection cannot be excluded.  CTA or MRA can be performed for further evaluation as clinically indicated.  3. Please see details as above.     ED M.D. notified of these findings with preliminary radiology report from [facility].     LOCATION:  [facility]     Dictated by (CST): Latoya Alberts MD on 7/17/2024 at 7:00 AM      Finalized by (CST): Latoya Alberts MD on 7/17/2024 at 7:44 AM    ASSESSMENT:  Staph epi bacteremia, concerning for aortic PVE.   2/2 Bcx 7/17. Per patient, taken from the same site. Patient has an AVR. TTE w/o obvious vegetation but leaflets poorly visualized and worsening function of valve noted.   Fevers, assume related to #1 vs #3.  UA negative. VRP negative. 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  Acute CVA, suspect embolic (multiple infarcts). ? Related to #1.   Leukocytosis- stable  CAD, s/p CABG; CHF; AVR, HTN, HLD, A. fib  H/o ICH.  CHAZ    PLAN:  - continue IV Zosyn and IV vanco, likely can dc IV Zosyn soon.   - follow repeat blood cultures from 7/17 and 7/18 (both done prior to vancomycin)  - TTE noted, will ask cards to see  - follow temps and wbc  - noted plans for neuro and hem to see    Discussed case with RN, Dr. Painting, Dr. Miller, patient and patient's wife.     MUNA Cruz   [facility]  [phone]    ID ATTENDING ADDENDUM     Pt seen an examined independently. Chart reviewed. Agree with above. Note has been reviewed by me and modified as  needed.  Exam and Impression/ Recs as noted above.  Presentation very concerning for PVE with embolic infarcts, cards consulted, I would favor JAZMÍN in this setting but will likely need 6 weeks of IV abx. Dc zosyn since no bowel ischemia, cont IV vanco  Will follow  D/w staff and with pt  More than 50% of clinical time and 100% of the clinical decision making performed by me.    Maren Painting MD

## 2024-07-19 NOTE — CONGREGATE LIVING REVIEW
[facility] Living Authorization    The [facility] Review Committee has reviewed this case and the patient IS APPROVED for discharge to a facility for Short Term Skilled once the following procedure is followed:     - The physician discharge instructions (contained within the FANNIE note for SNF) must inlcude the below appropriate and approved COVID instructions to the facility    For questions regarding CLRC approval process, please contact the CM assigned to the case.  For questions regarding RN discharge workflow, please contact the unit Clinical Leader.

## 2024-07-19 NOTE — CONSULTS
[facility]   NEUROLOGY   CONSULT NOTE    Admission date: 7/16/2024  Reason for Consult: Stroke  Chief Complaint:   Chief Complaint   Patient presents with    Fever   ________________________________________________________________    History     History of Presenting Illness  64 year old male with multiple medical problems, reported below, including recent stroke with residual right hemiparesis and expressive speech difficulty, admitted for fever and infection, has repeat MRI of brain showing multiple small infarcts. Patient has no new focal neurological deficits. Wife think he is still more lethargic. No seizures.    History obtained from Patient, spouse and chart review.     Past Medical History:    Arrhythmia    Cataract    Congestive heart disease (HCC)    Deep vein thrombosis (HCC)    Depression    Disorder of liver    Heart valve disease    High blood pressure    High cholesterol    ICH (intracerebral hemorrhage) (HCC)    Muscle weakness    Obstructive sleep apnea, adult    autoPAP 5-12     Sleep apnea    Stroke (HCC)    Visual impairment     Past Surgical History:   Procedure Laterality Date    Cabg      Cataract      Colonoscopy N/A 11/29/2018    Procedure: COLONOSCOPY, POSSIBLE BIOPSY, POSSIBLE POLYPECTOMY 38806;  Surgeon: Zack Giordano MD;  Location: [facility]     Social History     Socioeconomic History    Marital status:    Tobacco Use    Smoking status: Never    Smokeless tobacco: Never   Vaping Use    Vaping status: Never Used   Substance and Sexual Activity    Alcohol use: Yes     Alcohol/week: 1.0 standard drink of alcohol     Types: 1 Glasses of wine per week     Comment: Occastional    Drug use: No   Other Topics Concern    Caffeine Concern No     Comment: occasionally    Exercise No     Social Determinants of Health     Food Insecurity: No Food Insecurity (7/17/2024)    Food Insecurity     Food Insecurity: Never true   Transportation Needs: No Transportation Needs  (7/17/2024)    Transportation Needs     Lack of Transportation: No   Physical Activity: Sufficiently Active (3/17/2020)    [facility]    Exercise Vital Sign     Days of Exercise per Week: 6 days     Minutes of Exercise per Session: 60 min   Housing Stability: Low Risk  (7/17/2024)    Housing Stability     Housing Instability: No     Family History   Problem Relation Age of Onset    Breast Cancer Mother     Diabetes Father     Diabetes Maternal Grandmother     Diabetes Paternal Grandmother      Allergies No Known Allergies    Home Meds  Current Outpatient Medications   Medication Instructions    apixaban (ELIQUIS) 5 mg, Oral, 2 times daily    atorvastatin 80 MG Oral Tab TAKE 1 TABLET BY MOUTH EVERY NIGHT    carvedilol (COREG) 6.25 mg, Oral, 2 times daily    Cholecalciferol (VITAMIN D) 25 mcg, Oral, Daily    cyanocobalamin 1000 MCG Oral Tab Take 1 tablet (1,000 mcg total) by mouth daily.    furosemide (LASIX) 20 mg, Oral, Every other day    Multaq 400 mg, Oral, 2 times daily    Multiple Vitamins-Minerals (MULTI FOR HER 50+) Oral Tab 1 tablet, Oral, Daily    spironolactone 25 MG Oral Tab Take 1 tablet (25 mg total) by mouth daily.     Scheduled Meds:   vancomycin  15 mg/kg Intravenous Q12H    atorvastatin  80 mg Oral Nightly    dronedarone  400 mg Oral BID    piperacillin-tazobactam  3.375 g Intravenous Q8H     Continuous Infusions:   sodium chloride 75 mL/hr at 07/19/24 1005    continuous dose heparin       PRN Meds:  acetaminophen **OR** acetaminophen    labetalol    hydrALAzine    ondansetron    prochlorperazine    melatonin    acetaminophen    polyethylene glycol (PEG 3350)    sennosides    bisacodyl    fleet enema    ondansetron    OBJECTIVE   VITAL SIGNS:   Temp:  [97.5 °F (36.4 °C)-99 °F (37.2 °C)] 98.2 °F (36.8 °C)  Pulse:  [67-75] 67  Resp:  [15-19] 19  BP: (106-145)/(59-81) 145/81  SpO2:  [94 %-98 %] 94 %    PHYSICAL EXAM:    NEUROLOGIC:    Mental Status:  A&O  x 4, Follows simple commands, no obvious aphasia, mild dysarthria  Cranial nerves: PERRL.  Visual fields full.  EOMI.  Right facial weakness.  Hearing grossly intact. Tongue midline with normal movements.   Motor: Right PD with right upper extremity> RLE weakness.  Left side power normal.   Sensation: Intact to light touch bilaterally  Cerebellar: Normal Finger-To-Nose test      LABORATORY DATA:  Last 24 hour labs were reviewed in detail.  Recent Labs   Lab 07/17/24  0002 07/17/24  1828 07/18/24  1442 07/19/24  1050   *  --  138 141   K 2.9* 3.6 3.4* 3.5     --  108 109   CO2 26.0  --  26.0 26.0   *  --  109* 95   BUN 24*  --  12 14   CREATSERUM 1.04  --  0.84 0.88     Recent Labs   Lab 07/17/24  0002 07/18/24  1442 07/19/24  1050   WBC 18.4* 18.8* 18.2*   HGB 10.3* 10.1* 9.8*   .0* 141.0* 145.0*     Recent Labs   Lab 07/17/24  0002   ALT 27   AST 44*     No results for input(s): \"MG\", \"PHOS\" in the last 168 hours.  Last A1c value was 5.7% done 7/19/2024.     Lab Results   Component Value Date    LDL 63 07/19/2024    HDL 17 07/19/2024    TRIG 78 07/19/2024    VLDL 12 07/19/2024        Radiology:    MRI BRAIN (W+WO) (CPT=70553)    Result Date: 7/19/2024  CONCLUSION:  1. There are multiple punctate foci of diffusion restriction in frontal and parietal lobes bilaterally consistent with multiple small acute strokes.  This involves multiple vascular territories suggesting an embolic etiology for stroke.  Correlation with  clinical findings is necessary. 2. Multiple areas of hemosiderin scar are noted.  These correspond areas of prior hemorrhage.  There is no evidence of acute hemorrhage. 3. There is chronic small vessel ischemic change and atrophy.  Above findings were reported to JUAN Harrison on July 19, 2024 at 7:23 a.m..   LOCATION:  [facility]    Dictated by (CST): James Puga MD on 7/19/2024 at 7:18 AM     Finalized by (CST): James Puga MD on 7/19/2024 at 7:23 AM      ASSESSMENT/PLAN   64  year old male with:    Acute multiple infarcts, likely embolic. CTA of head and neck pending. LDL 63, AIC 5.7%. Echo reviewed. Continue Eliquis and Statin, add Plavix 75mg daily. Cardiology input regarding switching patient to Coumadin from Eliquis. OT/PT/Rehab. Family counseled.     Principal Problem:    Sepsis without acute organ dysfunction, due to unspecified organism (HCC)  Active Problems:    Essential hypertension    Chronic atrial fibrillation (HCC)    Hyperlipidemia    Coronary artery disease involving coronary bypass graft of native heart    Deep vein thrombosis (DVT) of proximal vein of left lower extremity (HCC)    Acute febrile illness    (HFpEF) heart failure with preserved ejection fraction (HCC)    Acute CVA (cerebrovascular accident) (HCC)    Anemia       Pedro Borden MD  Neurohospitalist  [facility]    Disclaimer: This record was dictated using Dragon software. There may be errors due to voice recognition problems that were not realized and corrected during the completion of the note.
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
## 2024-07-19 NOTE — DISCHARGE PLANNING
Patient follow-up appointment information:     Visit Type: Stroke follow-up  Date of TIA/Stroke: 07/16/2024  Type of Stroke: Ischemic  Patient to follow-up: 4 weeks  OP Neurologist: Any available neurologist  New patient to neurology service: Yes  Anticipated discharge (if known): TBD  Discharge disposition (if known): [facility]    Please contact patient's spouse Perla at [phone] to schedule TIA/stroke follow-up appointment.     RN Stroke Navigator team  [phone]

## 2024-07-19 NOTE — PLAN OF CARE
Transfer from Med onc. Received report from Fly MARTNIEZ. A/Ox4. Room air. Neuros Q2. NIH daily. Bite size diet. Pills whole with apple sauce. Normal sinus on tele. Heparin gtt started at 21ml/hr per MAR. Bed rest. All safety prec are in place and will continue with plan of care.

## 2024-07-20 LAB
ANION GAP SERPL CALC-SCNC: 5 MMOL/L (ref 0–18)
APTT PPP: 130.1 SECONDS (ref 23–36)
APTT PPP: 82.4 SECONDS (ref 23–36)
BASOPHILS # BLD AUTO: 0.08 X10(3) UL (ref 0–0.2)
BASOPHILS # BLD AUTO: 0.1 X10(3) UL (ref 0–0.2)
BASOPHILS NFR BLD AUTO: 0.5 %
BASOPHILS NFR BLD AUTO: 0.6 %
BUN BLD-MCNC: 12 MG/DL (ref 9–23)
CALCIUM BLD-MCNC: 9.2 MG/DL (ref 8.7–10.4)
CHLORIDE SERPL-SCNC: 113 MMOL/L (ref 98–112)
CO2 SERPL-SCNC: 23 MMOL/L (ref 21–32)
CREAT BLD-MCNC: 0.85 MG/DL
CREAT BLD-MCNC: 0.9 MG/DL
DEPRECATED HBV CORE AB SER IA-ACNC: 550.7 NG/ML
EGFRCR SERPLBLD CKD-EPI 2021: 96 ML/MIN/1.73M2 (ref 60–?)
EGFRCR SERPLBLD CKD-EPI 2021: 98 ML/MIN/1.73M2 (ref 60–?)
EOSINOPHIL # BLD AUTO: 0.22 X10(3) UL (ref 0–0.7)
EOSINOPHIL # BLD AUTO: 0.24 X10(3) UL (ref 0–0.7)
EOSINOPHIL NFR BLD AUTO: 1.3 %
EOSINOPHIL NFR BLD AUTO: 1.6 %
ERYTHROCYTE [DISTWIDTH] IN BLOOD BY AUTOMATED COUNT: 15.7 %
ERYTHROCYTE [DISTWIDTH] IN BLOOD BY AUTOMATED COUNT: 15.7 %
FOLATE SERPL-MCNC: 16 NG/ML (ref 5.4–?)
GLUCOSE BLD-MCNC: 100 MG/DL (ref 70–99)
GLUCOSE BLD-MCNC: 102 MG/DL (ref 70–99)
GLUCOSE BLD-MCNC: 91 MG/DL (ref 70–99)
GLUCOSE BLD-MCNC: 98 MG/DL (ref 70–99)
HAPTOGLOB SERPL-MCNC: <10 MG/DL (ref 30–200)
HCT VFR BLD AUTO: 29.8 %
HCT VFR BLD AUTO: 30.2 %
HGB BLD-MCNC: 9.5 G/DL
HGB BLD-MCNC: 9.7 G/DL
IMM GRANULOCYTES # BLD AUTO: 0.17 X10(3) UL (ref 0–1)
IMM GRANULOCYTES # BLD AUTO: 0.2 X10(3) UL (ref 0–1)
IMM GRANULOCYTES NFR BLD: 1.1 %
IMM GRANULOCYTES NFR BLD: 1.2 %
IRON SATN MFR SERPL: 10 %
IRON SATN MFR SERPL: 11 %
IRON SERPL-MCNC: 20 UG/DL
IRON SERPL-MCNC: 21 UG/DL
LDH SERPL L TO P-CCNC: 614 U/L
LYMPHOCYTES # BLD AUTO: 2.01 X10(3) UL (ref 1–4)
LYMPHOCYTES # BLD AUTO: 2.04 X10(3) UL (ref 1–4)
LYMPHOCYTES NFR BLD AUTO: 12.5 %
LYMPHOCYTES NFR BLD AUTO: 13.4 %
MCH RBC QN AUTO: 28.2 PG (ref 26–34)
MCH RBC QN AUTO: 28.4 PG (ref 26–34)
MCHC RBC AUTO-ENTMCNC: 31.5 G/DL (ref 31–37)
MCHC RBC AUTO-ENTMCNC: 32.6 G/DL (ref 31–37)
MCV RBC AUTO: 87.1 FL
MCV RBC AUTO: 89.6 FL
MONOCYTES # BLD AUTO: 0.77 X10(3) UL (ref 0.1–1)
MONOCYTES # BLD AUTO: 0.99 X10(3) UL (ref 0.1–1)
MONOCYTES NFR BLD AUTO: 5.1 %
MONOCYTES NFR BLD AUTO: 6.1 %
NEUTROPHILS # BLD AUTO: 11.72 X10 (3) UL (ref 1.5–7.7)
NEUTROPHILS # BLD AUTO: 11.72 X10(3) UL (ref 1.5–7.7)
NEUTROPHILS # BLD AUTO: 12.81 X10 (3) UL (ref 1.5–7.7)
NEUTROPHILS # BLD AUTO: 12.81 X10(3) UL (ref 1.5–7.7)
NEUTROPHILS NFR BLD AUTO: 78.3 %
NEUTROPHILS NFR BLD AUTO: 78.3 %
OSMOLALITY SERPL CALC.SUM OF ELEC: 291 MOSM/KG (ref 275–295)
PLATELET # BLD AUTO: 154 10(3)UL (ref 150–450)
PLATELET # BLD AUTO: 160 10(3)UL (ref 150–450)
POTASSIUM SERPL-SCNC: 3.7 MMOL/L (ref 3.5–5.1)
POTASSIUM SERPL-SCNC: 3.9 MMOL/L (ref 3.5–5.1)
RBC # BLD AUTO: 3.37 X10(6)UL
RBC # BLD AUTO: 3.42 X10(6)UL
SODIUM SERPL-SCNC: 141 MMOL/L (ref 136–145)
TIBC SERPL-MCNC: 195 UG/DL (ref 250–425)
TOTAL IRON BINDING CAPACITY: 197 UG/DL (ref 250–425)
TRANSFERRIN SERPL-MCNC: 131 MG/DL (ref 215–365)
TRANSFERRIN SERPL-MCNC: 131 MG/DL (ref 215–365)
VANCOMYCIN TROUGH SERPL-MCNC: 19.3 UG/ML (ref 10–20)
VIT B12 SERPL-MCNC: 547 PG/ML (ref 211–911)
WBC # BLD AUTO: 15 X10(3) UL (ref 4–11)
WBC # BLD AUTO: 16.4 X10(3) UL (ref 4–11)

## 2024-07-20 PROCEDURE — 99233 SBSQ HOSP IP/OBS HIGH 50: CPT | Performed by: INTERNAL MEDICINE

## 2024-07-20 RX ORDER — VANCOMYCIN HYDROCHLORIDE
1500 EVERY 12 HOURS
Status: DISCONTINUED | OUTPATIENT
Start: 2024-07-21 | End: 2024-07-23
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
## 2024-07-20 NOTE — SLP NOTE
ADULT SWALLOWING EVALUATION    ASSESSMENT    ASSESSMENT/OVERALL IMPRESSION:  Pt is a 65 y/o male originally admitted with fever and diagnosed with sepsis and now recently diagnosed with new stroke on seen on MRI yesterday 7/19/24. MRI revealed multiple small acute strokes in the frontal and parietal lobes. Hx CVA from years ago. PMHx HTN HL. Order received for reevaluation of oropharyngeal swallow function to r/o aspiration. Pt known to this service for VFSS 2/21/24 with recommendations for regular diet and mildly thick liquids d/t aspiration with thin liquids resulting from reduced BOT retraction and pharyngeal stripping wave.  Recent BSSE completed 7/18/24 with recommendations for soft and bite-sized solids and thin liquids via tsp, no straws as well as communication evaluation d/t stroke protocol. Night nurse reported last night on 7/19/24, pt was choking on meds and thin liquids and pt was unable to draw liquid from a straw although recommendations were for no straws d/t risk of aspiration. Pt received upright in bed. Pt reported new onset mild anomia and dysarthria since this current stroke. Pt consumes a regular diet (with preference for softer solids) and thin liquids at baseline.     Pt presented with a suspected mild oropharyngeal dysphagia. OME's revealed mild-mod L facial droop and reduced lingual strength and ROM. Pt independently initiating a bolus hold throughout exam. Bolus acceptance was intact w/o evidence of anterior bolus loss. Mastication and AP transit were prolonged, but functional with soft and bite-sized solids. Mild oral residue noted with soft and easy to chew solids, but cleared with a thin liquid wash. Hyolaryngeal excursion was present per palpation. Immediate/delayed cough x 3 with cup sips thin liquid. No overt s/s of aspiration were observed with thin liquids via tsp.     Recommend pt consume soft and bite-sized diet and thin liquids via TSP ONLY d/t new stroke with evidence of  increased overall weakness placing pt at increased risk for aspiration. Meds can be given whole in a pureed bolus. Aspiration precautions recommended including alternating small bites and sips to clear oral residue and reduce aspiration risk. VFSS recommended if s/s of aspiration increase for further objective assessment of oropharyngeal swallow function. SLP will follow up to monitor diet tolerance, administer trials upgrades, reinforce aspiration precautions, and administer communication evaluation.              RECOMMENDATIONS   Diet Recommendations - Solids: Mechanical soft chopped/ Soft & Bite Sized  Diet Recommendations - Liquids: Thin Liquids (TSP)                        Compensatory Strategies Recommended: No straws;Small bites and sips;Alternate consistencies;Liquids via spoon  Aspiration Precautions: Upright position;Slow rate;Small bites and sips;No straw  Medication Administration Recommendations: Whole in puree  Treatment Plan/Recommendations: Dysphagia therapy;Aspiration precautions;Communication evaluation    HISTORY   MEDICAL HISTORY  Reason for Referral: R/O aspiration    Problem List  Principal Problem:    Sepsis without acute organ dysfunction, due to unspecified organism (HCC)  Active Problems:    Essential hypertension    Chronic atrial fibrillation (HCC)    Hyperlipidemia    Coronary artery disease involving coronary bypass graft of native heart    Deep vein thrombosis (DVT) of proximal vein of left lower extremity (HCC)    Acute febrile illness    (HFpEF) heart failure with preserved ejection fraction (HCC)    Acute CVA (cerebrovascular accident) (HCC)    Anemia      Past Medical History  Past Medical History:    Arrhythmia    Cataract    Congestive heart disease (HCC)    Deep vein thrombosis (HCC)    Depression    Disorder of liver    Heart valve disease    High blood pressure    High cholesterol    ICH (intracerebral hemorrhage) (HCC)    Muscle weakness    Obstructive sleep apnea, adult     autoPAP 5-12     Sleep apnea    Stroke (HCC)    Visual impairment       Prior Living Situation: Home with spouse  Diet Prior to Admission: Regular;Thin liquids  Precautions: Aspiration    Patient/Family Goals: n/a    SWALLOWING HISTORY  Current Diet Consistency: NPO  Dysphagia History: see above  Imaging Results:   CONCLUSION:    1. There are multiple punctate foci of diffusion restriction in frontal and parietal lobes bilaterally consistent with multiple small acute strokes.  This involves multiple vascular territories suggesting an embolic etiology for stroke.  Correlation with    clinical findings is necessary.   2. Multiple areas of hemosiderin scar are noted.  These correspond areas of prior hemorrhage.  There is no evidence of acute hemorrhage.   3. There is chronic small vessel ischemic change and atrophy.      Above findings were reported to JUAN Harrison on July 19, 2024 at 7:23 a.m..         LOCATION:  [place]            Dictated by (CST): James Puga MD on 7/19/2024 at 7:18 AM       Finalized by (CST): James Puga MD on 7/19/2024 at 7:23 AM       SUBJECTIVE       OBJECTIVE   ORAL MOTOR EXAMINATION  Dentition: Functional  Symmetry: Reduced left facial  Strength:  (reduced)  Tone:  (reduced)  Range of Motion:  (reduced)  Rate of Motion:  (reduced)    Voice Quality: Clear  Respiratory Status: Unlabored  Consistencies Trialed: Thin liquids;Puree;Soft solid  Method of Presentation: Staff/Clinician assistance  Patient Positioning: Upright;Midline    Oral Phase of Swallow: Impaired  Bolus Retrieval: Intact  Bilabial Seal: Intact  Bolus Formation: Impaired  Bolus Propulsion: Impaired  Mastication: Impaired  Retention: Impaired    Pharyngeal Phase of Swallow: Impaired           (Please note: Silent aspiration cannot be evaluated clinically. Videofluoroscopic Swallow Study is required to rule-out silent aspiration.)    Esophageal Phase of Swallow: No complaints consistent with possible esophageal  involvement  Comments: none              GOALS  Goal #1 The patient will tolerate soft and bite-sized consistency and thin via TSP ONLY liquids without overt signs or symptoms of aspiration with 90 % accuracy over 2-3 session(s).  In Progress   Goal #2 The patient/family/caregiver will demonstrate understanding and implementation of aspiration precautions and swallow strategies independently over 2-3 session(s).    In Progress   Goal #3 The patient will tolerate trial upgrade of soft and easy to chew consistency and NA liquids without overt signs or symptoms of aspiration with 90 % accuracy over 2-3 session(s).  In Progress   Goal #4 The patient will utilize compensatory strategies as outlined by  BSSE (clinical evaluation) including Small bites, Small sips, Alternate liquids/solids, No straws, Liquids via tsp amount only with mild assistance 90 % of the time across 2 sessions.    In Progress   Goal #5 Pt will complete communication evaluation.     In Progress   FOLLOW UP  Treatment Plan/Recommendations: Dysphagia therapy;Aspiration precautions;Communication evaluation  Number of Visits to Meet Established Goals:  (2-3)  Follow Up Needed (Documentation Required): Yes  SLP Follow-up Date: 07/22/24    Thank you for your referral.   If you have any questions, please contact Svitlana TSAI, GIN

## 2024-07-20 NOTE — OCCUPATIONAL THERAPY NOTE
OCCUPATIONAL THERAPY EVALUATION - INPATIENT     Room Number: 3608/3608-A  Evaluation Date: 7/20/2024  Type of Evaluation: Initial  Presenting Problem: Sepsis; acute CVA 7/19/24    Physician Order: IP Consult to Occupational Therapy  Reason for Therapy: ADL/IADL Dysfunction and Discharge Planning    OCCUPATIONAL THERAPY ASSESSMENT   Patient is currently functioning below baseline with toileting, bathing, upper body dressing, lower body dressing, grooming, eating, bed mobility, and transfers. Prior to admission, patient's baseline is assist with ADLs (physical or set up), toileting IND. Patient is requiring SBA to max A as a result of the following impairments: decreased functional strength, decreased functional reach, pain, impaired sitting and standing balance, impaired coordination, and limited RUE>RLE ROM. Occupational Therapy will continue to follow for duration of hospitalization.    Patient will benefit from continued skilled OT Services to facilitate return to prior level of function as patient demonstrates high motivation with excellent tolerance to an intensive therapy program      History Related to Current Admission: Patient is a 64 year old male admitted on 7/16/2024 with Presenting Problem: Sepsis; acute CVA 7/19/24. Co-Morbidities : CVA (02/2024), DVT, HTN, HLD    Imaging  MRI of brain 7/19/24: Impression  1. There are multiple punctate foci of diffusion restriction in frontal and parietal lobes bilaterally consistent with multiple small acute strokes.  This involves multiple vascular territories suggesting an embolic etiology for stroke.  Correlation with   clinical findings is necessary.  2. Multiple areas of hemosiderin scar are noted.  These correspond areas of prior hemorrhage.  There is no evidence of acute hemorrhage.  3. There is chronic small vessel ischemic change and atrophy.   .  Recommendations for nursing staff:   Transfers: 2- person stand-pivot transfer w/ use of walker  Toileting  location: commode    EVALUATION SESSION:  Patient Start of Session: supine  FUNCTIONAL TRANSFER ASSESSMENT  Sit to Stand: Edge of Bed  Edge of Bed: Maximum Assist (mod A of 2)    BED MOBILITY  Rolling: Maximum Assist (mod A of 2)  Supine to Sit : Maximum Assist (mod A of 2)  Sit to Supine (OT): Maximum Assist (mod A of 2)  Scooting: mod A    BALANCE ASSESSMENT  Static Sitting: Contact Guard Assist  Static Standing: Minimal Assist  Standing Unilateral: Minimal Assist    FUNCTIONAL ADL ASSESSMENT  Eating: Stand-by Assist (w/ left non-dominant hand)  Grooming Seated: Modified Independent (Pt. seated with HOB elevated demonstrating grooming using LUE to wipe face with washcloth)      ACTIVITY TOLERANCE: Tolerated > 15 min EOB sitting w/ stable vitals  /80     Tolerated standing x ~2 min with min c/o fatigue                      O2 SATURATIONS       COGNITION  Arousal/Alertness:  appropriate responses to stimuli  Attention Span:  appears intact  Orientation Level:  oriented x4  Memory:  appears intact  Following Commands:  follows all commands and directions without difficulty  Initiation: appears intact  Safety Judgement:  good awareness of safety precautions  Awareness of Errors:  good awareness of errors made  Awareness of Deficits:  fully aware of deficits  COGNITION ASSESSMENTS  Short Blessed Test: 0      VISION  Head Position:  WDL = within defined limits  Tracking:  able to track stimulus in all quads without difficulty  Able to read clock on wall accurately    PERCEPTION  Left Attention:   intact  Right Attention:   intact  Position in Space:   intact    Communication: Able to make needs known.  Patient's speech is slurred and pt reports this is new since yesterday.    Behavioral/Emotional/Social: Pleasant, cooperative; able to direct his own care    UPPER EXTREMITY  ROM: within functional limits except for the following:  Right Shoulder flexion 1/4 active; 1/4 passive d/t c/o pain  Right Elbow flexion 1/2  active, 1/2 passive d/t c/o pain  Right Wrist extension 1/4 active; 1/4 passive d/t c/o pain  Right  1/4 active; limited passive range d/t edema and c/o pain  Strength: is within functional limits except for the following;  Right Shoulder flexion  2+/5  Right Elbow flexion  2+/5  Right Wrist extension  2+/5  Right   3-/5  Coordination  Gross motor: impaired  Fine motor: impaired R  Sensation: Light touch:  intact  Proprioception:  intact  Stereognosis:  intact  Tone: none    Neurological findings:  Neurological Findings: Coordination - Finger to Nose;Coordination - Rapid Alternating Movement;Coordination - Finger Opposition  Coordination - Finger to Nose: Asymmetrical  Coordination - Rapid Alternating Movement: Asymmetrical  Coordination - Finger Opposition: Asymmetrical       EDUCATION PROVIDED  Patient: Role of Occupational Therapy; Plan of Care; Functional Transfer Techniques; Fall Prevention  Patient's Response to Education: Verbalized Understanding; Requires Further Education  Family/Caregiver: Role of Occupational Therapy; Plan of Care  Family/Caregiver's Response to Education: Verbalized Understanding    Equipment used: RW, hospital bed functions  Demonstrates functional use, Would benefit from additional trial      Therapist comments:   Facilitated WB RUE, weight shifting, crossing midline, postural challenges, AAROM RUE, transitional movements  Patient w/ noted RUE edema, BLE edema.  Patient reports B LE edema is chronic, however reports right UE edema and c/o pain is new.  Patient's nurse notified and entered room to further assess.    Patient able to initiate all tasks presented and was able to reach full upright standing w/ mod A of 2.  Once standing, RUE was placed on wall and patient able to maintain hold w/ gross grasp.  Patient was able to take side steps towards HOB w/ min A and assist to move walker.  Patient returned to supine at end of session with agreement of getting up to chair after  he has rested.  Nurse aware.    Patient End of Session: In bed;With  staff;Needs met;Call light within reach;RN aware of session/findings;All patient questions and concerns addressed;Alarm set;Family present;Discussed recommendations with /    OCCUPATIONAL PROFILE    HOME SITUATION  Type of Home: House  Home Layout: Multi-level;Able to live on main level  Lives With: Spouse    Toilet and Equipment: Comfort height toilet  Shower/Tub and Equipment: Other (Comment);Walk-in shower (Pt. sponge bathing due to living on first floor)  Other Equipment:  (RW, adjustable hospital bed)       Hand Dominance: Right  Drives: No  Patient Regularly Uses: None    Prior Level of Function: Prior to admission pt. Was receiving assist for all ADLs; able to sponge bathe self once set up. Pt. Was mod I with toileting completing toileting and transfers while wife was at work. Pt. Home alone for about 9 hours while wife is at work. Pt. Sleeps on couch with mattress pad. Pt. Was up and walking with RW and WC prior to hospitalization; getting OPPT at [facility]. Pt. RUE immobilized using LUE to move it for transfers, etc.        SUBJECTIVE   \"This pain is new.\" (RUE)    PAIN ASSESSMENT  Rating: Unable to rate  Location: right UE pain w/ passive range or repositioning  Management Techniques: Nurse notified;Repositioning    OBJECTIVE  Precautions: Bed/chair alarm (-220)  Fall Risk: High fall risk      ASSESSMENTS  AM-PAC ‘6-Clicks’ Inpatient Daily Activity Short Form  -   Putting on and taking off regular lower body clothing?: A Lot  -   Bathing (including washing, rinsing, drying)?: A Lot  -   Toileting, which includes using toilet, bedpan or urinal? : A Lot  -   Putting on and taking off regular upper body clothing?: A Lot  -   Taking care of personal grooming such as brushing teeth?: A Little  -   Eating meals?: A Little    AM-PAC Score:  Score: 14  Approx Degree of Impairment: 59.67%  Standardized Score (AM-PAC  Scale): 33.39    ADDITIONAL TESTS  PHQ9: No (TBA)    NEUROLOGICAL FINDINGS  Coordination - Finger to Nose: Asymmetrical  Coordination - Rapid Alternating Movement: Asymmetrical  Coordination - Finger Opposition: Asymmetrical       PLAN  OT Treatment Plan: Balance activities;Energy conservation/work simplification techniques;ADL training;Visual perceptual training;Functional transfer training;Endurance training;UE strengthening/ROM;Patient/Family education;Patient/Family training;Equipment eval/education;Neuromuscluar reeducation;Fine motor coordination activities;Compensatory technique education  Rehab Potential : Good  Frequency: 3-5x/week  Number of Visits to Meet Established Goals: 8    ADL Goals   Patient will perform eating: with set up  Patient will perform grooming: with setup  Patient will perform toileting: with mod assist    Functional Transfer Goals  Patient will transfer from sit to supine:  with min assist  Patient will transfer from supine to sit:  with min assist  Patient will transfer from sit to stand:  with min assist  Patient will transfer to bedside commode:  with min assist    UE Exercise Program Goal  Patient will be minimum assistance with right AAROM HEP (home exercise program).    Therapist Goals  Complete PhQ9    OT Session Time: 45 minutes  Self-Care Home Management: 15 minutes  Therapeutic Activity: 15 minutes  Neuromuscular Re-education: 15 minutes

## 2024-07-20 NOTE — PROGRESS NOTES
Progress Note  Kris Colvin Patient Status:  Inpatient    3/6/1960 MRN [id number]   Location [facility] 3NE-A Attending Lizet Miller,    Hosp Day # 3 PCP Lenka Guevara,      Subjective:  In bed on exam. Overall feels weak. Otherwise feels he is near his baseline.     Objective:  /83 (BP Location: Right arm)   Pulse 67   Temp 98.4 °F (36.9 °C) (Oral)   Resp 18   Ht 6' 3\" (1.905 m)   Wt 260 lb (117.9 kg)   SpO2 96%   BMI 32.50 kg/m²     Telemetry: nsr      Intake/Output:    Intake/Output Summary (Last 24 hours) at 2024 0859  Last data filed at 2024 0529  Gross per 24 hour   Intake --   Output 655 ml   Net -655 ml       Last 3 Weights   24 0526 260 lb (117.9 kg)   24 2358 260 lb (117.9 kg)   24 1653 260 lb (117.9 kg)   24 1003 265 lb (120.2 kg)       Labs:  Recent Labs   Lab 24  1442 24  1050 24  0732   * 95 89  --    BUN 12 14 14  --    CREATSERUM 0.84 0.88 0.93 0.85   EGFRCR 98 97 92 98   CA 9.0 9.0 9.1  --     141 141  --    K 3.4* 3.5 3.6 3.7    109 110  --    CO2 26.0 26.0 24.0  --      Recent Labs   Lab 24  1442 24  1050 24  0732   RBC 3.47* 3.47* 3.59* 3.42*   HGB 10.1* 9.8* 10.1* 9.7*   HCT 29.0* 29.8* 30.8* 29.8*   MCV 83.6 85.9 85.8 87.1   MCH 29.1 28.2 28.1 28.4   MCHC 34.8 32.9 32.8 32.6   RDW 15.7 15.6 15.6 15.7   NEPRELIM 15.14* 14.73*  --  12.81*   WBC 18.8* 18.2* 18.9* 16.4*   .0* 145.0* 158.0 160.0         No results for input(s): \"TROP\", \"TROPHS\", \"CK\" in the last 168 hours.    Review of Systems   Cardiovascular:  Positive for leg swelling.   Respiratory: Negative.     Neurological:  Positive for focal weakness and weakness.       Physical Exam:    Gen: alert, oriented x 3, NAD, speech slow  Heent: pupils equal, reactive. Mucous membranes moist.   Neck: no jvd  Cardiac: regular rate and rhythm, normal S1,S2, 3/6 mahin  Lungs: CTA  Abd: soft, NT/ND  +bs  Ext:ble edema  Skin: Warm, dry  Neuro: right sided weakness        Medications:     metoprolol tartrate  12.5 mg Oral 2x Daily(Beta Blocker)    vancomycin  15 mg/kg Intravenous Q12H    atorvastatin  80 mg Oral Nightly    dronedarone  400 mg Oral BID      sodium chloride 75 mL/hr at 07/19/24 1005    continuous dose heparin 2,100 Units/hr (07/20/24 0112)           Assessment:  Acute CVA, multiple infarcts, likely embolic  Residual right sided weakness and mild aphasia from prior events.   Eliquis prior to admit, now on heparin gtt. Plavix added.  Neuro following.   Staph epi bacteremia  ID following. Concern for PVE and embolic infarcts.   Echo without evidence of aortic or mitral vegetation.   Recent hx ICH 2/2024  On xarelto, baby asa, and daily nsaids at that time.   HTN-controlled  Home arb on hold.   PAF, maintaining NSR  On multaq  Eliquis since 4/2024. Currently on heparin gtt.   LLE DVT hx-on eliquis  IVC filter placed 2/2024  CAD hx CABG x1v (lima-lad)  Chronic HFrecEF 45% (was 20% 2015)  Echo 7/18/24-LVEF 40-45%, peak velocity 4.82m/s, MG 55mmhg.   Bioprosthetic AVR and hemashield aortic graft  Worsening severe aortic stenosis. 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  CHAZ    Plan:  On heparin gtt. Was on eliquis prior to admit. Possibly missed a few evening doses over the last few weeks. Hematology and neurology following. Plavix added. Plans to transition back to doac once able unless coumadin preferred by neuro/heme.   Cont bb, multaq, statin (held this am until evaluated by Speech)  Abx per ID. Plans for IV x6 weeks  Stop IVF    Plan of care discussed with patient, RN, Dr. Paul Walton, APRN  7/20/2024  8:59 AM

## 2024-07-20 NOTE — PROGRESS NOTES
[facility]  Progress Note    Kris Colvin Patient Status:  Inpatient    3/6/1960 MRN [id number]   Location [facility] 3NE-A Attending Lizet Miller DO   Hosp Day # 3 PCP Lenka Guevara DO       SUBJECTIVE:    Denies any new complaints.  Difficulty speaking that is chronic.  Afebrile for over 48 hours.    OBJECTIVE:    Vitals:    24 0400 24 0529 24 0800 24 1011   BP: 105/72  111/83 136/80   BP Location: Right arm  Right arm Right arm   Pulse: 65 69 67 69   Resp:   18    Temp: 98.3 °F (36.8 °C)  98.4 °F (36.9 °C)    TempSrc: Oral  Oral    SpO2: 97% 96% 96%    Weight:       Height:           Wt Readings from Last 1 Encounters:   24 117.9 kg (260 lb)       LABS:  Recent Labs   Lab 24  1442 24  1050 24  0732   RBC 3.47* 3.47* 3.59* 3.42*   HGB 10.1* 9.8* 10.1* 9.7*   HCT 29.0* 29.8* 30.8* 29.8*   MCV 83.6 85.9 85.8 87.1   MCH 29.1 28.2 28.1 28.4   MCHC 34.8 32.9 32.8 32.6   RDW 15.7 15.6 15.6 15.7   NEPRELIM 15.14* 14.73*  --  12.81*   WBC 18.8* 18.2* 18.9* 16.4*   .0* 145.0* 158.0 160.0         Recent Labs   Lab 24  0002 24  1828 24  1442 24  1050 24  0732   *  --  109* 95 89  --    BUN 24*  --  12 14 14  --    CREATSERUM 1.04  --  0.84 0.88 0.93 0.85   EGFRCR 81  --  98 97 92 98   CA 8.7  --  9.0 9.0 9.1  --    ALB 2.9*  --   --   --   --   --    *  --  138 141 141  --    K 2.9*   < > 3.4* 3.5 3.6 3.7     --  108 109 110  --    CO2 26.0  --  26.0 26.0 24.0  --    ALKPHO 79  --   --   --   --   --    AST 44*  --   --   --   --   --    ALT 27  --   --   --   --   --    BILT 1.0  --   --   --   --   --    TP 6.3  --   --   --   --   --     < > = values in this interval not displayed.       MEDICATIONS:     metoprolol tartrate  12.5 mg Oral 2x Daily(Beta Blocker)    vancomycin  15 mg/kg Intravenous Q12H    atorvastatin  80 mg Oral Nightly    dronedarone  400 mg Oral BID        acetaminophen **OR** acetaminophen    labetalol    hydrALAzine    ondansetron    prochlorperazine    melatonin    acetaminophen    polyethylene glycol (PEG 3350)    sennosides    bisacodyl    fleet enema    ondansetron    PHYSICAL EXAM:    General: Awake, speech bit slurred.  Able to answer questions appropriately.  Chest: Clear to auscultation.  Heart: Regular rate and rhythm.   Abdomen: Soft, non tender with good bowel sounds.  Extremities: No edema.  Neurological: Grossly intact.     RADIOLOGY:     ASSESSMENT/PLAN:    # H/o VTE: L leg DVT 6/23, posttraumatic.  Rivaroxaban started.  Intracranial bleed 2/24.  Rivaroxaban discontinued, IVC filter placed.  Anticoagulation resumed with apixaban 5/24.  CT this admission shows thrombus within and inferior to IVC filter up to common iliac veins.  He only missed a couple of doses of apixaban and that would likely not explain new findings.  Concern is for underlying hypercoagulable state/malignancy vs DOAC failure.  Will evaluate for myeloproliferative disorders.  Check lupus anticoagulant recognizing that it could be falsely positive in the acute setting.  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  He does not wish to try warfarin.  LMWH/Fondaparinux would be a reasonable option and he is receptive to this, he will discuss with his wife.    # Acute multiple infarcts: Likely embolic.  While on apixaban.  No obvious vegetation on echo.  Currently on heparin.    # Bacteremia: Bioprosthetic aortic valve.  On antibiotics.  Cultures pending.    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  No surgical intervention needed.    # Anemia: Due to chronic disease.  Will check iron panel.    Maritza Jeffrey M.D.    [address] [place], [address]    7/20/2024  10:13 AM

## 2024-07-20 NOTE — PLAN OF CARE
Assumed patient care at 0730. A/Ox4. Neuros Q4. NIH daily. Room air. 2-3L at night nasal cannula. Heparin gtt therapeutic infusing @ 18ml/hr. PT/OT evaluated. Normal sinus on tele. Speech evaluated, Soft bite size. All safety precautions are in place and will continue with plan of care.    Problem: SAFETY ADULT - FALL  Goal: Free from fall injury  Description: INTERVENTIONS:  - Assess pt frequently for physical needs  - Identify cognitive and physical deficits and behaviors that affect risk of falls.  - [place] fall precautions as indicated by assessment.  - Educate pt/family on patient safety including physical limitations  - Instruct pt to call for assistance with activity based on assessment  - Modify environment to reduce risk of injury  - Provide assistive devices as appropriate  - Consider OT/PT consult to assist with strengthening/mobility  - Encourage toileting schedule  Outcome: Progressing     Problem: RISK FOR INFECTION - ADULT  Goal: Absence of fever/infection during anticipated neutropenic period  Description: INTERVENTIONS  - Monitor WBC  - Administer growth factors as ordered  - Implement neutropenic guidelines  Outcome: Progressing

## 2024-07-20 NOTE — CM/SW NOTE
Left VM for wife to inquire if decision made on ELVIE, will need insurance approval once ELVIE chosen.     Addendum 3:30- notified by PT/OT of change in recs for intensive therapy. PMR order entered. DENISSE/KHALIDA to follow up with pt/family on DC recs.    JOSE Dutton

## 2024-07-20 NOTE — PLAN OF CARE
Assumed pt care this evening at 1930.  Pt is A&Ox4, following commands.   Pt on room air, VSS.  NSR  Pt denies pain.  Pt max assist.   R side weakness  Regular diet, soft/bite sized  PIV L upper arm & L forearm, infusing 0.9 @ 75 & Heparin gtt @ current goal rate of 21mL/hr.  Neuro Q4 & NIH daily.  Potassium replaced through cardiac electrolyte jessenia.  Pills taken whole with applesauce  Bedrest, urinal & briefed  Pt becomes apneic while sleeping. O2 sats in low 90s overnight, pt refused supplimental O2.  Bed in lowest position, call light in reach.     0220- Pt consents to 3L O2 nasal cannula    0340- pt made NPO and SPL consult placed due to repeated choking on liquids and inability to use a straw or sip from a cup.  Holding PO Metroprolol, MD aware, and holding scheduled, 2nd dose potassium until lab draw. Route of delivery will need to be adjusted in MAR if levels still low.

## 2024-07-20 NOTE — PROGRESS NOTES
Impression/plan/MDM:  Patient seen and examined personally.  Investigations reviewed.    Acute multiple infarcts, likely embolic. CTA of head and neck did not show any acute abnormality. LDL 63, AIC 5.7%. Echo reviewed.  Patient switched to heparin by cardiologist.  Recommend Eliquis, Statin and Plavix 75mg daily, if okay with cardiology.OT/PT/Rehab. Family counseled.   Patient and wife counseled in detail.  All questions answered.  Will continue to follow.      Overall time spent 35 minutes.  Greater than 50% time spent counseling.

## 2024-07-20 NOTE — PROGRESS NOTES
[facility]   part of [facility] ID PROGRESS NOTE    Kris Colvin Patient Status:  Inpatient    3/6/1960 MRN [id number]   [facility] 4NW-A Attending Lizet Miller,    Hosp Day # 3 PCP Lenka Guevara,      Abx: IV vancomycin #3, s/p IV zosyn     Subjective: Patient seen and examined today. Speech improving today. Reports feeling a little better overall. Had an episode of emesis after eating at lunchtime. No nausea. Denies vision changes, headache, SOB, cough, pain anywhere, dysuria, diarrhea, joint pain, back pains, rash / itchy skin.     Allergies:  No Known Allergies    Medications:    Current Facility-Administered Medications:     [START ON 2024] vancomycin (Vancocin) 1.5 g in sodium chloride 0.9% 250mL IVPB premix, 1,500 mg, Intravenous, Q12H    acetaminophen (Tylenol) tab 650 mg, 650 mg, Oral, Q4H PRN **OR** acetaminophen (Tylenol) rectal suppository 650 mg, 650 mg, Rectal, Q4H PRN    labetalol (Trandate) 5 mg/mL injection 10 mg, 10 mg, Intravenous, Q10 Min PRN    hydrALAzine (Apresoline) 20 mg/mL injection 10 mg, 10 mg, Intravenous, Q2H PRN    ondansetron (Zofran) 4 MG/2ML injection 4 mg, 4 mg, Intravenous, Q6H PRN    prochlorperazine (Compazine) 10 MG/2ML injection 5 mg, 5 mg, Intravenous, Q8H PRN    heparin (Porcine) 64183 units/250mL infusion PE/DVT/THROMBUS CONTINUOUS, 200-3,000 Units/hr, Intravenous, Continuous    metoprolol tartrate (Lopressor) partial tab 12.5 mg, 12.5 mg, Oral, 2x Daily(Beta Blocker)    atorvastatin (Lipitor) tab 80 mg, 80 mg, Oral, Nightly    dronedarone (Multaq) tab 400 mg, 400 mg, Oral, BID    melatonin tab 3 mg, 3 mg, Oral, Nightly PRN    acetaminophen (Tylenol Extra Strength) tab 500 mg, 500 mg, Oral, Q4H PRN    polyethylene glycol (PEG 3350) (Miralax) 17 g oral packet 17 g, 17 g, Oral, Daily PRN    sennosides (Senokot) tab 17.2 mg, 17.2 mg, Oral, Nightly PRN    bisacodyl (Dulcolax) 10 MG rectal suppository 10 mg, 10 mg, Rectal, Daily  PRN    fleet enema (Fleet) 7-19 GM/118ML rectal enema 133 mL, 1 enema, Rectal, Once PRN    ondansetron (Zofran) 4 MG/2ML injection 4 mg, 4 mg, Intravenous, Q6H PRN    Review of Systems:  Completed. See pertinent positives and negatives above.     Physical Exam:  Vital signs: Blood pressure 121/74, pulse 75, temperature 97.7 °F (36.5 °C), temperature source Oral, resp. rate 18, height 6' 3\" (1.905 m), weight 260 lb (117.9 kg), SpO2 94%.    General: Alert, oriented, NAD, on room air.   HEENT: Moist mucous membranes.   Neck: No lymphadenopathy.  Supple.  Cardiovascular: RRR  Respiratory: Clear to auscultation bilaterally.  No wheezes. No rhonchi.  Abdomen: Soft, nontender, nondistended.   Musculoskeletal: LE edema with chronic appearing stasis changes (per wife erythema is at baseline)  Integument: No rash.    Laboratory Data:  Recent Labs   Lab 07/20/24  1619   RBC 3.37*   HGB 9.5*   HCT 30.2*   MCV 89.6   MCH 28.2   MCHC 31.5   RDW 15.7   NEPRELIM 11.72*   WBC 15.0*   .0     Recent Labs   Lab 07/17/24  0002 07/17/24  1828 07/19/24  1050 07/19/24  1927 07/20/24  0732 07/20/24  1619   *   < > 95 89  --  91   BUN 24*   < > 14 14  --  12   CREATSERUM 1.04   < > 0.88 0.93 0.85 0.90   CA 8.7   < > 9.0 9.1  --  9.2   ALB 2.9*  --   --   --   --   --    *   < > 141 141  --  141   K 2.9*   < > 3.5 3.6 3.7 3.9      < > 109 110  --  113*   CO2 26.0   < > 26.0 24.0  --  23.0   ALKPHO 79  --   --   --   --   --    AST 44*  --   --   --   --   --    ALT 27  --   --   --   --   --    BILT 1.0  --   --   --   --   --    TP 6.3  --   --   --   --   --     < > = values in this interval not displayed.       Microbiology: Reviewed in EMR    Radiology: Reviewed.    PROCEDURE:  CTA ABD/PEL (CPT=74174)     COMPARISON:  [facility] , CT, CT CHEST+ABDOMEN+PELVIS(ALL CNTRST ONLY)(CPT=71260/90842), 7/17/2024, 2:34 AM.     INDICATIONS:  possible SMA dissection     TECHNIQUE:  CT images of the abdomen and pelvis were  obtained pre- and post- injection of non-ionic intravenous contrast material. Multi-planar reformatted/3-D images were created to optimize visualization of vascular anatomy.  Dose reduction techniques  were used. Dose information is transmitted to the ACR (American College of Radiology) NRDR (National Radiology Data Registry) which includes the Dose Index Registry.     PATIENT STATED HISTORY:(As transcribed by Technologist)  fevers, recent ct cap 250 am. evaluate SMA dissection      CONTRAST USED:  100cc of Isovue 370     FINDINGS:  This is a follow-up to CT performed at 7/17/2024 at 0234 hours.    AORTA/VASCULAR:  Exam is degraded by motion artifact.  There is soft tissue stranding surrounding the SMA at the site of vessel narrowing with subsequent lack of intraluminal contrast, series 9, images 167 through 204. There is subsequent opacification  of more distal SMA branches.  Findings knee or SMA thrombosis.  A localized dissection cannot be excluded.  The origin of the SMA is widely patent.  Celiac artery is patent.  DAPHNE is well visualized and patent.  There is dense calcific plaque at the  origins of the main renal arteries bilaterally.  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    LIVER:  Stable CT appearance of the liver  BILIARY:  No biliary ductal dilatation.    PANCREAS:  Stable.  Homogeneous enhancement  SPLEEN:  Normal caliber  KIDNEYS:  Stable.  No hydronephrosis.  ADRENALS:  Stable.    RETROPERITONEUM:  Stable scattered periaortic lymph nodes.  BOWEL/MESENTERY:  There is soft tissue stranding and edema in the mesentery.  There is colonic diverticulosis.  No evidence of dilated small bowel loops.  There are air-fluid levels in the sigmoid colon with suggestion of mild wall thickening of the  rectosigmoid colon.  There is soft tissue stranding in the pericolic gutters bilaterally.  There is a  small amount of free fluid in soft tissue stranding in the presacral space.    PELVIC ORGANS:  Circumferential wall thickening in a partially decompressed urinary bladder.    BONES:  For trophic degenerative changes lumbar spine.  Benign appearing cyst L4.  Degenerative disc disease L3-4 L4-5.     Impression:    CONCLUSION:  There is lack of intraluminal contrast involving the SMA at the level of the central mesentery with surrounding soft tissue stranding and mesenteric edema with reconstitution of more distal SMA branches.  Findings concerning for SMA  thrombosis versus localized dissection as questioned on 7/17/2024 CT performed earlier in the day.  Findings phoned to nurse Vida on 7/17/2024.     There is soft tissue stranding and small amount of free fluid in the mesentery as well as soft tissue stranding and a small amount of free fluid in the presacral space and pericolic gutters.     Thrombosis of the infrarenal IVC unchanged     LOCATION:  [facility]     Dictated by (CST): Court Kline MD on 7/17/2024 at 10:53 AM      Finalized by (CST): Court Kline MD on 7/17/2024 at 11:04 AM       PROCEDURE:  CT BRAIN OR HEAD (45965)     COMPARISON:  [facility] , CT, CT BRAIN OR HEAD (77430), 3/01/2024, 1:33 PM.  [facility], CT, CT BRAIN OR HEAD (01232), 4/26/2024, 7:23 AM.     INDICATIONS:  Rule out CVA     TECHNIQUE:  Noncontrast CT scanning is performed through the brain. Dose reduction techniques were used. Dose information is transmitted to the ACR (American College of Radiology) NRDR (National Radiology Data Registry) which includes the Dose Index  Registry.     PATIENT STATED HISTORY: (As transcribed by Technologist)  ams         FINDINGS:       VENTRICLES/SULCI:   Ventricles and sulci are prominent indicating atrophy.     INTRACRANIAL:  There are no abnormal extraaxial fluid collections.  There is no midline shift.  There are no acute appearing intraparenchymal brain abnormalities.  There is nothing specific for  acute territorial infarct.  There is no hemorrhage or mass  lesion.  There is chronic microvascular ischemic white matter disease in the cerebrum bilaterally.  Nothing definite for acute infarct.  Residual encephalomalacia mild from previously demonstrated left hemisphere hematoma.     SINUSES:  No acute sinusitis.       MASTOIDS:  The mastoids are clear.     SKULL:  No evidence for fracture or acute osseous abnormality.     OTHER:  None.     Impression:    CONCLUSION:  Chronic changes in the brain, as described, but no acute intracranial bleed, or other acute intracranial process identified. If additional imaging is needed for suspected ischemia and/or infarct based on the clinical signs and symptoms, then   consider follow-up with MRI.    LOCATION:  [facility]     Dictated by (CST): Alex Hubbard MD on 7/17/2024 at 5:37 PM      Finalized by (CST): Alex Hubbard MD on 7/17/2024 at 5:39 PM        PROCEDURE:  XR CHEST AP PORTABLE  (CPT=71045)     TECHNIQUE:  AP chest radiograph was obtained.     COMPARISON:  [facility] , XR, XR CHEST AP PORTABLE  (CPT=71045), 2/17/2024, 3:54 PM.     INDICATIONS:  SOB     PATIENT STATED HISTORY: (As transcribed by Technologist)  Patient offered no additional history at this time.         FINDINGS:       Mildly enlarged cardiac silhouette.     Median sternotomy hardware is noted.     No consolidation, pleural effusion or pneumothorax.     Impression:    CONCLUSION:  No consolidation.     Agree with preliminary radiology report from [facility].     LOCATION:  [facility]     Dictated by (CST): Justino Jeter MD on 7/17/2024 at 8:16 AM      Finalized by (CST): Justino Jeter MD on 7/17/2024 at 8:16 AM         PROCEDURE:  CT CHEST+ABDOMEN+PELVIS(ALL CNTRST ONLY)(CPT=71260/82181)     COMPARISON:  None.     INDICATIONS:  fever/hypoxia     TECHNIQUE:  IV contrast-enhanced scanning through the chest, abdomen, and pelvis was performed.  Dose reduction techniques were used. Dose  information is transmitted to the ACR (American College of Radiology) NRDR (National Radiology Data Registry) which   includes the Dose Index Registry.     PATIENT STATED HISTORY:(As transcribed by Technologist)  fever, hypoxia      CONTRAST USED:  100 mLcc of Isovue 370     FINDINGS:       CHEST:    LUNGS:  There is image degradation due to motion.  No focal consolidation.  Lingular and left lower lobe linear densities are most consistent with atelectasis and or scar.    MEDIASTINUM:  No mass or adenopathy.  Mediastinal clips status post CABG.  YASMINE:  No mass or adenopathy.    CARDIAC:  Enlarged.  Status post CABG.  Aortic valve replacement.  No pericardial effusion.  PLEURA:  No mass or effusion.    CHEST WALL:  No mass or axillary adenopathy.  Sternotomy wires status post CABG.  AORTA:  Atherosclerosis.  No aneurysm or dissection.    VASCULATURE:  No visible pulmonary arterial thrombus or attenuation.       ABDOMEN/PELVIS:  LIVER:  No enlargement, atrophy, abnormal density, or significant focal lesion.    BILIARY:  Possible sludge and or small stones within the gallbladder.  Sonogram can be performed for further evaluation as clinically indicated.  No visible dilatation.    PANCREAS:  No lesion, fluid collection, ductal dilatation, or atrophy.    SPLEEN:  No enlargement or focal lesion.    KIDNEYS:  Small 1.5 cm cyst within the right superior pole for which no further follow-up is required.  No mass, obstruction, or calcification.    ADRENALS:  No mass or enlargement.    AORTA:  Infrarenal IVC filter.  There is thrombus within and inferior to the filter extending to the common iliac veins.  Prominent edema within the pelvic fat planes probably related to venous congestion.  Stranding surrounds the superior mesenteric  artery with suggestion of focal irregularity, best seen on images 53-56 of series 9.  Atherosclerosis.  No aneurysm or dissection.    RETROPERITONEUM:  No mass or adenopathy.    BOWEL/MESENTERY:   Normal caliber small and large bowel.    ABDOMINAL WALL:  No mass or hernia.    URINARY BLADDER:  Bladder is nearly empty most likely accounts for diffuse bladder wall thickening given no signs cystitis per ED MD. No visible focal wall thickening, lesion, or calculus.    PELVIC NODES:  No adenopathy.    PELVIC ORGANS:  Pelvic organs appropriate for patient age.    BONES:  Degenerative change predominantly involves the lower lumbar spine.  No fracture.       Impression:    CONCLUSION:    1. Thrombus within and inferior to IVC filter extending to the common iliac veins.  Prominent edema within the pelvic fat planes, probably related to venous congestion.  2. Stranding surrounds the superior mesenteric artery with appearance focal irregularity.  Findings may represent motion artifact, however focal dissection cannot be excluded.  CTA or MRA can be performed for further evaluation as clinically indicated.  3. Please see details as above.     ED M.D. notified of these findings with preliminary radiology report from [facility].     LOCATION:  [facility]     Dictated by (CST): Latoya Alberts MD on 7/17/2024 at 7:00 AM      Finalized by (CST): Latoya Alberts MD on 7/17/2024 at 7:44 AM    ASSESSMENT:  Staph epi bacteremia, concerning for aortic PVE.   2/2 Bcx 7/17. Per patient, taken from the same site. Patient has an AVR. TTE w/o obvious vegetation but leaflets poorly visualized and worsening function of valve noted.   Fevers, assume related to #1 vs #3.  UA negative. VRP negative. 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  Acute CVA, suspect embolic (multiple infarcts). ? Related to #1.   Leukocytosis- stable  CAD, s/p CABG; CHF; AVR, HTN, HLD, A. fib  H/o ICH.  CHAZ    PLAN:  - continue IV vanco  - follow repeat blood cultures from 7/17 and 7/18 (both done prior to  vancomycin)  - Given findings on TTE, would consider JAZMÍN for further evaluation of prosthetic valve given c/f endovascular infection  - follow temps and wbc  - continue to monitor for foci of infection  - monitor temps and WBC    [facility]

## 2024-07-20 NOTE — PHYSICAL THERAPY NOTE
PHYSICAL THERAPY EVALUATION - INPATIENT     Room Number: 3608/3608-A  Evaluation Date: 7/20/2024  Type of Evaluation: Initial  Physician Order: PT Eval and Treat    Presenting Problem: new CVA 7/18/24, weakness, sepsis  Co-Morbidities : CVA (02/2024), DVT, HTN, HLD  Reason for Therapy: Mobility Dysfunction and Discharge Planning    PHYSICAL THERAPY ASSESSMENT   Patient is currently functioning below baseline with bed mobility, transfers, gait, stair negotiation, and standing prolonged periods.  Prior to admission, patient's baseline is mod I for household ambulation with r/w.  Patient is requiring moderate assist and maximum assist as a result of the following impairments: decreased functional strength, impaired standing  balance, impaired coordination, impaired motor planning, and medical status.  Physical Therapy will continue to follow for duration of hospitalization.    Patient will benefit from continued skilled PT Services to facilitate return to prior level of function as patient demonstrates high motivation with excellent tolerance to an intensive therapy program .    PLAN  PT Treatment Plan: Bed mobility;Body mechanics;Endurance;Energy conservation;Patient education;Gait training;Range of motion;Strengthening;Transfer training;Balance training  Rehab Potential : Good  Frequency (Obs): 3-5x/week  Number of Visits to Meet Established Goals: 5      CURRENT GOALS    Goal #1 Patient is able to demonstrate supine - sit EOB @ level: minimum assistance     Goal #2 Patient is able to demonstrate transfers Sit to/from Stand at assistance level: minimum assistance     Goal #3 Patient is able to ambulate 20 feet with assist device: walker - rolling at assistance level: minimum assistance     Goal #4    Goal #5    Goal #6    Goal Comments: Goals established on 7/20/2024      PHYSICAL THERAPY MEDICAL/SOCIAL HISTORY  History related to current admission: Patient is a 64 year old male admitted on 7/16/2024 from home  for fevers.  Pt diagnosed with sepsis. Pt with new stroke diagnosed per MRI 7/19/24. Pt now appropriate for re-eval .    Imaging:  MRI BRAIN 7/19/24:  CONCLUSION:    1. There are multiple punctate foci of diffusion restriction in frontal and parietal lobes bilaterally consistent with multiple small acute strokes.  This involves multiple vascular territories suggesting an embolic etiology for stroke.  Correlation with    clinical findings is necessary.   2. Multiple areas of hemosiderin scar are noted.  These correspond areas of prior hemorrhage.  There is no evidence of acute hemorrhage.   3. There is chronic small vessel ischemic change and atrophy.      Above findings were reported to JUAN Harrison on July 19, 2024 at 7:23 a.m..     Recent admission:  2/17-2/25: Admitted for Acute intraparenchymal hemorrhage right parietal lobe with mass effect, no midline shift sp DOAC reversal in ER and discharged to [facility] acute rehab.    HOME SITUATION  Type of Home: House   Home Layout: Multi-level;Able to live on main level  Stairs to Enter : 2  Railing: No          Lives With: Spouse  Drives: No  Patient Owned Equipment: Rolling walker  Patient Regularly Uses: None    Prior Level of [place]: Per PT initial eval 7/18/24:   \"Pt and wife present to provide history. About a week and a half ago pt began just being able to amb on the first floor with a rolling walker. Pt at the time could navigate 2 steps to get in and out of his garage to get in a vehicle in order to be driven to outpatient therapy. Pt has been in outpatient therapy at [facility] working on cardio, strengthening and mobility for 4 weeks now. Pt described sudden loss of global strength on Sunday 7/14. Wife reports one week ago pt was able to stand and give himself cloth baths and stand at the sink to wash his hands and was able to go to the restroom ind. Pt wife works 9 hours a day and pt was able to be mod I in the home to get food, go to the bathroom and get  around. Pt receives assistance with ADLs from wife. Tuesday 7/16 was the last time the patient was up and walking. Pt sits up in a wheelchair at home when he is not moving around. Pt sleeps on sofa in the living room. Pt was unable to transfer out of bed or a recliner.   Pt had a stroke in February of this year and was admitted to [facility] where he spent 3 weeks at for acute rehab. After [facility], pt was admitted at [facility] for rehab where he was practicing walking with a chair follow. Pt received home health PT/OT after that admission.   No history of falls reported.\"      SUBJECTIVE  \"It's hard to get any sleep here, always someone wanting something\" Pt noted to have slurred speech.      OBJECTIVE  Precautions: Bed/chair alarm (-220)  Fall Risk: High fall risk    WEIGHT BEARING RESTRICTION  Weight Bearing Restriction: None                PAIN ASSESSMENT  Rating:  (does not rate)  Location: R elbow, shoulder and stiffness R LE  Management Techniques: Activity promotion;Body mechanics;Repositioning    COGNITION  Orientation Level:  oriented x4  Following Commands:  follows one step commands without difficulty    RANGE OF MOTION AND STRENGTH ASSESSMENT  Upper extremity ROM and strength : see OT note, RUE weakness noted    Lower extremity ROM is within functional limits     Lower extremity strength is within functional limits , testing 5/5 B with MMT      BALANCE  Static Sitting: Fair  Dynamic Sitting: Poor +  Static Standing: Fair -  Dynamic Standing: Poor +    ADDITIONAL TESTS                 Modified [place]: 4                  ACTIVITY TOLERANCE  Pulse: 69  Heart Rate Source: Monitor     BP: 136/80  BP Location: Right arm  BP Method: Automatic  Patient Position: Sitting    O2 WALK       NEUROLOGICAL FINDINGS                        AM-PAC '6-Clicks' INPATIENT SHORT FORM - BASIC MOBILITY  How much difficulty does the patient currently have...  Patient Difficulty: Turning over in bed (including adjusting  bedclothes, sheets and blankets)?: A Lot   Patient Difficulty: Sitting down on and standing up from a chair with arms (e.g., wheelchair, bedside commode, etc.): A Lot   Patient Difficulty: Moving from lying on back to sitting on the side of the bed?: A Lot   How much help from another person does the patient currently need...   Help from Another: Moving to and from a bed to a chair (including a wheelchair)?: A Lot   Help from Another: Need to walk in hospital room?: A Little   Help from Another: Climbing 3-5 steps with a railing?: Total       AM-PAC Score:  Raw Score: 12   Approx Degree of Impairment: 68.66%   Standardized Score (AM-PAC Scale): 35.33   CMS Modifier (G-Code): CL    FUNCTIONAL ABILITY STATUS  Gait Assessment   Functional Mobility/Gait Assessment  Gait Assistance: Minimum assistance  Distance (ft): 2  Assistive Device: Rolling walker  Pattern: Shuffle (sidestep to left side toward head of bed only)    Skilled Therapy Provided     Bed Mobility:  Rolling: NT  Supine to sit: mod assist of 2   Sit to supine: mod assist of 2     Transfer Mobility:  Sit to stand: min/mod assist of 1-2   Stand to sit: min assist  Gait = shuffle side steps toward head of bed with min assist of 1, sba of 1    Therapist's Comments: Pt presents semi-reclined in bed, agreeable to PT. RN approval for session noted. Pt mobility as above. Pt able to assist in all mobility. Good activity tolerance, pt able to sit EOB >15 min without support. Pt able to stand approx 2-3 min for side steps to HOB with min assist for walker mgmt, no lateral lean noted. Sensation intact to light touch to B LE. Spouse present at end of session.     Exercise/Education Provided:  Bed mobility  Functional activity tolerated  Gait training  Posture  Transfer training    Patient End of Session: In bed;Needs met;Call light within reach;RN aware of session/findings;All patient questions and concerns addressed;Family present      Patient Evaluation Complexity  Level:  History Moderate - 1 or 2 personal factors and/or co-morbidities   Examination of body systems Moderate - addressing a total of 3 or more elements   Clinical Presentation Low - Stable   Clinical Decision Making Low - Stable       PT Session Time: 35 minutes  Gait Training: 10 minutes  Therapeutic Activity: 20 minutes

## 2024-07-21 PROBLEM — Z79.01 ANTICOAGULATED: Status: ACTIVE | Noted: 2024-07-21

## 2024-07-21 PROBLEM — Z79.01 ANTICOAGULATED: Status: ACTIVE | Noted: 2024-01-01

## 2024-07-21 LAB
APTT PPP: 98.5 SECONDS (ref 23–36)
CREAT BLD-MCNC: 1.24 MG/DL
EGFRCR SERPLBLD CKD-EPI 2021: 65 ML/MIN/1.73M2 (ref 60–?)
GLUCOSE BLD-MCNC: 103 MG/DL (ref 70–99)
GLUCOSE BLD-MCNC: 91 MG/DL (ref 70–99)
GLUCOSE BLD-MCNC: 98 MG/DL (ref 70–99)
GLUCOSE BLD-MCNC: 99 MG/DL (ref 70–99)
PLATELET # BLD AUTO: 183 10(3)UL (ref 150–450)

## 2024-07-21 PROCEDURE — 99233 SBSQ HOSP IP/OBS HIGH 50: CPT | Performed by: OTHER

## 2024-07-21 PROCEDURE — 99233 SBSQ HOSP IP/OBS HIGH 50: CPT | Performed by: INTERNAL MEDICINE

## 2024-07-21 RX ORDER — CLOPIDOGREL BISULFATE 75 MG/1
75 TABLET ORAL DAILY
Status: DISCONTINUED | OUTPATIENT
Start: 2024-07-21 | End: 2024-07-23

## 2024-07-21 RX ORDER — ENOXAPARIN SODIUM 150 MG/ML
1 INJECTION SUBCUTANEOUS EVERY 12 HOURS
Status: DISCONTINUED | OUTPATIENT
Start: 2024-07-21 | End: 2024-07-23

## 2024-07-21 NOTE — PLAN OF CARE
PHYSICAL MEDICINE AND REHABILITATION plan of care    CC: Impaired mobility and ADL dysfunction secondary to stroke    HPI: This is a 63-year-old female with past medical history of hypertension, hyperlipidemia, obstructive sleep apnea, coronary artery disease, coronary artery bypass graft, aortic valve replacement, CVA with no residual deficits paroxysmal atrial fibrillation on Xarelto, heart failure with reduced ejection fraction, left lower extremity DVT, acute left side intracranial hemorrhage with mass effect (2/17/2024), IVC filter (2/20/2024), right lower extremity extensive DVT (2/27/2024), acute inpatient rehabilitation hospital stay followed by transition to subacute rehabilitation with eventual discharge to home early 4/2024, restart of anticoagulation therapy with Eliquis.  Patient presented to [facility] emergency department and was admitted on 7/16/2024 for evaluation and management of fever with generalized weakness.    Hospital course:  -Leukocytosis/sepsis/Staph epidermidis bacteremia  -Superior mesenteric artery thrombosis  -Thrombus of distal inferior vena caval below level of inferior vena cava filter  -Acute multiple infarcts, likely embolic  -Concerns of endocarditis  -Worsening stenosis of bioprosthetic aortic valve  -Heparin drip  -Hematology oncology hypercoagulable state workup underway    PM&R has now been consulted in order to assess patient's functional status and make recommendations for rehabilitation.      Current medications:   enoxaparin (Lovenox) 120 MG/0.8ML SUBQ injection 120 mg  1 mg/kg Subcutaneous q12h    clopidogrel (Plavix) tab 75 mg  75 mg Oral Daily    vancomycin (Vancocin) 1.5 g in sodium chloride 0.9% 250mL IVPB premix  1,500 mg Intravenous Q12H    [COMPLETED] gadoterate meglumine (Dotarem) 7.5 MMOL/15ML injection 15 mL  15 mL Intravenous ONCE PRN    [COMPLETED] gadoterate meglumine (Dotarem) 7.5 MMOL/15ML injection 15 mL  15 mL Intravenous ONCE PRN    acetaminophen  (Tylenol) tab 650 mg  650 mg Oral Q4H PRN    Or    acetaminophen (Tylenol) rectal suppository 650 mg  650 mg Rectal Q4H PRN    labetalol (Trandate) 5 mg/mL injection 10 mg  10 mg Intravenous Q10 Min PRN    hydrALAzine (Apresoline) 20 mg/mL injection 10 mg  10 mg Intravenous Q2H PRN    ondansetron (Zofran) 4 MG/2ML injection 4 mg  4 mg Intravenous Q6H PRN    prochlorperazine (Compazine) 10 MG/2ML injection 5 mg  5 mg Intravenous Q8H PRN    [COMPLETED] heparin (Porcine) 87294 units/250mL infusion (PE/DVT/THROMBUS) INITIAL DOSE  18 Units/kg/hr Intravenous Once    [COMPLETED] iopamidol 76% (ISOVUE-370) injection for power injector  75 mL Intravenous ONCE PRN    metoprolol tartrate (Lopressor) partial tab 12.5 mg  12.5 mg Oral 2x Daily(Beta Blocker)    [] potassium chloride (Klor-Con M20) tab 40 mEq  40 mEq Oral Q4H    [COMPLETED] acetaminophen (Tylenol Extra Strength) tab 1,000 mg  1,000 mg Oral Once    [COMPLETED] sodium chloride 0.9 % IV bolus 1,000 mL  1,000 mL Intravenous Once    [COMPLETED] sodium chloride 0.9 % IV bolus 1,000 mL  1,000 mL Intravenous Once    [COMPLETED] potassium chloride 40 mEq in 250mL sodium chloride 0.9% IVPB premix  40 mEq Intravenous Once    [COMPLETED] piperacillin-tazobactam (Zosyn) 4.5 g in dextrose 5% 100 mL IVPB-ADDV  4.5 g Intravenous Once    [COMPLETED] iopamidol 76% (ISOVUE-370) injection for power injector  100 mL Intravenous ONCE PRN    [] sodium chloride 0.9 % IV bolus 3,537 mL  30 mL/kg Intravenous Continuous    atorvastatin (Lipitor) tab 80 mg  80 mg Oral Nightly    dronedarone (Multaq) tab 400 mg  400 mg Oral BID    melatonin tab 3 mg  3 mg Oral Nightly PRN    acetaminophen (Tylenol Extra Strength) tab 500 mg  500 mg Oral Q4H PRN    polyethylene glycol (PEG 3350) (Miralax) 17 g oral packet 17 g  17 g Oral Daily PRN    sennosides (Senokot) tab 17.2 mg  17.2 mg Oral Nightly PRN    bisacodyl (Dulcolax) 10 MG rectal suppository 10 mg  10 mg Rectal Daily PRN    fleet  enema (Fleet) 7-19 GM/118ML rectal enema 133 mL  1 enema Rectal Once PRN    ondansetron (Zofran) 4 MG/2ML injection 4 mg  4 mg Intravenous Q6H PRN    [COMPLETED] iopamidol 76% (ISOVUE-370) injection for power injector  100 mL Intravenous ONCE PRN    [COMPLETED] potassium chloride (Klor-Con M20) tab 40 mEq  40 mEq Oral Q4H       Allergies:  No Known Allergies    Past Medical History:  Past Medical History:    Arrhythmia    Cataract    Congestive heart disease (HCC)    Deep vein thrombosis (HCC)    Depression    Disorder of liver    Heart valve disease    High blood pressure    High cholesterol    ICH (intracerebral hemorrhage) (HCC)    Muscle weakness    Obstructive sleep apnea, adult    autoPAP 5-12     Sleep apnea    Stroke (HCC)    Visual impairment       Past Surgical History:  Past Surgical History:   Procedure Laterality Date    Cabg      Cataract      Colonoscopy N/A 11/29/2018    Procedure: COLONOSCOPY, POSSIBLE BIOPSY, POSSIBLE POLYPECTOMY 50239;  Surgeon: Zack Giordano MD;  [facility]       Family History:   Family History   Problem Relation Age of Onset    Breast Cancer Mother     Diabetes Father     Diabetes Maternal Grandmother     Diabetes Paternal Grandmother      HOME SITUATION  Type of Home: House   Home Layout: Multi-level;Able to live on main level  Stairs to Enter : 2  Railing: No     Lives With: Spouse  Drives: No  Patient Owned Equipment: Rolling walker  Patient Regularly Uses: None     Prior Level of [place]: Per PT initial eval 7/18/24:   \"Pt and wife present to provide history. About a week and a half ago pt began just being able to amb on the first floor with a rolling walker. Pt at the time could navigate 2 steps to get in and out of his garage to get in a vehicle in order to be driven to outpatient therapy. Pt has been in outpatient therapy at [facility] working on cardio, strengthening and mobility for 4 weeks now. Pt described sudden loss of global strength on Sunday 7/14.  Wife reports one week ago pt was able to stand and give himself cloth baths and stand at the sink to wash his hands and was able to go to the restroom ind. Pt wife works 9 hours a day and pt was able to be mod I in the home to get food, go to the bathroom and get around. Pt receives assistance with ADLs from wife. Tuesday 7/16 was the last time the patient was up and walking. Pt sits up in a wheelchair at home when he is not moving around. Pt sleeps on sofa in the living room. Pt was unable to transfer out of bed or a recliner.   Pt had a stroke in February of this year and was admitted to [facility] where he spent 3 weeks at for acute rehab. After [facility], pt was admitted at [facility] for rehab where he was practicing walking with a chair follow. Pt received home health PT/OT after that admission.   No history of falls reported.    Current functional status-    Diet recommendations as per speech therapist on 7/20/2024:  Pt presented with a suspected mild oropharyngeal dysphagia. OME's revealed mild-mod L facial droop and reduced lingual strength and ROM. Pt independently initiating a bolus hold throughout exam. Bolus acceptance was intact w/o evidence of anterior bolus loss. Mastication and AP transit were prolonged, but functional with soft and bite-sized solids. Mild oral residue noted with soft and easy to chew solids, but cleared with a thin liquid wash. Hyolaryngeal excursion was present per palpation. Immediate/delayed cough x 3 with cup sips thin liquid. No overt s/s of aspiration were observed with thin liquids via tsp.      Recommend pt consume soft and bite-sized diet and thin liquids via TSP ONLY d/t new stroke with evidence of increased overall weakness placing pt at increased risk for aspiration. Meds can be given whole in a pureed bolus. Aspiration precautions recommended including alternating small bites and sips to clear oral residue and reduce aspiration risk. VFSS recommended if s/s of aspiration  increase for further objective assessment of oropharyngeal swallow function. SLP will follow up to monitor diet tolerance, administer trials upgrades, reinforce aspiration precautions, and administer communication evaluation.      RECOMMENDATIONS   Diet Recommendations - Solids: Mechanical soft chopped/ Soft & Bite Sized  Diet Recommendations - Liquids: Thin Liquids (TSP)     Compensatory Strategies Recommended: No straws;Small bites and sips;Alternate consistencies;Liquids via spoon  Aspiration Precautions: Upright position;Slow rate;Small bites and sips;No straw  Medication Administration Recommendations: Whole in puree  Treatment Plan/Recommendations: Dysphagia therapy;Aspiration precautions;Communication evaluation    Self-care as per occupational therapist on 7/20/2024:  FUNCTIONAL TRANSFER ASSESSMENT  Sit to Stand: Edge of Bed  Edge of Bed: Maximum Assist (mod A of 2)     BED MOBILITY  Rolling: Maximum Assist (mod A of 2)  Supine to Sit : Maximum Assist (mod A of 2)  Sit to Supine (OT): Maximum Assist (mod A of 2)  Scooting: mod A     BALANCE ASSESSMENT  Static Sitting: Contact Guard Assist  Static Standing: Minimal Assist  Standing Unilateral: Minimal Assist     FUNCTIONAL ADL ASSESSMENT  Eating: Stand-by Assist (w/ left non-dominant hand)  Grooming Seated: Modified Independent (Pt. seated with HOB elevated demonstrating grooming using LUE to wipe face with washcloth)    COGNITION  Arousal/Alertness:  appropriate responses to stimuli  Attention Span:  appears intact  Orientation Level:  oriented x4  Memory:  appears intact  Following Commands:  follows all commands and directions without difficulty  Initiation: appears intact  Safety Judgement:  good awareness of safety precautions  Awareness of Errors:  good awareness of errors made  Awareness of Deficits:  fully aware of deficits  COGNITION ASSESSMENTS  Short Blessed Test: 0        VISION  Head Position:  WDL = within defined limits  Tracking:  able to  track stimulus in all quads without difficulty  Able to read clock on wall accurately     PERCEPTION  Left Attention:   intact  Right Attention:   intact  Position in Space:   intact     Communication: Able to make needs known.  Patient's speech is slurred and pt reports this is new since yesterday.     Behavioral/Emotional/Social: Pleasant, cooperative; able to direct his own care     UPPER EXTREMITY  ROM: within functional limits except for the following:  Right Shoulder flexion 1/4 active; 1/4 passive d/t c/o pain  Right Elbow flexion 1/2 active, 1/2 passive d/t c/o pain  Right Wrist extension 1/4 active; 1/4 passive d/t c/o pain  Right  1/4 active; limited passive range d/t edema and c/o pain  Strength: is within functional limits except for the following;  Right Shoulder flexion  2+/5  Right Elbow flexion  2+/5  Right Wrist extension  2+/5  Right   3-/5  Coordination  Gross motor: impaired  Fine motor: impaired R  Sensation: Light touch:  intact  Proprioception:  intact  Stereognosis:  intact  Tone: none     Neurological findings:  Neurological Findings: Coordination - Finger to Nose;Coordination - Rapid Alternating Movement;Coordination - Finger Opposition  Coordination - Finger to Nose: Asymmetrical  Coordination - Rapid Alternating Movement: Asymmetrical  Coordination - Finger Opposition: Asymmetrica    Mobility as per physical therapist on 7/20/2024:  FUNCTIONAL ABILITY STATUS  Gait Assessment   Functional Mobility/Gait Assessment  Gait Assistance: Minimum assistance  Distance (ft): 2  Assistive Device: Rolling walker  Pattern: Shuffle (sidestep to left side toward head of bed only)     Skilled Therapy Provided      Bed Mobility:  Rolling: NT  Supine to sit: mod assist of 2         Sit to supine: mod assist of 2            Transfer Mobility:  Sit to stand: min/mod assist of 1-2              Stand to sit: min assist  Gait = shuffle side steps toward head of bed with min assist of 1, sba of 1      Therapist's Comments: Pt presents semi-reclined in bed, agreeable to PT. RN approval for session noted. Pt mobility as above. Pt able to assist in all mobility. Good activity tolerance, pt able to sit EOB >15 min without support. Pt able to stand approx 2-3 min for side steps to HOB with min assist for walker mgmt, no lateral lean noted. Sensation intact to light touch to B LE. Spouse present at end of session.      Primary insurance: [employer] PPO    OBJECTIVE:    /76 (BP Location: Right arm)   Pulse 67   Temp 97.8 °F (36.6 °C) (Oral)   Resp 18   Ht 6' 3\" (1.905 m)   Wt 260 lb (117.9 kg)   SpO2 95%   BMI 32.50 kg/m²   nt    Data Review:    Lab Results   Component Value Date    WBC 15.0 07/20/2024    HGB 9.5 07/20/2024    HCT 30.2 07/20/2024    .0 07/21/2024    CREATSERUM 1.24 07/21/2024    BUN 12 07/20/2024     07/20/2024    K 3.9 07/20/2024     07/20/2024    CO2 23.0 07/20/2024    GLU 91 07/20/2024    CA 9.2 07/20/2024    PTT 98.5 07/21/2024    PGLU 103 07/21/2024       ASSESSMENT:    REHAB DIAGNOSIS:  1. Impaired mobility/ADL dysfunction secondary to acute multiple infarcts, suspected embolic etiology  2.  Sepsis/Staph epidermidis bacteremia  3.  Worsening stenosis of bioprosthetic aortic valve replacement  4.  Multiple intravascular thrombosis (superior mesenteric artery, inferior vena cava below level of inferior vena caval filter, lower extremity deep venous thrombosis)  5.  Remote left left intracranial hemorrhage (2/17/2024)    IMPAIRMENTS: ADLs, functional mobility, balance, dysphagia, strength, endurance, self care, transfers, hygiene    RECOMMENDATIONS:    Continue bedside rehabilitation services while undergoing ongoing acute medical management.    Will continue to follow for rehabilitation needs during this hospitalization and make further recommendations based on findings, treatment and progress.      Thank you for this referral,  Nitish Adams MD    [facility]

## 2024-07-21 NOTE — PROGRESS NOTES
[facility] [place]  Progress Note    Kris Colvin Patient Status:  Inpatient    3/6/1960 MRN [id number]   Location [facility] 3NE-A Attending Lizet Miller DO   Hosp Day # 4 PCP Lenka Guevara DO       SUBJECTIVE:    No new complaints.  No family at bedside at present.    OBJECTIVE:    Vitals:    24 0000 24 0400 24 0519 24 0800   BP: 128/82 126/75  114/69   BP Location: Right arm Right arm  Right arm   Pulse: 72 66 67 63   Resp: 18 18     Temp: 98.9 °F (37.2 °C) 98.1 °F (36.7 °C)     TempSrc: Axillary Oral     SpO2: 97% 98% 95% 96%   Weight:       Height:           Wt Readings from Last 1 Encounters:   24 117.9 kg (260 lb)       LABS:  Recent Labs   Lab 24  1050 24  1927 24  0732 24  1619   RBC 3.47* 3.59* 3.42* 3.37*   HGB 9.8* 10.1* 9.7* 9.5*   HCT 29.8* 30.8* 29.8* 30.2*   MCV 85.9 85.8 87.1 89.6   MCH 28.2 28.1 28.4 28.2   MCHC 32.9 32.8 32.6 31.5   RDW 15.6 15.6 15.7 15.7   NEPRELIM 14.73*  --  12.81* 11.72*   WBC 18.2* 18.9* 16.4* 15.0*   .0* 158.0 160.0 154.0         Recent Labs   Lab 24  0002 24  1828 24  1050 24  1927 24  0732 24  1619   *   < > 95 89  --  91   BUN 24*   < > 14 14  --  12   CREATSERUM 1.04   < > 0.88 0.93 0.85 0.90   EGFRCR 81   < > 97 92 98 96   CA 8.7   < > 9.0 9.1  --  9.2   ALB 2.9*  --   --   --   --   --    *   < > 141 141  --  141   K 2.9*   < > 3.5 3.6 3.7 3.9      < > 109 110  --  113*   CO2 26.0   < > 26.0 24.0  --  23.0   ALKPHO 79  --   --   --   --   --    AST 44*  --   --   --   --   --    ALT 27  --   --   --   --   --    BILT 1.0  --   --   --   --   --    TP 6.3  --   --   --   --   --     < > = values in this interval not displayed.       MEDICATIONS:     vancomycin  1,500 mg Intravenous Q12H    metoprolol tartrate  12.5 mg Oral 2x Daily(Beta Blocker)    atorvastatin  80 mg Oral Nightly    dronedarone  400 mg Oral BID       acetaminophen **OR**  acetaminophen    labetalol    hydrALAzine    ondansetron    prochlorperazine    melatonin    acetaminophen    polyethylene glycol (PEG 3350)    sennosides    bisacodyl    fleet enema    ondansetron    PHYSICAL EXAM:    General: Awake, speech bit slurred.  Able to answer questions appropriately.  Chest: Clear to auscultation.  Heart: Regular rate and rhythm.   Abdomen: Soft, non tender with good bowel sounds.  Extremities: No edema.      RADIOLOGY:     ASSESSMENT/PLAN:    # H/o VTE: L leg DVT 6/23, posttraumatic.  Rivaroxaban started.  Intracranial bleed 2/24.  Rivaroxaban discontinued, IVC filter placed.  Anticoagulation resumed with apixaban 5/24.  CT this admission shows thrombus within and inferior to IVC filter up to common iliac veins.     JAK2 and lupus anticoagulant testing pending.  DOAC's not good option given previous ICH and recurrent VTE.  Does not wish to try warfarin.  Consider LMWH/Fondaparinux, he is receptive  will discuss with his wife.    # Acute multiple infarcts: Likely embolic.  While on apixaban.  No obvious vegetation on echo.  Currently on heparin.    # Bacteremia: Bioprosthetic aortic valve.  On antibiotics.      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  No surgical intervention needed.    # Anemia: Due to chronic disease and iron deficiency, will replace.    Maritza Jeffrey M.D.    [address] [place], [address]      7/21/2024    9:06 AM

## 2024-07-21 NOTE — PROGRESS NOTES
History of Presenting Illness:  Seen.  No new weakness, numbness, paresthesias, confusion, disorientation or seizure-like activity.      Vitals:   Vitals:    07/21/24 1200   BP: 125/76   Pulse: 67   Resp: 18   Temp: 97.8 °F (36.6 °C)          Examination:    Awake , alert, dysarthric, expressive an receptive language normal.   Pupils round and reactive to light, extra ocular movement normal, no facial weakness, hearing normal.  Residual weakness from previous stroke noted.       Investigations:    CTA BRAIN + CTA CAROTIDS (CPT=70496/42267)    Result Date: 7/19/2024  CONCLUSION:   1. There is a background of mild to moderate chronic small vessel ischemic disease.  There are small chronic infarcts within the cerebellar hemispheres and basal ganglia.  Please refer to the brain MRI from earlier today for better visualization of the multiple punctate acute infarcts within the cerebral hemispheres and right cerebellum.  2. No acute intracranial hemorrhage or hydrocephalus.  3. No high-grade stenosis, occlusion, aneurysm, or dissection is identified within the major intracranial or cervical arterial vasculature.   LOCATION:  [facility]   Dictated by (CST): Stromberg, LeRoy, MD on 7/19/2024 at 3:53 PM     Finalized by (CST): Stromberg, LeRoy, MD on 7/19/2024 at 4:07 PM            Lab Results   Component Value Date    A1C 5.7 (H) 07/19/2024        Lab Results   Component Value Date    LDL 63 07/19/2024    HDL 17 (L) 07/19/2024    TRIG 78 07/19/2024        Recent Labs   Lab 07/19/24  1050 07/19/24  1927 07/20/24  0732 07/20/24  1619 07/21/24  0836   RBC 3.47* 3.59* 3.42* 3.37*  --    HGB 9.8* 10.1* 9.7* 9.5*  --    HCT 29.8* 30.8* 29.8* 30.2*  --    MCV 85.9 85.8 87.1 89.6  --    MCH 28.2 28.1 28.4 28.2  --    MCHC 32.9 32.8 32.6 31.5  --    RDW 15.6 15.6 15.7 15.7  --    NEPRELIM 14.73*  --  12.81* 11.72*  --    WBC 18.2* 18.9* 16.4* 15.0*  --    .0* 158.0 160.0 154.0 183.0       Recent Labs   Lab 07/19/24  1050  07/19/24  1927 07/20/24  0732 07/20/24  1619 07/21/24  0836   GLU 95 89  --  91  --    BUN 14 14  --  12  --    CREATSERUM 0.88 0.93 0.85 0.90 1.24   EGFRCR 97 92 98 96 65   CA 9.0 9.1  --  9.2  --     141  --  141  --    K 3.5 3.6 3.7 3.9  --     110  --  113*  --    CO2 26.0 24.0  --  23.0  --        Impression/MDM.    Acute multiple infarcts, likely embolic. CTA of head and neck did not show any acute abnormality. LDL 63, AIC 5.7%. Echo reviewed.  Patient switched to heparin by cardiologist.  Case discussed with patient's hospitalist in detail.  Concern for Eliquis failure and therefore patient is now switched to Lovenox for long-term anticoagulation.  Case discussed with patient in detail risks and benefits of using Lovenox versus Coumadin was discussed.  Patient currently seems inclined to be on Lovenox.  Advised to continue OT/PT/rehab.  Patient and family member counseled.  All questions answered.      Time spent 35 minutes.  Greater than send time spent coordinating care and counseling.

## 2024-07-21 NOTE — PLAN OF CARE
Assumed pt care this evening at 1930.  Pt is A&Ox4, following commands.   Pt on room air, VSS. 2-3 L @ noc  NSR  Pt denies pain.  Pt max assist. R side paralysis  Pt on regular diet. Nausea & emesis today  Heparin gtt at therapeutic dose of 18 mL/hr  Diet: reg, soft/bite size. Thin liquids via tsp only  Aspiration precautions. Neurochecks Q4  Bed in lowest position, call light in reach.         Problem: RISK FOR INFECTION - ADULT  Goal: Absence of fever/infection during anticipated neutropenic period  Description: INTERVENTIONS  - Monitor WBC  - Administer growth factors as ordered  - Implement neutropenic guidelines  Outcome: Progressing     Problem: SAFETY ADULT - FALL  Goal: Free from fall injury  Description: INTERVENTIONS:  - Assess pt frequently for physical needs  - Identify cognitive and physical deficits and behaviors that affect risk of falls.  - Agness fall precautions as indicated by assessment.  - Educate pt/family on patient safety including physical limitations  - Instruct pt to call for assistance with activity based on assessment  - Modify environment to reduce risk of injury  - Provide assistive devices as appropriate  - Consider OT/PT consult to assist with strengthening/mobility  - Encourage toileting schedule  Outcome: Progressing
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
## 2024-07-21 NOTE — PROGRESS NOTES
Pharmacy Dosing Service  Follow-up Pharmacokinetic Consult for Vancomycin Dosing          Kris Colvin is a 64 year old male who is being treated for MRSE bacteremia.       Vancomycin (7/18-      Est CrCl: >60 mL/min      Cultures:  7/17 AM BCx 2/2 - MRSE  7/17 PM BCx x2 - NGTD  7/18 BCx x2 - NGTD        Pharmacokinetic target: Trough/random 10-15 mg/L    Vancomycin trough:  ~19ug/mL         A/P:  1.  The vancomycin trough is slightly supratherapeutic.  Will decrease vancomycin to 1500mg q12hr based on trough & and estimated renal function.    2.  Today's dose was given before the dose could be adjusted, so will start new dose tomorrow morning.    2.  Will re-check a vancomycin trough in next ~48hr.      3.  Pharmacy will need SCr daily while on vancomycin to assess renal function.      Pharmacy will continue to follow him and adjust dosing as appropriate.        Nish Manzo, PharmD, BCPS  7/20/2024  7:08 PM  [facility]: [phone]

## 2024-07-21 NOTE — PLAN OF CARE
Assumed care at 0730  A/O x 4. Neuro checks q4. NIH daily. R sided weakness, R arm > R leg. Slurred speech with slow responses at times. See flowsheets for full details.  RA  NSR on tele  SBP goal of 100-220 maintained   Vanco q12  Aspiration precautions. Takes pills whole in applesauce. No straws.   Hep gtt discontinued. Lovenox BID initiated.  BM this morning, incontinent of stool.   Urinal at bedside - voiding miguelangel colored urine. Fluids encouraged.  Poor appetite. Regular diet, chopped soft and easy to chew foods. Thin liquids.   Max assist   Cardiac EP  All needs met. Pt and pts wife at bedside updated. Will continue with plan of care.      Problem: RISK FOR INFECTION - ADULT  Goal: Absence of fever/infection during anticipated neutropenic period  Description: INTERVENTIONS  - Monitor WBC  - Administer growth factors as ordered  - Implement neutropenic guidelines  Outcome: Progressing     Problem: SAFETY ADULT - FALL  Goal: Free from fall injury  Description: INTERVENTIONS:  - Assess pt frequently for physical needs  - Identify cognitive and physical deficits and behaviors that affect risk of falls.  - Hughes fall precautions as indicated by assessment.  - Educate pt/family on patient safety including physical limitations  - Instruct pt to call for assistance with activity based on assessment  - Modify environment to reduce risk of injury  - Provide assistive devices as appropriate  - Consider OT/PT consult to assist with strengthening/mobility  - Encourage toileting schedule  Outcome: Progressing

## 2024-07-22 ENCOUNTER — APPOINTMENT (OUTPATIENT)
Facility: HOSPITAL | Age: 64
End: 2024-07-22
Attending: PHYSICIAN ASSISTANT
Payer: COMMERCIAL

## 2024-07-22 PROBLEM — B99.9 ANEMIA OF INFECTION AND CHRONIC DISEASE: Status: ACTIVE | Noted: 2024-01-01

## 2024-07-22 PROBLEM — D63.8 ANEMIA OF INFECTION AND CHRONIC DISEASE: Status: ACTIVE | Noted: 2024-07-22

## 2024-07-22 PROBLEM — D63.8 ANEMIA OF INFECTION AND CHRONIC DISEASE: Status: ACTIVE | Noted: 2024-01-01

## 2024-07-22 PROBLEM — B99.9 ANEMIA OF INFECTION AND CHRONIC DISEASE: Status: ACTIVE | Noted: 2024-07-22

## 2024-07-22 LAB
APTT PPP: 48.8 SECONDS (ref 23–36)
B2 GLYCOPROT1 IGG SERPL IA-ACNC: 3.9 U/ML (ref ?–7)
B2 GLYCOPROT1 IGM SERPL IA-ACNC: <2.4 U/ML (ref ?–7)
BASOPHILS # BLD AUTO: 0.04 X10(3) UL (ref 0–0.2)
BASOPHILS NFR BLD AUTO: 0.2 %
CARDIOLIPIN IGG SERPL-ACNC: 2.2 GPL (ref ?–10)
CARDIOLIPIN IGM SERPL-ACNC: 4.2 MPL (ref ?–10)
EOSINOPHIL # BLD AUTO: 0.15 X10(3) UL (ref 0–0.7)
EOSINOPHIL NFR BLD AUTO: 0.9 %
ERYTHROCYTE [DISTWIDTH] IN BLOOD BY AUTOMATED COUNT: 15.8 %
GLUCOSE BLD-MCNC: 101 MG/DL (ref 70–99)
GLUCOSE BLD-MCNC: 102 MG/DL (ref 70–99)
GLUCOSE BLD-MCNC: 102 MG/DL (ref 70–99)
GLUCOSE BLD-MCNC: 89 MG/DL (ref 70–99)
HCT VFR BLD AUTO: 28.8 %
HGB BLD-MCNC: 9.4 G/DL
IMM GRANULOCYTES # BLD AUTO: 0.15 X10(3) UL (ref 0–1)
IMM GRANULOCYTES NFR BLD: 0.9 %
LYMPHOCYTES # BLD AUTO: 2 X10(3) UL (ref 1–4)
LYMPHOCYTES NFR BLD AUTO: 12.4 %
MCH RBC QN AUTO: 28.8 PG (ref 26–34)
MCHC RBC AUTO-ENTMCNC: 32.6 G/DL (ref 31–37)
MCV RBC AUTO: 88.3 FL
MONOCYTES # BLD AUTO: 0.99 X10(3) UL (ref 0.1–1)
MONOCYTES NFR BLD AUTO: 6.1 %
NEUTROPHILS # BLD AUTO: 12.77 X10 (3) UL (ref 1.5–7.7)
NEUTROPHILS # BLD AUTO: 12.77 X10(3) UL (ref 1.5–7.7)
NEUTROPHILS NFR BLD AUTO: 79.5 %
PLATELET # BLD AUTO: 195 10(3)UL (ref 150–450)
RBC # BLD AUTO: 3.26 X10(6)UL
VANCOMYCIN TROUGH SERPL-MCNC: 25.9 UG/ML (ref 10–20)
WBC # BLD AUTO: 16.1 X10(3) UL (ref 4–11)

## 2024-07-22 PROCEDURE — 99233 SBSQ HOSP IP/OBS HIGH 50: CPT | Performed by: OTHER

## 2024-07-22 PROCEDURE — 99232 SBSQ HOSP IP/OBS MODERATE 35: CPT | Performed by: INTERNAL MEDICINE

## 2024-07-22 PROCEDURE — 02HV33Z INSERTION OF INFUSION DEVICE INTO SUPERIOR VENA CAVA, PERCUTANEOUS APPROACH: ICD-10-PCS | Performed by: INTERNAL MEDICINE

## 2024-07-22 PROCEDURE — B548ZZA ULTRASONOGRAPHY OF SUPERIOR VENA CAVA, GUIDANCE: ICD-10-PCS | Performed by: INTERNAL MEDICINE

## 2024-07-22 PROCEDURE — 99233 SBSQ HOSP IP/OBS HIGH 50: CPT | Performed by: INTERNAL MEDICINE

## 2024-07-22 RX ORDER — LIDOCAINE HYDROCHLORIDE 10 MG/ML
5 INJECTION, SOLUTION EPIDURAL; INFILTRATION; INTRACAUDAL; PERINEURAL
Status: COMPLETED | OUTPATIENT
Start: 2024-07-22 | End: 2024-07-22

## 2024-07-22 NOTE — SLP NOTE
SPEECH/LANGUAGE/COGNITIVE EVALUATION & DYSPHAGIA THERAPY- INPATIENT    Admission Date: 7/16/2024  Evaluation Date: 07/22/24    Reason for Referral: R/O aspiration    ASSESSMENT & PLAN   ASSESSMENT & IMPRESSION  Pt seen for dysphagia tx to assess tolerance with recommended diet, ensure proper utilization of aspiration precautions and provide pt/family education.  Patient alert and up in bed. Complained of xerostomia impacting motor speech. He is quite pleasant and insightful. Patient reports he feels his swallowing and communication deficits have improved since last week. He is able to participate in conversation without apparent expressive or receptive language deficits . SLP presented pills whole, one at a time in [facility] with medication provided by RN. Patient with good overt tolerance. Patient accepted thin liquids by tsp, cup sip and straw sips. Patient with reduced bilabial seal resulting in anterior loss of bolus with cup presentation. Patient with intermittent cough following thin liquids though appeared to be correlated with larger volume trials. Suspect oral weakness leading to reduced oral control of liquids as contributing factor. Patient was intermittently able to control sip size with thin by straw and demonstrated no overt signs of aspiration but unable to consistently execute this. Discussed continued practice with limiting volume and consideration for instrumental assessment in next 1-2 days pending progress. Patient agreeable to this.      Patient presents with moderate dysarthria evidenced by reduced articulatory precision and reduced breath support for speech and projecting voice. Patient reports he is soft spoken at baseline but that he is a bit below baseline in regard to this. He was able to independently sense and revise articulatory errors in conversation. SLP discussed compensatory articulation techniques including reduced rate of speech, exaggerated pronunciation and proper breath support for  speech. Patient verbalized understanding and agreement.      Recommend continue current diet with liquids by tsp. Patient reported he is satisfied with current solid consistency prescription. He reports reduced appetite but denied that it is correlated to altered diet consistency. Discussed consideration for instrumental exam pending ongoing clinical improvement and practice with controlling volume of liquid sips. Reinforced compensatory articulation techniques. Patient in agreement with all above.  Will continue to follow.     Assessment(s) Administered: Perceptual Dysarthria Evaluation Rating                   Deficits Identified: Motor speech    Patient Experiencing Pain: No                           GOALS  Goal #1 The patient will tolerate soft and bite-sized consistency and thin via TSP ONLY liquids without overt signs or symptoms of aspiration with 90 % accuracy over 2-3 session(s).  In Progress   Goal #2 The patient/family/caregiver will demonstrate understanding and implementation of aspiration precautions and swallow strategies independently over 2-3 session(s).     In Progress   Goal #3 The patient will tolerate trial upgrade of soft and easy to chew consistency and NA liquids without overt signs or symptoms of aspiration with 90 % accuracy over 2-3 session(s).  In Progress   Goal #4 The patient will utilize compensatory strategies as outlined by  BSSE (clinical evaluation) including Small bites, Small sips, Alternate liquids/solids, No straws, Liquids via tsp amount only with mild assistance 90 % of the time across 2 sessions.     In Progress   Goal #5 Pt will complete communication evaluation.      Met   Goal #6 Patient will implement compensatory articulation techniques in structured and unstructured tasks given min cues with 80% accuracy.  New Goal     Prior Living Situation: Home with spouse  Prior Level of Function: Assistance/Support for ADL's      Imaging Results:   MRI Brain from 7/19/24  revealed:  CONCLUSION:    1. There are multiple punctate foci of diffusion restriction in frontal and parietal lobes bilaterally consistent with multiple small acute strokes.  This involves multiple vascular territories suggesting an embolic etiology for stroke.  Correlation with    clinical findings is necessary.   2. Multiple areas of hemosiderin scar are noted.  These correspond areas of prior hemorrhage.  There is no evidence of acute hemorrhage.   3. There is chronic small vessel ischemic change and atrophy.      Above findings were reported to JUAN Harrison on July 19, 2024 at 7:23 a.m..         LOCATION:  [place]            Dictated by (CST): James Puga MD on 7/19/2024 at 7:18 AM       Finalized by (CST): James Puga MD on 7/19/2024 at 7:23 AM       Patient/Family Goals: to get better    Interdisciplinary Communication: Discussed with RN    Patient, family and/or caregiver has been informed and has taken part in this evaluation and plan of treatment and have been advised and agree on the findings and goals.      FOLLOW UP  Treatment Plan/Recommendations: Dysarthria therapy;Dysphagia therapy  Number of Visits to Meet Established Goals:  (2-3)  Follow Up Needed (Documentation Required): Yes  SLP Follow-up Date: 07/23/24    Thank you for your referral.  If you have any questions please contact Bulmaro Cedeño MS CCC-SLP  Pager [phone]

## 2024-07-22 NOTE — CM/SW NOTE
DENISSE received message from PT stating pt is refusing gradual or intensive therapy programs at discharge. PT stated pt prefers to discharge home and requested PT return at 4pm when his spouse is present.     DENISSE received call from pt's spouse Perla regarding discharge plan. DENISSE informed pt's spouse PMR assessment is pending. Pt's spouse stated she is uncertain if pt would agree to return to . Pt's spouse stated she spoke with pt and pt voiced his preference to return home at discharge. Pt's spouse stated she works full time and cannot assist pt with ADLs or transfers. SW discussed hiring a caregiver, pt's spouse stated she might have funds to hire a caregiver for a short time. Pt's spouse stated she would prefer [facility] for pt if he was agreeable and is considering [facility]. Pt's spouse stated she will be at  today at 4pm. SW informed pt's spouse this writer will meet with her and pt this afternoon to discuss discharge plan. SW will continue to follow.     Addendum (1:45pm) - DENISSE received order for outpatient IV ABX. Referral sent to [facility] in [place], await response.     Addendum (4:30pm) - DENISSE received call from Pamela at [facility] stating pt is approved for all options (IOI, teach and train, and home with home ute). DENISSE met with pt and pt's spouse at bedside to discuss discharge planning.     Pt stated he plans to return home at discharge and does not want rehab. DENISSE informed pt he is currently a two person assist and unable to ambulate. Pt became upset and stated he was able to stand with therapy, but pt did not walk in PT session. Pt stated his spouse is able to assist him at home and has lifted him recently. Pt's spouse stated she has lifted him under his shoulders. SW informed pt for the safety of him and his spouse his spouse should not be lifting him. DENISSE discussed benefits of rehab. Pt stated 'everything I say people have a counter argument' and continued to refuse rehab. Pt's spouse stated [facility]  is right next to her employment and she could visit pt often. Pt became upset and stated 'do not tell me about rehab, I am not going' and cut off his spouse when she tried to discuss rehab. DENISSE informed pt he will need a caregiver and equipment if discharging home. Pt stated a caregiver is a private cost and he will not privately pay for care. Pt stated he does not need consistent help and does not get up often at home. Pt's spouse stated pt has a walker and wheelchair at home. DENISSE informed pt this writer is working on safe discharge plan for pt and again discussed rehab. Pt repeatedly stated he will not go to rehab and is returning home at discharge. SW informed pt of options available from [facility] for IV ABX, pt prefers home healthcare. Pt's spouse stated pt has hx with [facility]. DENISSE informed pt and his spouse they will be taught how to administer IV ABX. Pt denied any further needs at this time and stated he wants to discharge home tomorrow.     Referral sent to [facility] in [place].     DME order for hospital bed and jojo lift placed. DENISSE will attach signed DME order and progress note to [place] referral once available, message sent to MD. Referral sent to HME in [place].    Final IV ABX orders needed. DENISSE will continue to follow.     JOSE Underwood  Discharge Planner

## 2024-07-22 NOTE — PROGRESS NOTES
Progress Note  Kris Colvin Patient Status:  Inpatient    3/6/1960 MRN [id number]   Location [facility] 3NE-A Attending Lizet Miller DO   Hosp Day # 5 PCP Lenka Guevara DO     Subjective:  He denies cardiac concerns from overnight.     Objective:  /80 (BP Location: Left arm)   Pulse 71   Temp 98.1 °F (36.7 °C) (Oral)   Resp 18   Ht 6' 3\" (1.905 m)   Wt 260 lb (117.9 kg)   SpO2 94%   BMI 32.50 kg/m²     Intake/Output:    Intake/Output Summary (Last 24 hours) at 2024 1155  Last data filed at 2024 0617  Gross per 24 hour   Intake --   Output 600 ml   Net -600 ml       Last 3 Weights   24 0526 260 lb (117.9 kg)   24 2358 260 lb (117.9 kg)   24 1653 260 lb (117.9 kg)   24 1003 265 lb (120.2 kg)       Labs:  Recent Labs   Lab 24  1050 24  0732 24  1619 24  0836   GLU 95 89  --  91  --    BUN 14 14  --  12  --    CREATSERUM 0.88 0.93 0.85 0.90 1.24   EGFRCR 97 92 98 96 65   CA 9.0 9.1  --  9.2  --     141  --  141  --    K 3.5 3.6 3.7 3.9  --     110  --  113*  --    CO2 26.0 24.0  --  23.0  --      Recent Labs   Lab 24  1050 24  0732 24  16124  0836   RBC 3.47* 3.59* 3.42* 3.37*  --    HGB 9.8* 10.1* 9.7* 9.5*  --    HCT 29.8* 30.8* 29.8* 30.2*  --    MCV 85.9 85.8 87.1 89.6  --    MCH 28.2 28.1 28.4 28.2  --    MCHC 32.9 32.8 32.6 31.5  --    RDW 15.6 15.6 15.7 15.7  --    NEPRELIM 14.73*  --  12.81* 11.72*  --    WBC 18.2* 18.9* 16.4* 15.0*  --    .0* 158.0 160.0 154.0 183.0         No results for input(s): \"TROP\", \"TROPHS\", \"CK\" in the last 168 hours.    Diagnostics:   Telemetry: NSR    Review of Systems   Cardiovascular:  Negative for chest pain.   Respiratory:  Negative for shortness of breath.        Physical Exam:  General: Alert and oriented in no apparent distress. Mild aphasia+  HEENT: Pupils equal. Mucous membranes moist.   Neck: No JVD  Cardiac:   Normal S1 S2, Regular. 3/6 systolic murmur  Lungs: Clear without wheezes or crackles    Abdomen: Soft, non-tender, ND  Extremities: trace LE edema  Neurologic: No focal deficits. Normal affect.  Skin: Warm and dry,    Medications:   enoxaparin  1 mg/kg Subcutaneous q12h    clopidogrel  75 mg Oral Daily    [Held by provider] vancomycin  1,500 mg Intravenous Q12H    metoprolol tartrate  12.5 mg Oral 2x Daily(Beta Blocker)    atorvastatin  80 mg Oral Nightly    dronedarone  400 mg Oral BID         Assessment:    Acute CVA, multiple infarcts, likely embolic  Residual right sided weakness and mild aphasia from prior events.   Eliquis prior to admission. Possibly missed a few doses over the last few weeks. Plavix added.  Heme following. Plan for therapeutic lovenox for at least 6 weeks then possible DOAC. Patient reluctant to use warfarin.   Neuro following.   Staph epi bacteremia  ID following. Concern for PVE and embolic infarcts.   TTE without evidence of valvular vegetation.  Plans for 6 weeks IV abx.   Recent hx ICH 2/2024  While on xarelto, baby asa, and daily nsaids at that time.   HTN-controlled  PAF  Maintaining NSR. On multaq  Eliquis since 4/2024. Currently on lovenox.  LLE DVT hx- while on eliquis  IVC filter placed 2/2024  CAD hx CABG x1v (LIMA-LAD)  Chronic HF with improved EF 45% (historically as low as 20% 2015)  Echo 7/18/24-LVEF 40-45%  Appears euvolemic  Bioprosthetic AVR and hemashield aortic graft  Worsening severe aortic stenosis. 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  CHAZ    Plan:    Continue lovenox 1 mg/kg BID with heme/onc plan to continue this for at least 6 weeks and then reassess oral therapy at that time.   Continue current cardiac medication regimen.  Will discuss risks/benefits of JAZMÍN with Dr Pruitt and Dr Marie. May not impact therapeutic  management at this time - already on IV antibiotics for 6 weeks per ID and therapeutic anticoagulation.    Plan of care discussed with patient, RN.    MUNA Pollock  7/22/2024  11:55 AM    Patient seen and examined independently.  Note reviewed and labs reviewed. Agree with above assessment and plan. 64 year old male with complex medical history as noted above.  This includes suspected severe aortic valve stenosis which appears to be progressing in the setting of bioprosthetic AVR and Hemashield aortic graft repair previously.  Noted to have Staph epidermidis bacteremia during this admission and started on IV antibiotics as well as acute CVA with multiple infarcts and embolic/thrombotic phenomenon despite being on DOAC anticoagulation as an outpatient.  He has agreed to Lovenox full dose anticoagulation as an outpatient.  Hematology recommendations appreciated.  Patient would benefit from JAZMÍN for further evaluation given suspected worsening aortic valve stenosis as well as suspected bacteremia.  However, he is a high risk candidate for procedure given above noted co-current issues.  Furthermore, he will need CT/JAZMÍN imaging prior to possible TAVR in the future.  Therefore, at this point, recommend continued IV antibiotics for 6 weeks with outpatient imaging evaluation. Patient is agreeable with this plan.  Will continue to follow.        Jefferson Pruitt DO  Cardiologist  [facility] [place]  7/22/2024 12:45 PM      Note to the patient: The 21st Century Cures Act makes medical notes like these available to patients in the interest of transparency. However, be advised that this is a medical document. It is intended as peer to peer communication. It is written in medical language and may contain abbreviations or verbiage that are unfamiliar. It may appear blunt or direct. Medical documents are intended to carry relevant information, facts as evident, and clinical opinion of the practitioner.     Disclaimer:  Components of this note were documented using voice recognition system and are subject to errors not corrected at proofreading. Contact the author of this note for any clarifications.

## 2024-07-22 NOTE — PAYOR COMM NOTE
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 Given 80 mg Oral Adelita Rosas RN          clopidogrel (Plavix) tab 75 mg       Date Action Dose Route User    7/22/2024 0844 Given 75 mg Oral Chanel Carias RN          dronedarone (Multaq) tab 400 mg       Date Action Dose Route User    7/22/2024 0844 Given 400 mg Oral Chanel Carias RN    7/21/2024 2134 Given 400 mg Oral Adelita Rosas RN          enoxaparin (Lovenox) 120 MG/0.8ML SUBQ injection 120 mg       Date Action Dose Route User    7/22/2024 1144 Given 120 mg Subcutaneous (Left Lower Abdomen) Chanel Carias RN    7/22/2024 0024 Given 120 mg Subcutaneous (Left Lower Abdomen) Adelita Rosas RN          lidocaine PF (Xylocaine-MPF) 1% injection       Date Action Dose Route User    7/22/2024 1424 Given 5 mL Intradermal Jennifer Tolliver RN          metoprolol tartrate (Lopressor) partial tab 12.5 mg       Date Action Dose Route User    7/22/2024 0544 Given 12.5 mg Oral Adelita Rosas RN    7/21/2024 1730 Given 12.5 mg Oral Michelle Weaver RN          vancomycin (Vancocin) 1.5 g in sodium chloride 0.9% 250mL IVPB premix       Date Action Dose Route User    7/21/2024 1730 New Bag 1,500 mg Intravenous Michelle Weaver RN            Vitals (last day)       Date/Time Temp Pulse Resp BP SpO2 Weight O2 Device O2 Flow Rate (L/min) [facility]    07/22/24 1200 98.4 °F (36.9 °C) 69 18 131/81 93 %  -- Nasal cannula 2 L/min     07/22/24 1059 -- 73 -- 138/84 -- -- -- -- MD    07/22/24 0800 98.1 °F (36.7 °C) 71 18 115/80 94 % -- -- --     07/22/24 0800 -- -- -- -- -- -- Nasal cannula 2 L/min     07/22/24 0617 -- 72 -- -- -- -- -- --     07/22/24 0400 -- 82 -- 120/74 98 % -- Nasal cannula 2 L/min     07/22/24 0000 97.6 °F (36.4 °C) 71 -- 118/82 98 % -- Nasal cannula 2 L/min     07/21/24 2000 97.6 °F (36.4 °C) 69 -- 138/76 98 % -- Nasal cannula 2 L/min     07/21/24 1600 97.5 °F (36.4 °C) -- 19 -- -- -- -- --     07/21/24 1600 -- 63 -- 148/80 97 % -- None (Room air) -- PH    07/21/24 1200 97.8 °F (36.6 °C) 67 18 125/76 95 % -- Nasal cannula 2 L/min     07/21/24 0800 98.2 °F (36.8 °C) -- 18 -- -- -- -- --     07/21/24 0800 -- 63 -- 114/69 96 % -- Nasal cannula 2 L/min     07/21/24 0519 -- 67 -- -- 95 % -- -- -- EN    07/21/24 0400 98.1 °F (36.7 °C) 66 18 126/75 98 % -- -- -- EN    07/21/24 0000 98.9 °F (37.2 °C) 72 18 128/82 97 % -- None (Room air) -- EN                   07/20/24 2000 98 °F (36.7 °C) 71 18 130/79 96 % -- None (Room air) -- EN   07/20/24 1547 97.7 °F (36.5 °C) 75 18 121/74 94 % -- None (Room air) --    07/20/24 1011 -- 69 -- 136/80 -- -- -- -- MD   07/20/24 1000 98.3 °F (36.8 °C) 69 19 136/80 94 % -- Nasal cannula 3 L/min    07/20/24 0800 98.4 °F (36.9 °C) 67 18 111/83 96 % -- Nasal cannula 3 L/min ND      Latest Reference Range & Units 07/20/24 16:19   APTT 23.0 - 36.0 seconds 82.4 (H)   BETA 2 GLYCOPROTEIN 1 AB, IgM <7 U/mL <2.4   BETA 2 GLYCOPROTEIN 1 AB, IgG <7 U/mL 3.9   Cardiolipin IgG Antibody <10 GPL 2.2   Cardiolipin IgM Antibody <10 MPL 4.2   WBC 4.0 - 11.0 x10(3) uL 15.0 (H)   Hemoglobin 13.0 - 17.5 g/dL 9.5 (L)   Hematocrit 39.0 - 53.0 % 30.2 (L)   Platelet Count 150.0 - 450.0 10(3)uL 154.0   RBC 4.30 - 5.70 x10(6)uL 3.37 (L)   MCH 26.0 - 34.0 pg 28.2   MCHC 31.0 - 37.0 g/dL 31.5   MCV 80.0 - 100.0 fL 89.6   RDW % 15.7   Prelim Neutrophil Abs 1.50 - 7.70 x10 (3)  uL 11.72 (H)   Neutrophils Absolute 1.50 - 7.70 x10(3) uL 11.72 (H)   Lymphocytes Absolute 1.00 - 4.00 x10(3) uL 2.01   Monocytes Absolute 0.10 - 1.00 x10(3) uL 0.77   Eosinophils Absolute 0.00 - 0.70 x10(3) uL 0.24    Data is abnormally low   Latest Reference Range & Units 07/20/24 07:32 07/20/24 10:06 07/20/24 16:19   Sodium 136 - 145 mmol/L   141   Potassium 3.5 - 5.1 mmol/L 3.7  3.9   Chloride 98 - 112 mmol/L   113 (H)   Carbon Dioxide, Total 21.0 - 32.0 mmol/L   23.0   BUN 9 - 23 mg/dL   12   CREATININE 0.70 - 1.30 mg/dL 0.85  0.90   CALCIUM 8.7 - 10.4 mg/dL   9.2   EGFR >=60 mL/min/1.73m2 98  96   ANION GAP 0 - 18 mmol/L   5   CALCULATED OSMOLALITY 275 - 295 mOsm/kg   291   Iron, Serum 65 - 175 ug/dL 21 (L) 20 (L)    Transferrin 215 - 365 mg/dL 131 (L) 131 (L)    Iron Bind.Cap.(TIBC) 250 - 425 ug/dL 197 (L) 195 (L)    Iron Saturation 20 - 50 % 11 (L) 10 (L)    FERRITIN 30.0 - 530.0 ng/mL 550.7 (H)     Vitamin B12 211 - 911 pg/mL 547     FOLATE (FOLIC ACID), SERUM >5.4 ng/mL 16.0

## 2024-07-22 NOTE — PHYSICAL THERAPY NOTE
PHYSICAL THERAPY TREATMENT NOTE - INPATIENT    Room Number: 3608/3608-A     Session: 1 since re-eval     Number of Visits to Meet Established Goals: 5    Presenting Problem: new CVA 7/18/24, weakness, sepsis  Co-Morbidities : CVA (02/2024), DVT, HTN, HLD    ASSESSMENT   Patient demonstrates fair progress this session, goals  remain in progress.    Patient continues to function below baseline with bed mobility, transfers, gait, maintaining seated position, and standing prolonged periods.  Contributing factors to remaining limitations include decreased functional strength, decreased endurance/aerobic capacity, pain, impaired static and dynamic sitting and standing balance, impaired motor planning, decreased muscular endurance, and medical status.  Next session anticipate patient to progress bed mobility, transfers, gait, maintaining seated position, and standing prolonged periods.  Physical Therapy will continue to follow patient for duration of hospitalization.    Patient continues to benefit from continued skilled PT services: to facilitate return to prior level of function as patient demonstrates high motivation with excellent tolerance to an intensive therapy program .    PLAN  PT Treatment Plan: Bed mobility;Body mechanics;Endurance;Energy conservation;Patient education;Gait training;Range of motion;Strengthening;Transfer training;Balance training  Rehab Potential : Good  Frequency (Obs): 3-5x/week    CURRENT GOALS     Goal #1 Patient is able to demonstrate supine - sit EOB @ level: minimum assistance      Goal #2 Patient is able to demonstrate transfers Sit to/from Stand at assistance level: minimum assistance      Goal #3 Patient is able to ambulate 20 feet with assist device: walker - rolling at assistance level: minimum assistance      Goal #4     Goal #5     Goal #6     Goal Comments: Goals established on 7/20/2024 7/22/2024 all goals ongoing    PHYSICAL THERAPY MEDICAL/SOCIAL HISTORY  History related  to current admission: Patient is a 64 year old male admitted on 7/16/2024 from home for fevers.  Pt diagnosed with sepsis. Pt with new stroke diagnosed per MRI 7/19/24. Pt now appropriate for re-eval .     Imaging:  MRI BRAIN 7/19/24:  CONCLUSION:    1. There are multiple punctate foci of diffusion restriction in frontal and parietal lobes bilaterally consistent with multiple small acute strokes.  This involves multiple vascular territories suggesting an embolic etiology for stroke.  Correlation with    clinical findings is necessary.   2. Multiple areas of hemosiderin scar are noted.  These correspond areas of prior hemorrhage.  There is no evidence of acute hemorrhage.   3. There is chronic small vessel ischemic change and atrophy.      Above findings were reported to JUAN Harrison on July 19, 2024 at 7:23 a.m..      Recent admission:  2/17-2/25: Admitted for Acute intraparenchymal hemorrhage right parietal lobe with mass effect, no midline shift sp DOAC reversal in ER and discharged to [facility] acute rehab.     HOME SITUATION  Type of Home: House   Home Layout: Multi-level;Able to live on main level  Stairs to Enter : 2  Railing: No     Lives With: Spouse  Drives: No  Patient Owned Equipment: Rolling walker  Patient Regularly Uses: None     Prior Level of [place]: Per PT initial eval 7/18/24:   \"Pt and wife present to provide history. About a week and a half ago pt began just being able to amb on the first floor with a rolling walker. Pt at the time could navigate 2 steps to get in and out of his garage to get in a vehicle in order to be driven to outpatient therapy. Pt has been in outpatient therapy at [facility] working on cardio, strengthening and mobility for 4 weeks now. Pt described sudden loss of global strength on Sunday 7/14. Wife reports one week ago pt was able to stand and give himself cloth baths and stand at the sink to wash his hands and was able to go to the restroom ind. Pt wife works 9 hours a day  and pt was able to be mod I in the home to get food, go to the bathroom and get around. Pt receives assistance with ADLs from wife. Tuesday 7/16 was the last time the patient was up and walking. Pt sits up in a wheelchair at home when he is not moving around. Pt sleeps on sofa in the living room. Pt was unable to transfer out of bed or a recliner.   Pt had a stroke in February of this year and was admitted to [facility] where he spent 3 weeks at for acute rehab. After [facility], pt was admitted at [facility] for rehab where he was practicing walking with a chair follow. Pt received home health PT/OT after that admission. No history of falls reported.\"    SUBJECTIVE  \"I want to go home, I don't want to go to rehab\"     OBJECTIVE  Precautions: Bed/chair alarm (-220)    WEIGHT BEARING RESTRICTION  Weight Bearing Restriction: None                PAIN ASSESSMENT   Rating:  (does not rate)  Location: R elbow, shoulder and stiffness R LE  Management Techniques: Activity promotion;Body mechanics;Repositioning    BALANCE                                                                                                                       Static Sitting: Fair  Dynamic Sitting: Poor +           Static Standing: Fair -  Dynamic Standing: Poor +    ACTIVITY TOLERANCE  Pulse: 73  Heart Rate Source: Monitor     BP: 138/84  BP Location: Left arm  BP Method: Automatic  Patient Position: Semi-Cohen    O2 WALK  Oxygen Therapy  SPO2% on Oxygen at Rest: 97  At rest oxygen flow (liters per minute): 2      AM-PAC '6-Clicks' INPATIENT SHORT FORM - BASIC MOBILITY  How much difficulty does the patient currently have...  Patient Difficulty: Turning over in bed (including adjusting bedclothes, sheets and blankets)?: A Lot   Patient Difficulty: Sitting down on and standing up from a chair with arms (e.g., wheelchair, bedside commode, etc.): A Lot   Patient Difficulty: Moving from lying on back to sitting on the side of the bed?: A Lot    How much help from another person does the patient currently need...   Help from Another: Moving to and from a bed to a chair (including a wheelchair)?: Total   Help from Another: Need to walk in hospital room?: Total   Help from Another: Climbing 3-5 steps with a railing?: Total       AM-PAC Score:  Raw Score: 9   Approx Degree of Impairment: 81.38%   Standardized Score (AM-PAC Scale): 30.55   CMS Modifier (G-Code): CM    FUNCTIONAL ABILITY STATUS  Gait Assessment   Functional Mobility/Gait Assessment  Gait Assistance:  (unable to advance LE's for gait)  Distance (ft): 0  Assistive Device: Rolling walker  Pattern:  (unable)    Skilled Therapy Provided    Bed Mobility:  Rolling: NT   Supine<>Sit: mod/max assist of 2   Sit<>Supine: mod/max assist of 2     Transfer Mobility:  Sit<>Stand: min/mod assist of 2 with HOB elevated   Stand<>Sit: min assist of 2   Gait: Attempting steps, pt unable to clear feet to take steps, assisted with weight shift and pt remains unable to lift LE on either side to clear feet to take step    Therapist's Comments: Pt present semi-reclined in bed, agreeable to PT with encouragement. Pt mobility as above. There-ex as noted below with acitive assist provided for LE's, PROM to R shoulder to approx 80 deg flex/abd, pt reporting pain, AAROM to R elbow for flex/ext and R wrist for flex/ext. Pt with left lean in sitting, when encouraged to shift to midline, able to correct but then drifts back to left side. Pt seated at EOB with spv to min assist x 5 min. In standing pt with stooped posture, unable to complete weight shift to  feet for gait. Pt tolerates standing x approx 1 min. Pt returned to semi-reclined in bed with mod/max assist of 2. Pt reporting neck pain, encouraged cervical rom for rotation and side bed, pt with fair return demo. Dr. Miller present at end of session, discussing DC. Pt currently refusing INT or GRAD therapy, requesting to go home. Discussed pt currently requiring  two assist. Pt continues to request to go home. If pt returns home would benefit from hospital bed and total lift, caregiver support and HHPT. SW and RN notified.       THERAPEUTIC EXERCISES  Lower Extremity Ankle pumps  Hip AB/AD  Heel slides     Upper Extremity Elbow flex/ext,  - open/close, and PROM to shoulder (flex, abd, er)     Position Semi-reclined     Repetitions   5-10   Sets   1     Patient End of Session: In bed;Needs met;Call light within reach;RN aware of session/findings;All patient questions and concerns addressed;Alarm set;Discussed recommendations with /    PT Session Time: 35 minutes  Gait Trainin minutes  Therapeutic Activity: 10 minutes  Therapeutic Exercise: 10 minutes

## 2024-07-22 NOTE — PROGRESS NOTES
[facility] Dosing Service      Follow Up Pharmacokinetic Consult for Vancomycin Dosing     Kris Colvin is a 64 year old male who is receiving vancomycin therapy for bacteremia. Patient is on day 4 of vancomycin and is currently receiving 1500 mg IV every 12 hours. The current treatment and monitoring approach is non-AUC strategy.        Weight and Temperature:    Wt Readings from Last 1 Encounters:   24 117.9 kg (260 lb)         Temp Readings from Last 1 Encounters:   24 97.6 °F (36.4 °C) (Oral)      Labs:   Recent Labs   Lab 24  0732 24  1619 24  0836   CREATSERUM 0.85 0.90 1.24      Estimated Creatinine Clearance: 72.9 mL/min (based on SCr of 1.24 mg/dL).     Recent Labs   Lab 24  1927 24  0732 24  1619   WBC 18.9* 16.4* 15.0*        Vancomycin Levels:  Lab Results   Component Value Date/Time    VANCT 25.9 (H) 2024 05:16 AM    VANCT 19.3 2024 10:06 AM       Corresponding 24 h-AUC: N/A     The Pharmacokinetic Target is:    Trough/random 10-15 mg/L    Renal Dosing Considerations:    None     Assessment/Plan:   Maintenance Regimen:  Hold Vanco for now pending random level    Monitorin) Plan for vancomycin random level to be obtained in approximately 24 hours    2) Pharmacy will order SCr as clinically indicated to assess renal function.    3) Pharmacy will monitor for toxicity and efficacy, adjust vancomycin dose and/or frequency, and order vancomycin levels as appropriate per the Pharmacy and Therapeutics Committee approved protocol until discontinuation of the medication.       We appreciate the opportunity to assist in the care of this patient.     Waldo Garcia, [facility]  2024  6:20 AM  [facility]  Pharmacy Extension: [phone]

## 2024-07-22 NOTE — PROGRESS NOTES
[facility]  MONA Neurology Progress Note    Kris Colvin Patient Status:  Inpatient    3/6/1960 MRN [id number]   Location [facility] 3NE-A Attending Lizet Miller DO   Hosp Day # 5 PCP Lenka Guevara DO     CC: Strokes    Subjective:  Kris Colvin is a 64 year old male with multiple medical problems,including recent stroke with residual right hemiparesis and expressive speech difficulty, who was admitted on  for fever and infection, had repeat MRI of brain revealed multiple small infarcts, likely embolic. Was on Eliquis, concern for medication failure. Was started on heparin gtt per cardiology, now switched to therapeutic dose Lovenox twice daily. Neurology following, completing stroke work up.     Patient seen for a follow up visit today. No new weakness, numbness, paresthesias, confusion, disorientation or seizure-like activity. Still with right sided weakness/residual hemiparesis. No acute events or current complaints.        MEDICATIONS:  No current outpatient medications on file.     Current Facility-Administered Medications   Medication Dose Route Frequency    enoxaparin (Lovenox) 120 MG/0.8ML SUBQ injection 120 mg  1 mg/kg Subcutaneous q12h    clopidogrel (Plavix) tab 75 mg  75 mg Oral Daily    [Held by provider] vancomycin (Vancocin) 1.5 g in sodium chloride 0.9% 250mL IVPB premix  1,500 mg Intravenous Q12H    acetaminophen (Tylenol) tab 650 mg  650 mg Oral Q4H PRN    Or    acetaminophen (Tylenol) rectal suppository 650 mg  650 mg Rectal Q4H PRN    labetalol (Trandate) 5 mg/mL injection 10 mg  10 mg Intravenous Q10 Min PRN    hydrALAzine (Apresoline) 20 mg/mL injection 10 mg  10 mg Intravenous Q2H PRN    ondansetron (Zofran) 4 MG/2ML injection 4 mg  4 mg Intravenous Q6H PRN    prochlorperazine (Compazine) 10 MG/2ML injection 5 mg  5 mg Intravenous Q8H PRN    metoprolol tartrate (Lopressor) partial tab 12.5 mg  12.5 mg Oral 2x Daily(Beta Blocker)    atorvastatin (Lipitor) tab 80 mg   80 mg Oral Nightly    dronedarone (Multaq) tab 400 mg  400 mg Oral BID    melatonin tab 3 mg  3 mg Oral Nightly PRN    acetaminophen (Tylenol Extra Strength) tab 500 mg  500 mg Oral Q4H PRN    polyethylene glycol (PEG 3350) (Miralax) 17 g oral packet 17 g  17 g Oral Daily PRN    sennosides (Senokot) tab 17.2 mg  17.2 mg Oral Nightly PRN    bisacodyl (Dulcolax) 10 MG rectal suppository 10 mg  10 mg Rectal Daily PRN    fleet enema (Fleet) 7-19 GM/118ML rectal enema 133 mL  1 enema Rectal Once PRN    ondansetron (Zofran) 4 MG/2ML injection 4 mg  4 mg Intravenous Q6H PRN       REVIEW OF SYSTEMS:  A 10-point system was reviewed.  Pertinent positives and negatives are noted in HPI.      PHYSICAL EXAMINATION:  VITAL SIGNS: /74 (BP Location: Left arm)   Pulse 72   Temp 97.6 °F (36.4 °C) (Oral)   Resp 19   Ht 75\"   Wt 260 lb (117.9 kg)   SpO2 98%   BMI 32.50 kg/m²   GENERAL:  Patient is a 64 year old male in no acute distress.  HEENT:  Normocephalic, atraumatic  ABD: Soft, non tender  SKIN: Warm, dry, no rashes    NEUROLOGICAL:   Mental status: Oriented to person, place, situation and time   Speech: Mild aphasia, no dysarthria  Memory and comprehension: Intact   Cranial Nerves: VFF, PERRL 3mm brisk, EOMI, no nystagmus, facial sensation intact, face symmetric, tongue midline, shoulder shrug equal, remainder CN intact  Motor: Residual right sided weakness from prev stroke  Sensory: Intact to light touch bilaterally  Coordination: FTN intact  Gait: Deferred      Imaging/Diagnostics:  CTA BRAIN + CTA CAROTIDS (CPT=70496/87392)    Result Date: 7/19/2024  CONCLUSION:   1. There is a background of mild to moderate chronic small vessel ischemic disease.  There are small chronic infarcts within the cerebellar hemispheres and basal ganglia.  Please refer to the brain MRI from earlier today for better visualization of the multiple punctate acute infarcts within the cerebral hemispheres and right cerebellum.  2. No acute  intracranial hemorrhage or hydrocephalus.  3. No high-grade stenosis, occlusion, aneurysm, or dissection is identified within the major intracranial or cervical arterial vasculature.   LOCATION:  [facility]   Dictated by (CST): Stromberg, LeRoy, MD on 7/19/2024 at 3:53 PM     Finalized by (CST): Stromberg, LeRoy, MD on 7/19/2024 at 4:07 PM       MRI BRAIN (W+WO) (CPT=70553)    Result Date: 7/19/2024  CONCLUSION:  1. There are multiple punctate foci of diffusion restriction in frontal and parietal lobes bilaterally consistent with multiple small acute strokes.  This involves multiple vascular territories suggesting an embolic etiology for stroke.  Correlation with  clinical findings is necessary. 2. Multiple areas of hemosiderin scar are noted.  These correspond areas of prior hemorrhage.  There is no evidence of acute hemorrhage. 3. There is chronic small vessel ischemic change and atrophy.  Above findings were reported to JUAN Harrison on July 19, 2024 at 7:23 a.m..   LOCATION:  [facility]    Dictated by (CST): James Puga MD on 7/19/2024 at 7:18 AM     Finalized by (CST): James Puga MD on 7/19/2024 at 7:23 AM       CT BRAIN OR HEAD (50871)    Result Date: 7/17/2024  CONCLUSION:  Chronic changes in the brain, as described, but no acute intracranial bleed, or other acute intracranial process identified. If additional imaging is needed for suspected ischemia and/or infarct based on the clinical signs and symptoms, then  consider follow-up with MRI.    LOCATION:  [facility]   Dictated by (CST): Alex Hubbard MD on 7/17/2024 at 5:37 PM     Finalized by (CST): Alex Hubbard MD on 7/17/2024 at 5:39 PM       CTA ABD/PEL (CPT=74174)    Result Date: 7/17/2024  CONCLUSION:  There is lack of intraluminal contrast involving the SMA at the level of the central mesentery with surrounding soft tissue stranding and mesenteric edema with reconstitution of more distal SMA branches.  Findings concerning for SMA thrombosis versus localized  dissection as questioned on 7/17/2024 CT performed earlier in the day.  Findings phoned to nurse Vida on 7/17/2024.  There is soft tissue stranding and small amount of free fluid in the mesentery as well as soft tissue stranding and a small amount of free fluid in the presacral space and pericolic gutters.  Thrombosis of the infrarenal IVC unchanged.   LOCATION:  [facility]   Dictated by (CST): Court Kline MD on 7/17/2024 at 10:53 AM     Finalized by (CST): Court Kline MD on 7/17/2024 at 11:04 AM       XR CHEST AP PORTABLE  (CPT=71045)    Result Date: 7/17/2024  CONCLUSION:  No consolidation.  Agree with preliminary radiology report from [facility] radiology.    LOCATION:  [facility]      Dictated by (CST): Justino Jeter MD on 7/17/2024 at 8:16 AM     Finalized by (CST): Justino Jeter MD on 7/17/2024 at 8:16 AM       CT CHEST+ABDOMEN+PELVIS(ALL CNTRST ONLY)(CPT=71260/69137)    Result Date: 7/17/2024  CONCLUSION:  1. Thrombus within and inferior to IVC filter extending to the common iliac veins.  Prominent edema within the pelvic fat planes, probably related to venous congestion. 2. Stranding surrounds the superior mesenteric artery with appearance focal irregularity.  Findings may represent motion artifact, however focal dissection cannot be excluded.  CTA or MRA can be performed for further evaluation as clinically indicated. 3. Please see details as above.  [facility] M.D. notified of these findings with preliminary radiology report from [facility].     LOCATION:  [facility]    Dictated by (CST): Latoya Alberts MD on 7/17/2024 at 7:00 AM     Finalized by (CST): Latoya Alberts MD on 7/17/2024 at 7:44 AM          Labs:  Recent Labs   Lab 07/19/24  1050 07/19/24  1927 07/20/24  0732 07/20/24  1619 07/21/24  0836   RBC 3.47* 3.59* 3.42* 3.37*  --    HGB 9.8* 10.1* 9.7* 9.5*  --    HCT 29.8* 30.8* 29.8* 30.2*  --    MCV 85.9 85.8 87.1 89.6  --    MCH 28.2 28.1 28.4 28.2  --    MCHC 32.9 32.8 32.6 31.5  --    RDW 15.6 15.6 15.7  15.7  --    NEPRELIM 14.73*  --  12.81* 11.72*  --    WBC 18.2* 18.9* 16.4* 15.0*  --    .0* 158.0 160.0 154.0 183.0         Recent Labs   Lab 07/19/24  1050 07/19/24  1927 07/20/24  0732 07/20/24  1619 07/21/24  0836   GLU 95 89  --  91  --    BUN 14 14  --  12  --    CREATSERUM 0.88 0.93 0.85 0.90 1.24   EGFRCR 97 92 98 96 65   CA 9.0 9.1  --  9.2  --     141  --  141  --    K 3.5 3.6 3.7 3.9  --     110  --  113*  --    CO2 26.0 24.0  --  23.0  --        Assessment & Plan:    A 64 year old male with:    Acute multiple infarcts as seen on MRI, likely emboli. In a setting of risk factors of previous stroke in February, 2024, HTN, CAD, HLD and current infection.   7/17 CT head - no acute changes  7/19 CTA head and neck - no acute abnormalities  7/19 MRI brain - multiple punctate foci of diffusion restriction in frontal and parietal lobes bilaterally consistent with multiple small acute strokes. This involves multiple vascular territories suggesting an embolic etiology for stroke.   TTE without evidence of valvular vegetation, no shunt or thrombus, ef 40-45%  Stroke labs: A1c 5.7, LDL 63. Patient was on Eliquis and statin at home. Concern for failure of Eliquis. Was started on Heparin gtt per Cardiology, now switched to Lovenox for long term anticoagulation ~ 6 weeks, then possibly DOAC per cards. Also started on Plavix 75 mg daily and continues home dose of atorvastatin 80 mg daily.   PT/OT/rehab evals.  Recent hx ICH 2/2024  While on xarelto, baby asa, and daily nsaids at that time.  IVC filter placed in 2/24.  Now on therapeutic Lovenox and Plavix as well as continues statin  4. Current infection, sepsis, infected thrombus?- staph epi bacteremia - plan for 4-6 weeks of IV abx per ID.  Discussed with patient and family, all questions answered. Discussed with dr. Borden. No further inpatient neurological intervention indicated at this time. Will sign off. Please call with any further questions  or concerns. Patient is to follow up with Neurology as outpatient in 4 weeks.     Is this a shared or split note between Advanced Practice Provider and Physician? Yes       Ramona Jeronimoshaquillerafael [facility]  7/22/2024, 9:32 AM   [place] # [id number]      Impression/plan/MDM:  Patient seen and examined personally.  Investigations reviewed.      Acute multiple infarcts likely embolic.  Patient also has history of DVT/PE, has IVC filter.  Currently considered Eliquis failure.  Patient switched to Lovenox by primary team/cardiology.  Patient to also continue atorvastatin and Plavix for stroke prophylaxis.  OT/PT.  Patient to follow-up with neurology 3 to 4 weeks after discharge.  Patient has previously seen Dr. Ohara.

## 2024-07-22 NOTE — PLAN OF CARE
Assumed care at 1930. A/Ox4, on 2L oxygen, NSR on tele. Soft diet, no straws, pills in apple sauce. Aspiration precautions. NIH daily. Q4 neuro checks. Weakness noted to right side. VTE with lovenox. No complaints of pain. IVPB vanco Q12 hours per mar. Patient updated with plan of care. All needs met at this time.     Problem: RISK FOR INFECTION - ADULT  Goal: Absence of fever/infection during anticipated neutropenic period  Description: INTERVENTIONS  - Monitor WBC  - Administer growth factors as ordered  - Implement neutropenic guidelines  Outcome: Progressing     Problem: SAFETY ADULT - FALL  Goal: Free from fall injury  Description: INTERVENTIONS:  - Assess pt frequently for physical needs  - Identify cognitive and physical deficits and behaviors that affect risk of falls.  - [place] fall precautions as indicated by assessment.  - Educate pt/family on patient safety including physical limitations  - Instruct pt to call for assistance with activity based on assessment  - Modify environment to reduce risk of injury  - Provide assistive devices as appropriate  - Consider OT/PT consult to assist with strengthening/mobility  - Encourage toileting schedule  Outcome: Progressing

## 2024-07-22 NOTE — DISCHARGE INSTRUCTIONS
For assistance in finding in home care please call:  Altagracia at [facility] [phone] Email [email or web address]  [facility] [phone]  [facility]  [phone]     Sometimes managing your health at home requires assistance.  The [facility] team has recognized your preference to use [facility].  They can be reached by phone at [phone].  The fax number for your reference is [phone].  A representative from the home health agency will contact you or your family to schedule your first visit.        It was recommended that you use a hospital bed, jojo lift, and 3 in 1 commode at home to facilitate your recovery and compliment your treatment.  The [facility] team has set up delivery with [facility].  Contact information: Laney Velasquez [address] [address].  Phone: [phone]..      If you experienced any difficulties with the delivery, please contact the [facility] Case Management department at [phone].

## 2024-07-22 NOTE — PROGRESS NOTES
Heme/Onc Progress Note - [facility]      Chief Complaint:    Follow up for evaluation and management of hypercoagulable state with recurrent VTE and recent embolic stroke.    Interim History:      Patient is on therapeutic LMWH 1 mg/kg BID.  He has slurred speech. He has no complaint of pain. He has no complaint of dyspnea at rest.     Physical Examination:    Vital Signs: /74 (BP Location: Left arm)   Pulse 72   Temp 97.6 °F (36.4 °C) (Oral)   Resp 19   Ht 1.905 m (6' 3\")   Wt 117.9 kg (260 lb)   SpO2 98%   BMI 32.50 kg/m²     General: Patient is alert and not in acute distress.  HEENT: Anicteric Sclera. Pink conjunctiva.  Oropharynx is clear.   Neck: No palpable lymphadenopathy. Neck is supple.  Chest: Clear to auscultation. No rales and no wheezes.  Heart: Regular rate and rhythm.   Abdomen: Soft, non tender with good bowel sounds.  Extremities: Bilateral LE edema. Non-tender.      Labs reviewed at this visit:     Recent Labs   Lab 07/19/24  1050 07/19/24 1927 07/20/24  0732 07/20/24 1619 07/21/24  0836   RBC 3.47* 3.59* 3.42* 3.37*  --    HGB 9.8* 10.1* 9.7* 9.5*  --    HCT 29.8* 30.8* 29.8* 30.2*  --    MCV 85.9 85.8 87.1 89.6  --    MCH 28.2 28.1 28.4 28.2  --    MCHC 32.9 32.8 32.6 31.5  --    RDW 15.6 15.6 15.7 15.7  --    NEPRELIM 14.73*  --  12.81* 11.72*  --    WBC 18.2* 18.9* 16.4* 15.0*  --    .0* 158.0 160.0 154.0 183.0       Recent Labs   Lab 07/17/24  0002 07/17/24  1828 07/19/24  1050 07/19/24 1927 07/20/24  0732 07/20/24  1619 07/21/24  0836   *   < > 95 89  --  91  --    BUN 24*   < > 14 14  --  12  --    CREATSERUM 1.04   < > 0.88 0.93 0.85 0.90 1.24   CA 8.7   < > 9.0 9.1  --  9.2  --    ALB 2.9*  --   --   --   --   --   --    *   < > 141 141  --  141  --    K 2.9*   < > 3.5 3.6 3.7 3.9  --       < > 109 110  --  113*  --    CO2 26.0   < > 26.0 24.0  --  23.0  --    ALKPHO 79  --   --   --   --   --   --    AST 44*  --   --   --   --   --   --    ALT 27  --    --   --   --   --   --    BILT 1.0  --   --   --   --   --   --    TP 6.3  --   --   --   --   --   --     < > = values in this interval not displayed.       Radiologic imaging reviewed at this visit:    CTA Brain + CTA Carotids on 7/19/2024:  FINDINGS:    Mild diffuse volume loss.     There is a background of mild to moderate chronic small vessel ischemic disease.  There are small chronic infarcts within the cerebellar hemispheres and basal ganglia. Please refer to the brain MRI from earlier today for better visualization of the  multiple punctate acute infarcts within the cerebral hemispheres and right cerebellum.     There is no midline shift or mass-effect. The basal cisterns are patent.       There is no acute intracranial hemorrhage or extra-axial fluid collection.       There is no evident fracture. The visualized paranasal sinuses and mastoid air cells are unremarkable.     The upper cervical, petrous, cavernous, and supraclinoid internal carotid arteries are unremarkable.  An anterior communicating artery is seen. The branches of the anterior cerebral and middle cerebral arteries are unremarkable.  A small right posterior   communicating artery is seen along with a small left posterior communicating artery.  The branches of the posterior cerebral and superior cerebellar arteries are unremarkable. The basilar artery has a normal course and caliber.  The bilateral vertebral  arteries are unremarkable.     There is a 3-vessel aortic arch with mild to moderate atherosclerotic plaque.  The arch vasculature is patent.  Mild atherosclerotic irregularity is seen at the left subclavian artery.  Mild atherosclerotic irregularity is seen at the right subclavian  artery.     The common carotid arteries are patent.  The carotid bifurcations demonstrate mild narrowing secondary to primarily calcified atherosclerotic plaque.  There is no evidence of hemodynamically significant stenosis in the carotid bulbs by NASCET  criteria.    The cervical internal carotid arteries are patent. The cervical internal carotid arteries are tortuous.     The vertebral arteries originate from the subclavian arteries.  The origins of the vertebral arteries are patent.  The cervical vertebral arteries are patent.  Vertebral arteries are codominant.  Mild narrowing at the proximal aspect of the right  vertebral artery.     Partially visualized postoperative changes from sternotomy.  Degenerative changes in the spine.     Impression   CONCLUSION:       1. There is a background of mild to moderate chronic small vessel ischemic disease.  There are small chronic infarcts within the cerebellar hemispheres and basal ganglia.  Please refer to the brain MRI from earlier today for better visualization of the  multiple punctate acute infarcts within the cerebral hemispheres and right cerebellum.     2. No acute intracranial hemorrhage or hydrocephalus.     3. No high-grade stenosis, occlusion, aneurysm, or dissection is identified within the major intracranial or cervical arterial vasculature.       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  Rivaroxaban started.  Intracranial bleed 2/24.  Rivaroxaban discontinued, IVC filter placed.  Anticoagulation resumed with apixaban 5/24.  CT this admission shows thrombus within and inferior to IVC filter up to common iliac veins.      JAK2 and lupus anticoagulant testing pending.      I would recommend continued therapeutic Enoxaparin 1 mg/kg BID. I discussed this with the patient. I would continued this for at least 6 weeks. We will then reassess at that time to determine whether we can maintain on other oral therapy. He is reluctant to be treated with warfarin. I still think a DOAC could be used in the maintenance/preventative setting. He was comfortable with this plan.     # Acute multiple infarcts: Likely embolic.  While on apixaban.  No obvious vegetation on echo.  Currently on Enoxaparin 1 mg/kg  BID.     # Bacteremia: Bioprosthetic aortic valve.  On antibiotics.       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  No surgical intervention needed.     # Anemia: Due to chronic disease with suppressed TIBC and elevated ferritin. Follow.       Getachew Osullivan MD  [facility]

## 2024-07-22 NOTE — PLAN OF CARE
Patient alert and oriented x4.  NIH daily, neuro checks q4.  On 2L.  NSR on tele.  HR 70s.  Denies pain at this time.  Lovenox.  Resting comfortably in bed.

## 2024-07-22 NOTE — PROGRESS NOTES
[facility]   part of [facility] ID PROGRESS NOTE    Kris Colvin Patient Status:  Inpatient    3/6/1960 MRN [id number]   Location [facility] 4NW-A Attending Lizet Miller DO   Hosp Day # 5 PCP Lenka Guevara DO     Abx: IV vancomycin #4, s/p IV zosyn     Subjective: Patient seen and examined today. Denies any pain. Afebrile.     Allergies:  No Known Allergies    Medications:    Current Facility-Administered Medications:     enoxaparin (Lovenox) 120 MG/0.8ML SUBQ injection 120 mg, 1 mg/kg, Subcutaneous, q12h    clopidogrel (Plavix) tab 75 mg, 75 mg, Oral, Daily    [Held by provider] vancomycin (Vancocin) 1.5 g in sodium chloride 0.9% 250mL IVPB premix, 1,500 mg, Intravenous, Q12H    acetaminophen (Tylenol) tab 650 mg, 650 mg, Oral, Q4H PRN **OR** acetaminophen (Tylenol) rectal suppository 650 mg, 650 mg, Rectal, Q4H PRN    labetalol (Trandate) 5 mg/mL injection 10 mg, 10 mg, Intravenous, Q10 Min PRN    hydrALAzine (Apresoline) 20 mg/mL injection 10 mg, 10 mg, Intravenous, Q2H PRN    ondansetron (Zofran) 4 MG/2ML injection 4 mg, 4 mg, Intravenous, Q6H PRN    prochlorperazine (Compazine) 10 MG/2ML injection 5 mg, 5 mg, Intravenous, Q8H PRN    metoprolol tartrate (Lopressor) partial tab 12.5 mg, 12.5 mg, Oral, 2x Daily(Beta Blocker)    atorvastatin (Lipitor) tab 80 mg, 80 mg, Oral, Nightly    dronedarone (Multaq) tab 400 mg, 400 mg, Oral, BID    melatonin tab 3 mg, 3 mg, Oral, Nightly PRN    acetaminophen (Tylenol Extra Strength) tab 500 mg, 500 mg, Oral, Q4H PRN    polyethylene glycol (PEG 3350) (Miralax) 17 g oral packet 17 g, 17 g, Oral, Daily PRN    sennosides (Senokot) tab 17.2 mg, 17.2 mg, Oral, Nightly PRN    bisacodyl (Dulcolax) 10 MG rectal suppository 10 mg, 10 mg, Rectal, Daily PRN    fleet enema (Fleet) 7-19 GM/118ML rectal enema 133 mL, 1 enema, Rectal, Once PRN    Review of Systems:  Completed. See pertinent positives and negatives above.     Physical Exam:  Vital signs:  Blood pressure 131/81, pulse 69, temperature 98.4 °F (36.9 °C), temperature source Oral, resp. rate 18, height 190.5 cm (6' 3\"), weight 260 lb (117.9 kg), SpO2 93%.    General: Drowsy but easily arousable, NAD.  HEENT: Moist mucous membranes. + thrush  Neck: No lymphadenopathy.  Supple.  Cardiovascular: RRR  Respiratory: Clear to auscultation bilaterally.  No wheezes. No rhonchi.  Abdomen: Soft, nontender, nondistended.   Musculoskeletal: LE edema with chronic appearing stasis changes (per wife erythema is at baseline)  Integument: No rash.    Laboratory Data:  Recent Labs   Lab 07/22/24  1501   RBC 3.26*   HGB 9.4*   HCT 28.8*   MCV 88.3   MCH 28.8   MCHC 32.6   RDW 15.8   NEPRELIM 12.77*   WBC 16.1*   .0     Recent Labs   Lab 07/17/24  0002 07/17/24  1828 07/19/24  1050 07/19/24  1927 07/20/24  0732 07/20/24  1619 07/21/24  0836   *   < > 95 89  --  91  --    BUN 24*   < > 14 14  --  12  --    CREATSERUM 1.04   < > 0.88 0.93 0.85 0.90 1.24   CA 8.7   < > 9.0 9.1  --  9.2  --    ALB 2.9*  --   --   --   --   --   --    *   < > 141 141  --  141  --    K 2.9*   < > 3.5 3.6 3.7 3.9  --       < > 109 110  --  113*  --    CO2 26.0   < > 26.0 24.0  --  23.0  --    ALKPHO 79  --   --   --   --   --   --    AST 44*  --   --   --   --   --   --    ALT 27  --   --   --   --   --   --    BILT 1.0  --   --   --   --   --   --    TP 6.3  --   --   --   --   --   --     < > = values in this interval not displayed.       Microbiology: Reviewed in EMR    Radiology: Reviewed.    PROCEDURE:  CTA ABD/PEL (CPT=74174)     COMPARISON:  [facility] , CT, CT CHEST+ABDOMEN+PELVIS(ALL CNTRST ONLY)(CPT=71260/40090), 7/17/2024, 2:34 AM.     INDICATIONS:  possible SMA dissection     TECHNIQUE:  CT images of the abdomen and pelvis were obtained pre- and post- injection of non-ionic intravenous contrast material. Multi-planar reformatted/3-D images were created to optimize visualization of vascular anatomy.  Dose reduction  techniques  were used. Dose information is transmitted to the ACR (American College of Radiology) NRDR (National Radiology Data Registry) which includes the Dose Index Registry.     PATIENT STATED HISTORY:(As transcribed by Technologist)  fevers, recent ct cap 250 am. evaluate SMA dissection      CONTRAST USED:  100cc of Isovue 370     FINDINGS:  This is a follow-up to CT performed at 7/17/2024 at 0234 hours.    AORTA/VASCULAR:  Exam is degraded by motion artifact.  There is soft tissue stranding surrounding the SMA at the site of vessel narrowing with subsequent lack of intraluminal contrast, series 9, images 167 through 204. There is subsequent opacification  of more distal SMA branches.  Findings knee or SMA thrombosis.  A localized dissection cannot be excluded.  The origin of the SMA is widely patent.  Celiac artery is patent.  DAPHNE is well visualized and patent.  There is dense calcific plaque at the  origins of the main renal arteries bilaterally.  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    LIVER:  Stable CT appearance of the liver  BILIARY:  No biliary ductal dilatation.    PANCREAS:  Stable.  Homogeneous enhancement  SPLEEN:  Normal caliber  KIDNEYS:  Stable.  No hydronephrosis.  ADRENALS:  Stable.    RETROPERITONEUM:  Stable scattered periaortic lymph nodes.  BOWEL/MESENTERY:  There is soft tissue stranding and edema in the mesentery.  There is colonic diverticulosis.  No evidence of dilated small bowel loops.  There are air-fluid levels in the sigmoid colon with suggestion of mild wall thickening of the  rectosigmoid colon.  There is soft tissue stranding in the pericolic gutters bilaterally.  There is a small amount of free fluid in soft tissue stranding in the presacral space.    PELVIC ORGANS:  Circumferential wall thickening in a partially decompressed urinary bladder.    BONES:  For trophic  degenerative changes lumbar spine.  Benign appearing cyst L4.  Degenerative disc disease L3-4 L4-5.     Impression:    CONCLUSION:  There is lack of intraluminal contrast involving the SMA at the level of the central mesentery with surrounding soft tissue stranding and mesenteric edema with reconstitution of more distal SMA branches.  Findings concerning for SMA  thrombosis versus localized dissection as questioned on 7/17/2024 CT performed earlier in the day.  Findings phoned to nurse Vida on 7/17/2024.     There is soft tissue stranding and small amount of free fluid in the mesentery as well as soft tissue stranding and a small amount of free fluid in the presacral space and pericolic gutters.     Thrombosis of the infrarenal IVC unchanged     LOCATION:  [facility]     Dictated by (CST): Court Kline MD on 7/17/2024 at 10:53 AM      Finalized by (CST): Court Kline MD on 7/17/2024 at 11:04 AM       PROCEDURE:  CT BRAIN OR HEAD (73666)     COMPARISON:  [facility] , CT, CT BRAIN OR HEAD (06030), 3/01/2024, 1:33 PM.  [facility], CT, CT BRAIN OR HEAD (97056), 4/26/2024, 7:23 AM.     INDICATIONS:  Rule out CVA     TECHNIQUE:  Noncontrast CT scanning is performed through the brain. Dose reduction techniques were used. Dose information is transmitted to the ACR (American College of Radiology) NRDR (National Radiology Data Registry) which includes the Dose Index  Registry.     PATIENT STATED HISTORY: (As transcribed by Technologist)  ams         FINDINGS:       VENTRICLES/SULCI:   Ventricles and sulci are prominent indicating atrophy.     INTRACRANIAL:  There are no abnormal extraaxial fluid collections.  There is no midline shift.  There are no acute appearing intraparenchymal brain abnormalities.  There is nothing specific for acute territorial infarct.  There is no hemorrhage or mass  lesion.  There is chronic microvascular ischemic white matter disease in the cerebrum bilaterally.  Nothing definite for acute infarct.   Residual encephalomalacia mild from previously demonstrated left hemisphere hematoma.     SINUSES:  No acute sinusitis.       MASTOIDS:  The mastoids are clear.     SKULL:  No evidence for fracture or acute osseous abnormality.     OTHER:  None.     Impression:    CONCLUSION:  Chronic changes in the brain, as described, but no acute intracranial bleed, or other acute intracranial process identified. If additional imaging is needed for suspected ischemia and/or infarct based on the clinical signs and symptoms, then   consider follow-up with MRI.    LOCATION:  [facility]     Dictated by (CST): Alex Hubbard MD on 7/17/2024 at 5:37 PM      Finalized by (CST): Alex Hubbard MD on 7/17/2024 at 5:39 PM        PROCEDURE:  XR CHEST AP PORTABLE  (CPT=71045)     TECHNIQUE:  AP chest radiograph was obtained.     COMPARISON:  [facility] , XR, XR CHEST AP PORTABLE  (CPT=71045), 2/17/2024, 3:54 PM.     INDICATIONS:  SOB     PATIENT STATED HISTORY: (As transcribed by Technologist)  Patient offered no additional history at this time.         FINDINGS:       Mildly enlarged cardiac silhouette.     Median sternotomy hardware is noted.     No consolidation, pleural effusion or pneumothorax.     Impression:    CONCLUSION:  No consolidation.     Agree with preliminary radiology report from [facility].     LOCATION:  [facility]     Dictated by (CST): Justino Jeter MD on 7/17/2024 at 8:16 AM      Finalized by (CST): Justino Jeter MD on 7/17/2024 at 8:16 AM         PROCEDURE:  CT CHEST+ABDOMEN+PELVIS(ALL CNTRST ONLY)(CPT=71260/84897)     COMPARISON:  None.     INDICATIONS:  fever/hypoxia     TECHNIQUE:  IV contrast-enhanced scanning through the chest, abdomen, and pelvis was performed.  Dose reduction techniques were used. Dose information is transmitted to the ACR (American College of Radiology) NRDR (National Radiology Data Registry) which   includes the Dose Index Registry.     PATIENT STATED HISTORY:(As transcribed by  Technologist)  fever, hypoxia      CONTRAST USED:  100 mLcc of Isovue 370     FINDINGS:       CHEST:    LUNGS:  There is image degradation due to motion.  No focal consolidation.  Lingular and left lower lobe linear densities are most consistent with atelectasis and or scar.    MEDIASTINUM:  No mass or adenopathy.  Mediastinal clips status post CABG.  YASMINE:  No mass or adenopathy.    CARDIAC:  Enlarged.  Status post CABG.  Aortic valve replacement.  No pericardial effusion.  PLEURA:  No mass or effusion.    CHEST WALL:  No mass or axillary adenopathy.  Sternotomy wires status post CABG.  AORTA:  Atherosclerosis.  No aneurysm or dissection.    VASCULATURE:  No visible pulmonary arterial thrombus or attenuation.       ABDOMEN/PELVIS:  LIVER:  No enlargement, atrophy, abnormal density, or significant focal lesion.    BILIARY:  Possible sludge and or small stones within the gallbladder.  Sonogram can be performed for further evaluation as clinically indicated.  No visible dilatation.    PANCREAS:  No lesion, fluid collection, ductal dilatation, or atrophy.    SPLEEN:  No enlargement or focal lesion.    KIDNEYS:  Small 1.5 cm cyst within the right superior pole for which no further follow-up is required.  No mass, obstruction, or calcification.    ADRENALS:  No mass or enlargement.    AORTA:  Infrarenal IVC filter.  There is thrombus within and inferior to the filter extending to the common iliac veins.  Prominent edema within the pelvic fat planes probably related to venous congestion.  Stranding surrounds the superior mesenteric  artery with suggestion of focal irregularity, best seen on images 53-56 of series 9.  Atherosclerosis.  No aneurysm or dissection.    RETROPERITONEUM:  No mass or adenopathy.    BOWEL/MESENTERY:  Normal caliber small and large bowel.    ABDOMINAL WALL:  No mass or hernia.    URINARY BLADDER:  Bladder is nearly empty most likely accounts for diffuse bladder wall thickening given no signs  cystitis per ED MD. No visible focal wall thickening, lesion, or calculus.    PELVIC NODES:  No adenopathy.    PELVIC ORGANS:  Pelvic organs appropriate for patient age.    BONES:  Degenerative change predominantly involves the lower lumbar spine.  No fracture.       Impression:    CONCLUSION:    1. Thrombus within and inferior to IVC filter extending to the common iliac veins.  Prominent edema within the pelvic fat planes, probably related to venous congestion.  2. Stranding surrounds the superior mesenteric artery with appearance focal irregularity.  Findings may represent motion artifact, however focal dissection cannot be excluded.  CTA or MRA can be performed for further evaluation as clinically indicated.  3. Please see details as above.     ED M.VITO. notified of these findings with preliminary radiology report from [facility].     LOCATION:  [facility]     Dictated by (CST): Latoya Alberts MD on 7/17/2024 at 7:00 AM      Finalized by (CST): Latoya Alberts MD on 7/17/2024 at 7:44 AM    ASSESSMENT:  Staph epi bacteremia, concerning for aortic PVE.   2/2 Bcx 7/17. Per patient, taken from the same site. Patient has an AVR. TTE w/o obvious vegetation but leaflets poorly visualized and worsening function of valve noted.   Fevers, assume related to #1. Resolved.  UA negative. VRP negative. 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  Acute CVA, suspect embolic (multiple infarcts). ? Related to #1.   Leukocytosis  CAD, s/p CABG; CHF; AVR, HTN, HLD, A. fib  H/o ICH.  CHAZ    PLAN:  - continue IV vanco  - follow repeat blood cultures from 7/17 and 7/18 (both done prior to vancomycin)- ngtd  - cards eval appreciated, no plans for JAZMÍN at this time given high risk  - will add nystatin for thrush  - follow temps and wbc  - continue to monitor for foci of infection  - monitor temps  and WBC  - will place PICC and plan for 6 weeks of IV abx with vancomycin (could also use daptomycin for a once daily option).     Discussed case with RN, SW, cards APN, Dr. Painting and patient    Hilary Christine PA-C    ID ATTENDING ADDENDUM     Pt seen an examined independently. Chart reviewed. Agree with above. Note has been reviewed by me and modified as needed.  Exam and Impression/ Recs as noted above.  More than 50% of clinical time and 100% of the clinical decision making performed by me.    Maren Painting MD
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
## 2024-07-23 VITALS
RESPIRATION RATE: 16 BRPM | HEIGHT: 75 IN | TEMPERATURE: 98 F | WEIGHT: 260 LBS | BODY MASS INDEX: 32.33 KG/M2 | HEART RATE: 75 BPM | DIASTOLIC BLOOD PRESSURE: 96 MMHG | SYSTOLIC BLOOD PRESSURE: 147 MMHG | OXYGEN SATURATION: 95 %

## 2024-07-23 LAB
ANION GAP SERPL CALC-SCNC: 6 MMOL/L (ref 0–18)
APTT PPP: 87.8 SECONDS (ref 23–36)
BASOPHILS # BLD AUTO: 0.08 X10(3) UL (ref 0–0.2)
BASOPHILS NFR BLD AUTO: 0.5 %
BUN BLD-MCNC: 22 MG/DL (ref 9–23)
CALCIUM BLD-MCNC: 9.6 MG/DL (ref 8.7–10.4)
CHLORIDE SERPL-SCNC: 109 MMOL/L (ref 98–112)
CK SERPL-CCNC: 44 U/L
CO2 SERPL-SCNC: 26 MMOL/L (ref 21–32)
CREAT BLD-MCNC: 1.36 MG/DL
CREAT BLD-MCNC: 1.36 MG/DL
EGFRCR SERPLBLD CKD-EPI 2021: 58 ML/MIN/1.73M2 (ref 60–?)
EGFRCR SERPLBLD CKD-EPI 2021: 58 ML/MIN/1.73M2 (ref 60–?)
EOSINOPHIL # BLD AUTO: 0.17 X10(3) UL (ref 0–0.7)
EOSINOPHIL NFR BLD AUTO: 1.2 %
ERYTHROCYTE [DISTWIDTH] IN BLOOD BY AUTOMATED COUNT: 15.8 %
GLUCOSE BLD-MCNC: 111 MG/DL (ref 70–99)
GLUCOSE BLD-MCNC: 118 MG/DL (ref 70–99)
GLUCOSE BLD-MCNC: 87 MG/DL (ref 70–99)
GLUCOSE BLD-MCNC: 97 MG/DL (ref 70–99)
HCT VFR BLD AUTO: 32.3 %
HGB BLD-MCNC: 10.5 G/DL
IMM GRANULOCYTES # BLD AUTO: 0.14 X10(3) UL (ref 0–1)
IMM GRANULOCYTES NFR BLD: 0.9 %
INR BLD: 1.37 (ref 0.85–1.16)
LA 3 SCREEN W REFLEX-IMP: POSITIVE
LYMPHOCYTES # BLD AUTO: 1.92 X10(3) UL (ref 1–4)
LYMPHOCYTES NFR BLD AUTO: 13 %
MCH RBC QN AUTO: 28.5 PG (ref 26–34)
MCHC RBC AUTO-ENTMCNC: 32.5 G/DL (ref 31–37)
MCV RBC AUTO: 87.5 FL
MONOCYTES # BLD AUTO: 0.68 X10(3) UL (ref 0.1–1)
MONOCYTES NFR BLD AUTO: 4.6 %
NEUTROPHILS # BLD AUTO: 11.77 X10 (3) UL (ref 1.5–7.7)
NEUTROPHILS # BLD AUTO: 11.77 X10(3) UL (ref 1.5–7.7)
NEUTROPHILS NFR BLD AUTO: 79.8 %
OSMOLALITY SERPL CALC.SUM OF ELEC: 296 MOSM/KG (ref 275–295)
PLATELET # BLD AUTO: 215 10(3)UL (ref 150–450)
POTASSIUM SERPL-SCNC: 3.9 MMOL/L (ref 3.5–5.1)
PROTHROMBIN TIME: 16.9 SECONDS (ref 11.6–14.8)
RBC # BLD AUTO: 3.69 X10(6)UL
SCREEN DRVVT: 1.14 S (ref 0–1.29)
SCREEN DRVVT: NEGATIVE S
SODIUM SERPL-SCNC: 141 MMOL/L (ref 136–145)
STACLOT LA DELTA: 20.4 SECONDS (ref ?–8)
VANCOMYCIN SERPL-MCNC: 13.6 UG/ML (ref ?–40)
WBC # BLD AUTO: 14.8 X10(3) UL (ref 4–11)

## 2024-07-23 PROCEDURE — 99239 HOSP IP/OBS DSCHRG MGMT >30: CPT | Performed by: INTERNAL MEDICINE

## 2024-07-23 PROCEDURE — 99231 SBSQ HOSP IP/OBS SF/LOW 25: CPT | Performed by: INTERNAL MEDICINE

## 2024-07-23 RX ORDER — CLOPIDOGREL BISULFATE 75 MG/1
75 TABLET ORAL DAILY
Qty: 30 TABLET | Refills: 1 | Status: SHIPPED | OUTPATIENT
Start: 2024-07-24

## 2024-07-23 RX ORDER — VANCOMYCIN HYDROCHLORIDE
1500 ONCE
Status: DISCONTINUED | OUTPATIENT
Start: 2024-07-23 | End: 2024-07-23

## 2024-07-23 RX ORDER — ENOXAPARIN SODIUM 150 MG/ML
120 INJECTION SUBCUTANEOUS EVERY 12 HOURS
Qty: 60 EACH | Refills: 3 | Status: SHIPPED | OUTPATIENT
Start: 2024-07-23
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
## 2024-07-23 NOTE — PLAN OF CARE
Patient alert and oriented x4.  2L, .  NSR on tele.  Neuro checks.  NIH.  L PICC line.  IV abx.  Denies pain.  Resting comfortably in bed.  Plan for dc today.

## 2024-07-23 NOTE — PHYSICAL THERAPY NOTE
PHYSICAL THERAPY TREATMENT NOTE - INPATIENT    Room Number: 3608/3608-A     Session: 2 since re-eval     Number of Visits to Meet Established Goals: 5    Presenting Problem: new CVA 7/18/24, weakness, sepsis  Co-Morbidities : CVA (02/2024), DVT, HTN, HLD    ASSESSMENT   Patient demonstrates good  progress this session, goals remain in progress.    Patient continues to function below baseline with bed mobility, transfers, gait, maintaining seated position, and standing prolonged periods. Contributing factors to remaining limitations include decreased functional strength, pain, impaired static and dynamic standing balance, impaired motor planning, decreased muscular endurance, cognitive deficits (decreased insight into deficits), and medical status.  Next session anticipate patient to progress bed mobility, transfers, gait, maintaining seated position, and standing prolonged periods.  Physical Therapy will continue to follow patient for duration of hospitalization.    Patient continues to benefit from continued skilled PT services, recommend intensive therapy program to facilitate return to maximum level of independence. Pt is refusing therapy services at a facility either acute or subacute,  pt is only agreeable to home therapies. RN/SW aware.    If pt returns home recommend bedside commode, hospital bed, sit to stand lift, increased supervision and HHPT. Pt reports he has w/c and walker.         PLAN  PT Treatment Plan: Bed mobility;Body mechanics;Endurance;Energy conservation;Patient education;Gait training;Range of motion;Strengthening;Transfer training;Balance training  Rehab Potential : Good  Frequency (Obs): 3-5x/week    CURRENT GOALS     Goal #1 Patient is able to demonstrate supine - sit EOB @ level: minimum assistance      Goal #2 Patient is able to demonstrate transfers Sit to/from Stand at assistance level: minimum assistance      Goal #3 Patient is able to ambulate 20 feet with assist device: walker -  rolling at assistance level: minimum assistance      Goal #4     Goal #5     Goal #6     Goal Comments: Goals established on 7/20/2024 7/23/2024 all goals ongoing    SUBJECTIVE  \"I AM going home today\"    OBJECTIVE  Precautions: Bed/chair alarm (-220)    WEIGHT BEARING RESTRICTION  Weight Bearing Restriction: None                PAIN ASSESSMENT   Rating:  (does not rate)  Location: R elbow, shoulder and stiffness R LE  Management Techniques: Activity promotion;Body mechanics;Repositioning    BALANCE                                                                                                                       Static Sitting: Good  Dynamic Sitting: Not tested           Static Standing: Poor +  Dynamic Standing: Poor    ACTIVITY TOLERANCE: pt reports fatigue following transfers                         O2 WALK  Oxygen Therapy  SPO2% on Room Air at Rest: 92  SPO2% on Oxygen at Rest: 0      AM-PAC '6-Clicks' INPATIENT SHORT FORM - BASIC MOBILITY  How much difficulty does the patient currently have...  Patient Difficulty: Turning over in bed (including adjusting bedclothes, sheets and blankets)?: A Lot   Patient Difficulty: Sitting down on and standing up from a chair with arms (e.g., wheelchair, bedside commode, etc.): A Lot   Patient Difficulty: Moving from lying on back to sitting on the side of the bed?: A Lot   How much help from another person does the patient currently need...   Help from Another: Moving to and from a bed to a chair (including a wheelchair)?: A Little   Help from Another: Need to walk in hospital room?: A Lot   Help from Another: Climbing 3-5 steps with a railing?: Total       AM-PAC Score:  Raw Score: 12   Approx Degree of Impairment: 68.66%   Standardized Score (AM-PAC Scale): 35.33   CMS Modifier (G-Code): CL    FUNCTIONAL ABILITY STATUS  Gait Assessment   Functional Mobility/Gait Assessment  Gait Assistance: Minimum assistance  Distance (ft): 2 x 2  Assistive Device: Rolling  walker  Pattern: Shuffle;R Decreased stance time (increased assist with weight shift to clear LE's during steps)    Skilled Therapy Provided    Bed Mobility:  Rolling: mod assist   Supine<>Sit: min/mod assist of 2, use of draw sheet to scoot to EOB   Sit<>Supine: mod assist of 2.     Transfer Mobility:  Sit<>Stand: min/mod assist of 1 with second assist of 1 for safety   Stand<>Sit: min assist of 1   Gait: shuffle steps, bilateral flexed knees with flexed posture and R UE supported, pt refusing trial of sling, unable to  walker on Right side, assisted with walker mgmt    Therapist's Comments:   Pt presents semi-reclined in bed, agreeable to PT. Pt mobility as above. Pt able to sit EOB in midline this session without left side lean. Pt with improved speech this session. Pt cued for sequencing for transfers Pt able to complete transfer this session from bed to chair with min/mod assist in standing for weight shift in order to take steps from bed to chair. Second assist for safety, walker mgmt. Pt able to complete sit to stand from chair with mod assist. Completes return transfer to bed with min/mod assist of 1-2 again with shuffle steps and mod assist for weight shift. Pt returned to semi-reclined position. Pt reports neck pain when head is flat, pt educateed on neck ROM exercises. Pt is at high risk of falls. Pt with poor insight into deficits.   Pt reports he has hospital bed in home (confirmed following session that pt does not have hospital bed per ), sit to stand lift recommended in home due to fluctuating needs for transfer assist.         Patient End of Session: In bed;Needs met;Call light within reach;RN aware of session/findings;All patient questions and concerns addressed;Alarm set;With  staff;Discussed recommendations with /    PT Session Time: 30 minutes  Gait Trainin minutes  Therapeutic Activity: 15 minutes

## 2024-07-23 NOTE — SLP NOTE
SPEECH DAILY NOTE - INPATIENT    ASSESSMENT & PLAN   ASSESSMENT  Pt seen for dysphagia tx to assess tolerance with recommended diet, ensure appropriate utilization of aspiration precautions and provide pt/family education. Pt found lying semi-upright in bed; alert with adequate participation in feeding tasks. Pt reported improved swallow fxn and good tolerance with recommended diet. Reviewed results of recent bedside swallow evaluation. Head of bed elevated upright to 90 degrees and patient assisted with tsp and tbsp amounts of thin liquids only. Good tolerance with all trials of thin in tsp amounts via spoon controlled by clinician. Inconsistent cough response with larger full spoonful of thin via spoon and cup. Reviewed and reiterated importance of aspiration precautions with good understanding reported. Consider video swallow study prior to advance in diet or if clinical signs of aspiration persist with larger thin liquid bolus. Will continue to follow as per plan. Continued services recommended at next level.     Diet Recommendations - Solids: Mechanical soft chopped/ Soft & Bite Sized  Diet Recommendations - Liquids: Thin Liquids    Compensatory Strategies Recommended: No straws;Small bites and sips;Alternate consistencies;Liquids via spoon  Aspiration Precautions: Upright position;Slow rate;Small bites and sips;No straw  Medication Administration Recommendations: Whole in puree    Patient Experiencing Pain: No                Treatment Plan  Treatment Plan/Recommendations: Dysarthria therapy;Dysphagia therapy               GOALS  Goal #1 The patient will tolerate soft and bite-sized consistency and thin via TSP ONLY liquids without overt signs or symptoms of aspiration with 90 % accuracy over 2-3 session(s).  In Progress   Goal #2 The patient/family/caregiver will demonstrate understanding and implementation of aspiration precautions and swallow strategies independently over 2-3 session(s).     In Progress    Goal #3 The patient will tolerate trial upgrade of soft and easy to chew consistency and NA liquids without overt signs or symptoms of aspiration with 90 % accuracy over 2-3 session(s).  In Progress   Goal #4 The patient will utilize compensatory strategies as outlined by  BSSE (clinical evaluation) including Small bites, Small sips, Alternate liquids/solids, No straws, Liquids via tsp amount only with mild assistance 90 % of the time across 2 sessions.     In Progress   Goal #5 Pt will complete communication evaluation.      Met   Goal #6 Patient will implement compensatory articulation techniques in structured and unstructured tasks given min cues with 80% accuracy.  New Goal       FOLLOW UP  Follow Up Needed (Documentation Required): Yes  SLP Follow-up Date: 07/24/24  Number of Visits to Meet Established Goals: 5    Session: 1    If you have any questions, please contact Rhonda BARBOSA, SLP  Rhonda Weathers MA, CCC-SLP  Pager [phone]

## 2024-07-23 NOTE — PLAN OF CARE
Pt a&ox4  Supplemental O2 at 2L  Tele on sinus  Soft diet, no straw  Meds plus apple sauce  Aspiration prec  PICC on the right arm  NIH daily  Neuro Qshift  Right sided weakness upon assessment   Febrile on a 100 tylenol given  Denies pain  Vanco held by provider  Bed at lowest position, bed alarm on  Safety and fall prec maintained  POC on-going        Problem: RISK FOR INFECTION - ADULT  Goal: Absence of fever/infection during anticipated neutropenic period  Description: INTERVENTIONS  - Monitor WBC  - Administer growth factors as ordered  - Implement neutropenic guidelines  Outcome: Progressing     Problem: SAFETY ADULT - FALL  Goal: Free from fall injury  Description: INTERVENTIONS:  - Assess pt frequently for physical needs  - Identify cognitive and physical deficits and behaviors that affect risk of falls.  - [place] fall precautions as indicated by assessment.  - Educate pt/family on patient safety including physical limitations  - Instruct pt to call for assistance with activity based on assessment  - Modify environment to reduce risk of injury  - Provide assistive devices as appropriate  - Consider OT/PT consult to assist with strengthening/mobility  - Encourage toileting schedule  Outcome: Progressing     Problem: NEUROLOGICAL - ADULT  Goal: Achieves stable or improved neurological status  Description: INTERVENTIONS  - Assess for and report changes in neurological status  - Initiate measures to prevent increased intracranial pressure  - Maintain blood pressure and fluid volume within ordered parameters to optimize cerebral perfusion and minimize risk of hemorrhage  - Monitor temperature, glucose, and sodium. Initiate appropriate interventions as ordered  Outcome: Progressing  Goal: Achieves maximal functionality and self care  Description: INTERVENTIONS  - Monitor swallowing and airway patency with patient fatigue and changes in neurological status  - Encourage and assist patient to increase activity  and self care with guidance from PT/OT  - Encourage visually impaired, hearing impaired and aphasic patients to use assistive/communication devices  Outcome: Progressing
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
## 2024-07-23 NOTE — PROGRESS NOTES
Progress Note  Kris Clovin Patient Status:  Inpatient    3/6/1960 MRN [id number]   Location [facility] 3NE-A Attending Lizet Miller,    Hosp Day # 6 PCP Lenka Guevara,      Subjective:  States he is feeling well and notes he feels like he is speaking more easily. Eager to go home. Denies complaints.     Objective:  /85 (BP Location: Right arm)   Pulse 68   Temp 97.8 °F (36.6 °C) (Oral)   Resp 17   Ht 6' 3\" (1.905 m)   Wt 260 lb (117.9 kg)   SpO2 96%   BMI 32.50 kg/m²     Intake/Output:    Intake/Output Summary (Last 24 hours) at 2024 0946  Last data filed at 2024 0500  Gross per 24 hour   Intake --   Output 750 ml   Net -750 ml       Last 3 Weights   24 0526 260 lb (117.9 kg)   24 2358 260 lb (117.9 kg)   24 1653 260 lb (117.9 kg)   24 1003 265 lb (120.2 kg)       Labs:  Recent Labs   Lab 24  1050 24  1927 24  0732 24  1619 24  0836   GLU 95 89  --  91  --    BUN 14 14  --  12  --    CREATSERUM 0.88 0.93 0.85 0.90 1.24   EGFRCR 97 92 98 96 65   CA 9.0 9.1  --  9.2  --     141  --  141  --    K 3.5 3.6 3.7 3.9  --     110  --  113*  --    CO2 26.0 24.0  --  23.0  --      Recent Labs   Lab 24  0732 24  16124  0836 24  1501   RBC 3.42* 3.37*  --  3.26*   HGB 9.7* 9.5*  --  9.4*   HCT 29.8* 30.2*  --  28.8*   MCV 87.1 89.6  --  88.3   MCH 28.4 28.2  --  28.8   MCHC 32.6 31.5  --  32.6   RDW 15.7 15.7  --  15.8   NEPRELIM 12.81* 11.72*  --  12.77*   WBC 16.4* 15.0*  --  16.1*   .0 154.0 183.0 195.0         No results for input(s): \"TROP\", \"TROPHS\", \"CK\" in the last 168 hours.    Diagnostics:   Telemetry: NSR    Review of Systems   Cardiovascular:  Negative for chest pain.   Respiratory:  Negative for shortness of breath.        Physical Exam:  General: Alert and oriented in no apparent distress.   HEENT: Pupils equal.  Neck: No JVD  Cardiac:  Normal S1 S2, Regular. 3/6 systolic  murmur  Lungs: Clear without wheezes or crackles    Abdomen: Soft, non-tender, ND  Extremities: trace LE edema  Neurologic: No focal deficits. Normal affect.  Skin: Warm and dry,    Medications:   nystatin  5 mL Oral QID    enoxaparin  1 mg/kg Subcutaneous q12h    clopidogrel  75 mg Oral Daily    [Held by provider] vancomycin  1,500 mg Intravenous Q12H    metoprolol tartrate  12.5 mg Oral 2x Daily(Beta Blocker)    atorvastatin  80 mg Oral Nightly    dronedarone  400 mg Oral BID         Assessment:    Acute CVA, multiple infarcts, likely embolic  Residual right sided weakness and mild aphasia from prior events.   Eliquis prior to admission. Possibly missed a few doses over the last few weeks. Plavix added.  Heme following. Plan for therapeutic lovenox for at least 6 weeks then possible DOAC. Patient reluctant to use warfarin.   Neuro following.   Staph epi bacteremia  ID following. Concern for PVE and embolic infarcts.   TTE without evidence of valvular vegetation.  Plans for 6 weeks IV abx.   Recent hx ICH 2/2024  While on xarelto, baby asa, and daily nsaids at that time.   HTN-controlled  PAF  Maintaining NSR. On multaq  Eliquis since 4/2024. Currently on lovenox.  LLE DVT hx- while on eliquis  IVC filter placed 2/2024  CAD hx CABG x1v (LIMA-LAD)  Chronic HF with improved EF 45% (historically as low as 20% 2015)  Echo 7/18/24-LVEF 40-45%  Appears euvolemic  Bioprosthetic AVR and hemashield aortic graft  Worsening severe aortic stenosis. 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  CHAZ    Plan:    Continue lovenox 1 mg/kg BID with heme/onc plan to continue this for at least 6 weeks and then reassess oral therapy at that time.   Continue current cardiac medication regimen.  Ideally would benefit from JAZMÍN - However, he is high risk at this time. Would recommend IV abx and  anti-coagulation until more stable for JAZMÍN - likely as an outpatient unless signs of persisting infection.   Would benefit from rehab but he is refusing at this time.  Ok to discharge from cardiology perspective with timely outpatient follow up.       Jefferson Pruitt DO  Cardiologist  [facility]  7/23/2024 9:46 AM      Note to the patient: The 21st Century Cures Act makes medical notes like these available to patients in the interest of transparency. However, be advised that this is a medical document. It is intended as peer to peer communication. It is written in medical language and may contain abbreviations or verbiage that are unfamiliar. It may appear blunt or direct. Medical documents are intended to carry relevant information, facts as evident, and clinical opinion of the practitioner.     Disclaimer: Components of this note were documented using voice recognition system and are subject to errors not corrected at proofreading. Contact the author of this note for any clarifications.
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
## 2024-07-23 NOTE — DIETARY NOTE
[facility]   part of [facility]   CLINICAL NUTRITION    Kris Colvin     Admitting diagnosis:  Acute febrile illness [R50.9]  Sepsis without acute organ dysfunction, due to unspecified organism (HCC) [A41.9]    Ht: 190.5 cm (6' 3\")  Wt: 117.9 kg (260 lb).   Body mass index is 32.5 kg/m².  IBW: 89.1 kg    Wt Readings from Last 6 Encounters:   07/17/24 117.9 kg (260 lb)   07/18/24 117.9 kg (260 lb)   04/29/24 120.2 kg (265 lb)   02/27/24 122.5 kg (270 lb)   02/24/24 126.3 kg (278 lb 8 oz)   01/25/24 129.3 kg (285 lb)        Labs/Meds reviewed    Diet:       Procedures    Regular/General diet Fluid Consistency: Thin Liquids ; Texture Consistency: Chopped / Soft & Bite Sized; Is Patient on Accuchecks? Yes     Percent Meals Eaten (last 3 days)       Date/Time Percent Meals Eaten (%)    07/23/24 0800 90 %          Pt chart reviewed d/t LOS.  Patient reports good appetite at this time.  Nursing notes reports Percent Meals Eaten (%): 90 % intake for last meal.  Tolerating po diet without diarrhea, emesis, or constipation.   No significant weight changes noted.     PMH includes CVA, HTN, HF, Liver D/O. Pt p/w sepsis. Found to have infected thrombus vs. Bacteremia and multiple infarcts.  Pt screened for LOS.  PO appears to be doing well, ate 90% of breakfast. No n/v/d. Last BM 7/21. No significant wt loss per EMR review (25# / 8% x 6 mo) Discharge anticipated this date.    Patient is at low nutrition risk at this time.    Please consult if patient status changes or nutrition issues arise.    Gris Hernandez RD, LDN, [facility]  Clinical Dietitian  Phone [phone]

## 2024-07-23 NOTE — PLAN OF CARE
NURSING DISCHARGE NOTE    Discharged Home via Ambulance.  Accompanied by Spouse  Belongings Taken by patient/family.    Patient and wife given dc paperwork.  IVs removed.  PICC line in place.  Wife verbalizes understanding.

## 2024-07-23 NOTE — HOME CARE LIAISON
[facility] Spoke w/ patient who is agreeable with [facility]. Contact information placed on AVS.

## 2024-07-23 NOTE — PROGRESS NOTES
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    Interim History:      Patient is on therapeutic LMWH 1 mg/kg BID.  He has slurred speech. He has no complaint of pain. He has no complaint of dyspnea at rest.     Physical Examination:    Vital Signs: BP (!) 147/96 (BP Location: Right arm)   Pulse 75   Temp 97.7 °F (36.5 °C) (Oral)   Resp 16   Ht 1.905 m (6' 3\")   Wt 117.9 kg (260 lb)   SpO2 95%   BMI 32.50 kg/m²     General: Patient is alert and not in acute distress.  HEENT: Anicteric Sclera. Pink conjunctiva.  Oropharynx is clear.   Neck: No palpable lymphadenopathy. Neck is supple.  Chest: Clear to auscultation. No rales and no wheezes.  Heart: Regular rate and rhythm.   Abdomen: Soft, non tender with good bowel sounds.  Extremities: Bilateral LE edema. Non-tender.      Labs reviewed at this visit:     Recent Labs   Lab 07/20/24  1619 07/21/24  0836 07/22/24  1501 07/23/24  0934   RBC 3.37*  --  3.26* 3.69*   HGB 9.5*  --  9.4* 10.5*   HCT 30.2*  --  28.8* 32.3*   MCV 89.6  --  88.3 87.5   MCH 28.2  --  28.8 28.5   MCHC 31.5  --  32.6 32.5   RDW 15.7  --  15.8 15.8   NEPRELIM 11.72*  --  12.77* 11.77*   WBC 15.0*  --  16.1* 14.8*   .0 183.0 195.0 215.0       Recent Labs   Lab 07/17/24  0002 07/17/24  1828 07/19/24  1927 07/20/24  0732 07/20/24  1619 07/21/24  0836 07/23/24  0934   *   < > 89  --  91  --  118*   BUN 24*   < > 14  --  12  --  22   CREATSERUM 1.04   < > 0.93 0.85 0.90 1.24 1.36*  1.36*   CA 8.7   < > 9.1  --  9.2  --  9.6   ALB 2.9*  --   --   --   --   --   --    *   < > 141  --  141  --  141   K 2.9*   < > 3.6 3.7 3.9  --  3.9      < > 110  --  113*  --  109   CO2 26.0   < > 24.0  --  23.0  --  26.0   ALKPHO 79  --   --   --   --   --   --    AST 44*  --   --   --   --   --   --    ALT 27  --   --   --   --   --   --    BILT 1.0  --   --   --   --    --   --    TP 6.3  --   --   --   --   --   --     < > = values in this interval not displayed.       Radiologic imaging reviewed at this visit:    CTA Brain + CTA Carotids on 7/19/2024:  FINDINGS:    Mild diffuse volume loss.     There is a background of mild to moderate chronic small vessel ischemic disease.  There are small chronic infarcts within the cerebellar hemispheres and basal ganglia. Please refer to the brain MRI from earlier today for better visualization of the  multiple punctate acute infarcts within the cerebral hemispheres and right cerebellum.     There is no midline shift or mass-effect. The basal cisterns are patent.       There is no acute intracranial hemorrhage or extra-axial fluid collection.       There is no evident fracture. The visualized paranasal sinuses and mastoid air cells are unremarkable.     The upper cervical, petrous, cavernous, and supraclinoid internal carotid arteries are unremarkable.  An anterior communicating artery is seen. The branches of the anterior cerebral and middle cerebral arteries are unremarkable.  A small right posterior   communicating artery is seen along with a small left posterior communicating artery.  The branches of the posterior cerebral and superior cerebellar arteries are unremarkable. The basilar artery has a normal course and caliber.  The bilateral vertebral  arteries are unremarkable.     There is a 3-vessel aortic arch with mild to moderate atherosclerotic plaque.  The arch vasculature is patent.  Mild atherosclerotic irregularity is seen at the left subclavian artery.  Mild atherosclerotic irregularity is seen at the right subclavian  artery.     The common carotid arteries are patent.  The carotid bifurcations demonstrate mild narrowing secondary to primarily calcified atherosclerotic plaque.  There is no evidence of hemodynamically significant stenosis in the carotid bulbs by NASCET criteria.    The cervical internal carotid arteries are  patent. The cervical internal carotid arteries are tortuous.     The vertebral arteries originate from the subclavian arteries.  The origins of the vertebral arteries are patent.  The cervical vertebral arteries are patent.  Vertebral arteries are codominant.  Mild narrowing at the proximal aspect of the right  vertebral artery.     Partially visualized postoperative changes from sternotomy.  Degenerative changes in the spine.     Impression   CONCLUSION:       1. There is a background of mild to moderate chronic small vessel ischemic disease.  There are small chronic infarcts within the cerebellar hemispheres and basal ganglia.  Please refer to the brain MRI from earlier today for better visualization of the  multiple punctate acute infarcts within the cerebral hemispheres and right cerebellum.     2. No acute intracranial hemorrhage or hydrocephalus.     3. No high-grade stenosis, occlusion, aneurysm, or dissection is identified within the major intracranial or cervical arterial vasculature.       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  Rivaroxaban started.  Intracranial bleed 2/24.  Rivaroxaban discontinued, IVC filter placed.  Anticoagulation resumed with apixaban 5/24.  CT this admission shows thrombus within and inferior to IVC filter up to common iliac veins.      JAK2 and lupus anticoagulant testing pending.      I would recommend continued therapeutic Enoxaparin 1 mg/kg BID. I discussed this with the patient. I would continued this for at least 6 weeks. We will then reassess at that time to determine whether we can maintain on other oral therapy. He is reluctant to be treated with warfarin. I still think a DOAC could be used in the maintenance/preventative setting. He was comfortable with this plan.     # Acute multiple infarcts: Likely embolic.  While on apixaban.  No obvious vegetation on echo.  Currently on Enoxaparin 1 mg/kg BID.     # Bacteremia: Bioprosthetic aortic valve.  On  antibiotics.       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  No surgical intervention needed.     # Anemia: Due to chronic disease with suppressed TIBC and elevated ferritin. Follow.       Getachew Osullivan MD  [facility]

## 2024-07-23 NOTE — CM/SW NOTE
Noted DC order placed. DENISSE received message from PT stating therapy will work with pt today.     [facility] placed on Will Call. PCS form completed and available for RN to print.      Final IV ABX orders needed to arrange outpatient IV ABX with [facility]. PICC line placed.     HH referral sent to [facility] in [place] currently under review.     DME referral sent to [facility] in [place] currently under review. DENISSE will attach signed DME order and verbiage to [place] referral once available. DENISSE will continue to follow.     Addendum (11am) - DENISSE received message from PT stating she is recommending sit to stand and sling for pt, pt refusing. PT stated pt was a one person Mod A for transfers but assist fluctuates. OT recommended commode for pt if pt does not have commode. Per previous notes, pt's spouse reported pt only has a walker and wheelchair at home.     DENISSE placed DME order for commode and messaged MD to sign order and place verbiage in progress note. DENISSE attached signed DME order and progress note to [place] referral.     DENISSE spoke with Ania at [facility] who stated she will speak with her team if the DME is able to be delivered today. Ania stated it will likely be a late delivery.     DENISSE received message from [facility] liaison Connie stating [facility] is able to accept. [facility] reserved in [place].     Final IV ABX orders needed. Await call back from Ania at [facility] regarding DME delivery. DENISSE will continue to follow.     Addendum (11:40am) - DENISSE received call from Pamela at [facility]. Pamela stated she has received orders for IV daptomycin daily for 6 weeks. DENISSE informed Pamela pt is anticipated to discharge today. Pamela stated pt will need to receive dose of daptomycin prior to discharge. Pamela stated she will reach out to pt's spouse. [facility]24 by 11am the latest. Final IV ABX orders are not yet in Epic.     DENISSE messaged [facility] liaison regarding SOC tomorrow. DENISSE will continue to  follow.     Addendum (1:05pm) - DENISSE received call from Ania at [facility] stating DME will not be delivered until tomorrow. Ania stated she is waiting for insurance approval and she might receive approval today but the approval will likely not happen until tomorrow. Anai stated she is going to reach out to pt's spouse to coordinate delivery of DME.     [facility] liaison stated she is able to schedule SOC for tomorrow at 12pm, time can change if needed. DENISSE will f/u with pt and pt's spouse to confirm discharge plan.     Addendum (3pm) - DENISSE spoke with Ania from [facility] via phone regarding DME. Ania stated she received the information from pt's insurance and the DME (hospital bed, jojo lift, commode) will be delivered tomorrow. Ania stated she does not have an ETA, but [facility] will reach out to pt's spouse in the morning to give a two hour window for delivery. Ania confirmed the DME will be delivered tomorrow.    DENISSE met with pt at bedside and updated him on above information. DENISSE informed pt the DME will not be delivered until tomorrow. Pt inquired on next steps. DENISSE informed pt he should remain in the hospital until tomorrow to ensure the DME is delivered. Pt became frustrated and stated 'I am not staying until tomorrow. I will sleep in my chair.' SW informed pt there is no ETA on delivery tomorrow at this time and if pt discharged today he would not have equipment. Pt stated he will not remain in the hospital until tomorrow and still wants to discharge today. DENISSE informed pt [facility] will deliver the IV ABX tomorrow morning and [facility] will provide SOC tomorrow. SW informed pt this writer will update his spouse. Pt agreeable to have SW update his spouse but stated pt's spouse cannot make decisions on his behalf.     DENISSE spoke with pt's spouse Perla via phone. DENISSE informed pt's spouse [facility] will deliver the IV ABX tonight or tomorrow by 11am. Pt's spouse stated she has the day off tomorrow. Pt's spouse stated she spoke with [facility] and is  aware the DME will not be delivered until tomorrow. Pt's spouse stated pt should remain in the hospital until DME is delivered. DENISSE informed pt's spouse if pt is medically cleared and wants to discharge home the hospital cannot hold him. Pt's spouse stated she will speak with pt. DENISSE informed pt [facility] can provide SOC the day after discharge for teach and train.     Final IV ABX referral attached to [place] referral.    [facility] will provide SOC tomorrow if discharged today. [facility] is delivering IV ABX to pt's residence and it will arrive by tomorrow at 11am. E will deliver the hospital bed, jojo lift, and commode tomorrow. Await medical clearance for further discharge planning. DENISSE will continue to follow.     Addendum (4:30pm) - DENISSE received message from ID PA stating pt can discharge home from ID stand point. RN stated pt is receiving his dose of IV daptomycin and is anticipated to discharge home today. DENISSE updated RN and informed her BLS is on Will Call if pt decides to discharge home today.     [facility] to provide SOC tomorrow if discharging today.     [facility] to deliver IV ABX today.    [facility] to deliver hospital bed, jojo lift, and commode tomorrow.     AP information placed on AVS to assist with arranging additional support at home if needed.    [facility]  [phone]    JOSE Underwood  Discharge Planner

## 2024-07-23 NOTE — PROGRESS NOTES
[facility]   part of [facility] ID PROGRESS NOTE    Kris Colvin Patient Status:  Inpatient    3/6/1960 MRN [id number]   Location [facility] 4NW-A Attending Lizet Miller,    Hosp Day # 6 PCP Lenka Guevara,      Abx: IV vancomycin #5, s/p IV zosyn     Subjective: Patient seen and examined today. Feeling better today. Denies any pain or SOB. Feels that his speech continues to improve.     Allergies:  No Known Allergies    Medications:    Current Facility-Administered Medications:     nystatin (Mycostatin) 099349 UNIT/ML oral suspension 500,000 Units, 5 mL, Oral, QID    enoxaparin (Lovenox) 120 MG/0.8ML SUBQ injection 120 mg, 1 mg/kg, Subcutaneous, q12h    clopidogrel (Plavix) tab 75 mg, 75 mg, Oral, Daily    [Held by provider] vancomycin (Vancocin) 1.5 g in sodium chloride 0.9% 250mL IVPB premix, 1,500 mg, Intravenous, Q12H    acetaminophen (Tylenol) tab 650 mg, 650 mg, Oral, Q4H PRN **OR** acetaminophen (Tylenol) rectal suppository 650 mg, 650 mg, Rectal, Q4H PRN    labetalol (Trandate) 5 mg/mL injection 10 mg, 10 mg, Intravenous, Q10 Min PRN    hydrALAzine (Apresoline) 20 mg/mL injection 10 mg, 10 mg, Intravenous, Q2H PRN    ondansetron (Zofran) 4 MG/2ML injection 4 mg, 4 mg, Intravenous, Q6H PRN    prochlorperazine (Compazine) 10 MG/2ML injection 5 mg, 5 mg, Intravenous, Q8H PRN    metoprolol tartrate (Lopressor) partial tab 12.5 mg, 12.5 mg, Oral, 2x Daily(Beta Blocker)    atorvastatin (Lipitor) tab 80 mg, 80 mg, Oral, Nightly    dronedarone (Multaq) tab 400 mg, 400 mg, Oral, BID    melatonin tab 3 mg, 3 mg, Oral, Nightly PRN    polyethylene glycol (PEG 3350) (Miralax) 17 g oral packet 17 g, 17 g, Oral, Daily PRN    sennosides (Senokot) tab 17.2 mg, 17.2 mg, Oral, Nightly PRN    bisacodyl (Dulcolax) 10 MG rectal suppository 10 mg, 10 mg, Rectal, Daily PRN    fleet enema (Fleet) 7-19 GM/118ML rectal enema 133 mL, 1 enema, Rectal, Once PRN    Review of Systems:  Completed. See  pertinent positives and negatives above.     Physical Exam:  Vital signs: Blood pressure 134/85, pulse 68, temperature 97.8 °F (36.6 °C), temperature source Oral, resp. rate 17, height 190.5 cm (6' 3\"), weight 260 lb (117.9 kg), SpO2 96%.    General: Awake and interactive, NAD.  HEENT: Moist mucous membranes. No thrush.   Neck: No lymphadenopathy.  Supple.  Cardiovascular: RRR  Respiratory: Clear to auscultation bilaterally.  No wheezes. No rhonchi.  Abdomen: Soft, nontender, nondistended.   Musculoskeletal: LE edema with chronic appearing stasis changes (per wife erythema is at baseline)  Integument: No rash.    Laboratory Data:  Recent Labs   Lab 07/22/24  1501   RBC 3.26*   HGB 9.4*   HCT 28.8*   MCV 88.3   MCH 28.8   MCHC 32.6   RDW 15.8   NEPRELIM 12.77*   WBC 16.1*   .0     Recent Labs   Lab 07/17/24  0002 07/17/24  1828 07/19/24  1050 07/19/24  1927 07/20/24  0732 07/20/24  1619 07/21/24  0836   *   < > 95 89  --  91  --    BUN 24*   < > 14 14  --  12  --    CREATSERUM 1.04   < > 0.88 0.93 0.85 0.90 1.24   CA 8.7   < > 9.0 9.1  --  9.2  --    ALB 2.9*  --   --   --   --   --   --    *   < > 141 141  --  141  --    K 2.9*   < > 3.5 3.6 3.7 3.9  --       < > 109 110  --  113*  --    CO2 26.0   < > 26.0 24.0  --  23.0  --    ALKPHO 79  --   --   --   --   --   --    AST 44*  --   --   --   --   --   --    ALT 27  --   --   --   --   --   --    BILT 1.0  --   --   --   --   --   --    TP 6.3  --   --   --   --   --   --     < > = values in this interval not displayed.       Microbiology: Reviewed in EMR    Radiology: Reviewed.    PROCEDURE:  CTA ABD/PEL (CPT=74174)     COMPARISON:  [facility] , CT, CT CHEST+ABDOMEN+PELVIS(ALL CNTRST ONLY)(CPT=71260/17296), 7/17/2024, 2:34 AM.     INDICATIONS:  possible SMA dissection     TECHNIQUE:  CT images of the abdomen and pelvis were obtained pre- and post- injection of non-ionic intravenous contrast material. Multi-planar reformatted/3-D images were  created to optimize visualization of vascular anatomy.  Dose reduction techniques  were used. Dose information is transmitted to the ACR (American College of Radiology) NRDR (National Radiology Data Registry) which includes the Dose Index Registry.     PATIENT STATED HISTORY:(As transcribed by Technologist)  fevers, recent ct cap 250 am. evaluate SMA dissection      CONTRAST USED:  100cc of Isovue 370     FINDINGS:  This is a follow-up to CT performed at 7/17/2024 at 0234 hours.    AORTA/VASCULAR:  Exam is degraded by motion artifact.  There is soft tissue stranding surrounding the SMA at the site of vessel narrowing with subsequent lack of intraluminal contrast, series 9, images 167 through 204. There is subsequent opacification  of more distal SMA branches.  Findings knee or SMA thrombosis.  A localized dissection cannot be excluded.  The origin of the SMA is widely patent.  Celiac artery is patent.  DAPHNE is well visualized and patent.  There is dense calcific plaque at the  origins of the main renal arteries bilaterally.  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    LIVER:  Stable CT appearance of the liver  BILIARY:  No biliary ductal dilatation.    PANCREAS:  Stable.  Homogeneous enhancement  SPLEEN:  Normal caliber  KIDNEYS:  Stable.  No hydronephrosis.  ADRENALS:  Stable.    RETROPERITONEUM:  Stable scattered periaortic lymph nodes.  BOWEL/MESENTERY:  There is soft tissue stranding and edema in the mesentery.  There is colonic diverticulosis.  No evidence of dilated small bowel loops.  There are air-fluid levels in the sigmoid colon with suggestion of mild wall thickening of the  rectosigmoid colon.  There is soft tissue stranding in the pericolic gutters bilaterally.  There is a small amount of free fluid in soft tissue stranding in the presacral space.    PELVIC ORGANS:  Circumferential wall  thickening in a partially decompressed urinary bladder.    BONES:  For trophic degenerative changes lumbar spine.  Benign appearing cyst L4.  Degenerative disc disease L3-4 L4-5.     Impression:    CONCLUSION:  There is lack of intraluminal contrast involving the SMA at the level of the central mesentery with surrounding soft tissue stranding and mesenteric edema with reconstitution of more distal SMA branches.  Findings concerning for SMA  thrombosis versus localized dissection as questioned on 7/17/2024 CT performed earlier in the day.  Findings phoned to nurse Vida on 7/17/2024.     There is soft tissue stranding and small amount of free fluid in the mesentery as well as soft tissue stranding and a small amount of free fluid in the presacral space and pericolic gutters.     Thrombosis of the infrarenal IVC unchanged     LOCATION:  [facility]     Dictated by (CST): Court Klnie MD on 7/17/2024 at 10:53 AM      Finalized by (CST): Court Kline MD on 7/17/2024 at 11:04 AM       PROCEDURE:  CT BRAIN OR HEAD ([id number])     COMPARISON:  [facility] , CT, CT BRAIN OR HEAD (28393), 3/01/2024, 1:33 PM.  [place], CT, CT BRAIN OR HEAD (97010), 4/26/2024, 7:23 AM.     INDICATIONS:  Rule out CVA     TECHNIQUE:  Noncontrast CT scanning is performed through the brain. Dose reduction techniques were used. Dose information is transmitted to the ACR (American College of Radiology) NRDR (National Radiology Data Registry) which includes the Dose Index  Registry.     PATIENT STATED HISTORY: (As transcribed by Technologist)  ams         FINDINGS:       VENTRICLES/SULCI:   Ventricles and sulci are prominent indicating atrophy.     INTRACRANIAL:  There are no abnormal extraaxial fluid collections.  There is no midline shift.  There are no acute appearing intraparenchymal brain abnormalities.  There is nothing specific for acute territorial infarct.  There is no hemorrhage or mass  lesion.  There is chronic microvascular ischemic white  matter disease in the cerebrum bilaterally.  Nothing definite for acute infarct.  Residual encephalomalacia mild from previously demonstrated left hemisphere hematoma.     SINUSES:  No acute sinusitis.       MASTOIDS:  The mastoids are clear.     SKULL:  No evidence for fracture or acute osseous abnormality.     OTHER:  None.     Impression:    CONCLUSION:  Chronic changes in the brain, as described, but no acute intracranial bleed, or other acute intracranial process identified. If additional imaging is needed for suspected ischemia and/or infarct based on the clinical signs and symptoms, then   consider follow-up with MRI.    LOCATION:  [facility]     Dictated by (CST): Alex Hubbard MD on 7/17/2024 at 5:37 PM      Finalized by (CST): Alex Hubbard MD on 7/17/2024 at 5:39 PM        PROCEDURE:  XR CHEST AP PORTABLE  (CPT=71045)     TECHNIQUE:  AP chest radiograph was obtained.     COMPARISON:  [facility] , XR, XR CHEST AP PORTABLE  (CPT=71045), 2/17/2024, 3:54 PM.     INDICATIONS:  SOB     PATIENT STATED HISTORY: (As transcribed by Technologist)  Patient offered no additional history at this time.         FINDINGS:       Mildly enlarged cardiac silhouette.     Median sternotomy hardware is noted.     No consolidation, pleural effusion or pneumothorax.     Impression:    CONCLUSION:  No consolidation.     Agree with preliminary radiology report from [facility].     LOCATION:  [facility]     Dictated by (CST): Justino Jeter MD on 7/17/2024 at 8:16 AM      Finalized by (CST): Justino Jeter MD on 7/17/2024 at 8:16 AM         PROCEDURE:  CT CHEST+ABDOMEN+PELVIS(ALL CNTRST ONLY)(CPT=71260/22887)     COMPARISON:  None.     INDICATIONS:  fever/hypoxia     TECHNIQUE:  IV contrast-enhanced scanning through the chest, abdomen, and pelvis was performed.  Dose reduction techniques were used. Dose information is transmitted to the ACR (American College of Radiology) NRDR (National Radiology Data Registry) which    includes the Dose Index Registry.     PATIENT STATED HISTORY:(As transcribed by Technologist)  fever, hypoxia      CONTRAST USED:  100 mLcc of Isovue 370     FINDINGS:       CHEST:    LUNGS:  There is image degradation due to motion.  No focal consolidation.  Lingular and left lower lobe linear densities are most consistent with atelectasis and or scar.    MEDIASTINUM:  No mass or adenopathy.  Mediastinal clips status post CABG.  YASMINE:  No mass or adenopathy.    CARDIAC:  Enlarged.  Status post CABG.  Aortic valve replacement.  No pericardial effusion.  PLEURA:  No mass or effusion.    CHEST WALL:  No mass or axillary adenopathy.  Sternotomy wires status post CABG.  AORTA:  Atherosclerosis.  No aneurysm or dissection.    VASCULATURE:  No visible pulmonary arterial thrombus or attenuation.       ABDOMEN/PELVIS:  LIVER:  No enlargement, atrophy, abnormal density, or significant focal lesion.    BILIARY:  Possible sludge and or small stones within the gallbladder.  Sonogram can be performed for further evaluation as clinically indicated.  No visible dilatation.    PANCREAS:  No lesion, fluid collection, ductal dilatation, or atrophy.    SPLEEN:  No enlargement or focal lesion.    KIDNEYS:  Small 1.5 cm cyst within the right superior pole for which no further follow-up is required.  No mass, obstruction, or calcification.    ADRENALS:  No mass or enlargement.    AORTA:  Infrarenal IVC filter.  There is thrombus within and inferior to the filter extending to the common iliac veins.  Prominent edema within the pelvic fat planes probably related to venous congestion.  Stranding surrounds the superior mesenteric  artery with suggestion of focal irregularity, best seen on images 53-56 of series 9.  Atherosclerosis.  No aneurysm or dissection.    RETROPERITONEUM:  No mass or adenopathy.    BOWEL/MESENTERY:  Normal caliber small and large bowel.    ABDOMINAL WALL:  No mass or hernia.    URINARY BLADDER:  Bladder is nearly  empty most likely accounts for diffuse bladder wall thickening given no signs cystitis per ED MD. No visible focal wall thickening, lesion, or calculus.    PELVIC NODES:  No adenopathy.    PELVIC ORGANS:  Pelvic organs appropriate for patient age.    BONES:  Degenerative change predominantly involves the lower lumbar spine.  No fracture.       Impression:    CONCLUSION:    1. Thrombus within and inferior to IVC filter extending to the common iliac veins.  Prominent edema within the pelvic fat planes, probably related to venous congestion.  2. Stranding surrounds the superior mesenteric artery with appearance focal irregularity.  Findings may represent motion artifact, however focal dissection cannot be excluded.  CTA or MRA can be performed for further evaluation as clinically indicated.  3. Please see details as above.     ED M.D. notified of these findings with preliminary radiology report from [facility].     LOCATION:  [facility]     Dictated by (CST): Latoya Alberts MD on 7/17/2024 at 7:00 AM      Finalized by (CST): Latoya Alberts MD on 7/17/2024 at 7:44 AM    ASSESSMENT:  Staph epi bacteremia, concerning for aortic PVE.   2/2 Bcx 7/17. Per patient, taken from the same site. Patient has an AVR. TTE w/o obvious vegetation but leaflets poorly visualized and worsening function of valve noted.   Repeat bcxs 7/17 negative  Fevers, assume related to #1. Resolved.  UA negative. VRP negative. 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  Acute CVA, suspect embolic (multiple infarcts). ? Related to #1.   Leukocytosis  CAD, s/p CABG; CHF; AVR, HTN, HLD, A. fib  H/o ICH.  CHAZ    PLAN:  - will dc vanco, check CPK and start IV dapto  - follow repeat blood cultures from 7/18 (done prior to vancomycin)- ngtd  - cards eval appreciated, no plans for JAZMÍN at this time given high  risk  - continue nystatin for thrush  - follow temps and wbc  - continue to monitor for foci of infection  - monitor temps and WBC  - PICC placed to LUE 7/22. Plan for 6 weeks of IV abx with daptomycin. Will need dose prior to dc today. Ok for dc planning from ID standpoint, once abx are arranged.     Discussed case with RN, SW, pharmacy, Dr. Pruitt and Dr. Painting.     Hilary Christine PA-C    ID ATTENDING ADDENDUM     Pt seen an examined independently. Chart reviewed. Agree with above. Note has been reviewed by me and modified as needed.  Exam and Impression/ Recs as noted above.  Will follow up with me in 2 weeks  D/w staff and with pt  More than 50% of clinical time and 100% of the clinical decision making performed by me.    Maren Painting MD

## 2024-07-24 ENCOUNTER — TELEPHONE (OUTPATIENT)
Dept: CASE MANAGEMENT | Facility: HOSPITAL | Age: 64
End: 2024-07-24

## 2024-07-24 ENCOUNTER — PATIENT OUTREACH (OUTPATIENT)
Dept: CASE MANAGEMENT | Age: 64
End: 2024-07-24

## 2024-07-24 ENCOUNTER — TELEPHONE (OUTPATIENT)
Dept: FAMILY MEDICINE CLINIC | Facility: CLINIC | Age: 64
End: 2024-07-24

## 2024-07-24 DIAGNOSIS — Z02.9 ENCOUNTERS FOR ADMINISTRATIVE PURPOSE: ICD-10-CM

## 2024-07-24 DIAGNOSIS — A41.9 SEPSIS WITHOUT ACUTE ORGAN DYSFUNCTION, DUE TO UNSPECIFIED ORGANISM (HCC): Primary | ICD-10-CM

## 2024-07-24 NOTE — PROGRESS NOTES
TCM request    Neuro/Stroke-Ischemic 07/16/24 apt request (discharged 07/23)    Neurology  [facility]  [address]  [place] [address]  [phone]  Follow up 1 month    Dr Javier Marie  CARDIOLOGY  [address] [address]  [phone]  Follow up 2 weeks  Spoke w/pt wife and she would like a call back tomorrow

## 2024-07-24 NOTE — PROGRESS NOTES
TCM has contacted patient and patient needs additional appointments.  Please contact patient for assistance with scheduling a follow-up appointment for Neurology and Cardiology.  Thank you!

## 2024-07-24 NOTE — CDS QUERY
DOCUMENTATION CLARIFICATION QUERY  Dear Dr. Miller,  Please further clarify the specificity of Congestive Heart failure (CHF)   [   ] Chronic Diastolic heart failure (HFpEF)  [   ] Chronic Systolic heart failure (HFrEF)  [  x ] Chronic combined systolic and diastolic heart failure   [   ] Other  (please specify) _________________      CLINICAL INDICATORS:   -Home medications: Furosemide, Carvedilol   -7/17/24 H&P: # HFpEF    -7/18 ECHO: Systolic function was reduced. The estimated ejection fraction was 40-45%, by visual assessment. Left ventricular diastolic function parameters were normal.    -7/19 Cardiology consult: Chronic HFrEF compensated with no exacerbation   LVEF 45% -recovered from 20% 2015.    -7/23 Hospitalist: #Chronic HFrEF with EF 45%  #Bioprosthetic AVF with graft  - repeat echo with worsening AV stenosis  - will likely need TAVR in near future    # HFpEF  - Echo in 2/2024 shows EF of 50-55% with grade 1 diastolic dysfunction  - holding Coreg Lasix and spironolactone on hold due to hypotension  - repeat echo with decreased function, ?worsening aortic valve function    RISK FACTORS: CHF, CAD s/p CABG and AVR, HTN    TREATMENT : Cardiology consult - ECHO     Use of terms such as suspected, possible, or probable (associated with a specific diagnosis that is being evaluated, monitored, or treated as if it exists) are acceptable and can be coded in the inpatient setting, when documented at the time of discharge.     Nisha Donis RN, BSN, CWOCN [facility]  [phone]                                                                                    THIS FORM IS A PART OF THE PERMANENT MEDICAL RECORD   
DOCUMENTATION CLARIFICATION QUERY  Dear Dr. Miller,  Please specify the patient's diagnosis of Atrial fibrillation   [   ] Chronic Atrial fibrillation   [   x] Paroxysmal atrial fibrillation  [   ] Other  (please specify) _________________      CLINICAL INDICATORS:   -7/17/24 EKG: Note:  Normal sinus rhythm    -7/17/24: H&P: # Chronic atrial fibrillation    -7/23 Hospitalist: # Chronic atrial fibrillation  - PTA dronedarone  - carvedilol transitioned to metoprolol for HR control given lower BP's  - AC as above    -7/23 Cardiology: PAF   Maintaining NSR. On multaq  Eliquis since 4/2024. Currently on lovenox      RISK FACTORS:  CHF, CAD s/p CABG and AVR, HTN, Afib       TREATMENT:  Multaq for rate control , Eliquis for anticoagulation - EKG - Telemetry - Cardiology consult             Use of terms such as suspected, possible, or probable (associated with a specific diagnosis that is being evaluated, monitored, or treated as if it exists) are acceptable and can be coded in the inpatient setting, when documented at the time of discharge.     Nisha Donis RN, BSN, CWOCN [facility]  [phone]                                                                                    THIS FORM IS A PART OF THE PERMANENT MEDICAL RECORD   
Continued History: Baby orally intubated with a 3.5 ETT, good chest rise obtained, placement confirmed with CO2 detector. Still no HR, chest compressions continued. Baby's  ETT slipped out and was replaced, placement confirmed.  3ml of Epinephrine given via ETT while UVC line was being placed. CPR continued. Still no HR. Epi (1ml) then given via UVC X1.  Then A NS bolus aprox (10ml/kg) was being given when UVC was displaced (aprox 10 ml in).  A new UVC was placed, 30 ml of NS given.  HR 60 on auscultation.  EPI 1ml given again via UVC.  CPR continued throughout.  Epi given a third time.  At aprox 15 mins of life HR of 60 obtained.  CPR continued.  At aprox 30 mons of life HR 88 and O2 sat 40%.  APGAR Scores: 1,0,0,1,1. In NICU patient recieved surfactant x1 and begun on SIMV. Patient recieved NS boluses (totalling 40cc/kg) and was started on antibiotics (ampicillin and cefotaxime). Patient was hypotensive after multiple fluid boluses and was started on Dopamine 15mcg/kg/min. Patient had clinical seizure and was given 20mg/kg of phenobarbital. Cardiology performed Echo showing decreased LV function, TR gradient <20 and PFO w/ bidirectional shunt. Transferred via helicopter to [facility] for brain cooling.   Active problems on admit: FT, HIE, respiratory failure, pulmonary hypertension, thrombocytopenia, coagulopathy, seizures.   -> weaned off dopamine, HFOV weaned, hypoglycemia resolved on High GIR, no seizure activity noted. FFP Rx, Plt Rx.   CXR and AXR: hypoinflated lungs 8 ribs, large heart, gasless abd, UV high and UA coiled.   Presumed sepsis. Continue abx for 48 hrs pending cultures.   Seizures on phenobarb. Head cooling started .   -> resolved hypoglycemia. Likely due to IV tubing malfunction, improved with boluses and furtherIVFs. Pulmonary hypertension improved. Off Cassidy, dopamine for hypotension/renal perfusion.    Continued in below addendum:

## 2024-07-24 NOTE — PROGRESS NOTES
Initial Post Discharge Follow Up   Discharge Date: 7/23/24  Contact Date: 7/24/2024    Consent Verification:  Assessment Completed With: Spouse: Perla Permission received per patient?  written  HIPAA Verified?  Yes    Discharge Dx:   Sepsis without acute organ dysfunction, due to unspecified organism     General:   How have you been since your discharge from the hospital?He's doing okay, he's very tired. We are waiting for delivery of the equipment, I called this morning and am waiting for an ETA. The nurse will be here noon.    Do you have any pain since discharge?  No    When you were leaving the hospital were your discharge instructions reviewed with you? Yes  How well were your discharge instructions explained to you?   On a scale of 1-5   1- Very Poor and 5- Very well   Very Well  Do you have any questions about your discharge instructions?  No  Before leaving the hospital was your diagnoses explained to you? Yes  Do you have any questions about your diagnoses? No  Are you able to perform normal daily activities of living as you have prior to your hospital stay (dressing, bathing, ambulating to the bathroom, etc)? yes  (NCM) Was patient given a different diet per AVS? no    Medications:   Current Outpatient Medications   Medication Sig Dispense Refill    enoxaparin 120 MG/0.8ML Injection Solution Prefilled Syringe Inject 0.8 mL (120 mg total) into the skin every 12 (twelve) hours. 60 each 3    clopidogrel 75 MG Oral Tab Take 1 tablet (75 mg total) by mouth daily. 30 tablet 1    metoprolol tartrate 25 MG Oral Tab Take 0.5 tablets (12.5 mg total) by mouth 2x Daily(Beta Blocker). 60 tablet 1    DAPTOmycin 50 mg/mL in sodium chloride 0.9% PF Inject 12 mL (600 mg total) into the vein daily. Will need weekly labs with CBC, BMP and ESR. Will need biweekly CPK while on this medication. 504 mL 0    cyanocobalamin 1000 MCG Oral Tab Take 1 tablet (1,000 mcg total) by mouth daily.      furosemide 20 MG Oral Tab Take 1  tablet (20 mg total) by mouth every other day.      spironolactone 25 MG Oral Tab Take 1 tablet (25 mg total) by mouth daily.      Cholecalciferol 25 MCG (1000 UT) Oral Tab Take 25 mcg by mouth daily.      atorvastatin 80 MG Oral Tab TAKE 1 TABLET BY MOUTH EVERY NIGHT 90 tablet 0    MULTAQ 400 MG Oral Tab Take 1 tablet (400 mg total) by mouth 2 (two) times daily.      Multiple Vitamins-Minerals (MULTI FOR HER 50+) Oral Tab Take 1 tablet by mouth daily.       Were there any changes to your current medication(s) noted on the AVS? Yes  If so, were these medication changes discussed with you prior to leaving the hospital? Yes  If a new medication was prescribed:    Was the new medication's purpose & side effects reviewed? Yes  Do you have any questions about your new medication? No  Did you  your discharge medications when you left the hospital? Yes, wife states that she got everything but the Lovenox which was out of stock. She will call to see if any other [facility] has it in stock.   Let's go over your medications together to make sure we are not missing anything. Medications Reviewed  Are there any reasons that keep you from taking your medication as prescribed? No  Are you having any concerns with constipation? No      Discharge medications reviewed/discussed/and reconciled against outpatient medications with patient.  Any changes or updates to medications sent to PCP.  Patient Acknowledged     Referrals/orders at D/C:  Referrals/orders placed at D/C? yes  What services:   Home health   (If HH was ordered) Has HH been set up?  No, the RN from [facility] is scheduled to come at noon today.       DME ordered at D/C? Yes  What? hospital bed, jojo lift, and commode  From where? [facility]  Have you received your (DME)? no, wife states that they will be delivered today and she is awaiting an ETA from [facility]    Discharge orders, AVS reviewed and discussed with patient. Any changes or updates to orders sent to PCP.  Patient  Acknowledged      SDOH:   Transportation Needs: No Transportation Needs (7/17/2024)    Transportation Needs     Lack of Transportation: No     Car Seat: Not on file     Financial Resource Strain: Low Risk  (7/24/2024)    Financial Resource Strain     Difficulty of Paying Living Expenses: Not hard at all     Med Affordability: No       Follow up appointments:      Your appointments       Date & Time Appointment Department ([place])    Aug 02, 2024 1:00 PM CDT HEMATOLOGY ONCOLOGY FOLLOW UP with Getachew Osullivan MD [facility] in [place] ([facility])              [facility]  [address] [place] [id number]  [phone]            TCC  Was TCC ordered: Yes  Was TCC scheduled: No, Explain wife states that she is holding off on appts right now      PCP (If no TCC appointment)  Does patient already have a PCP appointment scheduled? No  NCM Attempted to schedule PCP office TCM appointment with patient   If no appointment scheduled: Explain-wife states that she will hold off on appt right now until pt's mobility improves. NCM sent TE to PCP office.     Specialist    Does the patient have any other follow up appointment(s) needing to be scheduled? Yes  If yes: NCM reviewed upcoming specialist appointment with patient: Yes  Does the patient need assistance scheduling appointment(s): Yes, message sent to TST team, and pt already has heme/onc scheduled for 8/2/24    Is there any reason as to why you cannot make your appointment(s)?  No     Needs post D/C:   Now that you are home, are there any needs or concerns you need addressed before your next visit with your PCP?  (DME, meds, questions, etc.): No    Interventions by NCM:   NCM reviewed discharge instructions and when to seek medical attention with the patient's wife. She states that the patient is doing okay. She is making sure that he eats and drinks. She states that he  had a hard time sleeping last night as he tried to sleep in the chair as the DME is not being delivered until today. She states that he is just tired from that. She has not checked his bp yet stating that the HH RN will be there at noon and will check. She denied that he has had any fever, n/v/c/d, sob, pain, lightheadedness, HA, any new weakness/tingling/numbness or any new or worsening symptoms. Med review completed. Pt did receive the IV antibiotics and supplies this morning. She denied having any questions or concerns at this time.       CCM referral placed:    No    BOOK BY DATE: 8/6/24

## 2024-07-24 NOTE — TELEPHONE ENCOUNTER
NNEKA, Spoke to patient's wife for TCM today.  She states that she will hold off on scheduling an appt at this time until pt's mobility improves.  TCM appointment recommended by 7/30/24 as patient is a High risk for readmission.      BOOK BY DATE (last date for TCM): 8/6/24

## 2024-07-24 NOTE — PAYOR COMM NOTE
--------------  DISCHARGE REVIEW    Payor: [facility] PPO  Subscriber #:  [id number]  Authorization Number: [id number]    Admit date: 7/17/24  Admit time:   4:56 AM  Discharge Date: 7/23/2024  5:34 PM     Admitting Physician: Gualberto Lyon DO  Attending Physician:  Felicia Llamas MD  Primary Care Physician: Lenka Guevara DO

## 2024-07-24 NOTE — CM/SW NOTE
DENISSE received call from pt's spouse Perla stating pt's DME has not been delivered. Perla stated she has not received a call from [facility] today regarding DME delivery.     DENISSE called Ania at [facility]. Ania stated she will speak with dispatch.     DENISSE received call back from Ania stating the DME will be delivered between 1-3pm today. Ania stated she will update pt's spouse Perla.     JOSE Underwood  Discharge Planner

## 2024-07-25 NOTE — PROGRESS NOTES
TCM request     Neuro/Stroke-Ischemic 07/16/24 apt request (discharged 07/23)     Neurology  [facility]  [address]  [place] [address]  [phone]  Follow up 1 month     Dr Javier Marie  CARDIOLOGY  [address] [address]  [phone]  Follow up 2 weeks  LVM to call [phone] to assist w/scheduling apt  Multiple attempts; no apt made  Closing encounter

## 2024-07-29 ENCOUNTER — LAB REQUISITION (OUTPATIENT)
Dept: LAB | Age: 64
End: 2024-07-29
Payer: COMMERCIAL

## 2024-07-29 DIAGNOSIS — A41.01 SEPSIS DUE TO METHICILLIN SUSCEPTIBLE STAPHYLOCOCCUS AUREUS (HCC): ICD-10-CM

## 2024-07-29 LAB
ANION GAP SERPL CALC-SCNC: 6 MMOL/L (ref 0–18)
BASOPHILS # BLD AUTO: 0.06 X10(3) UL (ref 0–0.2)
BASOPHILS NFR BLD AUTO: 0.5 %
BUN BLD-MCNC: 21 MG/DL (ref 9–23)
CALCIUM BLD-MCNC: 9.3 MG/DL (ref 8.7–10.4)
CHLORIDE SERPL-SCNC: 104 MMOL/L (ref 98–112)
CO2 SERPL-SCNC: 30 MMOL/L (ref 21–32)
CREAT BLD-MCNC: 1.3 MG/DL
EGFRCR SERPLBLD CKD-EPI 2021: 62 ML/MIN/1.73M2 (ref 60–?)
EOSINOPHIL # BLD AUTO: 0.08 X10(3) UL (ref 0–0.7)
EOSINOPHIL NFR BLD AUTO: 0.7 %
ERYTHROCYTE [DISTWIDTH] IN BLOOD BY AUTOMATED COUNT: 15.3 %
ERYTHROCYTE [SEDIMENTATION RATE] IN BLOOD: 45 MM/HR
GLUCOSE BLD-MCNC: 79 MG/DL (ref 70–99)
HCT VFR BLD AUTO: 28.4 %
HGB BLD-MCNC: 8.8 G/DL
IMM GRANULOCYTES # BLD AUTO: 0.08 X10(3) UL (ref 0–1)
IMM GRANULOCYTES NFR BLD: 0.7 %
LYMPHOCYTES # BLD AUTO: 2.06 X10(3) UL (ref 1–4)
LYMPHOCYTES NFR BLD AUTO: 17.3 %
MCH RBC QN AUTO: 28.2 PG (ref 26–34)
MCHC RBC AUTO-ENTMCNC: 31 G/DL (ref 31–37)
MCV RBC AUTO: 91 FL
MONOCYTES # BLD AUTO: 0.89 X10(3) UL (ref 0.1–1)
MONOCYTES NFR BLD AUTO: 7.5 %
NEUTROPHILS # BLD AUTO: 8.76 X10 (3) UL (ref 1.5–7.7)
NEUTROPHILS # BLD AUTO: 8.76 X10(3) UL (ref 1.5–7.7)
NEUTROPHILS NFR BLD AUTO: 73.3 %
OSMOLALITY SERPL CALC.SUM OF ELEC: 292 MOSM/KG (ref 275–295)
PLATELET # BLD AUTO: 218 10(3)UL (ref 150–450)
POTASSIUM SERPL-SCNC: 3.5 MMOL/L (ref 3.5–5.1)
RBC # BLD AUTO: 3.12 X10(6)UL
SODIUM SERPL-SCNC: 140 MMOL/L (ref 136–145)
WBC # BLD AUTO: 11.9 X10(3) UL (ref 4–11)

## 2024-07-29 PROCEDURE — 85025 COMPLETE CBC W/AUTO DIFF WBC: CPT | Performed by: PHYSICIAN ASSISTANT

## 2024-07-29 PROCEDURE — 85652 RBC SED RATE AUTOMATED: CPT | Performed by: PHYSICIAN ASSISTANT

## 2024-07-29 PROCEDURE — 80048 BASIC METABOLIC PNL TOTAL CA: CPT | Performed by: PHYSICIAN ASSISTANT

## 2024-07-30 ENCOUNTER — TELEPHONE (OUTPATIENT)
Dept: NEUROLOGY | Facility: CLINIC | Age: 64
End: 2024-07-30

## 2024-07-30 NOTE — TELEPHONE ENCOUNTER
[facility] physical therapy progress notes/orders sent to Dr Ohara in [place] office for review and signature.  PT EFFECTIVE 7/24/2024 1WK1, [id number], [id number].

## 2024-07-30 NOTE — TELEPHONE ENCOUNTER
Pt has not been seen by Dr. Ohara since 2016 for an EMG- never seen as an office visit for assessment.    Pt In-patient for a stroke on 07/16/2024 and needs a follow up.    [facility] orders can be signed by whomever pt schedules appt with.  Pt can be seen by any provider that has an available stroke block within month of Stroke.    Left message for pt to call office.

## 2024-07-31 ENCOUNTER — TELEPHONE (OUTPATIENT)
Dept: HEMATOLOGY/ONCOLOGY | Age: 64
End: 2024-07-31

## 2024-07-31 ENCOUNTER — TELEPHONE (OUTPATIENT)
Dept: NEUROLOGY | Facility: CLINIC | Age: 64
End: 2024-07-31

## 2024-07-31 NOTE — TELEPHONE ENCOUNTER
Patient has scheduled an appointment with Dr Hills on 9/6. [facility] notes/orders placed in Dr Hills's folder for review and signature.

## 2024-07-31 NOTE — TELEPHONE ENCOUNTER
This is a message we received today via patient reschedule request to cancel the patient's appointment for 8/2/24. The message from the patient is: I just saw Dr. Osullivan at the hospital. I will see him in a month when my mobility is better. 7/31/24

## 2024-07-31 NOTE — TELEPHONE ENCOUNTER
[facility] notes/orders #[id number] placed in Dr Hills's folder for review and signature.  SN 1WK7, [id number]  PT 1WK1  OT EFFECTIVE 8/4/2024 1WK1.

## 2024-08-01 ENCOUNTER — OFFICE VISIT (OUTPATIENT)
Dept: FAMILY MEDICINE CLINIC | Facility: CLINIC | Age: 64
End: 2024-08-01
Payer: COMMERCIAL

## 2024-08-01 VITALS
HEART RATE: 85 BPM | OXYGEN SATURATION: 97 % | RESPIRATION RATE: 20 BRPM | HEIGHT: 75 IN | SYSTOLIC BLOOD PRESSURE: 117 MMHG | DIASTOLIC BLOOD PRESSURE: 79 MMHG | WEIGHT: 260 LBS | BODY MASS INDEX: 32.33 KG/M2

## 2024-08-01 DIAGNOSIS — D68.69 OTHER THROMBOPHILIA (HCC): ICD-10-CM

## 2024-08-01 DIAGNOSIS — R63.4 UNINTENTIONAL WEIGHT LOSS: Primary | ICD-10-CM

## 2024-08-01 DIAGNOSIS — Z86.79 HISTORY OF ATRIAL FIBRILLATION: ICD-10-CM

## 2024-08-01 DIAGNOSIS — G81.91 RIGHT HEMIPARESIS (HCC): ICD-10-CM

## 2024-08-01 DIAGNOSIS — I63.9 CEREBROVASCULAR ACCIDENT (CVA), UNSPECIFIED MECHANISM (HCC): ICD-10-CM

## 2024-08-01 DIAGNOSIS — R78.81 BACTEREMIA: ICD-10-CM

## 2024-08-01 DIAGNOSIS — R47.1 DYSARTHRIA: ICD-10-CM

## 2024-08-01 DIAGNOSIS — I42.0 DILATED CARDIOMYOPATHY (HCC): ICD-10-CM

## 2024-08-01 DIAGNOSIS — Q23.1 BICUSPID AORTIC VALVE (HCC): ICD-10-CM

## 2024-08-01 PROCEDURE — 99495 TRANSJ CARE MGMT MOD F2F 14D: CPT | Performed by: FAMILY MEDICINE

## 2024-08-01 PROCEDURE — 3078F DIAST BP <80 MM HG: CPT | Performed by: FAMILY MEDICINE

## 2024-08-01 PROCEDURE — 3008F BODY MASS INDEX DOCD: CPT | Performed by: FAMILY MEDICINE

## 2024-08-01 PROCEDURE — 3074F SYST BP LT 130 MM HG: CPT | Performed by: FAMILY MEDICINE

## 2024-08-01 RX ORDER — CARVEDILOL 6.25 MG/1
6.25 TABLET ORAL 2 TIMES DAILY
COMMUNITY
Start: 2024-07-25

## 2024-08-01 NOTE — TELEPHONE ENCOUNTER
[facility] order number [id number] received signed back from provider faxed to [facility] with confirmation.
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
## 2024-08-02 ENCOUNTER — APPOINTMENT (OUTPATIENT)
Dept: HEMATOLOGY/ONCOLOGY | Age: 64
End: 2024-08-02
Attending: INTERNAL MEDICINE
Payer: COMMERCIAL

## 2024-08-02 ENCOUNTER — TELEPHONE (OUTPATIENT)
Dept: NEUROLOGY | Facility: CLINIC | Age: 64
End: 2024-08-02

## 2024-08-02 NOTE — TELEPHONE ENCOUNTER
See Alt as well. PT has follow up appt scheduled with Dr. Hills on 9/6/2024.   Pt has only had an EMG with Dr. Ohara in 2016.

## 2024-08-02 NOTE — TELEPHONE ENCOUNTER
[facility] Order #[id number] requesting authorization to delay OT Eval to week of 8/5/24.  Patient will be following up with Dr. Hills on 9/6/24.  Endorsed to her for review and signature.

## 2024-08-05 ENCOUNTER — LAB REQUISITION (OUTPATIENT)
Dept: LAB | Age: 64
End: 2024-08-05
Payer: COMMERCIAL

## 2024-08-05 DIAGNOSIS — A41.1 SEPSIS DUE TO OTHER SPECIFIED STAPHYLOCOCCUS (HCC): ICD-10-CM

## 2024-08-05 LAB
ANION GAP SERPL CALC-SCNC: 4 MMOL/L (ref 0–18)
BASOPHILS # BLD AUTO: 0.08 X10(3) UL (ref 0–0.2)
BASOPHILS NFR BLD AUTO: 0.9 %
BUN BLD-MCNC: 12 MG/DL (ref 9–23)
CALCIUM BLD-MCNC: 9.4 MG/DL (ref 8.7–10.4)
CHLORIDE SERPL-SCNC: 101 MMOL/L (ref 98–112)
CO2 SERPL-SCNC: 30 MMOL/L (ref 21–32)
CREAT BLD-MCNC: 0.94 MG/DL
EGFRCR SERPLBLD CKD-EPI 2021: 91 ML/MIN/1.73M2 (ref 60–?)
EOSINOPHIL # BLD AUTO: 0.29 X10(3) UL (ref 0–0.7)
EOSINOPHIL NFR BLD AUTO: 3.4 %
ERYTHROCYTE [DISTWIDTH] IN BLOOD BY AUTOMATED COUNT: 15.3 %
ERYTHROCYTE [SEDIMENTATION RATE] IN BLOOD: 38 MM/HR
GLUCOSE BLD-MCNC: 76 MG/DL (ref 70–99)
HCT VFR BLD AUTO: 28.5 %
HGB BLD-MCNC: 9.1 G/DL
IMM GRANULOCYTES # BLD AUTO: 0.04 X10(3) UL (ref 0–1)
IMM GRANULOCYTES NFR BLD: 0.5 %
LYMPHOCYTES # BLD AUTO: 2.25 X10(3) UL (ref 1–4)
LYMPHOCYTES NFR BLD AUTO: 26.4 %
MCH RBC QN AUTO: 27.9 PG (ref 26–34)
MCHC RBC AUTO-ENTMCNC: 31.9 G/DL (ref 31–37)
MCV RBC AUTO: 87.4 FL
MONOCYTES # BLD AUTO: 0.74 X10(3) UL (ref 0.1–1)
MONOCYTES NFR BLD AUTO: 8.7 %
NEUTROPHILS # BLD AUTO: 5.13 X10 (3) UL (ref 1.5–7.7)
NEUTROPHILS # BLD AUTO: 5.13 X10(3) UL (ref 1.5–7.7)
NEUTROPHILS NFR BLD AUTO: 60.1 %
OSMOLALITY SERPL CALC.SUM OF ELEC: 279 MOSM/KG (ref 275–295)
PLATELET # BLD AUTO: 276 10(3)UL (ref 150–450)
POTASSIUM SERPL-SCNC: 3.4 MMOL/L (ref 3.5–5.1)
RBC # BLD AUTO: 3.26 X10(6)UL
SODIUM SERPL-SCNC: 135 MMOL/L (ref 136–145)
WBC # BLD AUTO: 8.5 X10(3) UL (ref 4–11)

## 2024-08-05 PROCEDURE — 85652 RBC SED RATE AUTOMATED: CPT | Performed by: PHYSICIAN ASSISTANT

## 2024-08-05 PROCEDURE — 85025 COMPLETE CBC W/AUTO DIFF WBC: CPT | Performed by: PHYSICIAN ASSISTANT

## 2024-08-05 PROCEDURE — 80048 BASIC METABOLIC PNL TOTAL CA: CPT | Performed by: PHYSICIAN ASSISTANT

## 2024-08-07 ENCOUNTER — TELEPHONE (OUTPATIENT)
Dept: NEUROLOGY | Facility: CLINIC | Age: 64
End: 2024-08-07

## 2024-08-12 ENCOUNTER — LAB REQUISITION (OUTPATIENT)
Dept: LAB | Age: 64
End: 2024-08-12
Payer: COMMERCIAL

## 2024-08-12 DIAGNOSIS — A41.1 SEPSIS DUE TO OTHER SPECIFIED STAPHYLOCOCCUS (HCC): ICD-10-CM

## 2024-08-12 LAB
ANION GAP SERPL CALC-SCNC: 7 MMOL/L (ref 0–18)
BASOPHILS # BLD AUTO: 0.07 X10(3) UL (ref 0–0.2)
BASOPHILS NFR BLD AUTO: 0.7 %
BUN BLD-MCNC: 12 MG/DL (ref 9–23)
CALCIUM BLD-MCNC: 9.6 MG/DL (ref 8.7–10.4)
CHLORIDE SERPL-SCNC: 103 MMOL/L (ref 98–112)
CO2 SERPL-SCNC: 28 MMOL/L (ref 21–32)
CREAT BLD-MCNC: 0.96 MG/DL
EGFRCR SERPLBLD CKD-EPI 2021: 89 ML/MIN/1.73M2 (ref 60–?)
EOSINOPHIL # BLD AUTO: 0.32 X10(3) UL (ref 0–0.7)
EOSINOPHIL NFR BLD AUTO: 3.1 %
ERYTHROCYTE [DISTWIDTH] IN BLOOD BY AUTOMATED COUNT: 15.6 %
ERYTHROCYTE [SEDIMENTATION RATE] IN BLOOD: 44 MM/HR
GLUCOSE BLD-MCNC: 71 MG/DL (ref 70–99)
HCT VFR BLD AUTO: 31 %
HGB BLD-MCNC: 9.8 G/DL
IMM GRANULOCYTES # BLD AUTO: 0.04 X10(3) UL (ref 0–1)
IMM GRANULOCYTES NFR BLD: 0.4 %
LYMPHOCYTES # BLD AUTO: 2.24 X10(3) UL (ref 1–4)
LYMPHOCYTES NFR BLD AUTO: 21.5 %
MCH RBC QN AUTO: 28.5 PG (ref 26–34)
MCHC RBC AUTO-ENTMCNC: 31.6 G/DL (ref 31–37)
MCV RBC AUTO: 90.1 FL
MONOCYTES # BLD AUTO: 0.72 X10(3) UL (ref 0.1–1)
MONOCYTES NFR BLD AUTO: 6.9 %
NEUTROPHILS # BLD AUTO: 7.02 X10 (3) UL (ref 1.5–7.7)
NEUTROPHILS # BLD AUTO: 7.02 X10(3) UL (ref 1.5–7.7)
NEUTROPHILS NFR BLD AUTO: 67.4 %
OSMOLALITY SERPL CALC.SUM OF ELEC: 284 MOSM/KG (ref 275–295)
PLATELET # BLD AUTO: 206 10(3)UL (ref 150–450)
POTASSIUM SERPL-SCNC: 3.8 MMOL/L (ref 3.5–5.1)
RBC # BLD AUTO: 3.44 X10(6)UL
SODIUM SERPL-SCNC: 138 MMOL/L (ref 136–145)
WBC # BLD AUTO: 10.4 X10(3) UL (ref 4–11)

## 2024-08-12 PROCEDURE — 80048 BASIC METABOLIC PNL TOTAL CA: CPT | Performed by: PHYSICIAN ASSISTANT

## 2024-08-12 PROCEDURE — 85652 RBC SED RATE AUTOMATED: CPT | Performed by: PHYSICIAN ASSISTANT

## 2024-08-12 PROCEDURE — 85025 COMPLETE CBC W/AUTO DIFF WBC: CPT | Performed by: PHYSICIAN ASSISTANT

## 2024-08-13 NOTE — TELEPHONE ENCOUNTER
Pt following up with Dr. Kendal Hills on 9/6/2024.    Placed in provider's folder for review and signature.    Order number [id number]

## 2024-08-14 ENCOUNTER — TELEPHONE (OUTPATIENT)
Dept: NEUROLOGY | Facility: CLINIC | Age: 64
End: 2024-08-14

## 2024-08-14 NOTE — TELEPHONE ENCOUNTER
Verbal order paperwork received verbal date 8/8/2024  forwarded to physician for review and signature.

## 2024-08-14 NOTE — TELEPHONE ENCOUNTER
[facility] notes/orders placed in Dr Ohara's folder for review and signature.  Skilled Nursing EFFECTIVE 9/15/2024 1WK1.  Occupational Therapy EFFECTIVE 8/4/2024 1WK3, [id number].

## 2024-08-15 NOTE — TELEPHONE ENCOUNTER
Family Medical Leave Act forms were logged under spouse chart. Forms logged for processing. [employer] message sent to patient for missing authorization.

## 2024-08-15 NOTE — TELEPHONE ENCOUNTER
Dr. Guevara,    Patient's spouse is seeking intermittent Family Medical Leave Act to care for spouse. She is requesting 1-5 flare ups per month with the option to use them intermittently or consecutively. She is also requesting 1-4 appointments per month each appointment lasting 1-4 hours. Do you support this request?    Thank you,    Jennifer HUFFMAN

## 2024-08-15 NOTE — TELEPHONE ENCOUNTER
Type of Leave: intermittent for flare ups and appointments  Reason for Leave: patient had a stroke and will be going to [place].  Start date of leave: 7/23/24-6 months  How much time needed?: 1-5 flare ups per month lasting a full day used intermittently or consecutively. 1-4 appts per month lasting 1-4 hours per appointment.  Forms Due Date:  Was Fee and Turnaround info Given?:

## 2024-08-15 NOTE — TELEPHONE ENCOUNTER
Received signed [facility] orders and faxed back with confirmation. Endorsed to scanning.

## 2024-08-15 NOTE — TELEPHONE ENCOUNTER
Dr. Guevara,     *The ACKNOWLEDGE button has been moved to the top right ribbon*    Please sign off on form if you agree to: Family Medical Leave Act as discussed below  (place your signature on the first page only)    -From your Inbasket, Highlight the patient and click Chart   -Double click the 8/1/24 Forms Completion telephone encounter  -Scroll down to the Media section   -Click the blue Hyperlink: Fmla Spouse Dr Guevara 8/15/24  -Click Acknowledge located in the top right ribbon/menu   -Drag the mouse into the blank space of the document and a + sign will appear. Left click to   electronically sign the document.     Thank you,    Jennifer HUFFMAN

## 2024-08-19 ENCOUNTER — LAB ENCOUNTER (OUTPATIENT)
Dept: LAB | Age: 64
End: 2024-08-19
Attending: PHYSICIAN ASSISTANT
Payer: COMMERCIAL

## 2024-08-19 ENCOUNTER — TELEPHONE (OUTPATIENT)
Dept: NEUROLOGY | Facility: CLINIC | Age: 64
End: 2024-08-19

## 2024-08-19 NOTE — TELEPHONE ENCOUNTER
Received progress note DOS 08/08/24 from [facility] , for review and signature from provider  Placed in neuro surgery bin for clinical staff.

## 2024-08-19 NOTE — TELEPHONE ENCOUNTER
Received Occupational Therapy orders from [facility] order# [id number]. Placed in provider folder for review and signature.

## 2024-08-20 NOTE — TELEPHONE ENCOUNTER
Dr. Guevara,     *The ACKNOWLEDGE button has been moved to the top right ribbon*     Please sign off on form if you agree to: Family Medical Leave Act as discussed below  (place your signature on the first page only)     -From your Inbasket, Highlight the patient and click Chart   -Double click the 8/1/24 Forms Completion telephone encounter  -Scroll down to the Media section   -Click the blue Hyperlink: Fmla Spouse Dr Guevara 8/19/24  -Click Acknowledge located in the top right ribbon/menu   -Drag the mouse into the blank space of the document and a + sign will appear. Left click to   electronically sign the document.     Thank you,     Jennifer HUFFMAN

## 2024-08-21 ENCOUNTER — TELEPHONE (OUTPATIENT)
Dept: NEUROLOGY | Facility: CLINIC | Age: 64
End: 2024-08-21

## 2024-08-21 NOTE — TELEPHONE ENCOUNTER
Received [facility] insurance approval for occupational therapy services [facility] effective 8/8/24-9/21/24.

## 2024-08-21 NOTE — TELEPHONE ENCOUNTER
Order number [id number] reviewed and signed by provider.    Faxed back to [facility] with confirmation. Copy sent to scanning.

## 2024-08-22 ENCOUNTER — TELEPHONE (OUTPATIENT)
Dept: NEUROLOGY | Facility: CLINIC | Age: 64
End: 2024-08-22

## 2024-08-22 NOTE — TELEPHONE ENCOUNTER
Rec'd Pt update printed 8/14/24, rec'd 8/21/24 for Provider review and signature. Placed in RN bin for p/u.

## 2024-08-27 NOTE — TELEPHONE ENCOUNTER
Forms uploaded to patient [employer]. No authorization on file to be able to fax completed forms.

## 2024-08-30 ENCOUNTER — ORDER TRANSCRIPTION (OUTPATIENT)
Dept: PHYSICAL THERAPY | Facility: HOSPITAL | Age: 64
End: 2024-08-30

## 2024-08-30 DIAGNOSIS — I63.9 ACUTE CVA (CEREBROVASCULAR ACCIDENT) (HCC): ICD-10-CM

## 2024-08-30 DIAGNOSIS — Z86.73 CHRONIC CEREBROVASCULAR ACCIDENT: Primary | ICD-10-CM

## 2024-09-04 ENCOUNTER — TELEPHONE (OUTPATIENT)
Dept: FAMILY MEDICINE CLINIC | Facility: CLINIC | Age: 64
End: 2024-09-04

## 2024-09-04 DIAGNOSIS — R74.8 ELEVATED LIVER ENZYMES: Primary | ICD-10-CM

## 2024-09-04 NOTE — TELEPHONE ENCOUNTER
Dr Guevara received a phone call from Dr Rakesh Guillaume at #[phone] regarding pt having elevated liver enzymes and requesting abdominal US be ordered per Dr Guevara.  Pt is in patient at [facility].Pt being discharged soon   Abdominal US ordered at [facility] for pt.Detailed phone message left for pt and pt wife informing them and instructed to call back with any questions.

## 2024-09-04 NOTE — TELEPHONE ENCOUNTER
Need physician statement completed for temporary disability form to extend his [facility] disability benefits until 3/1/25.      Form emailed to forms department.

## 2024-09-05 ENCOUNTER — TELEPHONE (OUTPATIENT)
Dept: HEMATOLOGY/ONCOLOGY | Age: 64
End: 2024-09-05

## 2024-09-05 ENCOUNTER — TELEPHONE (OUTPATIENT)
Dept: HEMATOLOGY/ONCOLOGY | Facility: HOSPITAL | Age: 64
End: 2024-09-05

## 2024-09-05 NOTE — TELEPHONE ENCOUNTER
Perla [phone] My  Ori is in [place] and they will not let him out for his appointment on 9/6/24 @ 2:45 pm.   They want to know if this appointment could be by phone or  video  at this time. Thanks Lyndsey

## 2024-09-05 NOTE — TELEPHONE ENCOUNTER
Returned patient's wife call regarding phone visit. Explained that Dr Osullivan was ok with doing a phone visit. Patient conveyed understanding.

## 2024-09-06 ENCOUNTER — VIRTUAL PHONE E/M (OUTPATIENT)
Dept: HEMATOLOGY/ONCOLOGY | Age: 64
End: 2024-09-06
Attending: INTERNAL MEDICINE
Payer: COMMERCIAL

## 2024-09-06 DIAGNOSIS — I82.512 CHRONIC DEEP VEIN THROMBOSIS (DVT) OF FEMORAL VEIN OF LEFT LOWER EXTREMITY (HCC): Primary | ICD-10-CM

## 2024-09-06 PROCEDURE — 99441 PHONE E/M BY PHYS 5-10 MIN: CPT | Performed by: INTERNAL MEDICINE

## 2024-09-06 NOTE — PROGRESS NOTES
Virtual Telephone Check-In    Kris Colvin verbally consents to a Virtual/Telephone Check-In visit on 09/06/24.  Patient has been referred to the [facility] website at [email or web address] to review the yearly Consent to Treat document.    Patient understands and accepts financial responsibility for any deductible, co-insurance and/or co-pays associated with this service.    Duration of the service: 10 minutes      Summary of topics discussed:     Patient is on Lovenox BID for his h/o DVT/PE. He is getting rehab at [facility]. He will continue with the Lovenox at the current dosing. He is planning for discharge in one week. I recommended that he arrange follow up with me in about three weeks. WE will decide at that time whether to switch to Apixaban 5 mg BID for continued management.      Getachew Osullivan MD

## 2024-09-07 ENCOUNTER — TELEPHONE (OUTPATIENT)
Dept: FAMILY MEDICINE CLINIC | Facility: CLINIC | Age: 64
End: 2024-09-07

## 2024-09-07 DIAGNOSIS — R79.89 ELEVATED LIVER FUNCTION TESTS: Primary | ICD-10-CM

## 2024-09-10 NOTE — TELEPHONE ENCOUNTER
Physician Statement/Temporary Disability received in forms dept. Logged for processing. Sent [employer] message.

## 2024-09-13 NOTE — TELEPHONE ENCOUNTER
Forms had to be signed by Dr. Guevara in the office. (Automated was not working correctly) Forms were picked up by pts wife on 9/12/2024.

## 2024-09-20 NOTE — TELEPHONE ENCOUNTER
Called patient spouse (on verbal) per message sent with forms to contact spouse. Left message to call back, please obtain details and task provider.

## 2024-09-23 NOTE — TELEPHONE ENCOUNTER
Dr. Guevara,    Patient is seeking the following. Previous disability forms completed by your office patient was to return to work 01/05/25. Patient is now seeking the following. Do you support patient's request?    Type of Leave: Disability   Reason for Leave: Stroke in February   Start date of leave: 2/27/24  End date of leave: end of 3/2025  How many flare ups per month/length?:  How many appts per month/length?:   Was Fee and Turnaround info Given?:      Thanks,  Melody

## 2024-09-23 NOTE — TELEPHONE ENCOUNTER
Patient spouse called back - Gathered Details below. Patient will send additional forms ([employer]) via e-mail.   Type of Leave: Disability   Reason for Leave: Stroke in February   Start date of leave: 2/27/24  End date of leave: end of 3/2025  How many flare ups per month/length?:  How many appts per month/length?:   Was Fee and Turnaround info Given?:

## 2024-09-24 NOTE — TELEPHONE ENCOUNTER
It should be continuous leave until march Ok for form fee   A different form filled out at f/u appt

## 2024-09-25 NOTE — TELEPHONE ENCOUNTER
Hand signed forms received and scanned into patient's chart. Disability faxed to [facility] [phone], awaiting confirmation.

## 2024-09-26 ENCOUNTER — OFFICE VISIT (OUTPATIENT)
Dept: HEMATOLOGY/ONCOLOGY | Age: 64
End: 2024-09-26
Attending: INTERNAL MEDICINE
Payer: COMMERCIAL

## 2024-09-26 VITALS
DIASTOLIC BLOOD PRESSURE: 61 MMHG | BODY MASS INDEX: 27 KG/M2 | TEMPERATURE: 98 F | RESPIRATION RATE: 18 BRPM | WEIGHT: 212 LBS | SYSTOLIC BLOOD PRESSURE: 103 MMHG | HEART RATE: 63 BPM | OXYGEN SATURATION: 98 %

## 2024-09-26 DIAGNOSIS — I82.512 CHRONIC DEEP VEIN THROMBOSIS (DVT) OF FEMORAL VEIN OF LEFT LOWER EXTREMITY (HCC): Primary | ICD-10-CM

## 2024-09-26 PROCEDURE — 99214 OFFICE O/P EST MOD 30 MIN: CPT | Performed by: INTERNAL MEDICINE

## 2024-09-26 NOTE — PROGRESS NOTES
Patient here or 4 month f/u for DVT. Patient not taking Eliquis since August and is currently taking Plavix 75 mg. Patient completed doppler US at [facility] on 8/30/24. Patient denies pain in legs, dyspnea, chest pain or dizziness.     Education Record    Learner:  Patient and Spouse    Disease / Diagnosis: DVT    Barriers / Limitations:  None   Comments:    Method:  Discussion   Comments:    General Topics:  Medication, Side effects and symptom management, and Plan of care reviewed   Comments:    Outcome:  Shows understanding   Comments:

## 2024-09-26 NOTE — PROGRESS NOTES
Cancer Center Progress Note    Problem List:      Patient Active Problem List   Diagnosis    Cerebral infarction (HCC)    Hypertensive emergency    NSVT (nonsustained ventricular tachycardia) (HCC)    White matter abnormality on MRI of brain    Vitamin B 12 deficiency    Vitamin D deficiency    History of CVA (cerebrovascular accident)    Essential hypertension    Paresthesias/numbness    Dilated cardiomyopathy (HCC)    Encounter for screening colonoscopy    Chronic atrial fibrillation (HCC)    Preop testing    Hyperlipidemia    CHAZ (obstructive sleep apnea)    Fever    H/O cardiac radiofrequency ablation    History of atrial fibrillation    Other thrombophilia (HCC)    Intraparenchymal hematoma of brain, left, without loss of consciousness, initial encounter (HCC)    HFrEF (heart failure with reduced ejection fraction) (HCC)    Coronary artery disease involving coronary bypass graft of native heart    Deep vein thrombosis (DVT) of proximal vein of left lower extremity (HCC)    Vasogenic cerebral edema (HCC)    ALEN (acute kidney injury) (HCC)    Thrombocytopenia (HCC)    Adjustment reaction with anxiety and depression    Bicuspid aortic valve (HCC)    Carotid artery stenosis    Chronic cutaneous venous stasis ulcer (HCC)    Chronic deep vein thrombosis (DVT) of left lower extremity (HCC)    Chronic systolic CHF (congestive heart failure) (HCC)    Dysphagia    Intracranial hemorrhage (HCC)    LBBB (left bundle branch block)    Leg DVT (deep venous thromboembolism), chronic, left (HCC)    Venous insufficiency    Right hemiparesis (HCC)    Sepsis without acute organ dysfunction, due to unspecified organism (HCC)    Acute febrile illness    (HFpEF) heart failure with preserved ejection fraction (HCC)    Acute CVA (cerebrovascular accident) (HCC)    Anemia    Anticoagulated    Anemia of infection and chronic disease       Interim History:    Kris Colvin presents today for evaluation and management of a diagnosis of left  leg DVT.    He had been on Lovenox BID until one week ago. He stopped this because of the pain from injections. He is on plavix. He is not on apixaban. He has been doing better with improved strength. He is home now. He has had less lower extremity edema. He has no bleeding complaints.    He has an IVC filter placed on 2/20/2024 after presenting with acute intracranial hemorrhage on 2/17/2024. He had repeat CT of the brain on 3/1/2024 that showed improvement in the hemorrhage but there was increase in surrounding vasogenic edema. He had repeat CNS imaging with improvement.     Review of Systems:   Constitutional: Negative for anorexia, fatigue, fevers, chills, night sweats and weight loss.  Eyes: Negative for visual disturbance, irritation and redness.  Respiratory: Negative for cough, hemoptysis, chest pain, or dyspnea.  Cardiovascular: Negative for angina, orthopnea or palpitations.  Gastrointestinal: Negative for nausea, vomiting, change in bowel habits, diarrhea, constipation and abdominal pain.  Integument/breast: Negative for rash, skin lesions, and pruritus.  Hematologic/lymphatic: Negative for easy bruising, bleeding, and lymphadenopathy.  Genitourinary: Negative for dysuria or hematuria  Psychiatric: The patient's mood was calm and appropriate for this visit.  The pertinent positives and negatives were described. All other systems were negative.    PMH/PSH:  Past Medical History:    Arrhythmia    Cataract    Congestive heart disease (HCC)    Deep vein thrombosis (HCC)    Depression    Disorder of liver    Heart valve disease    High blood pressure    High cholesterol    ICH (intracerebral hemorrhage) (HCC)    Muscle weakness    Obstructive sleep apnea, adult    autoPAP 5-12     Sleep apnea    Stroke (HCC)    Visual impairment       Past Surgical History:   Procedure Laterality Date    Cabg      Cataract      Colonoscopy N/A 11/29/2018    Procedure: COLONOSCOPY, POSSIBLE BIOPSY, POSSIBLE POLYPECTOMY 31913;   Surgeon: Zack Giordano MD;  Location: [facility]       Family History Reviewed:  Family History   Problem Relation Age of Onset    Breast Cancer Mother     Diabetes Father     Diabetes Maternal Grandmother     Diabetes Paternal Grandmother        Allergies:     No Known Allergies    Medications:   clopidogrel 75 MG Oral Tab Take 1 tablet (75 mg total) by mouth daily. 30 tablet 1    metoprolol tartrate 25 MG Oral Tab Take 0.5 tablets (12.5 mg total) by mouth 2x Daily(Beta Blocker). 60 tablet 1    cyanocobalamin 1000 MCG Oral Tab Take 1 tablet (1,000 mcg total) by mouth daily.      furosemide 20 MG Oral Tab Take 1 tablet (20 mg total) by mouth every other day.      spironolactone 25 MG Oral Tab Take 1 tablet (25 mg total) by mouth daily.      Cholecalciferol 25 MCG (1000 UT) Oral Tab Take 25 mcg by mouth daily.      MULTAQ 400 MG Oral Tab Take 1 tablet (400 mg total) by mouth 2 (two) times daily.      Multiple Vitamins-Minerals (MULTI FOR HER 50+) Oral Tab Take 1 tablet by mouth daily.           Vital Signs:      Height: --  Weight: 96.2 kg (212 lb) (09/26 1109)  BSA (Calculated - sq m): --  Pulse: 63 (09/26 1109)  BP: 103/61 (09/26 1109)  Temp: 97.8 °F (36.6 °C) (09/26 1109)  Do Not Use - Resp Rate: --  SpO2: 98 % (09/26 1109)      Physical Examination:    Constitutional: Patient is alert and oriented x 3, not in acute distress.  Eyes: Anicteric sclera, pink conjunctiva.  HEENT:  Oropharynx is clear. Neck is supple.  Respiratory: Clear to auscultation and percussion. No rales.  No wheezes.  Cardiovascular: Regular rate and rhythm. No murmurs.  Gastrointestinal: Soft, non tender with good bowel sounds.  Musculoskeletal: Bilateral LE edema. No calf tenderness. No ulceration.  Lymphatics: There is no palpable lymphadenopathy throughout in the cervical, supraclavicular, or axillary regions.  Psychiatric: The patient's mood is calm and appropriate for this visit.      Labs reviewed at this visit:     Lab Results    Component Value Date    WBC 8.7 08/19/2024    RBC 3.41 (L) 08/19/2024    HGB 9.6 (L) 08/19/2024    HCT 29.9 (L) 08/19/2024    MCV 87.7 08/19/2024    MCH 28.2 08/19/2024    MCHC 32.1 08/19/2024    RDW 15.9 08/19/2024    .0 08/19/2024     Lab Results   Component Value Date     08/19/2024    K 3.9 08/19/2024     08/19/2024    CO2 32.0 08/19/2024    BUN 16 08/19/2024    CREATSERUM 0.94 08/19/2024    GLU 82 08/19/2024    CA 9.2 08/19/2024    ALKPHO 79 07/17/2024    ALT 27 07/17/2024    AST 44 (H) 07/17/2024    BILT 1.0 07/17/2024    ALB 2.9 (L) 07/17/2024    TP 6.3 07/17/2024     Radiologic imaging reviewed at this visit:    Venous doppler on 8/30/2024:  FINDINGS:     Interval progression of extensive bilateral chronic deep venous thrombosis now involving the common femoral, greater saphenous, proximal deep femoral, superficial femoral, and popliteal veins.     Nonocclusive chronic deep venous thrombosis right common femoral and proximal popliteal veins. Occlusive chronic deep venous thrombosis right femoral and distal popliteal veins.     Nonocclusive chronic deep venous thrombosis left common femoral and proximal deep femoral vein new since the prior exam. Occlusive chronic deep venous thrombosis left femoral and distal popliteal veins.     Limited evaluation of the calf veins due to soft tissue edema.         IMPRESSION:     Interval progression of extensive bilateral chronic deep venous thrombosis as described above.     Venous doppler study from 2/27/2024:  Impression:     Right:   Extensive occlusive deep vein thrombosis of the right lower extremity essentially involving the entire leg.   This is expansile and hypoechoic consistent with acute thrombus.     Left:   There is moderate nonocclusive thrombus identified in the left femoral vein.   Occlusive thrombus of a gastrocnemius vein.   Probable chronic thrombus in the distal femoral vein and popliteal vein.     IMPRESSION:     Bilateral deep  vein thrombosis of the lower extremities.   Right-sided deep vein thrombosis extensive and appears acute.   Left-sided deep vein thrombosis is comparatively mild and both acute and chronic.      Assessment/Plan:     Bilateral DVT:    His original DVT occurred after left leg trauma. He had h/o ICH. He is s/p IVC filter.  There was concern for progression while on apixaban. He was on Lovenox but he declines taking this and he declines taking warfarin. I recommended that he resume the Apixaban 5 mg BID. He has no symptoms of acute thrombosis now. He is on plavix. He will see cardiology next week and discuss whether to continue the plavix. He will return to see me in three months. I favor long term anticoagulation in this patient. He has h/o CVA/ICH. His risks of thrombotic complications are greater than his risk of bleeding at this time.      Getachew Ousllivan MD

## 2024-10-01 ENCOUNTER — TELEPHONE (OUTPATIENT)
Dept: NEUROLOGY | Facility: CLINIC | Age: 64
End: 2024-10-01

## 2024-10-01 NOTE — TELEPHONE ENCOUNTER
Rec'd DOS 9/30/24 update, speech therapy evaluation is schdeuled for 10/15. Placed in RN bin for p/u.

## 2024-10-01 NOTE — TELEPHONE ENCOUNTER
Received from [facility] ST evaluation will be on 10/15. Can call if any questions. Therapy not ordered by this office [facility].    Patient has not been seen in over 5 years    Patient had a stroke on 7/16/24    Admitted to [facility] on 08/22/2024

## 2024-10-02 ENCOUNTER — TELEPHONE (OUTPATIENT)
Dept: NEUROLOGY | Facility: CLINIC | Age: 64
End: 2024-10-02

## 2024-10-02 ENCOUNTER — MED REC SCAN ONLY (OUTPATIENT)
Dept: NEUROLOGY | Facility: CLINIC | Age: 64
End: 2024-10-02

## 2024-10-02 NOTE — TELEPHONE ENCOUNTER
Disability forms received in forms dept. Logged for processing. [employer] message sent for completion of Release of Information.

## 2024-10-02 NOTE — TELEPHONE ENCOUNTER
Dr. Ohara continues to receive [facility] orders for pt, no appt on file.    Pt IP with CVA on 07/16/2024 then transferred to .  Pt needs NP appt as not seen since 2016.     Routed to  to call pt to be scheduled for a stroke appt.

## 2024-10-02 NOTE — TELEPHONE ENCOUNTER
Rec'd DOS 09/18/24 order date; order type; resumption of care for Provider review and signature. Placed in RN bin for p/u.

## 2024-10-02 NOTE — TELEPHONE ENCOUNTER
Faxed signed orders to [facility] and requested pt make appt for stroke follow up on orders.    See alternate TE.

## 2024-10-02 NOTE — TELEPHONE ENCOUNTER
Rec'd DOS 09/18/24 Home health certification and plan of care for Provider review and signature. Placed in RN bin for p/u.

## 2024-10-03 ENCOUNTER — TELEPHONE (OUTPATIENT)
Dept: NEUROLOGY | Facility: CLINIC | Age: 64
End: 2024-10-03

## 2024-10-03 NOTE — TELEPHONE ENCOUNTER
Rec'd DOS 9/30/24 notification of moving ST Eval to the week of 10/15 for Provider signature. Placed in RN bin for p/u.

## 2024-10-08 ENCOUNTER — APPOINTMENT (OUTPATIENT)
Dept: CT IMAGING | Facility: HOSPITAL | Age: 64
End: 2024-10-08
Attending: EMERGENCY MEDICINE
Payer: COMMERCIAL

## 2024-10-08 ENCOUNTER — APPOINTMENT (OUTPATIENT)
Dept: GENERAL RADIOLOGY | Facility: HOSPITAL | Age: 64
End: 2024-10-08
Payer: COMMERCIAL

## 2024-10-08 ENCOUNTER — APPOINTMENT (OUTPATIENT)
Dept: INTERVENTIONAL RADIOLOGY/VASCULAR | Facility: HOSPITAL | Age: 64
End: 2024-10-08
Attending: INTERNAL MEDICINE
Payer: COMMERCIAL

## 2024-10-08 ENCOUNTER — HOSPITAL ENCOUNTER (INPATIENT)
Facility: HOSPITAL | Age: 64
LOS: 2 days | Discharge: OTHER INSTITUTION | End: 2024-10-10
Attending: EMERGENCY MEDICINE | Admitting: HOSPITALIST
Payer: COMMERCIAL

## 2024-10-08 PROBLEM — R41.82 ALTERED MENTAL STATUS, UNSPECIFIED ALTERED MENTAL STATUS TYPE: Status: ACTIVE | Noted: 2024-10-08

## 2024-10-08 PROBLEM — R07.9 CHEST PAIN: Status: ACTIVE | Noted: 2024-10-08

## 2024-10-08 PROBLEM — A41.9 SEPSIS, DUE TO UNSPECIFIED ORGANISM, UNSPECIFIED WHETHER ACUTE ORGAN DYSFUNCTION PRESENT (HCC): Status: ACTIVE | Noted: 2024-10-08

## 2024-10-08 PROBLEM — R79.89 TROPONIN LEVEL ELEVATED: Status: ACTIVE | Noted: 2024-10-08

## 2024-10-08 PROBLEM — R41.82 ALTERED MENTAL STATUS, UNSPECIFIED ALTERED MENTAL STATUS TYPE: Status: ACTIVE | Noted: 2024-01-01

## 2024-10-08 PROBLEM — I33.0 ACUTE BACTERIAL ENDOCARDITIS (HCC): Status: ACTIVE | Noted: 2024-01-01

## 2024-10-08 PROBLEM — R79.89 TROPONIN LEVEL ELEVATED: Status: ACTIVE | Noted: 2024-01-01

## 2024-10-08 PROBLEM — A41.9 SEPSIS, DUE TO UNSPECIFIED ORGANISM, UNSPECIFIED WHETHER ACUTE ORGAN DYSFUNCTION PRESENT (HCC): Status: ACTIVE | Noted: 2024-01-01

## 2024-10-08 PROBLEM — I33.0 ACUTE BACTERIAL ENDOCARDITIS (HCC): Status: ACTIVE | Noted: 2024-10-08

## 2024-10-08 PROBLEM — R07.9 CHEST PAIN: Status: ACTIVE | Noted: 2024-01-01

## 2024-10-08 PROBLEM — G93.40 ENCEPHALOPATHY: Status: ACTIVE | Noted: 2024-01-01

## 2024-10-08 PROBLEM — G93.40 ENCEPHALOPATHY: Status: ACTIVE | Noted: 2024-10-08
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
## 2024-10-08 NOTE — ED INITIAL ASSESSMENT (HPI)
Patient from home, last seen by caregiver around noon. Wife found patient unresponsive. Arrives by EMS. No meds. Was V-fib, 120J shock x1 by EMS

## 2024-10-08 NOTE — CONSULTS
[facility]  Report of Consultation    Kris Colvin Patient Status:  Emergency    3/6/1960 MRN [id number]   Location [facility] EMERGENCY DEPARTMENT Attending Zack Calixto MD   Hosp Day # 0 PCP Lenka Guevara DO     Reason for Consultation:  Unresponsive    History of Present Illness:  Kris Colvin is a a(n) 64 year old male found by wife unresponsive.  En route to ED, had VT that degenerated to VF and had DCCV.      Currently, not interactive but breathing on his own.  Wife states this is not his baseline.  He has a history of AVR with LIMA to LAD and ascending aortic aneurysm repair in .  Is bioprosthetic AVR has severe stenosis with a mean gradient of 55 on echo in 2024.    He has a history of multiple CVAs with ICH this year.    Wife denies any history of renal issues.  He is on Eliquis and clopidogrel and has a history of PAF.      History:  Past Medical History:    Arrhythmia    Cataract    Congestive heart disease (HCC)    Deep vein thrombosis (HCC)    Depression    Disorder of liver    Heart valve disease    High blood pressure    High cholesterol    ICH (intracerebral hemorrhage) (HCC)    Muscle weakness    Obstructive sleep apnea, adult    autoPAP 5-12     Sleep apnea    Stroke (HCC)    Visual impairment     Past Surgical History:   Procedure Laterality Date    Cabg      Cataract      Colonoscopy N/A 2018    Procedure: COLONOSCOPY, POSSIBLE BIOPSY, POSSIBLE POLYPECTOMY 91091;  Surgeon: Zack Giordano MD;  Location: [facility]     Family History   Problem Relation Age of Onset    Breast Cancer Mother     Diabetes Father     Diabetes Maternal Grandmother     Diabetes Paternal Grandmother       reports that he has never smoked. He has never used smokeless tobacco. He reports current alcohol use of about 1.0 standard drink of alcohol per week. He reports that he does not use drugs.    Allergies:  No Known Allergies    Medications:    Current Facility-Administered Medications:      sodium chloride 0.9 % IV bolus 2,880 mL, 30 mL/kg, Intravenous, Once    piperacillin-tazobactam (Zosyn) 4.5 g in dextrose 5% 100 mL IVPB-ADDV, 4.5 g, Intravenous, Once    Review of Systems:  All systems were reviewed and are negative except as described above in HPI.    Physical Exam:  Blood pressure (!) 127/95, pulse 101, temperature (!) 103.2 °F (39.6 °C), temperature source Rectal, resp. rate (!) 30, weight 211 lb 10.3 oz (96 kg), SpO2 (!) 89%.  Temp (24hrs), Av.2 °F (39.6 °C), Min:103.2 °F (39.6 °C), Max:103.2 °F (39.6 °C)    Wt Readings from Last 3 Encounters:   10/08/24 211 lb 10.3 oz (96 kg)   24 212 lb (96.2 kg)   24 260 lb (117.9 kg)       Telemetry: Afib with LBBB  General: Alert and oriented in no apparent distress.  HEENT: No focal deficits.  Neck: No JVD, carotids 2+ no bruits.  Cardiac: Regular rate and rhythm, S1, S2 normal, no murmur, rub or gallop.  Lungs: Clear without wheezes, rales, rhonchi or dullness.  Normal excursions and effort.  Abdomen: Soft, non-tender.   Extremities: Without clubbing, cyanosis or edema.  Peripheral pulses are 2+.  Neurologic: Alert and oriented, normal affect.  Skin: Warm and dry.     Laboratories and Data:  Diagnostics:  EKG: Afib with LBBB      Labs:   Lab Results   Component Value Date    WBC 24.9 10/08/2024    RBC 3.85 10/08/2024    HGB 11.3 10/08/2024    HCT 32.0 10/08/2024    MCV 83.1 10/08/2024    MCH 29.4 10/08/2024    MCHC 35.3 10/08/2024    RDW 15.1 10/08/2024     Lab Results   Component Value Date    INR 1.28 (H) 2024    INR 1.07 2024    INR 1.80 (H) 10/22/2020       Assessment/Plan:  Patient Active Problem List   Diagnosis    Cerebral infarction (HCC)    Hypertensive emergency    NSVT (nonsustained ventricular tachycardia) (HCC)    White matter abnormality on MRI of brain    Vitamin B 12 deficiency    Vitamin D deficiency    History of CVA (cerebrovascular accident)    Essential hypertension    Paresthesias/numbness    Dilated  cardiomyopathy (HCC)    Encounter for screening colonoscopy    Chronic atrial fibrillation (HCC)    Preop testing    Hyperlipidemia    CHAZ (obstructive sleep apnea)    Fever    H/O cardiac radiofrequency ablation    History of atrial fibrillation    Other thrombophilia (HCC)    Intraparenchymal hematoma of brain, left, without loss of consciousness, initial encounter (HCC)    HFrEF (heart failure with reduced ejection fraction) ([facility])    Coronary artery disease involving coronary bypass graft of native heart    Deep vein thrombosis (DVT) of proximal vein of left lower extremity (HCC)    Vasogenic cerebral edema (HCC)    ALEN (acute kidney injury) (HCC)    Thrombocytopenia (HCC)    Adjustment reaction with anxiety and depression    Bicuspid aortic valve    Carotid artery stenosis    Chronic cutaneous venous stasis ulcer (HCC)    Chronic deep vein thrombosis (DVT) of left lower extremity (HCC)    Chronic systolic CHF (congestive heart failure) (HCC)    Dysphagia    Intracranial hemorrhage (HCC)    LBBB (left bundle branch block)    Leg DVT (deep venous thromboembolism), chronic, left (HCC)    Venous insufficiency    Right hemiparesis (HCC)    Sepsis without acute organ dysfunction, due to unspecified organism ([facility])    Acute febrile illness    (HFpEF) heart failure with preserved ejection fraction (HCC)    Acute CVA (cerebrovascular accident) ([facility])    Anemia    Anticoagulated    Anemia of infection and chronic disease       VT/VF   - Has a history of NSVT   - Currently on Multaq   - No evidence on ECG on acute ischemia   - Troponin elevation expected with VF   - Would not take to CCL at this time with mental status, WBC and fever    2. Aortic stenosis   - Monitor fluid status closely   - Was to be evaluated for TAVR    3. CAD   - Trend troponin    4. HFrEF   - Monitor fluid status    5. Sepsis   - Per hospitalist    6. Hx CVA    7. Afib   - Rate controlled   - No bleed on CT; cont Jena Acosta,  MD  10/8/2024  5:31 PM

## 2024-10-08 NOTE — ED QUICK NOTES
Orders for admission, patient is aware of plan and ready to go upstairs. Any questions, please call ED RN Sheridan at extension [phone].     Patient Covid vaccination status: Fully vaccinated     COVID Test Ordered in ED: SARS-CoV-2/Flu A and B/RSV by PCR (GeneXpert)    COVID Suspicion at Admission: N/A    Running Infusions:      Mental Status/LOC at time of transport: nonverbal     Other pertinent information:   CIWA score: N/A   NIH score:  N/A

## 2024-10-09 ENCOUNTER — ANESTHESIA EVENT (OUTPATIENT)
Dept: CV DIAGNOSTICS | Facility: HOSPITAL | Age: 64
End: 2024-10-09
Payer: COMMERCIAL

## 2024-10-09 ENCOUNTER — APPOINTMENT (OUTPATIENT)
Dept: CV DIAGNOSTICS | Facility: HOSPITAL | Age: 64
End: 2024-10-09
Payer: COMMERCIAL

## 2024-10-09 ENCOUNTER — ANESTHESIA (OUTPATIENT)
Dept: CV DIAGNOSTICS | Facility: HOSPITAL | Age: 64
End: 2024-10-09
Payer: COMMERCIAL

## 2024-10-09 ENCOUNTER — APPOINTMENT (OUTPATIENT)
Dept: CV DIAGNOSTICS | Facility: HOSPITAL | Age: 64
End: 2024-10-09
Attending: EMERGENCY MEDICINE
Payer: COMMERCIAL

## 2024-10-09 PROBLEM — G93.41 SEPSIS WITH ENCEPHALOPATHY WITHOUT SEPTIC SHOCK (HCC): Status: ACTIVE | Noted: 2024-10-08

## 2024-10-09 PROBLEM — R65.20 SEPSIS WITH ENCEPHALOPATHY WITHOUT SEPTIC SHOCK (HCC): Status: ACTIVE | Noted: 2024-01-01

## 2024-10-09 PROBLEM — R65.20 SEPSIS WITH ENCEPHALOPATHY WITHOUT SEPTIC SHOCK (HCC): Status: ACTIVE | Noted: 2024-10-08

## 2024-10-09 PROBLEM — A41.9 SEPSIS WITH ENCEPHALOPATHY WITHOUT SEPTIC SHOCK (HCC): Status: ACTIVE | Noted: 2024-10-08

## 2024-10-09 PROBLEM — G93.41 SEPSIS WITH ENCEPHALOPATHY WITHOUT SEPTIC SHOCK (HCC): Status: ACTIVE | Noted: 2024-01-01

## 2024-10-09 PROBLEM — A41.9 SEPSIS WITH ENCEPHALOPATHY WITHOUT SEPTIC SHOCK (HCC): Status: ACTIVE | Noted: 2024-01-01

## 2024-10-09 RX ORDER — SODIUM CHLORIDE 9 MG/ML
INJECTION, SOLUTION INTRAVENOUS CONTINUOUS PRN
Status: DISCONTINUED | OUTPATIENT
Start: 2024-10-09 | End: 2024-10-09 | Stop reason: SURG

## 2024-10-09 RX ORDER — LIDOCAINE HYDROCHLORIDE 10 MG/ML
INJECTION, SOLUTION EPIDURAL; INFILTRATION; INTRACAUDAL; PERINEURAL AS NEEDED
Status: DISCONTINUED | OUTPATIENT
Start: 2024-10-09 | End: 2024-10-09 | Stop reason: SURG

## 2024-10-09 RX ADMIN — LIDOCAINE HYDROCHLORIDE 50 MG: 10 INJECTION, SOLUTION EPIDURAL; INFILTRATION; INTRACAUDAL; PERINEURAL at 12:52:00

## 2024-10-09 RX ADMIN — SODIUM CHLORIDE: 9 INJECTION, SOLUTION INTRAVENOUS at 12:39:00

## 2024-10-09 NOTE — PROCEDURES
Cardiology 3D Transesophageal Echo Note    PRE-PROCEDURE DIAGNOSIS: Suspected endocarditis    PROCEDURE: Transesophageal Echocardiogram.    SEDATION:   Anesthesia provided by anesthesiology service.  Please see their records for further information.    DESCRIPTION:   Informed consent was obtained after risks and benefits of the procedure were explained. Oral hurricaine spray was placed in the oropharynx. After adequate anesthesia, the JAZMÍN probe was placed in the oropharynx with the esophagus being successfully intubated. Standard transgastric and multiplane views were obtained and available findings are as follows:    COMPLICATIONS: none    FINDINGS:   Overall reduced LV systolic function, estimated 30-35%.   Mitral valve appears normal without evidence of vegetation.  Mild mitral valve regurgitation.  Bioprosthetic 25 mm aortic valve is present.  There is a large echodensity (~1.1 x 1.2 cm) attached to the RCC and possibly LCC which prolapses into the LVOT, suspicious for vegetation.  There is also concern for dehiscence and suspicion of abscess formation given heterogeneous hypoechoic appearance surrounding the valve and including the sinus of valsalva as well as a portion of the aorto mitral continuity.  There also may be a small perforation of the noncoronary cusp leading to trivial AI.  V-max 6.6 m/s with rounded jet contour.  DVI 0.15.  Acceleration time 103 ms.  These findings are suggestive of severe prosthetic aortic valve stenosis.  Tricuspid valve without significant regurgitation.  No vegetation noted.  There was no evidence for intracardiac mass or thrombus in the WILLARD which appears to have been amputated.  Possible PFO.  No interatrial shunt noted via color Doppler.  There was mild scattered atherosclerotic plaque in the visualized aorta without evidence for mobile atheromata or thrombus.  No significant pericardial effusion.    3-Dimensional Echocardiography performed for:  Evaluation of complex  bioprosthetic aortic valve disease.      Jefferson Pruitt DO  10/9/2024  2:15 PM

## 2024-10-09 NOTE — CONSULTS
[facility] Pharmacy Dosing Service     Initial Pharmacokinetic Consult for Vancomycin Dosing     Kris Colvin is a 64 year old male who is being initiated on vancomycin therapy for bacteremia and endocarditis .  Pharmacy has been asked to dose vancomycin by Dr. Zack Shabazz. The initial treatment and monitoring approach will be non-AUC strategy.        Weight and Temperature:    Wt Readings from Last 1 Encounters:   10/08/24 96 kg (211 lb 10.3 oz)        Temp Readings from Last 1 Encounters:   10/08/24 (!) 101.3 °F (38.5 °C) (Rectal)      Labs:   Recent Labs   Lab 10/08/24  1654   CREATSERUM 1.35*      Estimated Creatinine Clearance: 66.9 mL/min (A) (based on SCr of 1.35 mg/dL (H)).     Recent Labs   Lab 10/08/24  1654   WBC 24.9*          The Pharmacokinetic Target is:    Trough/random 10-15 mg/L    Renal Dosing Considerations:    ALEN/ARF - unstable renal function (baseline sCr ~0.9-1.1)     Assessment/Plan:   Initial/Loading dose: Has received 1500 mg IV (15 mg/kg, capped at 2250 mg) x 1 initial dose.      Maintenance dose: Pharmacy will dose vancomycin at 1500 mg IV every 12 hours    Monitorin) Plan for vancomycin trough to be obtained in approximately 72 hours    2) Pharmacy will order SCr as clinically indicated to assess renal function.    3) Pharmacy will monitor for toxicity and efficacy, adjust vancomycin dose and/or frequency, and order vancomycin levels as appropriate per the Pharmacy and Therapeutics Committee approved protocol until discontinuation of the medication.       We appreciate the opportunity to assist in the care of this patient.     Sheridan Obrien, PharmD  10/8/2024  10:08 PM  [facility] Extension: [phone]
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
## 2024-10-09 NOTE — TELEPHONE ENCOUNTER
Dr. Osullivan,      Please sign off on form if you agree to: Disability Questionnaire  (place your signature on the first page only)    -From your Inbasket, Highlight the patient and click Chart   -Double click the 10/02/2024 Forms Completion telephone encounter  -Scroll down to the Media section   -Click the blue Hyperlink: disability questionnaire Dr. Getachew Osullivan 10/09/2024  -Click Acknowledge located in the top right ribbon/menu   -Drag the mouse into the blank space of the document and a + sign will appear. Left click to   electronically sign the document.     Thank you,  Christi VELAZQUEZ

## 2024-10-09 NOTE — ANESTHESIA PREPROCEDURE EVALUATION
PRE-OP EVALUATION    Patient Name: Kris Colvin    Admit Diagnosis: Chest pain [R07.9]  Troponin level elevated [R79.89]  Altered mental status, unspecified altered mental status type [R41.82]  Sepsis, due to unspecified organism, unspecified whether acute organ dysfunction present (HCC) [A41.9]    Pre-op Diagnosis: * No pre-op diagnosis entered *        Anesthesia Procedure: CARD ECHO JAZMÍN (CPT=93320/94506)    * No surgeons found in log *    Pre-op vitals reviewed.  Temp: 100.2 °F (37.9 °C)  Pulse: 74  Resp: 13  BP: 80/55  AO: 115/67  SpO2: 95 %  Body mass index is 26.34 kg/m².    Current medications reviewed.  Hospital Medications:   norepinephrine (Levophed) 4 mg/250mL infusion premix  0.5-30 mcg/min Intravenous Continuous    ceFEPIme (Maxpime) 2 g in sodium chloride 0.9% 100 mL IVPB-MBP  2 g Intravenous Q8H    [COMPLETED] Perflutren Lipid Microsphere (DEFINITY) 6.52 MG/ML injection 1.5 mL  1.5 mL Intravenous ONCE PRN    [START ON 10/10/2024] multivitamin with minerals (Thera M Plus) tab 1 tablet  1 tablet Oral Daily    [START ON 10/10/2024] sodium chloride 0.9% infusion   Intravenous On Call    benzocaine (Hurricaine/Topex) 20 % mouth spray 1 spray  1 spray Mouth/Throat See Admin Instructions    [COMPLETED] sodium chloride 0.9 % IV bolus 1,000 mL  1,000 mL Intravenous Once    [COMPLETED] lidocaine PF (Xylocaine-MPF) 1 % injection        [COMPLETED] heparin (Porcine) 5000 UNIT/ML injection        [COMPLETED] sodium chloride 0.9 % IV bolus 2,880 mL  30 mL/kg Intravenous Once    [COMPLETED] acetaminophen (Tylenol) rectal suppository 650 mg  650 mg Rectal Once    [COMPLETED] piperacillin-tazobactam (Zosyn) 4.5 g in dextrose 5% 100 mL IVPB-ADDV  4.5 g Intravenous Once    [COMPLETED] vancomycin (Vancocin) 1.5 g in sodium chloride 0.9% 250mL IVPB premix  15 mg/kg Intravenous Once    melatonin tab 3 mg  3 mg Oral Nightly PRN    polyethylene glycol (PEG 3350) (Miralax) 17 g oral packet 17 g  17 g Oral Daily PRN     sennosides (Senokot) tab 17.2 mg  17.2 mg Oral Nightly PRN    bisacodyl (Dulcolax) 10 MG rectal suppository 10 mg  10 mg Rectal Daily PRN    fleet enema (Fleet) rectal enema 133 mL  1 enema Rectal Once PRN    [COMPLETED] heparin (Porcine) 51680 units/250mL infusion (PE/DVT/THROMBUS) INITIAL DOSE  18 Units/kg/hr Intravenous Once    [Held by provider] heparin (Porcine) 54997 units/250mL infusion PE/DVT/THROMBUS CONTINUOUS  200-3,000 Units/hr Intravenous Continuous    clopidogrel (Plavix) tab 75 mg  75 mg Oral Daily    [Held by provider] metoprolol tartrate (Lopressor) partial tab 12.5 mg  12.5 mg Oral 2x Daily(Beta Blocker)    [Held by provider] dronedarone (Multaq) tab 400 mg  400 mg Oral BID    [COMPLETED] iopamidol 76% (ISOVUE-370) injection for power injector  100 mL Intravenous ONCE PRN    [COMPLETED] potassium chloride 40 mEq in 250mL sodium chloride 0.9% IVPB premix  40 mEq Intravenous Once    vancomycin (Vancocin) 1.5 g in sodium chloride 0.9% 250mL IVPB premix  15 mg/kg Intravenous Q12H    [COMPLETED] sodium chloride 0.9 % IV bolus 500 mL  500 mL Intravenous Once    [COMPLETED] magnesium sulfate in sterile water for injection 2 g/50mL IVPB premix 2 g  2 g Intravenous Once       Outpatient Medications:   Prescriptions Prior to Admission[1]    Allergies: Patient has no known allergies.      Anesthesia Evaluation    Patient summary reviewed.    Anesthetic Complications  (-) history of anesthetic complications         GI/Hepatic/Renal                                 Cardiovascular                  (+) hypertension     (+) CAD        (+) valvular problems/murmurs        (+) CHF                Endo/Other                                  Pulmonary                    (+) sleep apnea       Neuro/Psych          (+) CVA                            Past Surgical History:   Procedure Laterality Date    Cabg      Cataract      Colonoscopy N/A 11/29/2018    Procedure: COLONOSCOPY, POSSIBLE BIOPSY, POSSIBLE POLYPECTOMY 57375;   Surgeon: Zack Giordano MD;  Location: [facility]     Social History     Socioeconomic History    Marital status:    Tobacco Use    Smoking status: Never    Smokeless tobacco: Never   Vaping Use    Vaping status: Never Used   Substance and Sexual Activity    Alcohol use: Not Currently     Comment: Occastional    Drug use: No   Other Topics Concern    Caffeine Concern No     Comment: occasionally    Exercise No     History   Drug Use No     Available pre-op labs reviewed.  Lab Results   Component Value Date    WBC 26.5 (H) 10/09/2024    RBC 3.15 (L) 10/09/2024    HGB 9.3 (L) 10/09/2024    HCT 27.0 (L) 10/09/2024    MCV 85.7 10/09/2024    MCH 29.5 10/09/2024    MCHC 34.4 10/09/2024    RDW 15.3 10/09/2024    PLT 84.0 (L) 10/09/2024     Lab Results   Component Value Date     10/09/2024    K 4.2 10/09/2024    K 4.2 10/09/2024     10/09/2024    CO2 23.0 10/09/2024    BUN 32 (H) 10/09/2024    CREATSERUM 1.21 10/09/2024     (H) 10/09/2024    CA 8.3 (L) 10/09/2024     Lab Results   Component Value Date    INR 1.87 (H) 10/08/2024         Airway      Mallampati: III  Mouth opening: 3 FB  TM distance: 4 - 6 cm   Cardiovascular             Dental  Comment: No loose teeth per patient               Pulmonary                     Other findings              ASA: 4   Plan: MAC  NPO status verified and     Post-procedure pain management plan discussed with surgeon and patient.    Comment: MAC discussed in detail, as well as, possible conversion to GETA.  Risk of sore throat, cough, dental trauma, PONV discussed.  All questions answered    Plan/risks discussed with: patient and spouse                Present on Admission:  **None**             [1]   Medications Prior to Admission   Medication Sig Dispense Refill Last Dose/Taking    acetaminophen 325 MG Oral Tab Take 2 tablets (650 mg total) by mouth every 4 (four) hours as needed for Pain or Fever.   10/7/2024 at 2200    apixaban 5 MG Oral Tab Take 1  tablet (5 mg total) by mouth 2 (two) times daily. 60 tablet 3 10/8/2024 at 0600    clopidogrel 75 MG Oral Tab Take 1 tablet (75 mg total) by mouth daily. 30 tablet 1 10/8/2024    metoprolol tartrate 25 MG Oral Tab Take 0.5 tablets (12.5 mg total) by mouth 2x Daily(Beta Blocker). 60 tablet 1 10/8/2024 at 0600    furosemide 20 MG Oral Tab Take 1 tablet (20 mg total) by mouth every other day.   10/7/2024    spironolactone 25 MG Oral Tab Take 1 tablet (25 mg total) by mouth daily.   10/8/2024    Cholecalciferol 25 MCG (1000 UT) Oral Tab Take 25 mcg by mouth daily.   10/8/2024    MULTAQ 400 MG Oral Tab Take 1 tablet (400 mg total) by mouth 2 (two) times daily.   10/8/2024 at 0600    Multiple Vitamins-Minerals (MULTI FOR HER 50+) Oral Tab Take 1 tablet by mouth daily.   10/8/2024    [] DAPTOmycin 50 mg/mL in sodium chloride 0.9% PF Inject 12 mL (600 mg total) into the vein daily. Will need weekly labs with CBC, BMP and ESR. Will need biweekly CPK while on this medication. 504 mL 0     cyanocobalamin 1000 MCG Oral Tab Take 1 tablet (1,000 mcg total) by mouth daily.   Unknown

## 2024-10-09 NOTE — CONSULTS
INFECTIOUS DISEASE CONSULTATION    Kris Colvin Patient Status:  Inpatient    3/6/1960 MRN [id number]   Location [facility] 4SW-A Attending Hemal Mckeon MD   Hosp Day # 1 PCP Lenka Guevara DO       Requested by Dr. Dee    Reason for Consultation:  Recurrent bacteremia  Possible prosthetic valve endocarditis    History of Present Illness:  Kris Colvin is a a(n) 64 year old male was brought by EMS on 10/8, found unresponsive, and developed VT, was shocked.  In ED temp 103. 2, and was admitted to ICU.      He has significant history for CAD sp CABG, AVR, and aortic aneurysm repair in     He was hospitalized in 2024, blood cultures on , at midnight, 2 of 2 blood cultures were positive for staph epi (oxacillin resistant).  Repeat blood cultures sent on  in the evening, prior to first dose of vancomycin were no growth.    ECHO (TTE)  on  showed  no obvious vegetations.      He was discharged on IV daptomycin, that completed on 9/3, and PICC was removed.  Repeat blood cultures were ordered, but don't see any results.        History:  Past Medical History:    Arrhythmia    Cataract    Congestive heart disease (HCC)    Deep vein thrombosis (HCC)    Heart valve disease    High blood pressure    High cholesterol    ICH (intracerebral hemorrhage) (HCC)    Muscle weakness    Obstructive sleep apnea, adult    autoPAP 5-12     Sleep apnea    Stroke (HCC)    repeat stroke 2024 and 2024    Visual impairment     Past Surgical History:   Procedure Laterality Date    Cabg      Cataract      Colonoscopy N/A 2018    Procedure: COLONOSCOPY, POSSIBLE BIOPSY, POSSIBLE POLYPECTOMY 69126;  Surgeon: Zack Giordano MD;  Location: [facility]     Family History   Problem Relation Age of Onset    Diabetes Father     Breast Cancer Mother     Diabetes Maternal Grandmother     Diabetes Paternal Grandmother       reports that he has never smoked.  He has never used smokeless tobacco. He reports that he does not currently use alcohol. He reports that he does not use drugs.      Allergies:  Allergies[1]    Medications:    Current Facility-Administered Medications:     norepinephrine (Levophed) 4 mg/250mL infusion premix, 0.5-30 mcg/min, Intravenous, Continuous    ceFEPIme (Maxpime) 2 g in sodium chloride 0.9% 100 mL IVPB-MBP, 2 g, Intravenous, Q8H    melatonin tab 3 mg, 3 mg, Oral, Nightly PRN    polyethylene glycol (PEG 3350) (Miralax) 17 g oral packet 17 g, 17 g, Oral, Daily PRN    sennosides (Senokot) tab 17.2 mg, 17.2 mg, Oral, Nightly PRN    bisacodyl (Dulcolax) 10 MG rectal suppository 10 mg, 10 mg, Rectal, Daily PRN    fleet enema (Fleet) rectal enema 133 mL, 1 enema, Rectal, Once PRN    heparin (Porcine) 70913 units/250mL infusion PE/DVT/THROMBUS CONTINUOUS, 200-3,000 Units/hr, Intravenous, Continuous    clopidogrel (Plavix) tab 75 mg, 75 mg, Oral, Daily    [Held by provider] metoprolol tartrate (Lopressor) partial tab 12.5 mg, 12.5 mg, Oral, 2x Daily(Beta Blocker)    [Held by provider] dronedarone (Multaq) tab 400 mg, 400 mg, Oral, BID    vancomycin (Vancocin) 1.5 g in sodium chloride 0.9% 250mL IVPB premix, 15 mg/kg, Intravenous, Q12H  Medications Ordered Prior to Encounter[2]    Review of Systems:    A comprehensive 10 point review of systems was completed.  Pertinent positives and negatives noted in the the HPI.      Physical Exam:    General: No acute distress. Alert and oriented x 3.  Vital signs: Temp:  [98.1 °F (36.7 °C)-103.2 °F (39.6 °C)] 98.8 °F (37.1 °C)  Pulse:  [] 69  Resp:  [12-38] 16  BP: ()/(55-95) 80/55  SpO2:  [89 %-100 %] 96 %  AO: ()/(48-62) 105/59  Body mass index is 26.34 kg/m².  HEENT: Moist mucous membranes. Extraocular muscles are intact.  Neck: No swelling, no masses  Respiratory: Non labored, symmetric exursion  Abdomen: Soft, nontender, nondistended.    Musculoskeletal: Full range of motion of all  extremities.  No swelling noted.  Joints: no effusions  Skin: No lesions. No erythema, no open wounds    Laboratory Data:  Laboratory data reviewed      Recent Labs   Lab 10/09/24  0409   RBC 3.15*   HGB 9.3*   HCT 27.0*   MCV 85.7   MCH 29.5   MCHC 34.4   RDW 15.3   NEPRELIM 21.06*   WBC 26.5*   PLT 84.0*   NEUT 83   LYMPH 10   MON 6   EOS 0       Recent Labs   Lab 10/08/24  1654 10/08/24  2205 10/09/24  0409   * 132* 118*   BUN 32* 32* 32*   CREATSERUM 1.35* 1.31* 1.21   CA 9.1 8.5* 8.3*   ALB 3.1* 2.5* 2.6*   * 133* 136   K 3.5 3.8 4.2  4.2    105 106   CO2 20.0* 22.0 23.0   ALKPHO 98 82 81   AST 35* 27 25   ALT 10 7* 7*   BILT 1.0 0.8 0.6   TP 6.5 5.7 5.6*         Lab Results   Component Value Date    .0 10/09/2024    INR 1.87 10/08/2024    PTP 21.7 10/08/2024    ESRML 83 10/08/2024    CRP 28.90 10/08/2024    MG 2.2 10/09/2024    PHOS 4.3 10/09/2024    PGLU 122 10/08/2024       Recent Labs   Lab 10/08/24  1717   COLORUR Yellow   CLARITY Turbid*   SPECGRAVITY 1.027   GLUUR Normal   BILUR Negative   KETUR Trace*   BLOODURINE 1+*   PHURINE 6.0   PROUR 100*   UROBILINOGEN Normal   NITRITE Negative   LEUUR Negative   WBCUR 6-10*   RBCUR 6-10*   BACUR Rare*   EPIUR Few*       Microbiologic Data:   Hospital Encounter on 10/08/24   1. Blood Culture     Status: Abnormal (Preliminary result)    Collection Time: 10/08/24  5:19 PM    Specimen: Blood,peripheral   Result Value Ref Range    Blood Culture Result Positive N/A    Blood Smear Gram positive cocci in clusters (A) N/A       Established Problem list:  Patient Active Problem List   Diagnosis    Cerebral infarction (HCC)    Hypertensive emergency    NSVT (nonsustained ventricular tachycardia) (HCC)    White matter abnormality on MRI of brain    Vitamin B 12 deficiency    Vitamin D deficiency    History of CVA (cerebrovascular accident)    Essential hypertension    Paresthesias/numbness    Dilated cardiomyopathy (HCC)    Encounter for screening  colonoscopy    Chronic atrial fibrillation (HCC)    Preop testing    Hyperlipidemia    CHAZ (obstructive sleep apnea)    Fever    H/O cardiac radiofrequency ablation    History of atrial fibrillation    Other thrombophilia ([facility])    Intraparenchymal hematoma of brain, left, without loss of consciousness, initial encounter ([facility])    HFrEF (heart failure with reduced ejection fraction) ([facility])    Coronary artery disease involving coronary bypass graft of native heart    Deep vein thrombosis (DVT) of proximal vein of left lower extremity ([facility])    Vasogenic cerebral edema ([facility])    ALEN (acute kidney injury) ([facility])    Thrombocytopenia (HCC)    Adjustment reaction with anxiety and depression    Bicuspid aortic valve    Carotid artery stenosis    Chronic cutaneous venous stasis ulcer ([facility])    Chronic deep vein thrombosis (DVT) of left lower extremity (HCC)    Chronic systolic CHF (congestive heart failure) ([facility])    Dysphagia    Intracranial hemorrhage ([facility])    LBBB (left bundle branch block)    Leg DVT (deep venous thromboembolism), chronic, left ([facility])    Venous insufficiency    Right hemiparesis ([facility])    Sepsis without acute organ dysfunction, due to unspecified organism ([facility])    Acute febrile illness    (HFpEF) heart failure with preserved ejection fraction ([facility])    Acute CVA (cerebrovascular accident) ([facility])    Anemia    Anticoagulated    Anemia of infection and chronic disease    Sepsis, due to unspecified organism, unspecified whether acute organ dysfunction present ([facility])    Chest pain    Altered mental status, unspecified altered mental status type    Troponin level elevated    Acute bacterial endocarditis ([facility])    Encephalopathy       ASSESSMENT/PLAN:  1. Staph epi bacteremia  Blood cultures positive on 7/17 2 of 2 sets for staph epi  Repeat blood cultures sent on 7/17 , prior to vancomycin were no growth  ECHO last admissin (TTE) no obvious vegetation on the prosthetic aortic valve    Admitted 10/8 with out of hospital  cardiac arrest, VT  Fever on admission  ---CT clear lung fields, kidney area of focal hypodensitiy (? Pyelonephrtis, UA is negative for LE and nitrite)      Blood culture prelim GPC clusters (not staph aurues by PCR)  Already started on vancomycin yesterday  ECHO pending    Repeat blood cultures    Continue vancomycin and cefepime pending micro updates      Addendum: JAZMÍN shows evidence for prosthetic valve endocarditis and abscess  CV surgery evaluation, continue vancomycin. Asked micro to check S for daptomycin since was recently treated with dapto.  Add rifampin.and gent forsynergy.  DC cefepime     Bulmaro Fuller MD, MD [facility]  [phone]         [1] No Known Allergies  [2]   Current Facility-Administered Medications on File Prior to Encounter   Medication Dose Route Frequency Provider Last Rate Last Admin    [COMPLETED] lidocaine PF (Xylocaine-MPF) 1% injection  5 mL Intradermal Once Bulmaro Fuller MD   5 mL at 24 1424    [COMPLETED] gadoterate meglumine (Dotarem) 7.5 MMOL/15ML injection 15 mL  15 mL Intravenous ONCE PRN Lizet Miller, DO   15 mL at 24 0522    [COMPLETED] gadoterate meglumine (Dotarem) 7.5 MMOL/15ML injection 15 mL  15 mL Intravenous ONCE PRN Lizet Miller, DO   15 mL at 24 0522    [COMPLETED] heparin (Porcine) 13565 units/250mL infusion (PE/DVT/THROMBUS) INITIAL DOSE  18 Units/kg/hr Intravenous Once Lizet Miller DO 21 mL/hr at 24 1351 18 Units/kg/hr at 24 1351    [COMPLETED] iopamidol 76% (ISOVUE-370) injection for power injector  75 mL Intravenous ONCE PRN Lizet Miller, DO   75 mL at 24 1524    [] potassium chloride (Klor-Con M20) tab 40 mEq  40 mEq Oral Q4H Lizet Miller DO   40 mEq at 24 2317    [COMPLETED] acetaminophen (Tylenol Extra Strength) tab 1,000 mg  1,000 mg Oral Once Annita Magana MD   1,000 mg at 24 0019    [COMPLETED] sodium chloride 0.9 % IV  bolus 1,000 mL  1,000 mL Intravenous Once Annita Magana MD   Stopped at 24 0200    [COMPLETED] sodium chloride 0.9 % IV bolus 1,000 mL  1,000 mL Intravenous Once Annita Magana MD   Stopped at 24 0428    [COMPLETED] potassium chloride 40 mEq in 250mL sodium chloride 0.9% IVPB premix  40 mEq Intravenous Once Annita Magana MD 62.5 mL/hr at 24 0159 40 mEq at 24 0159    [COMPLETED] piperacillin-tazobactam (Zosyn) 4.5 g in dextrose 5% 100 mL IVPB-ADDV  4.5 g Intravenous Once Annita Magana MD   Stopped at 24 0358    [COMPLETED] iopamidol 76% (ISOVUE-370) injection for power injector  100 mL Intravenous ONCE PRN Annita Magana MD   100 mL at 24 0248    [] sodium chloride 0.9 % IV bolus 3,537 mL  30 mL/kg Intravenous Continuous Annita Magana MD 1,179 mL/hr at 24 0357 3,537 mL at 24 0357    [COMPLETED] iopamidol 76% (ISOVUE-370) injection for power injector  100 mL Intravenous ONCE PRN Lizet Miller DO   100 mL at 24 0944    [COMPLETED] potassium chloride (Klor-Con M20) tab 40 mEq  40 mEq Oral Q4H Lizet Miller DO   40 mEq at 24 1511     Current Outpatient Medications on File Prior to Encounter   Medication Sig Dispense Refill    acetaminophen 325 MG Oral Tab Take 2 tablets (650 mg total) by mouth every 4 (four) hours as needed for Pain or Fever.      apixaban 5 MG Oral Tab Take 1 tablet (5 mg total) by mouth 2 (two) times daily. 60 tablet 3    clopidogrel 75 MG Oral Tab Take 1 tablet (75 mg total) by mouth daily. 30 tablet 1    metoprolol tartrate 25 MG Oral Tab Take 0.5 tablets (12.5 mg total) by mouth 2x Daily(Beta Blocker). 60 tablet 1    furosemide 20 MG Oral Tab Take 1 tablet (20 mg total) by mouth every other day.      spironolactone 25 MG Oral Tab Take 1 tablet (25 mg total) by mouth daily.      Cholecalciferol 25 MCG (1000 UT) Oral Tab Take 25 mcg by mouth daily.      MULTAQ 400 MG Oral Tab Take 1 tablet (400 mg total) by  mouth 2 (two) times daily.      Multiple Vitamins-Minerals (MULTI FOR HER 50+) Oral Tab Take 1 tablet by mouth daily.      [] DAPTOmycin 50 mg/mL in sodium chloride 0.9% PF Inject 12 mL (600 mg total) into the vein daily. Will need weekly labs with CBC, BMP and ESR. Will need biweekly CPK while on this medication. 504 mL 0    cyanocobalamin 1000 MCG Oral Tab Take 1 tablet (1,000 mcg total) by mouth daily.
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
## 2024-10-09 NOTE — ANESTHESIA POSTPROCEDURE EVALUATION
[facility]    Kris Colvin Patient Status:  Inpatient   Age/Gender 64 year old male MRN [id number]   Location [facility] 4SW-A Attending Hemal Mckeon MD   Hosp Day # 1 PCP Lenka Guevara DO       Anesthesia Post-op Note        Procedure Summary       Date: 10/09/24 Room / Location: [facility]    Anesthesia Start: 1239 Anesthesia Stop: 1348    Procedure: CARD ECHO JAZMÍN (CPT=93320/62832) Diagnosis: (bio AVR, endocarditis)    Scheduled Providers:  Anesthesiologist: Michael Baptiste MD    Anesthesia Type: MAC ASA Status: 4            Anesthesia Type: MAC    Vitals Value Taken Time   BP 93/63 10/09/24 1347   Temp 100.58 °F (38.1 °C) 10/09/24 1347   Pulse 78 10/09/24 1347   Resp 30 10/09/24 1347   SpO2 100 % 10/09/24 1347   Vitals shown include unfiled device data.    Patient Location: ICU    Anesthesia Type: MAC    Airway Patency: patent    Postop Pain Control: adequate    Mental Status: preanesthetic baseline    Nausea/Vomiting: none    Cardiopulmonary/Hydration status: stable euvolemic    Complications: no apparent anesthesia related complications    Postop vital signs: stable    Dental Exam: Unchanged from Preop    Sign out given to ICU staff.

## 2024-10-09 NOTE — ED PROVIDER NOTES
Patient Seen in: [facility] Emergency Department      History     Chief Complaint   Patient presents with    Altered Mental Status     Stated Complaint: AMS, STEMI from EMS    Subjective:   HPI    64-year-old male brought in with altered mental status.  EMS reports EKG was concerning for ST elevation MI and a STEMI was called in the field.  On arrival here EKG does not appear is consistent.  The patient has dry mucous membranes and is currently not speaking.  He is awake.  Breathing appears somewhat labored.  Good gag reflex.  Due to the patient's current condition no further history is available.  His wife later arrived and reports that he has a history of previous CVA and was treated for hemorrhagic stroke earlier this year.  She reports that 1 day ago he had low-grade fever at 100 °F and appeared a bit sluggish but improved after some Tylenol.  She reports he was significantly more fatigued today.  No reports of cough.  No history of aspiration pneumonia.  Does not get frequent urinary tract infections.    Objective:     Past Medical History:    Arrhythmia    Cataract    Congestive heart disease (HCC)    Deep vein thrombosis (HCC)    Depression    Disorder of liver    Heart valve disease    High blood pressure    High cholesterol    ICH (intracerebral hemorrhage) (HCC)    Muscle weakness    Obstructive sleep apnea, adult    autoPAP 5-12     Sleep apnea    Stroke (HCC)    Visual impairment              Past Surgical History:   Procedure Laterality Date    Cabg      Cataract      Colonoscopy N/A 11/29/2018    Procedure: COLONOSCOPY, POSSIBLE BIOPSY, POSSIBLE POLYPECTOMY 28793;  Surgeon: Zack Giordano MD;  Location: [facility]                Social History     Socioeconomic History    Marital status:    Tobacco Use    Smoking status: Never    Smokeless tobacco: Never   Vaping Use    Vaping status: Never Used   Substance and Sexual Activity    Alcohol use: Yes     Alcohol/week: 1.0 standard drink  of alcohol     Types: 1 Glasses of wine per week     Comment: Occastional    Drug use: No   Other Topics Concern    Caffeine Concern No     Comment: occasionally    Exercise No     Social Determinants of Health     Financial Resource Strain: Low Risk  (7/24/2024)    Financial Resource Strain     Difficulty of Paying Living Expenses: Not hard at all     Med Affordability: No   Food Insecurity: Low Risk  (8/22/2024)    Received from [facility]    Food Insecurity     Have there been times that your food ran out, and you didn't have money to get more?: No     Are there times that you worry that this might happen?: No   Transportation Needs: Low Risk  (9/13/2024)    Received from [facility]    Transportation Needs     Has lack of transportation kept you from medical appointments, meetings, work, or from getting things needed for daily living: No     How do you normally get to and from your appointments?: Family/Friend   Physical Activity: Sufficiently Active (3/17/2020)    Received from [facility]    Exercise Vital Sign     Days of Exercise per Week: 6 days     Minutes of Exercise per Session: 60 min   Housing Stability: Low Risk  (8/22/2024)    Received from [facility]    Housing Stability     Are you worried that your electric, gas, oil, or water might be shut off?: No     Are you concerned about having a safe and reliable place to live?: No                  Physical Exam     ED Triage Vitals   BP 10/08/24 1651 128/88   Pulse 10/08/24 1651 81   Resp 10/08/24 1651 18   Temp 10/08/24 1722 (!) 103.2 °F (39.6 °C)   Temp src 10/08/24 1722 Rectal   SpO2 10/08/24 1700 94 %   O2 Device 10/08/24 1700 Nasal cannula       Current Vitals:   Vital Signs  BP: 92/67  Pulse: 69  Resp: 21  Temp: (!) 102.1 °F (38.9 °C)  Temp src: Rectal  MAP (mmHg): 76    Oxygen Therapy  SpO2: 98 %  O2 Device: Nasal cannula  O2 Flow Rate (L/min): 3  L/min        Physical Exam  General:  Vitals as listed.  Chronically ill-appearing.  Pale and diaphoretic.  HEENT: Dry mucous membranes.  Head atraumatic.  Normal gag reflex.  Lungs: good air exchange and clear   Heart: regular rate rhythm and no murmur   Abdomen: Soft and nontender.  No abdominal masses.  No peritoneal signs   Extremities: no edema  Neuro: Awake but does not follow commands.  Moving extremities.  Skin: Erythematous rash to left lower leg.  No warmth and does not appear consistent with cellulitis.    ED Course     Labs Reviewed   COMP METABOLIC PANEL (14) - Abnormal; Notable for the following components:       Result Value    Glucose 145 (*)     Sodium 133 (*)     CO2 20.0 (*)     BUN 32 (*)     Creatinine 1.35 (*)     eGFR-Cr 59 (*)     AST 35 (*)     Albumin 3.1 (*)     A/G Ratio 0.9 (*)     All other components within normal limits   CBC WITH DIFFERENTIAL WITH PLATELET - Abnormal; Notable for the following components:    WBC 24.9 (*)     RBC 3.85 (*)     HGB 11.3 (*)     HCT 32.0 (*)     PLT 91.0 (*)     Immature Platelet Fraction 7.5 (*)     Neutrophil Absolute Prelim 20.47 (*)     All other components within normal limits   TROPONIN I HIGH SENSITIVITY - Abnormal; Notable for the following components:    Troponin I (High Sensitivity) 471 (*)     All other components within normal limits   URINALYSIS WITH CULTURE REFLEX - Abnormal; Notable for the following components:    Clarity Urine Turbid (*)     Ketones Urine Trace (*)     Blood Urine 1+ (*)     Protein Urine 100 (*)     WBC Urine 6-10 (*)     RBC Urine 6-10 (*)     Bacteria Urine Rare (*)     Squamous Epi. Cells Few (*)     All other components within normal limits   LACTIC ACID, PLASMA - Abnormal; Notable for the following components:    Lactic Acid 4.2 (*)     All other components within normal limits   MANUAL DIFFERENTIAL - Abnormal; Notable for the following components:    Neutrophil Absolute Manual 22.16 (*)     RBC Morphology See  morphology below (*)     All other components within normal limits   LIPID PANEL - Abnormal; Notable for the following components:    HDL Cholesterol <5 (*)     Triglycerides 157 (*)     All other components within normal limits   LACTIC ACID REFLEX POST POSTIVE - Abnormal; Notable for the following components:    Lactic Acid 2.3 (*)     All other components within normal limits   C-REACTIVE PROTEIN - Abnormal; Notable for the following components:    C-Reactive Protein 28.90 (*)     All other components within normal limits   PROCALCITONIN - Abnormal; Notable for the following components:    Procalcitonin 2.94 (*)     All other components within normal limits   SED RATE, WESTERGREN (AUTOMATED) - Abnormal; Notable for the following components:    Sed Rate 83 (*)     All other components within normal limits   PTT, ACTIVATED - Abnormal; Notable for the following components:    PTT 39.0 (*)     All other components within normal limits   PROTHROMBIN TIME (PT) - Abnormal; Notable for the following components:    PT 21.6 (*)     INR 1.86 (*)     All other components within normal limits   POCT GLUCOSE - Abnormal; Notable for the following components:    POC Glucose 122 (*)     All other components within normal limits   SARS-COV-2/FLU A AND B/RSV BY PCR ([facility]ERT) - Normal    Narrative:     This test is intended for the qualitative detection and differentiation of SARS-CoV-2, influenza A, influenza B, and respiratory syncytial virus (RSV) viral RNA in nasopharyngeal or nares swabs from individuals suspected of respiratory viral infection consistent with COVID-19 by their healthcare provider. Signs and symptoms of respiratory viral infection due to SARS-CoV-2, influenza, and RSV can be similar.    Test performed using the Xpert Xpress SARS-CoV-2/FLU/RSV (real time RT-PCR)  assay on the [employer] instrument, [employer], [place] [address].   This test is being used under the Food and Drug Administration's Emergency Use  Authorization.    The authorized Fact Sheet for Healthcare Providers for this assay is available upon request from the laboratory.   LEGIONELLA URINE AG SEROGRP 1   STREPTOCOCCUS PNEUMONIAE AG, URINE   RAINBOW DRAW LAVENDER   RAINBOW DRAW LIGHT GREEN   RAINBOW DRAW BLUE   RAINBOW DRAW GOLD   BLOOD CULTURE   BLOOD CULTURE     EKG    Rate, intervals and axes as noted on EKG Report.  Rate: 85  Rhythm: Atrial Fibrillation  Reading: No ST elevation MI.  Reviewed EKG with Dr. Acosta from [facility] who agrees                CT BRAIN OR HEAD (CPT=70450)    Result Date: 10/8/2024  PROCEDURE:  CT BRAIN OR HEAD (99569)  COMPARISON:  [facility] , MR, MRI BRAIN (W+WO) (CPT=70553), 7/19/2024, 4:52 AM.  [facility] , CT, CTA BRAIN + CTA CAROTIDS (CPT=70496/69275), 7/19/2024, 3:09 PM.  [facility] , CT, CT BRAIN OR HEAD (46324), 7/17/2024, 5:08 PM.  [place], CT, CT BRAIN OR HEAD (41046), 4/26/2024, 7:23 AM.  [facility] , CT, CT BRAIN OR HEAD (19891), 3/01/2024, 1:33 PM.  INDICATIONS:  AMS, STEMI from EMS  TECHNIQUE:  Noncontrast CT scanning is performed through the brain. Dose reduction techniques were used. Dose information is transmitted to the ACR (American College of Radiology) NRDR (National Radiology Data Registry) which includes the Dose Index Registry.  PATIENT STATED HISTORY: (As transcribed by Technologist)  AMS. Full arrest    FINDINGS:  VENTRICLES/SULCI:  Ventricles and sulci are prominent consistent with atrophy. INTRACRANIAL:  There is an old infarct within the right medial occipital lobe.  There is moderate low-attenuation within periventricular white matter consistent small vessel ischemic disease.  There is an old infarct within the subinsular cortex bilaterally.  There are no abnormal extraaxial fluid collections.  There is no midline shift.  There are no intraparenchymal brain abnormalities.  There is nothing specific for acute infarct.  There is no hemorrhage or mass lesion.  SINUSES:           No sign of acute sinusitis.   MASTOIDS:          No sign of acute inflammation. SKULL:             No evidence for fracture or osseous abnormality. OTHER:             None.            CONCLUSION:   1.  No evidence of acute intracranial process  2. Old infarcts are noted within the subinsular cortex bilaterally and the right occipital lobe.    LOCATION:  [facility]   Dictated by (CST): Shaquille Brooks MD on 10/08/2024 at 6:17 PM     Finalized by (CST): Shaquille Brooks MD on 10/08/2024 at 6:19 PM       XR CHEST AP PORTABLE  (CPT=71045)    Result Date: 10/8/2024  PROCEDURE:  XR CHEST AP PORTABLE  (CPT=71045)  TECHNIQUE:  AP chest radiograph was obtained.  COMPARISON:  [facility] , XR, XR CHEST AP PORTABLE  (CPT=71045), 7/17/2024, 0:29 AM.  INDICATIONS:  AMS, STEMI from EMS  PATIENT STATED HISTORY: (As transcribed by Technologist)  Pt. with unresposive, AMS STEMI.               CONCLUSION:   Postoperative changes of CABG and valve replacement with stable cardiac and mediastinal contours.  There may be a component of mild interstitial pulmonary edema.  No airspace consolidation.  The pleural spaces are clear.  Left anterior chest wall leads noted.   LOCATION:  [facility]      Dictated by (CST): Sukhjinder Briceno MD on 10/08/2024 at 5:35 PM     Finalized by (CST): Sukhjinder Briceno MD on 10/08/2024 at 5:36 PM             MDM      64-year-old male brought in by paramedics with altered mental status.  He arrived febrile, hypotensive and altered.  Unable to provide history.  Patient's wife later arrived and reported fever that began a day ago.  No focal infectious symptoms noted by the patient's wife    Differential includes but is not limited to intracranial hemorrhage, cardiac ischemia, sepsis, a life threat.    CBC, CMP, urinalysis, lactic acid, blood cultures, viral nasal swab, CT brain, chest x-ray ordered for further evaluation.    My independent interpretation of CT of the brain is that there is no acute intracranial hemorrhage    Laboratory evaluation did return with an  elevated cardiac enzyme with troponin at 471.  Dr. Acosta reevaluated the patient at the bedside.  EKG is not showing ST elevation MI and at this time cardiology does not wish to take the patient to the Cath Lab.  They will continue to follow and manage as needed.    Case was discussed with admitting physician as well as the critical care team who both evaluated the patient at the bedside.  Hospitalist requesting CT chest abdomen and pelvis for evaluation of any other infectious causes.  The patient is currently anticoagulated moving around quite a bit in the hospital bed and right now is not very directable.  All evaluating parties agree that attempting lumbar puncture here in the ER is not needed at this time and they will consider IR LP going forward.  I have spoken with the patient's wife multiple times to update her on plan of management and results.  She feels he is becoming more alert and interactive.      Critical CARE time:  A total of 60 minutes of critical care time (exclusive of billable procedures) was administered to manage the patient's respiratory instability, cardiovascular instability, and neurologic instability due to his sepsis with altered mental status, abnormal cardiac enzymes.  This involved direct patient intervention, complex decision making, and/or extensive discussions with the patient, family, and clinical staff.          Medical Decision Making      Disposition and Plan     Clinical Impression:  1. Chest pain    2. Sepsis, due to unspecified organism, unspecified whether acute organ dysfunction present (HCC)    3. Altered mental status, unspecified altered mental status type    4. Troponin level elevated         Disposition:  There is no disposition on file for this visit.  There is no disposition time on file for this visit.    Follow-up:  No follow-up provider specified.        Medications Prescribed:  Current Discharge Medication List              Supplementary Documentation:      [facility]   part of [facility]      Sepsis Reassessment Note    BP 92/67   Pulse 69   Temp (!) 102.1 °F (38.9 °C) (Rectal)   Resp 21   Wt 96 kg   SpO2 98%   BMI 26.45 kg/m²      I completed the sepsis reassessment at 2048    Cardiac:  Regularity: Irregular  Rate: Normal  Heart Sounds: S1,S2    Lungs:   Right: Clear  Left: Clear    Peripheral Pulses:  Radial: Right 1+ or Left 1+      Capillary Refill:  <3 Secs    Skin:  Temp/Moisture: Warm and Dry  Color: Normal      Zack Calixto MD  10/8/2024  8:45 PM

## 2024-10-09 NOTE — TRANSFER CENTER NOTE
Contacted [facility] transfer center to confirm that they have accepted the pt. Dr Adalberto Albert has accepted the pt. They have not gotten a face sheet. That was faxed to [phone].    The pt's insurance needs clearance and they will then work on a bed.     Bryce Santoyo with [facility] transfer number.

## 2024-10-09 NOTE — SLP NOTE
ADULT SWALLOWING EVALUATION    ASSESSMENT    ASSESSMENT/OVERALL IMPRESSION:  Patient is a 65 y/o male admitted with AMS and PMHx significant for CHF, CAD, DVT, HTN. SLP order received to evaluate oropharyngeal swallow d/t failed RN dysphagia screen. Patient known to this service for prior dysphagia assessment and management in 07/2024. He was last recommended a soft & bite-sized diet with thin liquids at that time. Today, patient reported recent stay and Acute Rehab where he was working with speech therapy and was advanced to a regular diet and thin liquids. He reported occasional cough with PO intake, but infrequent. He also reported utilizing a brief oral bolus hold at baseline.    Patient presented with mild pharyngeal dysphagia characterized by suspected mistimed pharyngeal swallow initiation. Bolus acceptance was adequate without evidence of anterior bolus loss. Mastication and AP bolus transit were thorough and efficient without evidence of oral residue. Suspect delayed pharyngeal swallow initiation x1 resulting in immediate cough with initial trial of thin liquids. No further s/s of aspiration observed when patient implementing brief oral bolus hold.    Recommend patient initiate a regular diet and thin liquids with strict aspiration precautions. Patient to continue to utilize brief oral bolus hold for improved timing of pharyngeal swallow. SLP will continue to follow to monitor diet tolerance and adjust as appropriate. Education provided re: results and recommendations.         RECOMMENDATIONS   Diet Recommendations - Solids: Regular  Diet Recommendations - Liquids: Thin Liquids                        Compensatory Strategies Recommended: Small bites and sips (brief oral bolus hold)  Aspiration Precautions: Upright position  Medication Administration Recommendations: One pill at a time  Treatment Plan/Recommendations: Aspiration precautions    HISTORY   MEDICAL HISTORY  Reason for Referral: RN dysphagia  screen    Problem List  Principal Problem:    Chest pain  Active Problems:    Sepsis with encephalopathy without septic shock (HCC)    Altered mental status, unspecified altered mental status type    Troponin level elevated    Acute bacterial endocarditis (HCC)    Encephalopathy      Past Medical History  Past Medical History:    Arrhythmia    Cataract    Congestive heart disease (HCC)    Deep vein thrombosis (HCC)    Heart valve disease    High blood pressure    High cholesterol    ICH (intracerebral hemorrhage) (HCC)    Muscle weakness    Obstructive sleep apnea, adult    autoPAP 5-12     Sleep apnea    Stroke (HCC)    repeat stroke feb 2024 and july 2024    Visual impairment       Prior Living Situation: Home with spouse  Diet Prior to Admission: Regular;Thin liquids  Precautions: Aspiration    Patient/Family Goals: none stated    SWALLOWING HISTORY  Current Diet Consistency: NPO  Dysphagia History: as above  Imaging Results:   CT Chest 10/8/24  CONCLUSION:       1. No evidence of pulmonary embolus or airspace disease within the chest.       2. Possible infarct within the spleen could be due to embolic event and even septic emboli.      3. There is a focus of hypoenhancement within the left midpole of the kidney is nonspecific could represent a early area of pyelonephritis in the correct clinical setting and correlation with urinalysis recommended.      4. Presacral inflammatory change within the fat likely due to post treatment or chronic changes.      5. Probable chronic thrombus below the IVC filter.            LOCATION:  [place]            Dictated by (CST): Shaquille Brooks MD on 10/08/2024 at 9:10 PM       Finalized by (CST): Shaquille Brooks MD on 10/08/2024 at 9:22 PM     SUBJECTIVE       OBJECTIVE   ORAL MOTOR EXAMINATION  Dentition: Functional  Symmetry: Within Functional Limits  Strength: Within Functional Limits     Range of Motion: Within Functional Limits       Voice Quality: Clear  Respiratory Status:  Unlabored  Consistencies Trialed: Thin liquids;Puree;Soft solid;Hard solid  Method of Presentation: Staff/Clinician assistance  Patient Positioning: Upright;Midline    Oral Phase of Swallow: Within Functional Limits                      Pharyngeal Phase of Swallow: Impaired  Laryngeal Elevation Timing: Appears impaired        (Please note: Silent aspiration cannot be evaluated clinically. Videofluoroscopic Swallow Study is required to rule-out silent aspiration.)    Esophageal Phase of Swallow: No complaints consistent with possible esophageal involvement  Comments: d/w RN              GOALS  Goal #1 The patient will tolerate regular consistency and thin liquids without overt signs or symptoms of aspiration with 90 % accuracy over 1-2 session(s).  In Progress   Goal #2 The patient/family/caregiver will demonstrate understanding and implementation of aspiration precautions and swallow strategies independently over 1-2 session(s).    In Progress   Goal #3     Goal #4     Goal #5     Goal #6     Goal #7     Goal #8       FOLLOW UP  Treatment Plan/Recommendations: Aspiration precautions     Follow Up Needed (Documentation Required): Yes  SLP Follow-up Date: 10/10/24    Thank you for your referral.   If you have any questions, please contact GIN Neal

## 2024-10-09 NOTE — CONSULTS
[facility]   part of [facility] ID CONSULT NOTE    Kris Colvin Patient Status:  Inpatient    3/6/1960 MRN [id number]   Location [facility] 4SW-A Attending Hemal Mckeon MD   Hosp Day # 1 PCP Lenka Guevara DO     Consulted by Dr. Dee    Reason for Consultation:  Possible endocarditis, fever, AMS    History of Present Illness:  Kris Colvin is a 64 year old male with a history of CHF, CAD s/p CABG and AVR, HTN, p. A.fib and DVT (patient with IVC filter), HLD and ICH 2024. Patient was hospitalized 24- 24 at which time he was found to an acute CVA and staph epi (oxacillin R) in the blood- concerning for possible aortic PVE. However, repeat blood cultures were negative and were done prior to vancomycin. He was discharged with 6 weeks of IV dapto, which he completed.    Patient now presents with AMS and fever. Per chart review, he was found unresponsive and EMS was called. Was noted to be in V fib and shocked by EMS.      Patient reports that he has had some intermittent fevers and chills for some time now. Denies any chest pain, nausea, vomiting, diarrhea, Sob, cough or headache. He is alert and oriented to self, location, month and year at the time of my eval.     Temps as high as 103.2 in the ED- much better today. PCT 2.94. WBC 26.5K. Requiring 2mcg of levo overnight, off levo this am.    History:  Past Medical History:    Arrhythmia    Cataract    Congestive heart disease (HCC)    Deep vein thrombosis (HCC)    Heart valve disease    High blood pressure    High cholesterol    ICH (intracerebral hemorrhage) (HCC)    Muscle weakness    Obstructive sleep apnea, adult    autoPAP 5-12     Sleep apnea    Stroke (HCC)    repeat stroke 2024 and 2024    Visual impairment     Past Surgical History:   Procedure Laterality Date    Cabg      Cataract      Colonoscopy N/A 2018    Procedure: COLONOSCOPY, POSSIBLE BIOPSY, POSSIBLE POLYPECTOMY 53353;  Surgeon: Zack Giordano,  MD;  Location: [facility]     Family History   Problem Relation Age of Onset    Diabetes Father     Breast Cancer Mother     Diabetes Maternal Grandmother     Diabetes Paternal Grandmother       reports that he has never smoked. He has never used smokeless tobacco. He reports that he does not currently use alcohol. He reports that he does not use drugs.    Allergies:  Allergies[1]    Medications:    Current Facility-Administered Medications:     norepinephrine (Levophed) 4 mg/250mL infusion premix, 0.5-30 mcg/min, Intravenous, Continuous    ceFEPIme (Maxpime) 2 g in sodium chloride 0.9% 100 mL IVPB-MBP, 2 g, Intravenous, Q8H    melatonin tab 3 mg, 3 mg, Oral, Nightly PRN    polyethylene glycol (PEG 3350) (Miralax) 17 g oral packet 17 g, 17 g, Oral, Daily PRN    sennosides (Senokot) tab 17.2 mg, 17.2 mg, Oral, Nightly PRN    bisacodyl (Dulcolax) 10 MG rectal suppository 10 mg, 10 mg, Rectal, Daily PRN    fleet enema (Fleet) rectal enema 133 mL, 1 enema, Rectal, Once PRN    heparin (Porcine) 43381 units/250mL infusion PE/DVT/THROMBUS CONTINUOUS, 200-3,000 Units/hr, Intravenous, Continuous    clopidogrel (Plavix) tab 75 mg, 75 mg, Oral, Daily    [Held by provider] metoprolol tartrate (Lopressor) partial tab 12.5 mg, 12.5 mg, Oral, 2x Daily(Beta Blocker)    [Held by provider] dronedarone (Multaq) tab 400 mg, 400 mg, Oral, BID    vancomycin (Vancocin) 1.5 g in sodium chloride 0.9% 250mL IVPB premix, 15 mg/kg, Intravenous, Q12H    Review of Systems:  CONSTITUTIONAL:  No new weakness or fatigue.  HEENT:  Eyes:  No visual loss. Ears, Nose, Throat:  No hearing loss  SKIN:  No rash or itching.  CARDIOVASCULAR:  No chest pain  RESPIRATORY:  No shortness of breath, cough or sputum.  GASTROINTESTINAL:  No nausea, vomiting or diarrhea. No abdominal pain  GENITOURINARY:  No Burning on urination.   NEUROLOGICAL:  No headache  MUSCULOSKELETAL:  No back pain or joint pain  HEMATOLOGIC:  No bleeding  ALLERGIES:  No history of  asthma    Physical Exam:  Vital signs: Blood pressure (!) 80/55, pulse 72, temperature 99 °F (37.2 °C), resp. rate 16, weight 210 lb 12.2 oz (95.6 kg), SpO2 96%.    General: Alert, oriented, NAD, on room air.   HEENT: Moist mucous membranes.   Neck: No lymphadenopathy.  Supple.  Cardiovascular: RRR  Respiratory: Clear to auscultation bilaterally.  No wheezes. No rhonchi.  Abdomen: Soft, nontender, nondistended.   Musculoskeletal: LE edema  Integument: Some erythema and scabbing to legs bilaterally.    Laboratory Data:  Recent Labs   Lab 10/09/24  0409   RBC 3.15*   HGB 9.3*   HCT 27.0*   MCV 85.7   MCH 29.5   MCHC 34.4   RDW 15.3   NEPRELIM 21.06*   WBC 26.5*   PLT 84.0*   NEUT 83   LYMPH 10   MON 6   EOS 0     Recent Labs   Lab 10/08/24  1654 10/08/24  2205 10/09/24  0409   * 132* 118*   BUN 32* 32* 32*   CREATSERUM 1.35* 1.31* 1.21   CA 9.1 8.5* 8.3*   ALB 3.1* 2.5* 2.6*   * 133* 136   K 3.5 3.8 4.2  4.2    105 106   CO2 20.0* 22.0 23.0   ALKPHO 98 82 81   AST 35* 27 25   ALT 10 7* 7*   BILT 1.0 0.8 0.6   TP 6.5 5.7 5.6*       Microbiology: Reviewed in EMR    Radiology: Reviewed.    PROCEDURE:  CT CHEST+ABDOMEN+PELVIS(ALL CNTRST ONLY)(CPT=71260/78815)     COMPARISON:  [facility] , CT, CTA ABD/PEL (CPT=74174), 7/17/2024, 9:30 AM.  [facility] , CT, CT CHEST+ABDOMEN+PELVIS(ALL CNTRST ONLY)(CPT=71260/23521), 7/17/2024, 2:34 AM.     INDICATIONS:  Febrile, hypotensive, and altered.     TECHNIQUE:  IV contrast-enhanced scanning through the chest, abdomen, and pelvis was performed.  Dose reduction techniques were used. Dose information is transmitted to the ACR (American College of Radiology) NRDR (National Radiology Data Registry) which   includes the Dose Index Registry.     PATIENT STATED HISTORY:(As transcribed by Technologist)  Altered mental satus and STEMI.      CONTRAST USED:  100cc of Isovue 370     FINDINGS:       CHEST:    LUNGS:  Subsegmental atelectasis in the left lower lobe.  There is no dense  airspace consolidation.  The large airways unremarkable.  MEDIASTINUM:  No mass or adenopathy.    YASMINE:  No mass or adenopathy.    CARDIAC:  There is a aortic valve replacement no pericardial effusion.  Mild coronary calcifications.  PLEURA:  No mass or effusion.    CHEST WALL:  No mass or axillary adenopathy.    AORTA:  No aneurysm or dissection.    VASCULATURE:  There is no evidence of pulmonary embolus however the subsegmental pulmonary arteries are not well visualized secondary to significant motion and breathing artifact.     ABDOMEN/PELVIS:  LIVER:  No enlargement, atrophy, abnormal density, or significant focal lesion.    BILIARY:  No visible dilatation or calcification.    PANCREAS:  No lesion, fluid collection, ductal dilatation, or atrophy.    SPLEEN:  There is low-attenuation lesion within the anterior spleen could represent a splenic infarct measuring 17 by 14 mm.  There are additional small foci of hypoenhancement within the spleen on image 171 and 172.  KIDNEYS:  Non-obstructing stones are seen bilaterally such is in the right midpole measuring 6 mm in left lower pole measuring 2 mm. .  Cyst within right kidney which are simple with the largest on the right measuring 17 mm.  There is focal hypo  enhancement within the left midpole of the kidney.  Please correlate with any urinalysis to exclude pyelonephritis within the kidney.  There is no evidence of hydroureter or obstructing stone.  ADRENALS:  No mass or enlargement.    AORTA/IVC:  No aneurysm or dissection.  IVC filter is noted below the renal veins which is a retrievable filter and there is decreased size of the infrarenal IVC with some low density within the IVC could represent chronic thrombus beneath the filter in  therefore the filter should remain in position.  RETROPERITONEUM:  No mass or adenopathy.    BOWEL/MESENTERY:  No visible mass, obstruction, or bowel wall thickening.  There is perirectal infiltration of the fat which is been seen on  previous studies may be due to post treatment changes.  ABDOMINAL WALL:  No mass or hernia.    URINARY BLADDER:  Urinary bladder is decompressed.  PELVIC NODES:  No adenopathy.    PELVIC ORGANS:  No visible mass.  Pelvic organs appropriate for patient age.    BONES:  No bony lesion or fracture.  Moderate degenerative changes of the lumbar spine most pronounced at L3-4 and L4-5.     Impression:    CONCLUSION:       1. No evidence of pulmonary embolus or airspace disease within the chest.       2. Possible infarct within the spleen could be due to embolic event and even septic emboli.     3. There is a focus of hypoenhancement within the left midpole of the kidney is nonspecific could represent a early area of pyelonephritis in the correct clinical setting and correlation with urinalysis recommended.     4. Presacral inflammatory change within the fat likely due to post treatment or chronic changes.     5. Probable chronic thrombus below the IVC filter.     LOCATION:  [facility]     Dictated by (CST): Shaquille Brooks MD on 10/08/2024 at 9:10 PM      Finalized by (CST): Shaquille Brooks MD on 10/08/2024 at 9:22 PM      PROCEDURE:  CT BRAIN OR HEAD (16505)     COMPARISON:  [facility] , MR, MRI BRAIN (W+WO) (CPT=70553), 7/19/2024, 4:52 AM.  [facility] , CT, CTA BRAIN + CTA CAROTIDS (CPT=70496/25154), 7/19/2024, 3:09 PM.  [facility] , CT, CT BRAIN OR HEAD (22991), 7/17/2024, 5:08 PM.  [facility], CT, CT BRAIN OR HEAD  (47138), 4/26/2024, 7:23 AM.  [facility] , CT, CT BRAIN OR HEAD (75719), 3/01/2024, 1:33 PM.     INDICATIONS:  AMS, STEMI from EMS     TECHNIQUE:  Noncontrast CT scanning is performed through the brain. Dose reduction techniques were used. Dose information is transmitted to the ACR (American College of Radiology) NRDR (National Radiology Data Registry) which includes the Dose Index  Registry.     PATIENT STATED HISTORY: (As transcribed by Technologist)  AMS. Full arrest      FINDINGS:    VENTRICLES/SULCI:  Ventricles and sulci are  prominent consistent with atrophy.  INTRACRANIAL:  There is an old infarct within the right medial occipital lobe.  There is moderate low-attenuation within periventricular white matter consistent small vessel ischemic disease.  There is an old infarct within the subinsular cortex  bilaterally.  There are no abnormal extraaxial fluid collections.  There is no midline shift.  There are no intraparenchymal brain abnormalities.  There is nothing specific for acute infarct.  There is no hemorrhage or mass lesion.    SINUSES:           No sign of acute sinusitis.    MASTOIDS:          No sign of acute inflammation.  SKULL:             No evidence for fracture or osseous abnormality.  OTHER:             None.     Impression:    CONCLUSION:       1.  No evidence of acute intracranial process     2. Old infarcts are noted within the subinsular cortex bilaterally and the right occipital lobe.     LOCATION:  [facility]     Dictated by (CST): Shaquille Brooks MD on 10/08/2024 at 6:17 PM      Finalized by (CST): Shaquille Brooks MD on 10/08/2024 at 6:19 PM        PROCEDURE:  XR CHEST AP PORTABLE  (CPT=71045)     TECHNIQUE:  AP chest radiograph was obtained.     COMPARISON:  [facility] , XR, XR CHEST AP PORTABLE  (CPT=71045), 7/17/2024, 0:29 AM.     INDICATIONS:  AMS, STEMI from EMS     PATIENT STATED HISTORY: (As transcribed by Technologist)  Pt. with unresposive, AMS STEMI.     Impression:    CONCLUSION:       Postoperative changes of CABG and valve replacement with stable cardiac and mediastinal contours.  There may be a component of mild interstitial pulmonary edema.  No airspace consolidation.  The pleural spaces are clear.  Left anterior chest wall leads  noted.     LOCATION:  [facility]     Dictated by (CST): Sukhjinder Briceno MD on 10/08/2024 at 5:35 PM      Finalized by (CST): Sukhjinder Briceno MD on 10/08/2024 at 5:36 PM      ASSESSMENT:  GPC bacteremia. 1/2 Bcx + 10/8, no ID by PCR. Patient with AVR, concern for PVE.  H/o staph epi bacteremia 2/2  Bcxs 7/2024. TTE w/o obvious vegetation at that time. Completed 6 week course of IV dapto for possible PVE.   Fevers/leukocytosis, assume related to above. CT c/a/p with possible infarct (possible septic emboli), nonspecific hypoenhancement within the left kidney that could be early pyelo although UA not very impressive.   AMS on admission, resolved now.  History of AVR with Hemashield graft to ascending aorta 5/2020.   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  H/o CVA (suspected to be embolic 7/2024), ICH 2/2024.  CAD, s/p CABG; CHF; HTN, HLD, A. Fib  CHAZ    PLAN:  - continue IV cefepime and vancomycin for now  - await ID on [facility]--> d/w micro- they will perform full ID and krystian  - repeat blood cultures to document clearance  - await TTE results; cards on consult  - follow temps and wbc  - reviewed labs, micro, imaging reports, available old records    Discussed case with RN, micro, patient and Dr. Fuller.    Thank you for allowing us to participate in the care of this patient. Please do not hesitate to call if you have any questions.   We will continue to follow with you and will make further recommendations based on his progress.    [facility]  [phone]  10/9/2024         [1] No Known Allergies

## 2024-10-09 NOTE — TELEPHONE ENCOUNTER
Valid oralia received and tasked to scan, no fax # on oralia but patient selected also to mail completed forms

## 2024-10-09 NOTE — DIETARY MALNUTRITION NOTE
[facility]   part of [facility]  NUTRITION ASSESSMENT    Pt meets severe malnutrition criteria at this time.    CRITERIA FOR MALNUTRITION DIAGNOSIS:  Criteria for severe malnutrition diagnosis: chronic illness related to wt loss greater than 20% in 1 year, energy intake less than 75% for greater than 1 month, and muscle mass severe depletion    NUTRITION INTERVENTION:    RD nutrition Care Plan- Encouraged small frequent meals with emphasis on high calorie/high protein, Initiated ONS (oral nutritional supplements), and Ordered multivitamin  Meal and Snacks - ADAT to Regular once medically appropriate; monitor patient po intake. Encourage adequate po of appropriate diet.  Medical Food Supplements - RD added Magic Shake strawberry daily and [employer] Standard 1.0 vanilla daily. Rationale/use for oral supplements discussed.  Vitamin and Mineral Supplements - Recommend adding Multivitamin with minerals  Coordination of Nutrition Care - Recommend SLP consult prior to diet advancement.    PATIENT STATUS: 64 year old male admitted on 10/8 presents with AMS. Pt screened d/t MST score 4. Visited pt at bedside with wife present. Pt reports decreased appetite/PO intake since Feb and has continues to lose wt. Denies nausea, vomiting and diarrhea but reports mild constipation with last BM yesterday. No chewing or swallowing difficulties; per RN SLP cleared pt for regular solids and thin liquids. Discussed ONS options as pt does not like Ensure (too sweet) or Magic Cup (too \"chewy\"). Pt agreeable to try strawberry Magic Shake and vanilla [employer] once diet advanced. All questions answered at this time.    PMH: CHF, CAD, DVT, HTN, CHAZ     ANTHROPOMETRICS:  Ht:  6'3\"  Wt: 95.6 kg (210 lb 12.2 oz).   BMI: Body mass index is 26.34 kg/m².  IBW: 89.1 kg    WEIGHT HISTORY: Per chart, pt with ~89 lb wt loss x 11 months (30%, significant per standards).  Patient Weight(s) for the past 336 hrs:   Weight   10/08/24 2200 95.6 kg  (210 lb 12.2 oz)   10/08/24 1651 96 kg (211 lb 10.3 oz)       Wt Readings from Last 13 Encounters:   10/08/24 95.6 kg (210 lb 12.2 oz)   09/26/24 96.2 kg (212 lb)   08/01/24 117.9 kg (260 lb)   07/17/24 117.9 kg (260 lb)   07/18/24 117.9 kg (260 lb)   04/29/24 120.2 kg (265 lb)   02/27/24 122.5 kg (270 lb)   02/24/24 126.3 kg (278 lb 8 oz)   01/25/24 129.3 kg (285 lb)   01/18/24 134.7 kg (297 lb)   12/27/23 135.6 kg (299 lb)   11/09/23 135.6 kg (299 lb)   09/06/23 131.5 kg (290 lb)        NUTRITION:  Diet:       Procedures    NPO      Food Allergies: No  Cultural/Ethnic/Jain Preferences Addressed: Yes    Percent Meals Eaten (last 3 days)       None            GI SYSTEM REVIEW: constipation; last BM 10/8 per pt  Skin/Wounds: WNL    NUTRITION RELATED PHYSICAL FINDINGS:     1. Body Fat/Muscle Mass: moderate depletion body fat Buccal fat pad and Triceps, moderate muscle depletion Clavicle region, and severe muscle depletion Temple region     2. Fluid Accumulation: lower extremity edema 1+ and foot edema 1+    NUTRITION PRESCRIPTION:  95.6 kg Actual Body Weight  Calories: 0810-5097 calories/day (25-30 kcal/kg)  Protein:  grams protein/day (1.0-1.5 gm/kg)  Fluid: ~1 ml/kcal or per MD discretion    NUTRITION DIAGNOSIS/PROBLEM:  Malnutrition related to insufficient appetite resulting in inadequate nutrition intake as evidenced by documented/reported insufficient oral intake, documented/reported unintentional weight loss, loss of fat mass, and loss of muscle mass    MONITOR AND EVALUATE/NUTRITION GOALS:  PO intake of 75% of meals TID - New  PO intake of 75% of oral nutrition supplement/s - New  Weight stable within 1 to 2 lbs during admission - New      MEDICATIONS:  abx    LABS:  Reviewed     Pt is at High nutrition risk    Samina Doss, ELI, LDN, CNSC  Clinical Dietitian  Spectra: [phone]

## 2024-10-09 NOTE — PROGRESS NOTES
[facility]   part of [facility]     Hospitalist Progress Note     Kris Colvin Patient Status:  Inpatient    3/6/1960 MRN [id number]   Location [facility] 4SW-A Attending Hemal Mckeon MD   Hosp Day # 1 PCP Lenka Guevara DO     Chief Complaint: fever    Subjective:     Patient continued fevers    Objective:    Review of Systems:   A comprehensive review of systems was completed; pertinent positive and negatives stated in subjective.    Vital signs:  Temp:  [98.1 °F (36.7 °C)-103.2 °F (39.6 °C)] 99.5 °F (37.5 °C)  Pulse:  [] 73  Resp:  [12-38] 15  BP: ()/(55-95) 93/63  SpO2:  [89 %-100 %] 97 %  AO: ()/(48-68) 102/60    Physical Exam:    General: No acute distress  Respiratory: No wheezes, no rhonchi  Cardiovascular: S1, S2, regular rate and rhythm  Abdomen: Soft, Non-tender, non-distended, positive bowel sounds  Neuro: No new focal deficits.   Extremities: No edema      Diagnostic Data:    Labs:  Recent Labs   Lab 10/08/24  1654 10/08/24  2205 10/09/24  0409   WBC 24.9* 26.4* 26.5*   HGB 11.3* 9.7* 9.3*   MCV 83.1 83.9 85.7   PLT 91.0* 77.0* 84.0*   BAND  --  2 1   INR 1.86* 1.87*  --        Recent Labs   Lab 10/08/24  1654 10/08/24  2205 10/09/24  0409   * 132* 118*   BUN 32* 32* 32*   CREATSERUM 1.35* 1.31* 1.21   CA 9.1 8.5* 8.3*   ALB 3.1* 2.5* 2.6*   * 133* 136   K 3.5 3.8 4.2  4.2    105 106   CO2 20.0* 22.0 23.0   ALKPHO 98 82 81   AST 35* 27 25   ALT 10 7* 7*   BILT 1.0 0.8 0.6   TP 6.5 5.7 5.6*       Estimated Creatinine Clearance: 74.7 mL/min (based on SCr of 1.21 mg/dL).    Recent Labs   Lab 10/08/24  1654 10/08/24  2205   TROPHS 471* 503*       Recent Labs   Lab 10/08/24  1654 10/08/24  2205   PTP 21.6* 21.7*   INR 1.86* 1.87*                  Microbiology    Hospital Encounter on 10/08/24   1. Blood Culture     Status: Abnormal (Preliminary result)    Collection Time: 10/08/24  5:19 PM    Specimen: Blood,peripheral   Result Value Ref Range    Blood  Culture Result Positive N/A    Blood Smear Gram positive cocci in clusters (A) N/A         Imaging: Reviewed in Epic.    Medications:    [START ON 10/10/2024] multivitamin with minerals  1 tablet Oral Daily    [START ON 10/10/2024] sodium chloride   Intravenous On Call    benzocaine  1 spray Mouth/Throat See Admin Instructions    gentamicin  2 mg/kg (Adjusted) Intravenous Q24H    rifAMPin  300 mg Oral BID @ 0700, 2100    clopidogrel  75 mg Oral Daily    [Held by provider] metoprolol tartrate  12.5 mg Oral 2x Daily(Beta Blocker)    [Held by provider] dronedarone  400 mg Oral BID    vancomycin  15 mg/kg Intravenous Q12H       Assessment & Plan:      #Acute Febrile Illness/Sepsis  #infectious endocarditis  Possibly recurrence of staph epi sepsis  IV ABX, broad spectrum  JAZMÍN today confirmed IE and possible abscess  BCX pending  D/w CV surgery. Recommending transfer to [facility]     #Acute Metabolic encephalopathy  MRI on last admission did show multiple frontal parietal lobe infarcts likely embolic in nature  Repeat head CT here without acute process  Repeat brain MRI     # Troponin elevation  Suspect secondary to sepsis  Trend     # History of ICH  Head CT noted     # SMA thrombus  OAC-> heparin drip     # Bioprosthetic AVR     # HFrEF; EF 45%     #CAD s/p CABG  Plavix, BB,      # Hypertension  # Hyperlipidemia     #A. fib  Cont. Rate control as tolerated  OAC -> heparin drip    Ok to transfer from my standpoint when able      Hemal Mckeon MD    Supplementary Documentation:     Quality:  DVT Mechanical Prophylaxis:        DVT Pharmacologic Prophylaxis   Medication    [Held by provider] heparin (Porcine) 79238 units/250mL infusion PE/DVT/THROMBUS CONTINUOUS                Code Status: Full Code  Gaines: External urinary catheter in place  Gaines Duration (in days):   Central line:    ZORA:     Discharge is dependent on: course  At this point Mr. Colvin is expected to be discharge to: transfer?    The 21st Century Cures Act  makes medical notes like these available to patients in the interest of transparency. Please be advised this is a medical document. Medical documents are intended to carry relevant information, facts as evident, and the clinical opinion of the practitioner. The medical note is intended as peer to peer communication and may appear blunt or direct. It is written in medical language and may contain abbreviations or verbiage that are unfamiliar.     Dietitian Malnutrition Assessment    Evaluation for Malnutrition: Criteria for severe malnutrition diagnosis- chronic illness related to   Wt loss greater than 20% in 1 year., Energy intake less than 75% for greater than 1 month., Muscle mass severe depletion.               RD Malnutrition Care Plan: Encouraged small frequent meals with emphasis on high calorie/high protein., Intiated ONS (oral nutritional supplements)., Ordered multivitamin.    Body Fat/Muscle Mass:          Physician Assessment     Patient has a diagnosis of severe malnutrition

## 2024-10-09 NOTE — PLAN OF CARE
Pt received from ED- following simple commands- delayed responses/ garbled/slurred speech.   Art line placed by aprn.   Levophed started to maintain map >65 or sbp >90  Mentation improving overnight. Pt A&Ox4 this am.  Denies chest pain/shortness of breath.  External cath in place- voiding.  Rectal temp probe in place.   Speech to see today  Heparin drip per dvt/pe protocol.  Wife at bedside- updated on plan of care  Consults notified by Critical Care- Dr. Shabazz.  Per critical care- holding off  on neuro consult for now since pt is returning back to baseline.

## 2024-10-09 NOTE — H&P
[facility]     Kris Colvin Patient Status:  Emergency    3/6/1960 MRN [id number]   Location [facility] EMERGENCY DEPARTMENT Attending Zack Calixto MD   Hosp Day # 0 PCP Lenka Guevara DO     Chief Complaint:   Chief Complaint   Patient presents with    Altered Mental Status       Subjective:    History of Present Illness:     Kris Colvin is a 64 year old male with a past medical history of CHF, CAD, DVT, HTN, CHAZ.  He was recently admitted secondary to fevers and weakness.  He was found to have Staph epidermidis sepsis and possible infected SMA thrombus.  He was started on fluids and antibiotics and eventually discharged home on a course of IV antibiotics/vancomycin, completed 3 weeks ago.    He has completed his course of IV vancomycin.  About 2 days ago he started to have subjective fevers.  His wife found him unresponsive today and he was brought to the emergency room.   In the emergency room he is confused and febrile.        History/Other:    Past Medical History:  Past Medical History:    Arrhythmia    Cataract    Congestive heart disease (HCC)    Deep vein thrombosis (HCC)    Depression    Disorder of liver    Heart valve disease    High blood pressure    High cholesterol    ICH (intracerebral hemorrhage) (HCC)    Muscle weakness    Obstructive sleep apnea, adult    autoPAP 5-12     Sleep apnea    Stroke (HCC)    Visual impairment     Past Surgical History:   Past Surgical History:   Procedure Laterality Date    Cabg      Cataract      Colonoscopy N/A 2018    Procedure: COLONOSCOPY, POSSIBLE BIOPSY, POSSIBLE POLYPECTOMY 30470;  Surgeon: Zack Giordano MD;  Location: [facility]      Family History:   Family History   Problem Relation Age of Onset    Breast Cancer Mother     Diabetes Father     Diabetes Maternal Grandmother     Diabetes Paternal Grandmother      Social History:    reports that he has never smoked. He has never used smokeless tobacco. He  reports current alcohol use of about 1.0 standard drink of alcohol per week. He reports that he does not use drugs.     Allergies: No Known Allergies    Medications:    No current facility-administered medications on file prior to encounter.     Current Outpatient Medications on File Prior to Encounter   Medication Sig Dispense Refill    apixaban 5 MG Oral Tab Take 1 tablet (5 mg total) by mouth 2 (two) times daily. 60 tablet 3    carvedilol 6.25 MG Oral Tab Take 1 tablet (6.25 mg total) by mouth 2 (two) times daily. (Patient not taking: Reported on 9/26/2024)      clopidogrel 75 MG Oral Tab Take 1 tablet (75 mg total) by mouth daily. 30 tablet 1    metoprolol tartrate 25 MG Oral Tab Take 0.5 tablets (12.5 mg total) by mouth 2x Daily(Beta Blocker). 60 tablet 1    cyanocobalamin 1000 MCG Oral Tab Take 1 tablet (1,000 mcg total) by mouth daily.      furosemide 20 MG Oral Tab Take 1 tablet (20 mg total) by mouth every other day.      spironolactone 25 MG Oral Tab Take 1 tablet (25 mg total) by mouth daily.      Cholecalciferol 25 MCG (1000 UT) Oral Tab Take 25 mcg by mouth daily.      MULTAQ 400 MG Oral Tab Take 1 tablet (400 mg total) by mouth 2 (two) times daily.      Multiple Vitamins-Minerals (MULTI FOR HER 50+) Oral Tab Take 1 tablet by mouth daily.         Review of Systems:   A comprehensive review of systems was completed.    Pertinent positives and negatives noted in the HPI.    Objective:   Physical Exam:    BP 92/67   Pulse 69   Temp (!) 102.1 °F (38.9 °C) (Rectal)   Resp 21   Wt 211 lb 10.3 oz (96 kg)   SpO2 98%   BMI 26.45 kg/m²   General: lethargic and confused  Respiratory: No rhonchi, no wheezes  Cardiovascular: S1, S2.   Abdomen: Soft, Non-tender, Non-distended, Positive bowel sounds  Neuro: unable to fully examine, chronic RUE weakness  Extremities: LE venous stasis dermatitis, no cyanosis      Results:    Labs:      Labs Last 24 Hours:  Recent Labs   Lab 10/08/24  1654   WBC 24.9*   HGB 11.3*    MCV 83.1   PLT 91.0*   INR 1.86*       Recent Labs   Lab 10/08/24  1654   *   BUN 32*   CREATSERUM 1.35*   CA 9.1   ALB 3.1*   *   K 3.5      CO2 20.0*   ALKPHO 98   AST 35*   ALT 10   BILT 1.0   TP 6.5       Estimated Creatinine Clearance: 66.9 mL/min (A) (based on SCr of 1.35 mg/dL (H)).    Recent Labs   Lab 10/08/24  1654   TROPHS 471*       Recent Labs   Lab 10/08/24  1654   PTP 21.6*   INR 1.86*       No results for input(s): \"TROP\", \"CK\" in the last 168 hours.      Imaging: Imaging data reviewed in Epic.    Assessment & Plan:      #Acute Febrile Illness/Sepsis  Possibly recurrence of staph epi sepsis  Suspect IE  IV ABX, broad spectrum  Echo  BCX  CT C/A/P pending    #Acute Metabolic encephalopathy  MRI on last admission did show multiple frontal parietal lobe infarcts possibly embolic in nature  Repeat head CT here without acute process  Repeat brain MRI    # Troponin elevation  Suspect secondary to sepsis  Trend    # History of ICH  Head CT noted    # SMA thrombus  OAC-> heparin drip    # Bioprosthetic AVR    # HFrEF; EF 45%    #CAD s/p CABG  Plavix, BB,     # Hypertension  # Hyperlipidemia    #A. fib  Cont. Rate control as tolerated  OAC -> heparin drip        All diagnosis' and recommendations discussed with patient and/or family in detail.      Plan of care discussed with ED physician      Sergio Quintana MD  Critical care time:  43 minutes  Supplementary Documentation:     The 21st Century Cures Act makes medical notes like these available to patients in the interest of transparency. Please be advised this is a medical document. Medical documents are intended to carry relevant information, facts as evident, and the clinical opinion of the practitioner. The medical note is intended as peer to peer communication and may appear blunt or direct. It is written in medical language and may contain abbreviations or verbiage that are unfamiliar.

## 2024-10-09 NOTE — PLAN OF CARE
Assumed care of pt after RN report. Alert and oriented, per wife mental status at baseline. RA. Levo off this shift. Heparin gtt off per cards. RA. NSR with RBB and 1st degree AV block. External cath in place. Tolerating diet. Denies pain. See flowsheets for full assessments. POC discussed with pt and wife bedside.

## 2024-10-09 NOTE — PROCEDURES
Arterial Line  Performed by: NAMITA Albarran, proctored by Dr. Nitin Shabazz     General Information and Staff     Procedure Start: 2310  Patient Location:  ICU  Indication: continuous blood pressure monitoring and blood sampling needed    Site Identification: real time ultrasound guided, sterile technique used     Procedure Details     Catheter Size:  20 G  Catheter Length:  1 and 3/4 inchCatheter Type:  Arrow  Seldinger Technique?: Yes    Laterality:  left  Site: radial    Site Prep: chlorhexidine  Line Secured:  Tape and Tegaderm     Assessment: Marcos's test negative, Good flash, guidewire and catheter advanced without difficulty, pulsatile blood flow noted.    Events: patient tolerated procedure well with no complications            I was present for the entire procedure    Dos 10/8/24

## 2024-10-10 NOTE — TELEPHONE ENCOUNTER
Dr. Miguelangel Whatley with [facility] calling regarding patient.  Requested medical records for continuation of care.     Sent MRI/CT reports requested as well as most recent neurology progress note. Fax sent to [phone] as requested.   Please advise.

## 2024-10-10 NOTE — TELEPHONE ENCOUNTER
Received Home Health plan of care from [facility] order# [id number]. Placed in provider folder for review and signature.

## 2024-10-10 NOTE — PLAN OF CARE
Assumed care of patient after report. Alert and oriented. RA. Some comments by patient suggests delirium but delirium assessment came back negative. Rectal probe in place. Elevated temperatures noted. Tylenol given. Ice placed on pt. ICU MD notified scheduled IV tylenol ordered. Irregularities noted in tele strip. EKG performed and ICU MD notified. No new orders. Pt denies chest pain and SOB. Pt voiding with external cath. [facility] bed became available. Report called and given to receiving nurse. Spoke with Dax at transport center. Transport set up. Wife, Perla, call and updated that pt received bed in room 4060 at [facility]. Wife verbalized understanding that pt will be transported overnight via ambulance. IVs patent and saline locked for transport. Rectal probe removed for transport. Art line intact with good waveform. Clamped for transport. BP stable. See MAR and flowsheets for additional information.

## 2024-10-10 NOTE — DISCHARGE SUMMARY
[facility]     Kris Colvin Patient Status:  Inpatient    3/6/1960 MRN [id number]   Location [facility] 4SW-A Attending No att. providers found   Hosp Day # 2 PCP Lenka Guevara DO     Date of Admission: 10/8/2024  Date of Discharge:  10/10/2024     Discharge Disposition: Planned Readmit--Other Type of Facility Not Defined    Discharge Diagnosis:  #Acute Febrile Illness/Sepsis  #infectious endocarditis  Possibly recurrence of staph epi sepsis  IV ABX, broad spectrum  JAZMÍN today confirmed IE and possible abscess  BCX pending  D/w CV surgery. Recommending transfer to [facility]     #Acute Metabolic encephalopathy  MRI on last admission did show multiple frontal parietal lobe infarcts likely embolic in nature  Repeat head CT here without acute process  Repeat brain MRI     # Troponin elevation  Suspect secondary to sepsis  Trend     # History of ICH  Head CT noted     # SMA thrombus  OAC-> heparin drip     # Bioprosthetic AVR     # HFrEF; EF 45%     #CAD s/p CABG  Plavix, BB,      # Hypertension  # Hyperlipidemia     #A. fib  Cont. Rate control as tolerated  OAC -> heparin drip    History of Present Illness: Kris Colvin is a 64 year old male with a past medical history of CHF, CAD, DVT, HTN, CHAZ.  He was recently admitted secondary to fevers and weakness.  He was found to have Staph epidermidis sepsis and possible infected SMA thrombus.  He was started on fluids and antibiotics and eventually discharged home on a course of IV antibiotics/vancomycin, completed 3 weeks ago.     He has completed his course of IV vancomycin.  About 2 days ago he started to have subjective fevers.  His wife found him unresponsive today and he was brought to the emergency room.   In the emergency room he is confused and febrile.      Brief Synopsis: pt w/ persistent fevers, sepsis. Found to have SMA thrombus. Had recent CVA's concerning for embolic source. Found to have infectious endocarditis here and possible  abscess. Started on hep gtt and abx. Seen by CV surgery who recommended surgery at [facility]. Pt transferred urgently to [facility] for further care and surgical intervention.     Lace+ Score: 77  59-90 High Risk  29-58 Medium Risk  0-28   Low Risk  Patient was referred to the [facility].    TCM Follow-Up Recommendation:  LACE > 58: High Risk of readmission after discharge from the hospital.      Procedures during hospitalization:   JAZMÍN    Incidental or significant findings and recommendations (brief descriptions):  none    Lab/Test results pending at Discharge:   none    Consultants:  Cards, CV surg, ID    Discharge Medication List:     Discharge Medications        CONTINUE taking these medications        Instructions Prescription details   acetaminophen 325 MG Tabs  Commonly known as: Tylenol      Take 2 tablets (650 mg total) by mouth every 4 (four) hours as needed for Pain or Fever.   Refills: 0     apixaban 5 MG Tabs  Commonly known as: Eliquis      Take 1 tablet (5 mg total) by mouth 2 (two) times daily.   Quantity: 60 tablet  Refills: 3     Cholecalciferol 25 MCG (1000 UT) Tabs  Commonly known as: VITAMIN D      Take 25 mcg by mouth daily.   Refills: 0     clopidogrel 75 MG Tabs  Commonly known as: Plavix      Take 1 tablet (75 mg total) by mouth daily.   Quantity: 30 tablet  Refills: 1     cyanocobalamin 1000 MCG Tabs  Commonly known as: Vitamin B12      Take 1 tablet (1,000 mcg total) by mouth daily.   Refills: 0     furosemide 20 MG Tabs  Commonly known as: Lasix      Take 1 tablet (20 mg total) by mouth every other day.   Refills: 0     metoprolol tartrate 25 MG Tabs  Commonly known as: Lopressor      Take 0.5 tablets (12.5 mg total) by mouth 2x Daily(Beta Blocker).   Quantity: 60 tablet  Refills: 1     Multaq 400 MG Tabs  Generic drug: dronedarone      Take 1 tablet (400 mg total) by mouth 2 (two) times daily.   Refills: 0     Multi For Her 50+ Tabs      Take 1 tablet by mouth daily.    Refills: 0     spironolactone 25 MG Tabs  Commonly known as: Aldactone      Take 1 tablet (25 mg total) by mouth daily.   Refills: 0            STOP taking these medications      DAPTOmycin 50 mg/mL in sodium chloride 0.9% PF                 [place]    Follow-up appointment:   No follow-up provider specified.  Appointments for Next 30 Days 10/10/2024 - 2024      None            Vital signs:  Temp:  [99.5 °F (37.5 °C)-103.5 °F (39.7 °C)] 103.5 °F (39.7 °C)  Pulse:  [61-83] 61  Resp:  [15-27] 25  SpO2:  [95 %-100 %] 96 %  AO: (102-126)/(58-74) 106/58    Physical Exam:    General: No acute distress   Lungs: clear to auscultation  Cardiovascular: S1, S2  Abdomen: Soft      -----------------------------------------------------------------------------------------------  PATIENT DISCHARGE INSTRUCTIONS: See electronic chart    Hemal Mckeon MD    Total time spent on discharge plannin minutes     The  Century Cures Act makes medical notes like these available to patients in the interest of transparency. Please be advised this is a medical document. Medical documents are intended to carry relevant information, facts as evident, and the clinical opinion of the practitioner. The medical note is intended as peer to peer communication and may appear blunt or direct. It is written in medical language and may contain abbreviations or verbiage that are unfamiliar.

## 2024-10-12 LAB
ATRIAL RATE: 100 BPM
ATRIAL RATE: 46 BPM
ATRIAL RATE: 48 BPM
ATRIAL RATE: 86 BPM
BACTERIA BLD CULT: POSITIVE
P AXIS: 17 DEGREES
P AXIS: 9 DEGREES
P-R INTERVAL: 272 MS
P-R INTERVAL: 396 MS
P-R INTERVAL: 448 MS
Q-T INTERVAL: 418 MS
Q-T INTERVAL: 512 MS
Q-T INTERVAL: 524 MS
Q-T INTERVAL: 526 MS
QRS DURATION: 136 MS
QRS DURATION: 170 MS
QRS DURATION: 176 MS
QRS DURATION: 190 MS
QTC CALCULATION (BEZET): 448 MS
QTC CALCULATION (BEZET): 497 MS
QTC CALCULATION (BEZET): 531 MS
QTC CALCULATION (BEZET): 599 MS
R AXIS: -39 DEGREES
R AXIS: -70 DEGREES
R AXIS: -71 DEGREES
R AXIS: -79 DEGREES
T AXIS: 101 DEGREES
T AXIS: 115 DEGREES
T AXIS: 49 DEGREES
T AXIS: 70 DEGREES
VENTRICULAR RATE: 46 BPM
VENTRICULAR RATE: 62 BPM
VENTRICULAR RATE: 78 BPM
VENTRICULAR RATE: 85 BPM

## 2024-10-13 LAB — BACTERIA BLD CULT: POSITIVE

## 2024-10-15 ENCOUNTER — TELEPHONE (OUTPATIENT)
Dept: HEMATOLOGY/ONCOLOGY | Age: 64
End: 2024-10-15

## 2024-10-15 ENCOUNTER — TELEPHONE (OUTPATIENT)
Dept: FAMILY MEDICINE CLINIC | Facility: CLINIC | Age: 64
End: 2024-10-15

## 2024-10-15 NOTE — TELEPHONE ENCOUNTER
Patient passed away on 10/11/24 at [facility] ICU. Family sent message through [employer]. No further information was given. 10/15/24 GA

## 2024-10-23 ENCOUNTER — TELEPHONE (OUTPATIENT)
Dept: NEUROLOGY | Facility: CLINIC | Age: 64
End: 2024-10-23

## 2024-10-23 ENCOUNTER — MED REC SCAN ONLY (OUTPATIENT)
Dept: NEUROLOGY | Facility: CLINIC | Age: 64
End: 2024-10-23

## 2024-10-23 NOTE — TELEPHONE ENCOUNTER
Received Discharge report ending 9/18/24from [facility]. Placed in provider folder for review.

## 2024-12-19 ENCOUNTER — APPOINTMENT (OUTPATIENT)
Dept: HEMATOLOGY/ONCOLOGY | Age: 64
End: 2024-12-19
Attending: INTERNAL MEDICINE
Payer: COMMERCIAL

## 2025-01-29 ENCOUNTER — MED REC SCAN ONLY (OUTPATIENT)
Dept: FAMILY MEDICINE CLINIC | Facility: CLINIC | Age: 65
End: 2025-01-29

## (undated) DIAGNOSIS — I10 ESSENTIAL HYPERTENSION: ICD-10-CM

## (undated) DIAGNOSIS — I25.10 ATHEROSCLEROSIS OF NATIVE CORONARY ARTERY OF NATIVE HEART WITHOUT ANGINA PECTORIS: Primary | ICD-10-CM

## (undated) DIAGNOSIS — I42.0 DILATED CARDIOMYOPATHY (HCC): ICD-10-CM

## (undated) DIAGNOSIS — R06.81 APNEA: Primary | ICD-10-CM

## (undated) DIAGNOSIS — Z86.79 HISTORY OF ATRIAL FIBRILLATION: ICD-10-CM

## (undated) DIAGNOSIS — Z01.818 PREOP EXAMINATION: Primary | ICD-10-CM

## (undated) DIAGNOSIS — Z98.890 H/O CARDIAC RADIOFREQUENCY ABLATION: ICD-10-CM

## (undated) DIAGNOSIS — Q23.1 BICUSPID AORTIC VALVE: ICD-10-CM

## (undated) DIAGNOSIS — Z11.59 SCREENING FOR VIRAL DISEASE: ICD-10-CM

## (undated) DIAGNOSIS — I71.2 THORACIC ASCENDING AORTIC ANEURYSM (HCC): ICD-10-CM

## (undated) DEVICE — SUTURE SILK 2-0

## (undated) DEVICE — SUTURE POLYDEK 2-0

## (undated) DEVICE — Device

## (undated) DEVICE — FIXED CORE WIRE GUIDE SAFE-T-J, CURVED: Brand: COOK

## (undated) DEVICE — SYSTEM ZDRIVE NLTX DISPOSABLE

## (undated) DEVICE — SUTURE ETHIBOND 2-0 SX

## (undated) DEVICE — CELL SAVER BAG 600ML 4R2023

## (undated) DEVICE — #15 STERILE STAINLESS BLADE: Brand: STERILE STAINLESS BLADES

## (undated) DEVICE — SOL LACT RINGERS 1000ML

## (undated) DEVICE — SUTURE SILK 0 FSL

## (undated) DEVICE — 3M™ BAIR HUGGER® CARDIAC ACCESS BLANKET, 5 PER CASE 63000: Brand: BAIR HUGGER™

## (undated) DEVICE — CELL SAVER RESERVOIR BRAT

## (undated) DEVICE — BLOWER CO2 MEDTRONIC/DLP 22150

## (undated) DEVICE — TIBURON DRAPE TOWELS: Brand: CONVERTORS

## (undated) DEVICE — TAPE UMBILICAL U11T

## (undated) DEVICE — SUTURE PROLENE 5-0 C-1

## (undated) DEVICE — SUTURE PROLENE 3-0 SH

## (undated) DEVICE — ABSORBABLE HEMOSTAT (OXIDIZED REGENERATED CELLULOSE, U.S.P.): Brand: SURGICEL

## (undated) DEVICE — SUTURE WIRE DOUBLE STERNOTOMY

## (undated) DEVICE — SUTURE PROLENE 8-0 BV-130-5

## (undated) DEVICE — SYRINGE 50ML LL TIP

## (undated) DEVICE — CELL SAVER 5/5+ BOWL KIT-225ML: Brand: HAEMONETICS CELL SAVER 5/5+ SYSTEMS

## (undated) DEVICE — SUTURE POLYDEK 3-0 69-738

## (undated) DEVICE — OPEN HEART: Brand: MEDLINE INDUSTRIES, INC.

## (undated) DEVICE — SPONGE LAP 18X18 XRAY STRL

## (undated) DEVICE — HAND PIECE LEFT ATRICURE

## (undated) DEVICE — GLOVE SURG TRIUMPH SZ 8

## (undated) DEVICE — SOL  .9 1000ML BTL

## (undated) DEVICE — 1840 FOAM BLOCK NEEDLE COUNTER: Brand: DEVON

## (undated) DEVICE — SUTURE PROLENE 4-0 V-5

## (undated) DEVICE — BLADE SW 32X6MM  STRNM

## (undated) DEVICE — 3M™ TEGADERM™ TRANSPARENT FILM DRESSING, 1626W, 4 IN X 4-3/4 IN (10 CM X 12 CM), 50 EACH/CARTON, 4 CARTON/CASE: Brand: 3M™ TEGADERM™

## (undated) DEVICE — STERILE POLYISOPRENE POWDER-FREE SURGICAL GLOVES: Brand: PROTEXIS

## (undated) DEVICE — MEDI-VAC SUCTION HANDLE REGULAR CAPACITY: Brand: CARDINAL HEALTH

## (undated) DEVICE — ADULT, RADIOTRANSPARENT ELEMENT, COMPATIBLE W/ GRAY FLAT CONNECTOR: Brand: DEFIBRILLATION ELECTRODES

## (undated) DEVICE — INTENT TO BE USED WITH SUTURE MATERIAL FOR TISSUE CLOSURE: Brand: RICHARD-ALLAN® NEEDLE 1/2 CIRCLE TAPER

## (undated) DEVICE — SUTURE PROLENE 3-0 V-7

## (undated) DEVICE — SUTURE PROLENE 4-0 SH

## (undated) DEVICE — GAUZE SPONGES,12 PLY: Brand: CURITY

## (undated) DEVICE — TRANSPOSAL ULTRAFLEX DUO/QUAD ULTRA CART MANIFOLD

## (undated) DEVICE — BARD® PTFE FELT PLEDGETS, (OVAL), 4.8 MM X 6 MM: Brand: BARD® PTFE FELT PLEDGETS

## (undated) DEVICE — TOURNIQUET KIT 7 IN

## (undated) DEVICE — COR-KNOT MINI® COMBO KITBASE PACKAGE TYPE - KITEACH STERILE PACKAGE KIT CONTAINS (2) SINGLE PATIENT USE COR-KNOT MINI® DEVICES AND (12) COR-KNOT® QUICK LOADS®.: Brand: COR-KNOT MINI®

## (undated) DEVICE — X-RAY DETECTABLE SPONGES,16 PLY: Brand: VISTEC

## (undated) DEVICE — CATH RED RUBBER 18FR

## (undated) NOTE — LETTER
18    Patient: Doni Puckett  : 3/6/1960 Visit date: 4/3/2018    Dear  Dr. Emely Arnold, DO,    Thank you for referring Doni Puckett to my practice. Please find my assessment and plan below.             Assessment   Encounter for screening colonoscopy All risks, benefits, complications and alternatives to the proposed operation were fully discussed with the patient. All questions from the patient were answered in detail. A description of the procedure and it's possible outcomes was fully discussed.

## (undated) NOTE — LETTER
06/11/19        Joel Solis [facility] [address]      Dear Koko Peterson,    Our records indicate that you have outstanding lab work and or testing that was ordered for you and has not yet been completed:  Orders Placed This Encounter      T4 MIRTHA

## (undated) NOTE — IP AVS SNAPSHOT
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 PM.  INDICATIONS:  fu ICH  TECHNIQUE:  Noncontrast CT scanning is performed through the brain. Dose reduction techniques were used. Dose information is transmitted to the ACR (American College of Radiology) NRDR (National Radiology Data Registry) which includes the Dose Index Registry.  PATIENT STATED HISTORY: (As transcribed by Technologist)  Patient with follow up to intracranial hemorrhage.    FINDINGS:  Redemonstrated is an acute intraparenchymal hemorrhage within the left insular region, this measures 2.8 x 1.8 x 2.7 centimeters, grossly unchanged from prior.  There is mild associated mass effect and surrounding hypodensity suggestive of edema.  This is also similar to prior.  There are patchy areas of low attenuation in the periventricular and deep white matter which are nonspecific but most consistent with small vessel ischemic changes.  The visualized paranasal sinuses show no significant findings. No evidence of depressed skull  fracture.            CONCLUSION: 1. Stable acute intraparenchymal hemorrhage involving the left insular region.  Stable mild associated mass effect.      XR CHEST AP PORTABLE  (CPT=71045)    Result Date: 2/17/2024  PROCEDURE:  XR CHEST AP PORTABLE  (CPT=71045)  TECHNIQUE:  AP chest radiograph was obtained.  COMPARISON:  [facility] , XR, XR CHEST DECUBITUS BILAT INCL PA AND LAT(4 VIEWS)(CPT=71048), 6/22/2020, 7:22 AM.  [facility] , XR, XR CHEST AP PORTABLE  (CPT=71045), 6/15/2020, 11:43 AM.  INDICATIONS:  hx of HF, admission for stroke, holding meds  PATIENT STATED HISTORY: (As transcribed by Technologist)  Patient offered no additional history at this time.    FINDINGS:  There is cardiomegaly, which is similar to prior.  No new focal consolidation..  No pleural effusion.  No pneumothorax.  Median sternotomy wires.  No new osseous findings.            CONCLUSION:  No acute cardiopulmonary findings.     CT STROKE BRAIN (NO IV)(CPT=70450)    Result Date: 2/17/2024  PROCEDURE:  CT STROKE BRAIN (NO IV)(CPT=70450)  COMPARISON:  [facility] , CT, CT BRAIN OR HEAD ([id number]), 3/03/2015, 1:49 PM.  INDICATIONS:  right side deficit 0930  TECHNIQUE:  Noncontrast CT scanning is performed through the brain.  Dose reduction techniques were used. Dose information is transmitted to the ACR (American College of Radiology) NRDR (National Radiology Data Registry) which includes the Dose Index Registry.  PATIENT STATED HISTORY: (As transcribed by Technologist)  Patient is here for stroke, with right sided deficits.    FINDINGS:  There is an acute intraparenchymal hemorrhage within the insular region of the right parietal lobe measuring 2.6 x 3.0 x 2.2 cm in maximal dimensions.  Mild associated mass effect.  No midline shift.  Mild diffuse atrophy and white matter disease noted.  The bony calvarium is intact.  Paranasal sinuses and mastoid air cells are well pneumatized.            CONCLUSION:  1. Acute intraparenchymal hemorrhage within the insular  region of the right parietal lobe measuring 2.6 x 3.0 x 2.2 cm.  Mild associated mass effect.  No midline shift. 2. Mild diffuse atrophy and white matter disease consistent with chronic small vessel ischemic changes. 3. The findings of this critical value study were discussed with the ordering ER physician,       Labs:     Lab Results   Component Value Date    INR 1.07 02/17/2024    INR 1.80 (H) 10/22/2020    INR 1.70 (H) 07/09/2020        Lab Results   Component Value Date    PGLU 94 02/20/2024     Impression:  Acute left insular intracerebral hemorrhage -on Xarelto and baby aspirin and almost daily NSAIDs for left lower leg pain from venous ulcer with uncontrolled hypertension.  Presented with slurred speech and right facial droop with right-sided weakness.  He has prior history of stroke but with no residuals.  Uncontrolled malignant hypertension could contribute to hemorrhagic stroke.  BP was above 200 on admission.  But I think 3 blood thinners contributed to intracranial bleed more than blood pressure.  He was checking his blood pressure at home recently and it was running in 150s to 160s mmHg.  Chronically anticoagulated Xarelto for left leg DVT and also history of PAF in the past -Xarelto was reversed with Andexxa on admission.  CAD with history of one-vessel CABG -stable coronary issues.  No angina.  Slurred speech and right facial droop with right-sided weakness  Left leg DVT diagnosed in May 2023, then developed left venous insufficiency of left lower leg and finally venous ulcers -then wound clinic.  Granulation.  Not healed yet.  Current ultrasound showed extension of DVT from calf veins to mid femoral level.  Chronic HFrEF compensated with no exacerbation LVEF 45% -recovered from 20% 2015.  History of bioprosthetic aortic valve and replacement of the aneurysmal ascending thoracic aorta with Hemashield graft, and 1-vessel CABG LIMA to LAD.  Left bundle branch block -became incomplete left bundle branch  block with recovery of LVEF.  Obstructive sleep apnea.  Noncompliance with office visit with me between 2020 and 2022 then patient was sent back to me and we worked together reintroducing heart to medications and treating hypertension to help with EF and went up to 45% from 30-35% avoiding EP device this way and now even further up to 50%.    Important labs: CMP normal and CBC normal.  Coags normal.    Plan:  -Telemetry monitoring for any arrhythmia was stated in sinus with history of PAF  -IVC filter today  -Holding anticoagulation but at some point we have to come back to it depends on intracranial bleed healing in the future  -Holding aspirin as well  -Continue carvedilol losartan and statin from cardiac medications  -Resume spironolactone 12.5 mg p.o. daily -not for any diuresis but to help with BP control and decreased EF  -Follow hematocrit and transfuse if drops  -Wound care of left lower leg venous ulcer  -Blood pressure control  -No plan for neuro procedures  -Neurochecks  -PT OT and speech therapy  -Acute rather than subacute rehab  -Anticoagulation on hold and hematology will follow with neurosurgery when to resume but is not a good time now  -Outpatient follow-up neuroimaging of the brain per neuro services and hopefully brain bleed will resolve and we may consider initiation of Xarelto if okay with neuro but no aspirin and no NSAIDs.  He can use Tylenol for pain.    Discussed with the patient, his wife and the nursing staff  Thank you for consult    Mu Marie M.D., F.A.C.C.  Advanced Heart Failure  Interventional Cardiology  [facility]    2/20/2024  2:35 PM  C5    Electronically signed by Javier Marie MD on 2/20/2024  4:04 PM           D/C Summary    No notes of this type exist for this encounter.     Imaging Results (HF patients)    Chest X-Ray Results (HF patients only)    No exam resulted this encounter.      2D Echocardiogram Results (HF patients only)    CARD ECHO  2D DOPPLER (CPT=93306)    Result Date: 2024  Transthoracic Echocardiogram Name:Kris Colvin Date: 2024 :  1960 Ht:  (75in)  BP: 144 / 88 MRN:  [id number]    Age:  63years    Wt:  (302lb) HR: 64bpm Loc:  [facility]       Gndr: M          BSA: 2.61m^2 Sonographer: Pauline VERA AE, PE Ordering:    Makenna Souza ---------------------------------------------------------------------------- History/Indications:  Prosthetic Aortic Valve.  Coronary artery disease. Acute intraparenchymal hemorrhage.  Coronary artery bypass grafting. ---------------------------------------------------------------------------- Procedure information:  A transthoracic complete 2D study was performed. Additional evaluation included M-mode, complete spectral Doppler, and color Doppler.  Patient status:  Inpatient.  Location:  Bedside.    Comparison was made to the study of 2021.    This was a routine study. Transthoracic echocardiography for ventricular function evaluation and assessment of valvular function. Image quality was suboptimal. The study was technically limited due to body habitus.The patient refused Definity. ECG rhythm:   Normal sinus ---------------------------------------------------------------------------- Conclusions: 1. Procedure narrative: Transthoracic echocardiography for ventricular    function evaluation and assessment of valvular function. Image quality    was suboptimal. The study was technically limited due to body habitus.The    patient refused Definity. 2. Left ventricle: The cavity size was normal. Wall thickness was at the    upper limits of normal. Systolic function was normal. The estimated    ejection fraction was 50-55%. Doppler parameters are consistent with    abnormal left ventricular relaxation - grade 1 diastolic dysfunction. 3. Aortic valve: Transvalvular velocity was increased. There was no    significant regurgitation. The peak systolic velocity was 3.54m/sec. The    mean systolic  gradient was 25mm Hg. The valve area (VTI) was 1.44cm^2.    The valve area (VTI) index was 0.55cm^2/m^2. A 25 mm Inspiris Bovine    Pericardial valve was present. 4. Left atrium: The atrium was moderately dilated. 5. Pulmonary arteries: Systolic pressure could not be accurately estimated. Impressions:  This study is compared with previous dated 03/12/2021: Ejection fraction has improved and aortic gradient has worsened. Clinical correlation sugggested. * ---------------------------------------------------------------------------- * Findings: Left ventricle:  The cavity size was normal. Wall thickness was at the upper limits of normal. Systolic function was normal. The estimated ejection fraction was 50-55%. Doppler parameters are consistent with abnormal left ventricular relaxation - grade 1 diastolic dysfunction. Left atrium:  The atrium was moderately dilated. Right ventricle:  The cavity size was normal. Systolic function was normal. Right atrium:  The atrium was normal in size. Mitral valve:  The valve was structurally normal. Leaflet separation was normal.  Doppler:  Transvalvular velocity was within the normal range. There was no evidence for stenosis. There was trivial regurgitation. Aortic valve:  A 25 mm Inspiris Bovine Pericardial valve was present. Doppler:  Transvalvular velocity was increased. There was no significant regurgitation.    The valve area (VTI) was 1.44cm^2. The valve area (VTI) index was 0.55cm^2/m^2.    The mean systolic gradient was 25mm Hg. The peak systolic gradient was 57mm Hg. Tricuspid valve:  The valve is structurally normal. Leaflet separation was normal.  Doppler:  Transvalvular velocity was within the normal range. There was no evidence for stenosis. There was trivial regurgitation. Pulmonic valve:   Not well visualized.  Doppler:  Transvalvular velocity was within the normal range. There was no evidence for stenosis. There was no significant regurgitation. Pericardium:   There  was no pericardial effusion. Aorta: Aortic root: The aortic root was normal-sized. Ascending aorta: The ascending aorta was normal. Pulmonary arteries: Not well visualized.  Systolic pressure could not be accurately estimated. Systemic veins: Inferior vena cava: The IVC was normally collapsible and normal-sized. ---------------------------------------------------------------------------- Measurements  Left ventricle       Value          Ref       03/12/2021  IVS thickness,   (H) 1.2   cm       0.6 - 1.0 0.5  ED, PLAX  LV ID, ED, PLAX  (H) 5.9   cm       4.2 - 5.8 6.7  LV ID, ES, PLAX      4.0   cm       2.5 - 4.0 5.3  LV PW thickness, (H) 1.2   cm       0.6 - 1.0 0.5  ED, PLAX  IVS/LV PW ratio,     1.04           --------- 0.94  ED, PLAX  LV PW/LV ID          0.2            --------- ----------  ratio, ED, PLAX  LV ejection          60    %        52 - 72   40  fraction  Stroke               44    ml/m^2   --------- 49  volume/bsa, 2D  LV e', lateral   (L) 9.9   cm/sec   >=10.0    ----------  LV E/e', lateral     7              <=13      ----------  LV e', medial        8.1   cm/sec   >=7.0     ----------  LV E/e', medial      8              --------- ----------  LV e', average       9.0   cm/sec   --------- ----------  LV E/e', average     7              <=14      ----------  LVOT                 Value          Ref       03/12/2021  LVOT ID              2.5   cm       --------- ----------  LVOT peak            1.09  m/sec    --------- 1.08  velocity, S  LVOT VTI, S          23.1  cm       --------- 24.4  LVOT mean            2     mm Hg    --------- ----------  gradient, S  Stroke volume        113   ml       --------- 120  (SV), LVOT DP  Stroke index         43    ml/m^2   --------- 48  (SV/bsa), LVOT  DP  Aortic valve         Value          Ref       03/12/2021  Aortic valve         3.54  m/sec    --------- 2.84  peak velocity, S  Aortic valve         70.4  cm       --------- 66.9  VTI, S  Aortic mean          25     mm Hg    --------- 19  gradient, S  Aortic peak          57    mm Hg    --------- 32  gradient, S  Aortic valve         1.44  cm^2     --------- 1.79  area, VTI  Aortic valve         0.55  cm^2/m^2 --------- 0.72  area/bsa, VTI  Velocity ratio,      0.29           --------- 0.38  peak, LVOT/AV  Aortic root          Value          Ref       03/12/2021  Aortic root ID,      4.4   cm       --------- ----------  ED, MM  Ascending aorta      Value          Ref       03/12/2021  Ascending aorta  (H) 3.9   cm       2.2 - 3.8 ----------  ID, A-P, ED  Left atrium          Value          Ref       03/12/2021  LA volume, S     (H) 121   ml       18 - 58   119  LA volume/bsa, S (H) 46    ml/m^2   16 - 34   48  LA volume, ES,   (H) 109   ml       18 - 58   125  1-p A4C  LA volume, ES,   (H) 121   ml       18 - 58   110  1-p A2C  LA volume, ES,       131   ml       --------- ----------  A/L  LA volume/bsa,   (H) 50    ml/m^2   16 - 34   ----------  ES, A/L  LA ID, A-P, ES,  (H) 4.9   cm       3.0 - 4.0 ----------  MM  LA ID/bsa, A-P,      1.9   cm/m^2   1.5 - 2.3 ----------  ES, MM  LA/aortic root       1.11           --------- ----------  ratio, MM  Mitral valve         Value          Ref       03/12/2021  Mitral E-wave        0.67  m/sec    --------- ----------  peak velocity  Mitral A-wave        0.8   m/sec    --------- ----------  peak velocity  Mitral E/A           0.8            --------- ----------  ratio, peak Legend: (L)  and  (H)  jayy values outside specified reference range. ---------------------------------------------------------------------------- Prepared and electronically signed by Kwadwo Landeros MD 02/18/2024 13:02         Cath Angiogram Results (HF Patients only)    No exam resulted this encounter.          Physical Therapy Notes (last 72 hours)      Physical Therapy Note signed by Pauline Chapman PT at 2/23/2024  1:45 PM  Version 2 of 2    Author: Pauline Chapman PT Service: Rehab Author Type: Physical  Therapist    Filed: 2/23/2024  1:45 PM Date of Service: 2/23/2024 10:40 AM Status: Addendum    : Pauline Chapman PT (Physical Therapist)    Related Notes: Original Note by Pauline Chapman PT (Physical Therapist) filed at 2/23/2024  1:44 PM          PHYSICAL THERAPY TREATMENT NOTE - INPATIENT    Room Number: 7602/7602-A     Session: 3     Number of Visits to Meet Established Goals: 8    Presenting Problem: Intraparenchymal hemtoma of brain, HDL, DVT,  Co-Morbidities : HTN, CHAZ< DVT, CAD s/p CABG    CT Brain 2/19  CONCLUSION:  Stable 2.8 x 2.7 x 1.8 cm acute intraparenchymal hematoma arising from the left insular region with a small amount of surrounding vasogenic edema and stable mild localized mass effect.      Mild diffuse cerebral and cerebellar atrophy with chronic microvascular ischemic changes of aging.      US Venous   CONCLUSION:  Partial DVT in the proximal mid and distal segments of the left superficial femoral   vein as well as the proximal segment of the left popliteal vein.  Extent of DVT has progressed since 1/17/2024 exam.  Critical exam results phoned to nurse Thomas on 2/18/2024 at 1042 hours with read back.      No DVT in the right leg.      Posterior tibial veins of the left calf could not be visualized secondary to ulceration.      2/20 Pt underwent IVC filter    ASSESSMENT   Patient demonstrates fair progress this session, goals  remain in progress.    Patient continues to function below baseline with bed mobility, transfers, gait, stair negotiation, maintaining seated position, and standing prolonged periods.  Contributing factors to remaining limitations include decreased functional strength, decreased endurance/aerobic capacity, pain, impaired standing balance, impaired coordination, impaired motor planning, decreased muscular endurance, and medical status.  Next session anticipate patient to progress bed mobility, transfers, gait, maintaining seated position, and standing prolonged  periods.  Physical Therapy will continue to follow patient for duration of hospitalization.    Patient continues to benefit from continued skilled PT services: to facilitate return to prior level of function as patient demonstrates high motivation with excellent tolerance to an intensive therapy program .    PLAN  PT Treatment Plan: Bed mobility;Body mechanics;Gait training;Strengthening;Stair training;Transfer training;Balance training  Rehab Potential : Good  Frequency (Obs): 3-5x/week    CURRENT GOALS     Goal #1 Patient is able to demonstrate supine - sit EOB @ level: minimum assistance      Goal #2 Patient is able to demonstrate transfers EOB to/from [facility] at assistance level: moderate assistance      Goal #3 Patient is able to ambulate 10 feet with assist device: walker - platform at assistance level: moderate assistance      Goal #4     Goal #5     Goal #6     Goal Comments: Goals established on 2024 all goals ongoing        SUBJECTIVE  Pt states his right leg is moving a little better today.    OBJECTIVE  Precautions:  (R side flaccid, Left Leg wound)    WEIGHT BEARING RESTRICTION                   PAIN ASSESSMENT   Ratin  Location: left shin wound  Management Techniques: Repositioning;Relaxation    BALANCE                                                                                                                       Static Sitting: Fair +  Dynamic Sitting: Fair           Static Standing: Poor +  Dynamic Standing: Poor -    ACTIVITY TOLERANCE                         O2 WALK  Oxygen Therapy  SPO2% on Room Air at Rest: 97      AM-PAC '6-Clicks' INPATIENT SHORT FORM - BASIC MOBILITY  How much difficulty does the patient currently have...  Patient Difficulty: Turning over in bed (including adjusting bedclothes, sheets and blankets)?: A Little   Patient Difficulty: Sitting down on and standing up from a chair with arms (e.g., wheelchair, bedside commode, etc.): A Lot   Patient Difficulty:  Moving from lying on back to sitting on the side of the bed?: A Lot   How much help from another person does the patient currently need...   Help from Another: Moving to and from a bed to a chair (including a wheelchair)?: A Lot   Help from Another: Need to walk in hospital room?: A Lot   Help from Another: Climbing 3-5 steps with a railing?: Total       AM-PAC Score:  Raw Score: 12   Approx Degree of Impairment: 68.66%   Standardized Score (AM-PAC Scale): 35.33   CMS Modifier (G-Code): CL    FUNCTIONAL ABILITY STATUS  Gait Assessment   Functional Mobility/Gait Assessment  Gait Assistance: Maximum assistance  Distance (ft): 2  Assistive Device: Other (Comment) (srikanth walker)    Skilled Therapy Provided    Bed Mobility:  Rolling: min A   Supine<>Sit: mod A   Sit<>Supine: NT     Transfer Mobility:  Sit<>Stand: min A x 2 with cues for L hand placement and sequencing to attain full uptight standing position.   Stand<>Sit: min-mod A x 2   Gait: Pt took 2 steps with srikanth walker and max A  x 2; pt required max A to weight shift to left while requiring additional max A (additional person) to advance R LE along floor while completing SPT to bedside chair.    Therapist's Comments: Pt motivated and agreeable to PT. NO active movement noted R UE; R UE placed in sling by OT at beginning of session. Worked on sitting balance with L UE reaching while cued to keep L foot on floor as pt initially kept lifting L foot off floor when sitting. Worked on R LE positioning in neutral while sitting. Once in standing, pt required frequent cues to shift weight more toward the left to maintain upright standing. Pt may benefit from trial with R platform walker during next PT session.      THERAPEUTIC EXERCISES  Lower Extremity Alternating marching  Ankle pumps  Hip AB/AD  Knee extension  Quad sets     Upper Extremity      Position Sitting     Repetitions   6-10   Sets   1     Patient End of Session: Up in chair;Needs met;Call light within  reach;RN aware of session/findings;All patient questions and concerns addressed;Family present    PT Session Time: 28 minutes    Therapeutic Activity: 23 minutes  Therapeutic Exercise: 5 minutes                   Physical Therapy Note signed by Pauline Chapman PT at 2/23/2024  1:44 PM  Version 1 of 2    Author: Pauline Chapman PT Service: Rehab Author Type: Physical Therapist    Filed: 2/23/2024  1:44 PM Date of Service: 2/23/2024 10:40 AM Status: Signed    : Pauline Chapman PT (Physical Therapist)    Related Notes: Addendum by Pauline Chapman PT (Physical Therapist) filed at 2/23/2024  1:45 PM          PHYSICAL THERAPY TREATMENT NOTE - INPATIENT    Room Number: 7602/7602-A     Session: 3     Number of Visits to Meet Established Goals: 8    Presenting Problem: Intraparenchymal hemtoma of brain, HDL, DVT,  Co-Morbidities : HTN, CHAZ< DVT, CAD s/p CABG    CT Brain 2/19  CONCLUSION:  Stable 2.8 x 2.7 x 1.8 cm acute intraparenchymal hematoma arising from the left insular region with a small amount of surrounding vasogenic edema and stable mild localized mass effect.      Mild diffuse cerebral and cerebellar atrophy with chronic microvascular ischemic changes of aging.      US Venous   CONCLUSION:  Partial DVT in the proximal mid and distal segments of the left superficial femoral   vein as well as the proximal segment of the left popliteal vein.  Extent of DVT has progressed since 1/17/2024 exam.  Critical exam results phoned to nurse Thomas on 2/18/2024 at 1042 hours with read back.      No DVT in the right leg.      Posterior tibial veins of the left calf could not be visualized secondary to ulceration.      2/20 Pt underwent IVC filter    ASSESSMENT   Patient demonstrates fair progress this session, goals  remain in progress.    Patient continues to function below baseline with bed mobility, transfers, gait, stair negotiation, maintaining seated position, and standing prolonged periods.  Contributing  factors to remaining limitations include decreased functional strength, decreased endurance/aerobic capacity, pain, impaired standing balance, impaired coordination, impaired motor planning, decreased muscular endurance, and medical status.  Next session anticipate patient to progress bed mobility, transfers, gait, maintaining seated position, and standing prolonged periods.  Physical Therapy will continue to follow patient for duration of hospitalization.    Patient continues to benefit from continued skilled PT services: to facilitate return to prior level of function as patient demonstrates high motivation with excellent tolerance to an intensive therapy program .    PLAN  PT Treatment Plan: Bed mobility;Body mechanics;Gait training;Strengthening;Stair training;Transfer training;Balance training  Rehab Potential : Good  Frequency (Obs): 3-5x/week    CURRENT GOALS     Goal #1 Patient is able to demonstrate supine - sit EOB @ level: minimum assistance      Goal #2 Patient is able to demonstrate transfers EOB to/from [facility] at assistance level: moderate assistance      Goal #3 Patient is able to ambulate 10 feet with assist device: walker - platform at assistance level: moderate assistance      Goal #4     Goal #5     Goal #6     Goal Comments: Goals established on 2024 all goals ongoing        SUBJECTIVE  Pt states his right leg is moving a little better today.    OBJECTIVE  Precautions:  (R side flaccid, Left Leg wound)    WEIGHT BEARING RESTRICTION                   PAIN ASSESSMENT   Ratin  Location: left shin wound  Management Techniques: Repositioning;Relaxation    BALANCE                                                                                                                       Static Sitting: Fair +  Dynamic Sitting: Fair           Static Standing: Poor +  Dynamic Standing: Poor -    ACTIVITY TOLERANCE                         O2 WALK  Oxygen Therapy  SPO2% on Room Air at Rest:  97      AM-PAC '6-Clicks' INPATIENT SHORT FORM - BASIC MOBILITY  How much difficulty does the patient currently have...  Patient Difficulty: Turning over in bed (including adjusting bedclothes, sheets and blankets)?: A Little   Patient Difficulty: Sitting down on and standing up from a chair with arms (e.g., wheelchair, bedside commode, etc.): A Lot   Patient Difficulty: Moving from lying on back to sitting on the side of the bed?: A Lot   How much help from another person does the patient currently need...   Help from Another: Moving to and from a bed to a chair (including a wheelchair)?: A Lot   Help from Another: Need to walk in hospital room?: A Lot   Help from Another: Climbing 3-5 steps with a railing?: Total       AM-PAC Score:  Raw Score: 12   Approx Degree of Impairment: 68.66%   Standardized Score (AM-PAC Scale): 35.33   CMS Modifier (G-Code): CL    FUNCTIONAL ABILITY STATUS  Gait Assessment   Functional Mobility/Gait Assessment  Gait Assistance: Maximum assistance  Distance (ft): 2  Assistive Device: Other (Comment) (srikanth walker)    Skilled Therapy Provided    Bed Mobility:  Rolling: min A   Supine<>Sit: mod A   Sit<>Supine: NT     Transfer Mobility:  Sit<>Stand: min A x 2 with cues for L hand placement and sequencing to attain full uptight standing position.   Stand<>Sit: min-mod A x 2   Gait: Pt took 2 steps with srikanth walker and max A  x 2; pt required max A to weight shift to left while requiring additional max A (additional person) to advance R LE along floor while completing SPT to bedside chair.    Therapist's Comments: Pt motivated and agreeable to PT. NO active movement noted R UE; R UE placed in sling by OT at beginning of session. Worked on sitting balance with L UE reaching while cued to keep L foot on floor as pt initially kept lifting L foot off floor when sitting. Worked on R LE positioning in neutral while sitting. Once in standing, pt required frequent cues to shift weight more toward the  left to maintain upright standing. Pt may benefit from trial with R platform walker during next PT session.      THERAPEUTIC EXERCISES  Lower Extremity Alternating marching  Ankle pumps  Hip AB/AD  Knee extension  Quad sets     Upper Extremity      Position Sitting     Repetitions   6-10   Sets   1     Patient End of Session: Up in chair;Needs met;Call light within reach;RN aware of session/findings;All patient questions and concerns addressed;Family present    PT Session Time: 25 minutes    Therapeutic Activity: 20 minutes  Therapeutic Exercise: 5 minutes                            Occupational Therapy Notes (last 72 hours)      Occupational Therapy Note signed by Waldemar Benton OT at 2/23/2024  2:51 PM  Version 1 of 1    Author: Waldemar Benton OT Service: Rehab Author Type: Occupational Therapist    Filed: 2/23/2024  2:51 PM Date of Service: 2/23/2024 10:35 AM Status: Signed    : Waldemar Benton OT (Occupational Therapist)       OCCUPATIONAL THERAPY TREATMENT NOTE - INPATIENT     Room Number: 7602/7602-A  Session: 3  Number of Visits to Meet Established Goals: 5    Presenting Problem: L ICH  Prior Level of Function: Pt typically independent with ADLs and mobility. Pt does not use AD.    ASSESSMENT   Patient demonstrates good  progress this session, goals remain in progress.    Patient continues to function below baseline with toileting, lower body dressing, bed mobility, transfers, stating sitting balance, dynamic sitting balance, static standing balance, and dynamic standing balance.   Contributing factors to remaining limitations include decreased functional strength, decreased functional reach, decreased endurance, pain, impaired sitting balance, impaired coordination, impaired motor planning, decreased muscular endurance, and medical status.  Next session anticipate patient to progress toileting, bed mobility, transfers, stating sitting balance, dynamic sitting balance, static standing balance, dynamic  standing balance, and maintaining seated position.  Occupational Therapy will continue to follow patient for duration of hospitalization.    Patient continues to benefit from continued skilled OT services: to facilitate return to prior level of function as patient demonstrates high motivation with excellent tolerance to an intensive therapy program .          History: Patient is a 63 year old male admitted on 2/17/2024 with Presenting Problem: L ICH. Co-Morbidities : HTN, CHAZ< DVT, CAD s/p CABG    WEIGHT BEARING RESTRICTION                     TREATMENT SESSION:  Patient Start of Session: semi supine in bed  FUNCTIONAL TRANSFER ASSESSMENT  Sit to Stand: Edge of Bed  Edge of Bed: Moderate Assist (min A x 2)    BED MOBILITY  Rolling: Moderate Assist  Supine to Sit : Moderate Assist  Sit to Supine (OT): Not Tested  Scooting: NT    BALANCE ASSESSMENT  Static Sitting: Stand-by Assist  Sitting Unilateral: Contact Guard Assist  Sitting Bilateral: Contact Guard Assist  Static Standing: Minimal Assist  Standing Unilateral: Dependent (mod A x 2/max A x 2)  Standing Bilateral: Dependent (mod A x 2/max A x 2)    FUNCTIONAL ADL ASSESSMENT  Grooming Seated: Not Tested  LB Dressing Seated: Not Tested      ACTIVITY TOLERANCE: WFL                         O2 SATURATIONS       EDUCATION PROVIDED  Patient: Role of Occupational Therapy; Plan of Care; Discharge Recommendations; Functional Transfer Techniques; Fall Prevention; Posture/Positioning; Energy Conservation; Proper Body Mechanics  Patient's Response to Education: Verbalized Understanding; Returned Demonstration; Requires Further Education    THERAPEUTIC EXERCISES  Lower Extremity      Upper Extremity AROM  Scap retraction    AAROM  Shoulder raises  Forward punches  Elbow flexion/extension    PROM  Forearm pronation/supination  Wrist flexion/extension   Position Sitting EOB     Repetitions 10-15   Sets 1       Therapist comments: Pt received semi supine in bed, pleasant and  cooperative for OT session. Pt performs bed mobility at mod A to sit EOB with cues provided for hand placement and sequencing. R UE sling donned in preparation to trial standing and transfers with srikanth walker. Pt engages in sit to stand transfers requiring min A x 2 with cues provided for L hand placement and sequencing. Pt demonstrates step transfer with srikanth walker requiring max A x 2 with multimodal cueing provided for walker sequence and weight shifting in order to advance R LE. Discussed with PT to trial R platform RW for increased safety and stability with transfer in preparation for transferring to bedside commode.   Patient End of Session: Up in chair;Needs met;Call light within reach;RN aware of session/findings;All patient questions and concerns addressed;Alarm set;Family present    SUBJECTIVE  \"My right leg is moving better.\"  \"My left shin still hurts, but it feels better than yesterday.\"    PAIN ASSESSMENT  Ratin  Location: L shin        OBJECTIVE  Precautions:  (R side flaccid, Left Leg wound)    AM-PAC ‘6-Clicks’ Inpatient Daily Activity Short Form  -   Putting on and taking off regular lower body clothing?: A Lot  -   Bathing (including washing, rinsing, drying)?: A Lot  -   Toileting, which includes using toilet, bedpan or urinal? : A Lot  -   Putting on and taking off regular upper body clothing?: A Lot  -   Taking care of personal grooming such as brushing teeth?: A Lot  -   Eating meals?: A Lot    AM-PAC Score:  Score: 12  Approx Degree of Impairment: 66.57%  Standardized Score (AM-PAC Scale): 30.6    PLAN  OT Treatment Plan: Balance activities;Energy conservation/work simplification techniques;ADL training;IADL training;Continued evaluation;Compensatory technique education;Equipment eval/education;Patient/Family training;Patient/Family education;Endurance training;Functional transfer training;UE strengthening/ROM  Rehab Potential : Good  Frequency: 3-5x/week    OT Goals:     All goals  ongoing 02/23    ADL Goals   Patient will perform eating: with set up  Patient will perform grooming: with min assist     Functional Transfer Goals  Patient will transfer from sit to supine:  with min assist  Patient will transfer from supine to sit:  with min assist  Patient will transfer from sit to stand:  with mod assist    OT Session Time: 30 minutes  Therapeutic Activity: 30 minutes                    Video Swallow Study Note - Video Swallow Study Notes      Video Swallow Study Note signed by Lindsay Proctor SLP at 2/21/2024  2:44 PM  Version 1 of 1    Author: Lindsay Proctor SLP Service: Rehab Author Type: SPEECH-LANGUAGE PATHOLOGIST    Filed: 2/21/2024  2:44 PM Date of Service: 2/21/2024  1:20 PM Status: Signed    : Lindsay Proctor SLP (SPEECH-LANGUAGE PATHOLOGIST)       ADULT VIDEOFLUOROSCOPIC SWALLOWING STUDY    Admission Date: 2/17/2024  Evaluation Date: 02/21/24  Radiologist: Dr. Mcginnis    RECOMMENDATIONS   Diet Recommendations - Solids: Regular  Diet Recommendations - Liquids: Nectar thick liquids/ Mildly thick    Further Follow-up:  Follow Up Needed (Documentation Required): Yes  SLP Follow-up Date: 02/22/24  Compensatory Strategies Recommended: No straws;Slow rate;Small bites and sips;Multiple swallows  Aspiration Precautions: Upright position;Slow rate;Small bites and sips;No straw  Medication Administration Recommendations: One pill at a time;Present with thickened liquid  Treatment Plan/Recommendations: Dysphagia therapy;Aspiration precautions;Communication evaluation    HISTORY   Background/Objective Information:    Problem List  Principal Problem:    Intraparenchymal hematoma of brain, left, without loss of consciousness, initial encounter ([facility])  Active Problems:    Hyperlipidemia    HFrEF (heart failure with reduced ejection fraction) ([facility])    Coronary artery disease involving coronary bypass graft of native heart    Deep vein thrombosis (DVT) of proximal vein of left lower extremity  (HCC)    Vasogenic cerebral edema (HCC)      Past Medical History  Past Medical History:   Diagnosis Date    Cataract     High blood pressure     High cholesterol     Obstructive sleep apnea, adult [place] 6-20-16    autoPAP 5-12     Stroke (HCC) 2015       Current Diet Consistency: Regular;Thin liquids  Prior Level of Function: Independent  Prior Living Situation: Home with spouse  History of Recent: No recent respiratory difficulty  Precautions: Aspiration  Imaging results:     Reason for Referral: R/O aspiration (define pharyngeal phase)    Family/Patient Goals:  To eat and drink     ASSESSMENT   DYSPHAGIA ASSESSMENT  Test completed in conjunction with Radiologist.  Patient Positioned: Upright;Midline;HARDEEP chair.  Patient Viewed: Lateral.  Patient Alertness: Fully alert.  Consistencies Presented: Thin liquids;Nectar thick liquids/ Mildly thick;Puree;Hard solid to assess oropharyngeal swallow function and assess for compensatory strategies to improve safe swallow function.    THIN LIQUIDS  Method of Presentation: Teaspoon;Cup  Oral Phase of Swallow (VFSS - Thin Liquids): Within Functional Limits  Triggered at: Valleculae  Residue Severity, Location: Mild;Valleculae;Pyriform sinuses  Cleared/Reduced with: Secondary swallow  Laryngeal Penetration: Before the swallow;After the swallow  Tracheal Aspiration: After the swallow  Cough/Throat Clear Response: Yes  Cough/Throat Clear Effective: No  Strategy(ies) Implemented (Thin Liquids): Small bites and sips;Multiple swallows  Effectiveness: No  NECTAR THICK LIQUIDS/ MILDLY THICK  Method of Presentation: Teaspoon;Cup  Oral Phase of Swallow (VFSS - Nectar Thick Liquids): Within Functional Limits  Triggered at: Base of tongue  Residue Severity, Location: Mild;Valleculae (base of tongue)  Cleared/Reduced with: Secondary swallow  Laryngeal Penetration: None  Tracheal Aspiration: None  Strategy(ies) Implemented (Nectar Thick): Multiple swallows  Effectiveness: Yes      PUREE  Oral Phase of Swallow (VFSS - Puree): Within Functional Limits  Triggered at: Valleculae  Residue Severity, Location: Mild;Valleculae (base of tongue)  Cleared/Reduced with: Secondary swallow  Laryngeal Penetration: None  Tracheal Aspiration: None  Strategy(ies) Implemented (Puree): Multple swallows  Effectiveness: Yes     HARD SOLID  Oral Phase of Swallow (VFSS - Hard Solid): Within Functional Limits  Triggered at: Valleculae  Residue Severity, Location: Mild;Valleculae (base of tongue)  Cleared/Reduced with: Secondary swallow  Laryngeal Penetration: None  Tracheal Aspiration: None  Strategy(ies) Implemented (Hard Solid): Small bites and sips;Multiple swallows  Effectiveness: Yes  Penetration Aspiration Scale Score: Score 7: Material enters the airway, passes below the vocal folds, and is not ejected from the trachea despite effort       Overall Impression:   Videofluoroscopic swallow study was completed in conjunction with the radiologist.  Patient was assessed with  thin liquids via spoon and cup, mildly thick liquids, puree and solid consistencies. Patient demonstrated a functional oral phase and mild-moderately impaired pharyngeal phase of swallow with cough in response to aspirated material of thin liquid. Patient with laryngeal penetration and aspiration during the controlled conditions of this exam with thin liquids.   Oral phase revealed cohesive bolus hold, timely and efficient chewing and mashing, brisk tongue motion, and complete clearing of oral cavity.   Pharyngeal phase revealed decreased base of tongue motion and diminished pharyngeal stripping.  Impairments of the pharyngeal swallow resulted in laryngeal penetration during the swallow and aspiration of thin liquid residue as he aspirated the overflow of residue from valleculae with a cough in response to aspirated material.  Swallow strategy assessed was a double swallow which effectively cleared the residue.  Patient is at high risk of  aspiration due to weakness.     Following exam, education provided to patient and wife regarding results, aspiration risks, diet recommendations, aspiration precautions and plan with understanding verbalized. Collaborated with RN on results and recommendations with RN in agreement.  Will continue to follow as per plan of care.               GOALS  Goal #1 The patient will tolerate regular consistency and mildly thick liquids without overt signs or symptoms of aspiration with 95 % accuracy over 2-3 session(s).  In Progress   Goal #2 The patient will utilize compensatory strategies as outlined by  VFSS including Slow rate, Small bites, Small sips, Multiple swallows, No straws, Upright 90 degrees with min assistance 95 % of the time across 2 sessions.    In Progress   Goal #3 The patient/family/caregiver will demonstrate understanding and implementation of aspiration precautions and swallow strategies independently over 2-3 session(s).  In Progress   Goal #4 Patient will complete oral and pharyngeal musculature exercises:  Effortful swallow x 10  Shaker Exercise x 10    In Progress                         PLAN: Skilled dysphagia therapy targeting oral and pharyngeal musculature exercises to help strengthen and coordinate the muscles involved in swallowing.       EDUCATION/INSTRUCTION  Reviewed results and recommendations with patient and his wife.  Agreement/Understanding verbalized and all questions answered to their apparent satisfaction.    INTERDISCIPLINARY COMMUNICATION  Reviewed results with Radiologist; agreement verbalized.    Patient Experiencing Pain: No                FOLLOW UP  Treatment Plan/Recommendations: Dysphagia therapy;Aspiration precautions;Communication evaluation  Number of Visits to Meet Established Goals: 4      Thank you for your referral.   If you have any questions, please contact GIN Scott Colleen M SLP              SLP Note - SLP Notes      SLP Note signed by Conor  GIN Marquis at 2/23/2024  3:11 PM  Version 1 of 1    Author: Conor, Kandis, SLP Service: Rehab Author Type: Speech Pathologist    Filed: 2/23/2024  3:11 PM Date of Service: 2/23/2024  3:01 PM Status: Signed    : Kandis Perdomo SLP (Speech Pathologist)       SPEECH DAILY NOTE - INPATIENT    ASSESSMENT & PLAN   ASSESSMENT  Pt seen for dysphagia tx to assess tolerance with recommended diet, ensure proper utilization of aspiration precautions and provide pt/family education. Spouse present. Patient received awake, alert, and motivated. Patient completed dysphagia therapeutic exercises x10 each, therapeutic PO trials of thin liquid via tspn sip x10 with 90% acc in no s/s aspiration observed, as well as x5 ice chip trials with no overt clinical s/s aspiration observed or suspected.  Patient completed double swallow 100% of the time.    Patient was administered the [facility] Mental Status Exam (UMS) which is an exam for detecting mild cognitive impairment and dementia. Patient achieved a raw score of 30/30  (Normal= 27-30; Mild= 21-26; Dementia= 1-20) which was WNL.  Patient exhibited fluent language with no evidence of paraphasias or word finding difficulty. Right facial droop however adequate ROM.  Speech intelligibility suspect mildly reduced  however 90+% intelligible across contexts.   Education/counseling completed regarding role/purpose of SLP, post acute speech therapy recommendation, as well as swallow strategies and aspiration precautions. Patient/spouse expressed understanding and agreement.         Diet Recommendations - Solids: Regular  Diet Recommendations - Liquids: Nectar thick liquids/ Mildly thick (ice chips ok)  Compensatory Strategies Recommended: No straws;Slow rate;Small bites and sips;Multiple swallows  Aspiration Precautions: Upright position;Slow rate;Small bites and sips;No straw  Medication Administration Recommendations: One pill at a time;Present with thickened  liquid    Patient Experiencing Pain: Yes  Pain Rating: 3  Pain Location: left lower leg/wound site  Pain Control: PO meds  Nursing Aware of Pain?: Yes    Treatment Plan  Treatment Plan/Recommendations: Dysarthria therapy;Aspiration precautions;Dysphagia therapy        GOALS  Goal #1 The patient will tolerate regular consistency and mildly thick liquids without overt signs or symptoms of aspiration with 95 % accuracy over 2-3 session(s).  In Progress   Goal #2 The patient will utilize compensatory strategies as outlined by  VFSS including Slow rate, Small bites, Small sips, Multiple swallows, No straws, Upright 90 degrees with min assistance 95 % of the time across 2 sessions.     In Progress   Goal #3 The patient/family/caregiver will demonstrate understanding and implementation of aspiration precautions and swallow strategies independently over 2-3 session(s).  In Progress   Goal #4 Patient will complete oral and pharyngeal musculature exercises:  Effortful swallow x 10  Shaker Exercise x 10     In Progress   Goal #5 Cognitive communication evaluation MET     FOLLOW UP  Follow Up Needed (Documentation Required): Yes  SLP Follow-up Date: 02/26/24  Number of Visits to Meet Established Goals: 4    Session: 1, since VFSS completed    If you have any questions, please contact GIN Robert, MS CCC-SLP/L, pager [phone]  Speech-Language Pathologist      Electronically signed by Kandis Perdomo, SLP on 2/23/2024  3:11 PM           Immunizations     Name Date      Covid-19 Moderna 01/15/22     Covid-19 Moderna 05/21/21     Covid-19 Moderna 04/23/21     INFLUENZA 11/12/19     INFLUENZA 11/12/19     INFLUENZA 09/09/16     INFLUENZA 09/09/16     Pneumovax 23 06/01/21     TDAP 09/06/18     Zoster Vaccine Recombinant Adjuvanted (Shingrix) 06/30/22     Zoster Vaccine Recombinant Adjuvanted (Shingrix) 03/10/22       Future Appointments        Provider Department Center    2/29/2024 10:00 AM Getachew Osullivan MD [facility]    3/4/2024 2:15 PM Shelby Gordon APRN [facility] [place]    3/7/2024 7:30 AM Ricky Darby MD [facility]    3/11/2024 7:30 AM WOUND CARE NURSE [facility]    3/14/2024 7:30 AM Ricky Darby MD [facility]    3/18/2024 7:30 AM WOUND CARE NURSE [facility]    3/21/2024 7:30 AM Ricky Darby MD [facility]    3/25/2024 7:30 AM WOUND CARE NURSE [facility]    3/28/2024 7:30 AM Ricky Darby MD [facility]      Multidisciplinary Problems     Active Goals        Problem: Patient/Family Goals    Goal Priority Disciplines Outcome Interventions   Patient/Family Long Term Goal     Interdisciplinary Progressing    Description: Patient's Long Term Goal: ***    Interventions:  - ***  - See additional Care Plan goals for specific interventions   Patient/Family Short Term Goal     Interdisciplinary Progressing    Description: Patient's Short Term Goal: ***    Interventions:   - ***  - See additional Care Plan goals for specific interventions

## (undated) NOTE — LETTER
[address] [facility] [place] FOR BLOOD OR BLOOD COMPONENTS      In the course of your treatment, it may become necessary to administer a transfusion of blood or blood components.  This form provides basic information concerning this proc explain the alternatives to you if it has not already been done. I,Kris Colvin, have read/had read to me the above. I understand the matters bearing on the decision whether or not to authorize a transfusion of blood or blood components.  I have no questio

## (undated) NOTE — LETTER
01/11/21        Cyn Gipson [id number]      Dear Sherry Munoz,    [address] records indicate that you have outstanding lab work and or testing that was ordered for you and has not yet been completed:  Orders Placed This Encounter      T4 MIRTHA

## (undated) NOTE — LETTER
[address]  [address]  Authorization for Surgical Operation and Procedure     Date:___________                                                                                                         Time:__________  I hereby authorize Dr. ESTEBAN Pruitt, my physician and his/her assistants (if applicable), which may include medical students, residents, and/or fellows, to perform the following surgical operation/ procedure and administer such anesthesia as may be determined necessary by my physician: Transesophageal Echocardiogram   on Kris Colvin   2.   I recognize that during the surgical operation/procedure, unforeseen conditions may necessitate additional or different procedures than those listed above.  I, therefore, further authorize and request that the above-named surgeon, assistants, or designees perform such procedures as are, in their judgment, necessary and desirable.    3.   My surgeon/physician has discussed prior to my surgery the potential benefits, risks and side effects of this procedure; the likelihood of achieving goals; and potential problems that might occur during recuperation.  They also discussed reasonable alternatives to the procedure, including risks, benefits, and side effects related to the alternatives and risks related to not receiving this procedure.  I have had all my questions answered and I acknowledge that no guarantee has been made as to the result that may be obtained.    4.   Should the need arise during my operation/procedure, which includes change of level of care prior to discharge, I also consent to the administration of blood and/or blood products.  Further, I understand that despite careful testing and screening of blood or blood products by collecting agencies, I may still be subject to ill effects as a result of receiving a blood transfusion and/or blood products.  The following are some, but not all, of the potential risks that can  occur: fever and allergic reactions, hemolytic reactions, transmission of diseases such as Hepatitis, AIDS and Cytomegalovirus (CMV) and fluid overload.  In the event that I wish to have an autologous transfusion of my own blood, or a directed donor transfusion, I will discuss this with my physician.  Check only if Refusing Blood or Blood Products  I understand refusal of blood or blood products as deemed necessary by my physician may have serious consequences to my condition to include possible death. I hereby assume responsibility for my refusal and release the hospital, its personnel, and my physicians from any responsibility for the consequences of my refusal.          o  Refuse      5.   I authorize the use of any specimen, organs, tissues, body parts or foreign objects that may be removed from my body during the operation/procedure for diagnosis, research or teaching purposes and their subsequent disposal by hospital authorities.  I also authorize the release of specimen test results and/or written reports to my treating physician on the hospital medical staff or other referring or consulting physicians involved in my care, at the discretion of the Pathologist or my treating physician.    6.   I consent to the photographing or videotaping of the operations or procedures to be performed, including appropriate portions of my body for medical, scientific, or educational purposes, provided my identity is not revealed by the pictures or by descriptive texts accompanying them.  If the procedure has been photographed/videotaped, the surgeon will obtain the original picture, image, videotape or CD.  The hospital will not be responsible for storage, release or maintenance of the picture, image, tape or CD.    7.   I consent to the presence of a  or observers in the operating room as deemed necessary by my physician or their designees.    8.   I recognize that in the event my procedure results in  extended X-Ray/fluoroscopy time, I may develop a skin reaction.    9. If I have a Do Not Attempt Resuscitation (DNAR) order in place, that status will be suspended while in the operating room, procedural suite, and during the recovery period unless otherwise explicitly stated by me (or a person authorized to consent on my behalf). The surgeon or my attending physician will determine when the applicable recovery period ends for purposes of reinstating the DNAR order.  10. Patients having a sterilization procedure: I understand that if the procedure is successful the results will be permanent and it will therefore be impossible for me to inseminate, conceive, or bear children.  I also understand that the procedure is intended to result in sterility, although the result has not been guaranteed.   11. I acknowledge that my physician has explained sedation/analgesia administration to me including the risk and benefits I consent to the administration of sedation/analgesia as may be necessary or desirable in the judgment of my physician.    I CERTIFY THAT I HAVE READ AND FULLY UNDERSTAND THE ABOVE CONSENT TO OPERATION and/or OTHER PROCEDURE.    _________________________________________  __________________________________  Signature of Patient     Signature of Responsible Person         ___________________________________         Printed Name of Responsible Person           _________________________________                 Relationship to Patient  _________________________________________  ______________________________  Signature of Witness          Date  Time      Patient Name: Kris Colvin     : 3/6/1960                 Printed: 2024     Medical Record #: [id number]                     Page 1 [address]  [address]    Consent for Anesthesia    I, Kris Colvin agree to be cared for by an anesthesiologist, who is specially trained to  monitor me and give me medicine to put me to sleep or keep me comfortable during my procedure    I understand that my anesthesiologist is not an employee or agent of [facility] or [employer]. He or she works for [employer].    As the patient asking for anesthesia services, I agree to:  Allow the anesthesiologist (anesthesia doctor) to give me medicine and do additional procedures as necessary. Some examples are: Starting or using an “IV” to give me medicine, fluids or blood during my procedure, and having a breathing tube placed to help me breathe when I’m asleep (intubation). In the event that my heart stops working properly, I understand that my anesthesiologist will make every effort to sustain my life, unless otherwise directed by [facility] Do Not Resuscitate documents.  Tell my anesthesia doctor before my procedure:  If I am pregnant.  The last time that I ate or drank.  All of the medicines I take (including prescriptions, herbal supplements, and pills I can buy without a prescription (including street drugs/illegal medications). Failure to inform my anesthesiologist about these medicines may increase my risk of anesthetic complications.  If I am allergic to anything or have had a reaction to anesthesia before.  I understand how the anesthesia medicine will help me (benefits).  I understand that with any type of anesthesia medicine there are risks:  The most common risks are: nausea, vomiting, sore throat, muscle soreness, damage to my eyes, mouth, or teeth (from breathing tube placement).  Rare risks include: remembering what happened during my procedure, allergic reactions to medications, injury to my airway, heart, lungs, vision, nerves, or muscles and in extremely rare instances death.  My doctor has explained to me other choices available to me for my care (alternatives).  Pregnant Patients (“epidural”):  I understand that the risks of having an epidural (medicine  given into my back to help control pain during labor), include itching, low blood pressure, difficulty urinating, headache or slowing of the baby’s heart. Very rare risks include infection, bleeding, seizure, irregular heart rhythms and nerve injury.  Regional Anesthesia (“spinal”, “epidural”, & “nerve blocks”):  I understand that rare but potential complications include headache, bleeding, infection, seizure, irregular heart rhythms, and nerve injury.    I can change my mind about having anesthesia services at any time before I get the medicine.    _____________________________________________________________________________  Patient (or Representative) Signature/Relationship to Patient  Date   Time    _____________________________________________________________________________   Name (if used)    Language/Organization   Time    _____________________________________________________________________________  Anesthesiologist Signature     Date   Time  I have discussed the procedure and information above with the patient (or patient’s representative) and answered their questions. The patient or their representative has agreed to have anesthesia services.    _____________________________________________________________________________  Witness        Date   Time  I have verified that the signature is that of the patient or patient’s representative, and that it was signed before the procedure  Patient Name: Kris Colvin     : 3/6/1960                 Printed: 2024     Medical Record #: [id number]                     Page 2 of 2

## (undated) NOTE — LETTER
[facility] [address], [address]  Consent for Procedure/Sedation    Date:     Time:       1.  I authorize the performance upon Mariel Paul the following:cardiac catheterization, left ventricular cineangiography, bilateral selecti period, the physician will determine when the applicable recovery period ends for purposes of reinstating the Do Not Resuscitate (DNR) order.     Signature of Patient: ____________________________________________________    Signature of person authorized

## (undated) NOTE — LETTER
Date: 2/8/2024    Patient Name: Kris Colvin          To Whom it may concern:      Patient should have work restrictions for 2/9/24 - 2/29/24 as follows:     Light duty only.   No heavy lifting, bending, squatting, or climbing stairs or ladders.         Sincerely,    Ricky Darby MD

## (undated) NOTE — LETTER
[address]  [address]  Consent for Procedure/Sedation  Date: 2/20/24         Time: 1100    I hereby authorize Dr Mcginnis, my physician and his/her assistants (if applicable), which may include medical students, residents, and/or fellows, to perform the following surgical operation/ procedure and administer such anesthesia as may be determined necessary by my physician: Inferior Vena Cava Filter Insertion on Kris Colvin  2.   I recognize that during the surgical operation/procedure, unforeseen conditions may necessitate additional or different procedures than those listed above.  I, therefore, further authorize and request that the above-named surgeon, assistants, or designees perform such procedures as are, in their judgment, necessary and desirable.    3.   My surgeon/physician has discussed prior to my surgery the potential benefits, risks and side effects of this procedure; the likelihood of achieving goals; and potential problems that might occur during recuperation.  They also discussed reasonable alternatives to the procedure, including risks, benefits, and side effects related to the alternatives and risks related to not receiving this procedure.  I have had all my questions answered and I acknowledge that no guarantee has been made as to the result that may be obtained.    4.   Should the need arise during my operation/procedure, which includes change of level of care prior to discharge, I also consent to the administration of blood and/or blood products.  Further, I understand that despite careful testing and screening of blood or blood products by collecting agencies, I may still be subject to ill effects as a result of receiving a blood transfusion and/or blood products.  The following are some, but not all, of the potential risks that can occur: fever and allergic reactions, hemolytic reactions, transmission of diseases such as Hepatitis, AIDS and Cytomegalovirus (CMV)  and fluid overload.  In the event that I wish to have an autologous transfusion of my own blood, or a directed donor transfusion, I will discuss this with my physician.   Check only if Refusing Blood or Blood Products  I understand refusal of blood or blood products as deemed necessary by my physician may have serious consequences to my condition to include possible death. I hereby assume responsibility for my refusal and release the hospital, its personnel, and my physicians from any responsibility for the consequences of my refusal.         o  Refuse         5.   I authorize the use of any specimen, organs, tissues, body parts or foreign objects that may be removed from my body during the operation/procedure for diagnosis, research or teaching purposes and their subsequent disposal by hospital authorities.  I also authorize the release of specimen test results and/or written reports to my treating physician on the hospital medical staff or other referring or consulting physicians involved in my care, at the discretion of the Pathologist or my treating physician.    6.   I consent to the photographing or videotaping of the operations or procedures to be performed, including appropriate portions of my body for medical, scientific, or educational purposes, provided my identity is not revealed by the pictures or by descriptive texts accompanying them.  If the procedure has been photographed/videotaped, the surgeon will obtain the original picture, image, videotape or CD.  The hospital will not be responsible for storage, release or maintenance of the picture, image, tape or CD.    7.   I consent to the presence of a  or observers in the operating room as deemed necessary by my physician or their designees.    8.   I recognize that in the event my procedure results in extended X-Ray/fluoroscopy time, I may develop a skin reaction.    9. If I have a Do Not Attempt Resuscitation (DNAR) order in place,  that status will be suspended while in the operating room, procedural suite, and during the recovery period unless otherwise explicitly stated by me (or a person authorized to consent on my behalf). The surgeon or my attending physician will determine when the applicable recovery period ends for purposes of reinstating the DNAR order.  10. Patients having a sterilization procedure: I understand that if the procedure is successful the results will be permanent and it will therefore be impossible for me to inseminate, conceive, or bear children.  I also understand that the procedure is intended to result in sterility, although the result has not been guaranteed.   11. I acknowledge that my physician has explained sedation/analgesia administration to me including the risk and benefits I consent to the administration of sedation/analgesia as may be necessary or desirable in the judgment of my physician.    I CERTIFY THAT I HAVE READ AND FULLY UNDERSTAND THE ABOVE CONSENT TO OPERATION and/or OTHER PROCEDURE.        ____________________________________       _________________________________      ______________________________  Signature of Patient         Signature of Responsible Person        Printed Name of Responsible Person        ____________________________________      _________________________________      ______________________________       Signature of Witness          Relationship to Patient                       Date                                       Time  Patient Name: Kris Colvin     : 3/6/1960                 Printed: 2024      Medical Record #: [id number]                      Page 1 of 1

## (undated) NOTE — LETTER
Lex Mins RACHEL Vinson M.D., F.A.C.S. Billie Araiza M.D., F.A.C.S. Soniya Ricks M.D., Hernesto Palacio M.D., F.A.C.SVaishnavi Taylor. Vonda Suarez M.D., F.A.C.S. Thao Pizarro M.D. ARMIDA Oconnor M.D., F. chance to have all of your questions and concerns answered. If there are any issues which have not been adequately addressed, we ask you to bring them forward so that we can thoroughly address them.     A patient who is fully informed and understands their A [facility] surgeon has explained to me that there are alternatives to surgery which might include no surgery, medical therapy, or interventional treatment, among other options and the risks and benefits of the different treatment options:    Yes _____ No _____ Record #: [id number]    Page: 2 of  2        Printed: May 12, 2020

## (undated) NOTE — Clinical Note
I saw this patient today. I think he needs wound clinic ASAP. I will have him continue the rivaroxaban 20 mg daily as the venous doppler looks better. He needs wound care and compression for that left leg. We are helping him get in to see them. - Vickey

## (undated) NOTE — LETTER
[address] [address]  [place], [address] [facility]    Consent for Operation  Date: __________________                                Time: _______________    1.  I authorize the performance upon Veronica Pastrana the following operation:  Procedure(s) medical, scientific, or educational purposes, provided my identity is not revealed by the pictures or by descriptive texts accompanying them. If the procedure has been videotaped, the surgeon will obtain the original videotape.  The hospital will not be res I CERTIFY THAT I HAVE READ AND UNDERSTAND THE ABOVE CONSENT TO OPERATION, THAT MY DOCTOR PROVIDED ME WITH THE ABOVE EXPLANATIONS, THAT ALL BLANKS OR STATEMENTS REQUIRING INSERTION OR COMPLETION WERE FILLED IN.     Signature of Patient:   ___________________ ii. The last time that I ate or drank.  iii. All of the medicines I take (including prescriptions, herbal supplements, and pills I can buy without a prescription (including street drugs/illegal medications).  Failure to inform my anesthesiologist about thes _____________________________________________________________________________  Anesthesiologist Signature     Date   Time  I have discussed the procedure and information above with the patient (or patient’s representative) and answered their questions.  The

## (undated) NOTE — LETTER
12/12/19        Veronica Pleasure  [address] 1969      Dear Liana Naranjo,    [facility] records indicate that you have outstanding lab work and or testing that was ordered for you and has not yet been completed:  Orders Placed This Encounter      TSH

## (undated) NOTE — LETTER
[facility] [address], [address]  Consent for Procedure/Sedation    Date:     Time:       1. I authorize the performance upon Doni Lavern the following:  ELECTIVE CARDIOVERSION    2.  I authorize Dr. Miguel Momin (and whomever is desig ________________________________    ___________________    Witness: _________________________      Date: ___________________    Printed: 2019   1:24 PM  Patient Name: Citlaly Greene        : 3/6/1960       Medical Record #: [id number]

## (undated) NOTE — LETTER
Date: 1/25/2024    Patient Name: Kris Colvin          To Whom it may concern:    This letter has been written at the patient's request. The above patient was seen at the [facility] for treatment of a medical condition.    This patient should be excused from attending work/school from 1/25/24 through 2/8/24.      Sincerely,    Ricky Darby MD

## (undated) NOTE — MR AVS SNAPSHOT
[address]  [address] [facility], [address] [address] [phone]               Thank you for choosing us for your health care visit with Malon Crigler, DO.   We are glad to serve you and happy to provide you with this ayers Today's Vital Signs     BP Pulse Temp Height Weight BMI    126/80 mmHg 80 98.6 °F (37 °C) (Oral) 75\" 267 lb 33.37 kg/m2         Current Medications          This list is accurate as of: 3/7/17  8:10 AM.  Always use your most recent med list. Don’t eat while distracted and slow down. Avoid over sized portions. Don’t eat while when you’re bored.      EAT THESE FOODS MORE OFTEN: EAT THESE FOODS LESS OFTEN:   Make half your plate fruits and vegetables Highly refined, white starches including wh

## (undated) NOTE — MR AVS SNAPSHOT
After Visit Summary   6/7/2022    Glenn Valdivia   MRN: [id number]           Visit Information     Date & Time  6/7/2022  7:00 PM Provider  [facility]  [address] Dept. Phone  [phone]      Allergies as of 6/7/2022  Review status set to Review Complete on 3/3/2022   No Known Allergies     Your Current Medications        Dosage    LISINOPRIL 40 MG Oral Tab TAKE 1 TABLET DAILY    ATORVASTATIN 80 MG Oral Tab TAKE 1 TABLET BY MOUTH EVERY NIGHT    carvedilol 3.125 MG Oral Tab Take 1 tablet (3.125 mg total) by mouth 2 (two) times daily with meals. dronedarone 400 MG Oral Tab Take 400 mg by mouth 2 (two) times daily with meals. aspirin 81 MG Oral Tab Take 81 mg by mouth daily. Multiple Vitamins-Minerals (MULTI FOR HER 50+) Oral Tab Take by mouth daily. Future Appointments        Provider Department    6/30/2022 11:15 AM JULIANNE [address]                Did you know that [facility] primary care physicians now offer Video Visits through [address] for adult patients for a variety of conditions such as allergies, back pain and cold symptoms? Skip the drive and waiting room and online chat with a doctor face-to-face using your web-cam enabled computer or mobile device wherever you are. Video Visits cost $50 and can be paid hassle-free using a credit, debit, or health savings card. Not active on [employer]? Ask us how to get signed up today! If you receive a survey from [employer], please take a few minutes to complete it and provide feedback. We strive to deliver the best patient experience and are looking for ways to make improvements. Your feedback will help us do so. For more information on [employer], please visit [email or web address]           No text in SmartText           No text in SmartText

## (undated) NOTE — MR AVS SNAPSHOT
[address]  [address] [facility], [address] [address] [phone]               Thank you for choosing us for your health care visit with Asya Donis DO.   We are glad to serve you and happy to provide you with this ayers discharge instructions in [employer] by going to Visits < Admission Summaries. If you've been to the Emergency Department or your doctor's office, you can view your past visit information in [employer] by going to Visits < Visit Summaries. [employer] questions?